# Patient Record
Sex: FEMALE | Race: WHITE | NOT HISPANIC OR LATINO | Employment: OTHER | URBAN - METROPOLITAN AREA
[De-identification: names, ages, dates, MRNs, and addresses within clinical notes are randomized per-mention and may not be internally consistent; named-entity substitution may affect disease eponyms.]

---

## 2017-01-24 ENCOUNTER — TRANSCRIBE ORDERS (OUTPATIENT)
Dept: ADMINISTRATIVE | Facility: HOSPITAL | Age: 67
End: 2017-01-24

## 2017-01-24 DIAGNOSIS — K57.30 DIVERTICULOSIS OF LARGE INTESTINE WITHOUT DIVERTICULITIS: ICD-10-CM

## 2017-01-24 DIAGNOSIS — R10.11 ABDOMINAL PAIN, RIGHT UPPER QUADRANT: Primary | ICD-10-CM

## 2017-01-24 DIAGNOSIS — R19.7 DIARRHEA, UNSPECIFIED TYPE: ICD-10-CM

## 2017-01-24 DIAGNOSIS — R10.31 ABDOMINAL PAIN, RIGHT LOWER QUADRANT: ICD-10-CM

## 2017-01-24 DIAGNOSIS — K57.32 DIVERTICULITIS OF COLON (WITHOUT MENTION OF HEMORRHAGE)(562.11): ICD-10-CM

## 2017-01-27 ENCOUNTER — TRANSCRIBE ORDERS (OUTPATIENT)
Dept: ADMINISTRATIVE | Facility: HOSPITAL | Age: 67
End: 2017-01-27

## 2017-01-27 ENCOUNTER — APPOINTMENT (OUTPATIENT)
Dept: LAB | Facility: HOSPITAL | Age: 67
End: 2017-01-27
Payer: MEDICARE

## 2017-01-27 DIAGNOSIS — K57.32 DIVERTICULITIS OF COLON (WITHOUT MENTION OF HEMORRHAGE)(562.11): Primary | ICD-10-CM

## 2017-01-27 DIAGNOSIS — Z79.899 ENCOUNTER FOR LONG-TERM (CURRENT) USE OF OTHER MEDICATIONS: Primary | ICD-10-CM

## 2017-01-27 LAB
ALBUMIN SERPL BCP-MCNC: 3.5 G/DL (ref 3.5–5)
ALP SERPL-CCNC: 68 U/L (ref 46–116)
ALT SERPL W P-5'-P-CCNC: 17 U/L (ref 12–78)
ANION GAP SERPL CALCULATED.3IONS-SCNC: 9 MMOL/L (ref 4–13)
AST SERPL W P-5'-P-CCNC: 13 U/L (ref 5–45)
BILIRUB SERPL-MCNC: 0.3 MG/DL (ref 0.2–1)
BUN SERPL-MCNC: 25 MG/DL (ref 5–25)
CALCIUM SERPL-MCNC: 8.7 MG/DL (ref 8.3–10.1)
CHLORIDE SERPL-SCNC: 103 MMOL/L (ref 100–108)
CO2 SERPL-SCNC: 28 MMOL/L (ref 21–32)
CREAT SERPL-MCNC: 0.83 MG/DL (ref 0.6–1.3)
GFR SERPL CREATININE-BSD FRML MDRD: >60 ML/MIN/1.73SQ M
GLUCOSE SERPL-MCNC: 87 MG/DL (ref 65–140)
POTASSIUM SERPL-SCNC: 3.8 MMOL/L (ref 3.5–5.3)
PROT SERPL-MCNC: 7.1 G/DL (ref 6.4–8.2)
SODIUM SERPL-SCNC: 140 MMOL/L (ref 136–145)

## 2017-01-27 PROCEDURE — 36415 COLL VENOUS BLD VENIPUNCTURE: CPT | Performed by: NURSE PRACTITIONER

## 2017-01-27 PROCEDURE — 80053 COMPREHEN METABOLIC PANEL: CPT | Performed by: NURSE PRACTITIONER

## 2017-01-30 ENCOUNTER — HOSPITAL ENCOUNTER (OUTPATIENT)
Dept: RADIOLOGY | Facility: HOSPITAL | Age: 67
Discharge: HOME | End: 2017-01-30
Attending: INTERNAL MEDICINE
Payer: MEDICARE

## 2017-01-30 DIAGNOSIS — K57.30 DIVERTICULOSIS OF LARGE INTESTINE WITHOUT DIVERTICULITIS: ICD-10-CM

## 2017-01-30 DIAGNOSIS — R19.7 DIARRHEA, UNSPECIFIED TYPE: ICD-10-CM

## 2017-01-30 DIAGNOSIS — R10.11 ABDOMINAL PAIN, RIGHT UPPER QUADRANT: ICD-10-CM

## 2017-01-30 DIAGNOSIS — R10.31 ABDOMINAL PAIN, RIGHT LOWER QUADRANT: ICD-10-CM

## 2017-01-30 DIAGNOSIS — K57.32 DIVERTICULITIS OF COLON (WITHOUT MENTION OF HEMORRHAGE)(562.11): ICD-10-CM

## 2017-01-30 PROCEDURE — 74177 CT ABD & PELVIS W/CONTRAST: CPT

## 2017-01-30 RX ADMIN — IOHEXOL 100 ML: 350 INJECTION, SOLUTION INTRAVENOUS at 10:27

## 2017-02-20 ENCOUNTER — ALLSCRIPTS OFFICE VISIT (OUTPATIENT)
Dept: OTHER | Facility: OTHER | Age: 67
End: 2017-02-20

## 2017-03-27 ENCOUNTER — GENERIC CONVERSION - ENCOUNTER (OUTPATIENT)
Dept: OTHER | Facility: OTHER | Age: 67
End: 2017-03-27

## 2017-04-13 ENCOUNTER — GENERIC CONVERSION - ENCOUNTER (OUTPATIENT)
Dept: OTHER | Facility: OTHER | Age: 67
End: 2017-04-13

## 2017-09-16 ENCOUNTER — GENERIC CONVERSION - ENCOUNTER (OUTPATIENT)
Dept: OTHER | Facility: OTHER | Age: 67
End: 2017-09-16

## 2017-10-29 ENCOUNTER — HOSPITAL ENCOUNTER (INPATIENT)
Facility: HOSPITAL | Age: 67
LOS: 1 days | Discharge: HOME | DRG: 305 | End: 2017-10-31
Attending: FAMILY MEDICINE | Admitting: INTERNAL MEDICINE
Payer: MEDICARE

## 2017-10-29 ENCOUNTER — APPOINTMENT (EMERGENCY)
Dept: RADIOLOGY | Facility: HOSPITAL | Age: 67
DRG: 305 | End: 2017-10-29
Payer: MEDICARE

## 2017-10-29 DIAGNOSIS — I10 HTN (HYPERTENSION): Primary | ICD-10-CM

## 2017-10-29 DIAGNOSIS — I16.0 HYPERTENSIVE URGENCY: ICD-10-CM

## 2017-10-29 DIAGNOSIS — R51.9 HEADACHE: ICD-10-CM

## 2017-10-29 PROBLEM — E03.9 HYPOTHYROIDISM: Status: ACTIVE | Noted: 2017-10-29

## 2017-10-29 LAB
ALBUMIN SERPL BCP-MCNC: 3.7 G/DL (ref 3.5–5)
ALP SERPL-CCNC: 79 U/L (ref 46–116)
ALT SERPL W P-5'-P-CCNC: 34 U/L (ref 12–78)
ANION GAP SERPL CALCULATED.3IONS-SCNC: 9 MMOL/L (ref 4–13)
APTT PPP: 28 SECONDS (ref 24–33)
AST SERPL W P-5'-P-CCNC: 24 U/L (ref 5–45)
BACTERIA UR QL AUTO: ABNORMAL /HPF
BASOPHILS # BLD AUTO: 0 THOUSANDS/ΜL (ref 0–0.1)
BASOPHILS NFR BLD AUTO: 1 % (ref 0–1)
BILIRUB SERPL-MCNC: 0.4 MG/DL (ref 0.2–1)
BILIRUB UR QL STRIP: NEGATIVE
BUN SERPL-MCNC: 12 MG/DL (ref 5–25)
CALCIUM SERPL-MCNC: 9.5 MG/DL (ref 8.3–10.1)
CHLORIDE SERPL-SCNC: 101 MMOL/L (ref 100–108)
CLARITY UR: CLEAR
CO2 SERPL-SCNC: 29 MMOL/L (ref 21–32)
COLOR UR: YELLOW
CREAT SERPL-MCNC: 0.73 MG/DL (ref 0.6–1.3)
CRP SERPL HS-MCNC: 2.27 MG/L
EOSINOPHIL # BLD AUTO: 0.2 THOUSAND/ΜL (ref 0–0.61)
EOSINOPHIL NFR BLD AUTO: 4 % (ref 0–6)
ERYTHROCYTE [DISTWIDTH] IN BLOOD BY AUTOMATED COUNT: 13 % (ref 11.6–15.1)
ERYTHROCYTE [SEDIMENTATION RATE] IN BLOOD: 13 MM/HOUR (ref 2–25)
GFR SERPL CREATININE-BSD FRML MDRD: 84 ML/MIN/1.73SQ M
GLUCOSE SERPL-MCNC: 91 MG/DL (ref 65–140)
GLUCOSE UR STRIP-MCNC: NEGATIVE MG/DL
HCT VFR BLD AUTO: 43.8 % (ref 37–47)
HGB BLD-MCNC: 14.7 G/DL (ref 12–16)
HGB UR QL STRIP.AUTO: ABNORMAL
INR PPP: 0.94 (ref 0.86–1.16)
KETONES UR STRIP-MCNC: NEGATIVE MG/DL
LEUKOCYTE ESTERASE UR QL STRIP: NEGATIVE
LYMPHOCYTES # BLD AUTO: 1.2 THOUSANDS/ΜL (ref 0.6–4.47)
LYMPHOCYTES NFR BLD AUTO: 26 % (ref 14–44)
MAGNESIUM SERPL-MCNC: 1.8 MG/DL (ref 1.6–2.6)
MCH RBC QN AUTO: 32.4 PG (ref 27–31)
MCHC RBC AUTO-ENTMCNC: 33.6 G/DL (ref 31.4–37.4)
MCV RBC AUTO: 96 FL (ref 82–98)
MONOCYTES # BLD AUTO: 0.4 THOUSAND/ΜL (ref 0.17–1.22)
MONOCYTES NFR BLD AUTO: 9 % (ref 4–12)
MUCOUS THREADS UR QL AUTO: ABNORMAL
NEUTROPHILS # BLD AUTO: 2.8 THOUSANDS/ΜL (ref 1.85–7.62)
NEUTS SEG NFR BLD AUTO: 60 % (ref 43–75)
NITRITE UR QL STRIP: NEGATIVE
NON-SQ EPI CELLS URNS QL MICRO: ABNORMAL /HPF
NRBC BLD AUTO-RTO: 0 /100 WBCS
PH UR STRIP.AUTO: 6.5 [PH] (ref 5–9)
PLATELET # BLD AUTO: 361 THOUSANDS/UL (ref 130–400)
PMV BLD AUTO: 7.4 FL (ref 8.9–12.7)
POTASSIUM SERPL-SCNC: 3.7 MMOL/L (ref 3.5–5.3)
PROT SERPL-MCNC: 7.6 G/DL (ref 6.4–8.2)
PROT UR STRIP-MCNC: NEGATIVE MG/DL
PROTHROMBIN TIME: 9.9 SECONDS (ref 9.4–11.7)
RBC # BLD AUTO: 4.55 MILLION/UL (ref 4.2–5.4)
RBC #/AREA URNS AUTO: ABNORMAL /HPF
SODIUM SERPL-SCNC: 139 MMOL/L (ref 136–145)
SP GR UR STRIP.AUTO: 1.01 (ref 1–1.03)
TROPONIN I SERPL-MCNC: <0.02 NG/ML
TROPONIN I SERPL-MCNC: <0.02 NG/ML
UROBILINOGEN UR QL STRIP.AUTO: 0.2 E.U./DL
WBC # BLD AUTO: 4.6 THOUSAND/UL (ref 4.8–10.8)
WBC #/AREA URNS AUTO: ABNORMAL /HPF

## 2017-10-29 PROCEDURE — 84484 ASSAY OF TROPONIN QUANT: CPT | Performed by: FAMILY MEDICINE

## 2017-10-29 PROCEDURE — 85025 COMPLETE CBC W/AUTO DIFF WBC: CPT | Performed by: PHYSICIAN ASSISTANT

## 2017-10-29 PROCEDURE — 96374 THER/PROPH/DIAG INJ IV PUSH: CPT

## 2017-10-29 PROCEDURE — 86141 C-REACTIVE PROTEIN HS: CPT | Performed by: PHYSICIAN ASSISTANT

## 2017-10-29 PROCEDURE — 85610 PROTHROMBIN TIME: CPT | Performed by: PHYSICIAN ASSISTANT

## 2017-10-29 PROCEDURE — 70450 CT HEAD/BRAIN W/O DYE: CPT

## 2017-10-29 PROCEDURE — 96375 TX/PRO/DX INJ NEW DRUG ADDON: CPT

## 2017-10-29 PROCEDURE — 71020 HB CHEST X-RAY 2VW FRONTAL&LATL: CPT

## 2017-10-29 PROCEDURE — 36415 COLL VENOUS BLD VENIPUNCTURE: CPT | Performed by: PHYSICIAN ASSISTANT

## 2017-10-29 PROCEDURE — 99285 EMERGENCY DEPT VISIT HI MDM: CPT

## 2017-10-29 PROCEDURE — 83735 ASSAY OF MAGNESIUM: CPT | Performed by: PHYSICIAN ASSISTANT

## 2017-10-29 PROCEDURE — 96361 HYDRATE IV INFUSION ADD-ON: CPT

## 2017-10-29 PROCEDURE — 87081 CULTURE SCREEN ONLY: CPT | Performed by: FAMILY MEDICINE

## 2017-10-29 PROCEDURE — 85730 THROMBOPLASTIN TIME PARTIAL: CPT | Performed by: PHYSICIAN ASSISTANT

## 2017-10-29 PROCEDURE — 80053 COMPREHEN METABOLIC PANEL: CPT | Performed by: PHYSICIAN ASSISTANT

## 2017-10-29 PROCEDURE — 81001 URINALYSIS AUTO W/SCOPE: CPT | Performed by: PHYSICIAN ASSISTANT

## 2017-10-29 PROCEDURE — 85652 RBC SED RATE AUTOMATED: CPT | Performed by: PHYSICIAN ASSISTANT

## 2017-10-29 RX ORDER — ASPIRIN 81 MG/1
81 TABLET ORAL DAILY
COMMUNITY
End: 2018-05-25 | Stop reason: ALTCHOICE

## 2017-10-29 RX ORDER — MULTIVITAMIN
1 TABLET ORAL DAILY
COMMUNITY
End: 2018-04-17

## 2017-10-29 RX ORDER — DIPHENHYDRAMINE HYDROCHLORIDE 50 MG/ML
25 INJECTION INTRAMUSCULAR; INTRAVENOUS ONCE
Status: COMPLETED | OUTPATIENT
Start: 2017-10-29 | End: 2017-10-29

## 2017-10-29 RX ORDER — LEVOTHYROXINE SODIUM 0.1 MG/1
100 TABLET ORAL
Status: DISCONTINUED | OUTPATIENT
Start: 2017-10-30 | End: 2017-10-30

## 2017-10-29 RX ORDER — AMLODIPINE BESYLATE 10 MG/1
10 TABLET ORAL DAILY
Status: DISCONTINUED | OUTPATIENT
Start: 2017-10-30 | End: 2017-10-29

## 2017-10-29 RX ORDER — METOCLOPRAMIDE HYDROCHLORIDE 5 MG/ML
10 INJECTION INTRAMUSCULAR; INTRAVENOUS ONCE
Status: COMPLETED | OUTPATIENT
Start: 2017-10-29 | End: 2017-10-29

## 2017-10-29 RX ORDER — LEVOTHYROXINE SODIUM 0.1 MG/1
100 TABLET ORAL DAILY
COMMUNITY
End: 2017-10-31 | Stop reason: HOSPADM

## 2017-10-29 RX ORDER — ALPRAZOLAM 0.25 MG/1
0.25 TABLET ORAL AS NEEDED
COMMUNITY
End: 2019-03-14 | Stop reason: SDUPTHER

## 2017-10-29 RX ORDER — ALPRAZOLAM 0.25 MG/1
0.25 TABLET ORAL 2 TIMES DAILY PRN
Status: DISCONTINUED | OUTPATIENT
Start: 2017-10-29 | End: 2017-10-31 | Stop reason: HOSPADM

## 2017-10-29 RX ORDER — ACETAMINOPHEN 325 MG/1
650 TABLET ORAL EVERY 6 HOURS PRN
Status: DISCONTINUED | OUTPATIENT
Start: 2017-10-29 | End: 2017-10-31 | Stop reason: HOSPADM

## 2017-10-29 RX ORDER — MULTIVITAMIN WITH IRON
250 TABLET ORAL DAILY
COMMUNITY
End: 2018-05-25 | Stop reason: ALTCHOICE

## 2017-10-29 RX ORDER — ASPIRIN 81 MG/1
81 TABLET ORAL DAILY
Status: DISCONTINUED | OUTPATIENT
Start: 2017-10-30 | End: 2017-10-31 | Stop reason: HOSPADM

## 2017-10-29 RX ORDER — AMLODIPINE BESYLATE 5 MG/1
5 TABLET ORAL DAILY
Status: DISCONTINUED | OUTPATIENT
Start: 2017-10-30 | End: 2017-10-29

## 2017-10-29 RX ORDER — MOEXIPRIL HYDROCHLORIDE AND HYDROCHLOROTHIAZIDE 15; 25 MG/1; MG/1
1 TABLET, FILM COATED ORAL DAILY
COMMUNITY
End: 2017-10-31 | Stop reason: HOSPADM

## 2017-10-29 RX ORDER — ACETAMINOPHEN 325 MG/1
650 TABLET ORAL ONCE
Status: COMPLETED | OUTPATIENT
Start: 2017-10-29 | End: 2017-10-29

## 2017-10-29 RX ORDER — METOPROLOL TARTRATE 5 MG/5ML
5 INJECTION INTRAVENOUS ONCE
Status: COMPLETED | OUTPATIENT
Start: 2017-10-29 | End: 2017-10-29

## 2017-10-29 RX ADMIN — Medication 400 MG: at 17:58

## 2017-10-29 RX ADMIN — DIPHENHYDRAMINE HYDROCHLORIDE 25 MG: 50 INJECTION, SOLUTION INTRAMUSCULAR; INTRAVENOUS at 11:15

## 2017-10-29 RX ADMIN — SODIUM CHLORIDE 500 ML: 0.9 INJECTION, SOLUTION INTRAVENOUS at 11:15

## 2017-10-29 RX ADMIN — ACETAMINOPHEN 650 MG: 325 TABLET, FILM COATED ORAL at 11:19

## 2017-10-29 RX ADMIN — METOPROLOL TARTRATE 5 MG: 5 INJECTION INTRAVENOUS at 11:18

## 2017-10-29 RX ADMIN — ALPRAZOLAM 0.25 MG: 0.25 TABLET ORAL at 21:40

## 2017-10-29 RX ADMIN — METOCLOPRAMIDE 10 MG: 5 INJECTION, SOLUTION INTRAMUSCULAR; INTRAVENOUS at 11:25

## 2017-10-29 NOTE — ED PROVIDER NOTES
History  Chief Complaint   Patient presents with    Headache     Pt reports she started with a headache yesterday, today noticed her BP was high  The patient is a 29-year-old female who presents for evaluation of headache  The patient states headache developed 1-2 days ago  The patient does have a history of migraines however feels different  The patient believes this is related to her hypertension  Patient states that she has a history of and renal gland tumor but no longer follows up with the endocrinologist and is currently waiting for a new appointment with a new endocrinologist   Patient states that she gets these hypertensive attacks every couple months  She does take her antihypertensive medications as prescribed  Denies any falls or trauma  Denies any blurry vision  Headache is generalized and vague  Also complains of bilateral ear pain  Denies any neck pain fever, chills, difficulty breathing, chest pain, shortness of breath, blurred vision  Headache   Associated symptoms: no eye pain, no fatigue, no myalgias, no photophobia, no seizures, no sore throat and no weakness        Prior to Admission Medications   Prescriptions Last Dose Informant Patient Reported? Taking?    ALPRAZolam (XANAX) 0 25 mg tablet 10/28/2017 at Unknown time  Yes Yes   Sig: Take 0 25 mg by mouth as needed for anxiety   Magnesium 250 MG TABS 10/28/2017 at Unknown time  Yes Yes   Sig: Take 250 mg by mouth daily   Multiple Vitamin (MULTIVITAMIN) tablet 10/28/2017 at Unknown time  Yes Yes   Sig: Take 1 tablet by mouth daily   aspirin (ECOTRIN LOW STRENGTH) 81 mg EC tablet 10/28/2017 at Unknown time  Yes Yes   Sig: Take 81 mg by mouth daily   levothyroxine 100 mcg tablet 10/29/2017 at Unknown time  Yes Yes   Sig: Take 100 mcg by mouth daily   moexipril-hydrochlorothiazide (UNIRETIC) 15-25 MG per tablet 10/29/2017 at Unknown time  Yes Yes   Sig: Take 1 tablet by mouth daily      Facility-Administered Medications: None Past Medical History:   Diagnosis Date    Disease of thyroid gland     Hypertension        Past Surgical History:   Procedure Laterality Date    APPENDECTOMY       SECTION      CHOLECYSTECTOMY         History reviewed  No pertinent family history  I have reviewed and agree with the history as documented  Social History   Substance Use Topics    Smoking status: Former Smoker    Smokeless tobacco: Never Used    Alcohol use Yes      Comment: occasional        Review of Systems   Constitutional: Negative for activity change, appetite change and fatigue  HENT: Negative for nosebleeds, sneezing, sore throat, trouble swallowing and voice change  Eyes: Negative for photophobia, pain and visual disturbance  Respiratory: Negative for apnea, choking and stridor  Cardiovascular: Negative for palpitations and leg swelling  Gastrointestinal: Negative for anal bleeding and constipation  Endocrine: Negative for cold intolerance, heat intolerance, polydipsia and polyphagia  Genitourinary: Negative for decreased urine volume, enuresis, frequency, genital sores and urgency  Musculoskeletal: Negative for joint swelling and myalgias  Allergic/Immunologic: Negative for environmental allergies and food allergies  Neurological: Positive for headaches  Negative for tremors, seizures, speech difficulty and weakness  Hematological: Negative for adenopathy  Psychiatric/Behavioral: Negative for behavioral problems, decreased concentration, dysphoric mood and hallucinations         Physical Exam  ED Triage Vitals   Temp Pulse Respirations Blood Pressure SpO2   -- 10/29/17 1010 10/29/17 1010 10/29/17 1010 10/29/17 1010    77 18 (!) 214/116 96 %      Temp src Heart Rate Source Patient Position - Orthostatic VS BP Location FiO2 (%)   -- 10/29/17 1010 10/29/17 1010 10/29/17 1115 --    Monitor Sitting Left arm       Pain Score       10/29/17 1010       Worst Possible Pain           Orthostatic Vital Signs  Vitals:    10/29/17 1115 10/29/17 1145 10/29/17 1208 10/29/17 1301   BP: (!) 213/100 (!) 171/96 (!) 185/84 151/91   Pulse: 71 62 57    Patient Position - Orthostatic VS: Sitting  Sitting        Physical Exam   Constitutional: She is oriented to person, place, and time  She appears well-developed and well-nourished  No distress  HENT:   Head: Normocephalic and atraumatic  Right Ear: External ear normal    Left Ear: External ear normal    Nose: Nose normal    Mouth/Throat: Oropharynx is clear and moist    Eyes: Conjunctivae and EOM are normal  Pupils are equal, round, and reactive to light  Neck: Normal range of motion  Neck supple  Cardiovascular: Normal rate, regular rhythm and normal heart sounds  Exam reveals no gallop and no friction rub  No murmur heard  Pulmonary/Chest: Effort normal and breath sounds normal  No respiratory distress  She has no wheezes  Abdominal: Soft  Bowel sounds are normal    Neurological: She is alert and oriented to person, place, and time  No cranial nerve deficit  Skin: Skin is warm and dry  She is not diaphoretic  Psychiatric: She has a normal mood and affect  Her behavior is normal    Vitals reviewed        ED Medications  Medications   sodium chloride 0 9 % bolus 500 mL (500 mL Intravenous New Bag 10/29/17 1115)   acetaminophen (TYLENOL) tablet 650 mg (650 mg Oral Given 10/29/17 1119)   metoclopramide (REGLAN) injection 10 mg (10 mg Intravenous Given 10/29/17 1125)   diphenhydrAMINE (BENADRYL) injection 25 mg (25 mg Intravenous Given 10/29/17 1115)   metoprolol (LOPRESSOR) injection 5 mg (5 mg Intravenous Given 10/29/17 1118)       Diagnostic Studies  Results Reviewed     Procedure Component Value Units Date/Time    Sedimentation rate, automated [[id number]]  (Normal) Collected:  10/29/17 1115    Lab Status:  Final result Specimen:  Blood from Arm, Right Updated:  10/29/17 1226     Sed Rate 13 mm/hour     Troponin I [[id number]]  (Normal) Collected:  10/29/17 1115    Lab Status:  Final result Specimen:  Blood from Arm, Right Updated:  10/29/17 1200     Troponin I <0 02 ng/mL     Narrative:         [employer] Chemistry analyzer 99% cutoff is > 0 04 ng/mL in network labs    o cTnI 99% cutoff is useful only when applied to patients in the clinical setting of myocardial ischemia  o cTnI 99% cutoff should be interpreted in the context of clinical history, ECG findings and possibly cardiac imaging to establish correct diagnosis  o cTnI 99% cutoff may be suggestive but clearly not indicative of a coronary event without the clinical setting of myocardial ischemia  Comprehensive metabolic panel [[id number]] Collected:  10/29/17 1115    Lab Status:  Final result Specimen:  Blood from Arm, Right Updated:  10/29/17 1155     Sodium 139 mmol/L      Potassium 3 7 mmol/L      Chloride 101 mmol/L      CO2 29 mmol/L      Anion Gap 9 mmol/L      BUN 12 mg/dL      Creatinine 0 73 mg/dL      Glucose 91 mg/dL      Calcium 9 5 mg/dL      AST 24 U/L      ALT 34 U/L      Alkaline Phosphatase 79 U/L      Total Protein 7 6 g/dL      Albumin 3 7 g/dL      Total Bilirubin 0 40 mg/dL      eGFR 84 ml/min/1 73sq m     Narrative:         National Kidney Disease Education Program recommendations are as follows:  GFR calculation is accurate only with a steady state creatinine  Chronic Kidney disease less than 60 ml/min/1 73 sq  meters  Kidney failure less than 15 ml/min/1 73 sq  meters      Magnesium [[id number]]  (Normal) Collected:  10/29/17 1115    Lab Status:  Final result Specimen:  Blood from Arm, Right Updated:  10/29/17 1155     Magnesium 1 8 mg/dL     Protime-INR [[id number]]  (Normal) Collected:  10/29/17 1115    Lab Status:  Final result Specimen:  Blood from Arm, Right Updated:  10/29/17 1151     Protime 9 9 seconds      INR 0 94    APTT [[id number]]  (Normal) Collected:  10/29/17 1115    Lab Status:  Final result Specimen:  Blood from Arm, Right Updated:  10/29/17 1150     PTT 28 seconds Narrative: Therapeutic Heparin Range = 60-90 seconds    CBC and differential [[id number]]  (Abnormal) Collected:  10/29/17 1115    Lab Status:  Final result Specimen:  Blood from Arm, Right Updated:  10/29/17 1133     WBC 4 60 (L) Thousand/uL      RBC 4 55 Million/uL      Hemoglobin 14 7 g/dL      Hematocrit 43 8 %      MCV 96 fL      MCH 32 4 (H) pg      MCHC 33 6 g/dL      RDW 13 0 %      MPV 7 4 (L) fL      Platelets 404 Thousands/uL      nRBC 0 /100 WBCs      Neutrophils Relative 60 %      Lymphocytes Relative 26 %      Monocytes Relative 9 %      Eosinophils Relative 4 %      Basophils Relative 1 %      Neutrophils Absolute 2 80 Thousands/µL      Lymphocytes Absolute 1 20 Thousands/µL      Monocytes Absolute 0 40 Thousand/µL      Eosinophils Absolute 0 20 Thousand/µL      Basophils Absolute 0 00 Thousands/µL     High sensitivity CRP [[id number]] Collected:  10/29/17 1115    Lab Status: In process Specimen:  Blood from Arm, Right Updated:  10/29/17 1130    UA w Reflex to Microscopic w Reflex to Culture [[id number]]     Lab Status:  No result Specimen:  Urine                  CT head wo contrast   Final Result by Laurie Gunn MD (10/29 1245)      No acute intracranial abnormality  Workstation performed: [id number]         XR chest 2 views   Final Result by Rema Robertson MD (10/29 1214)      No active pulmonary disease           Workstation performed: [id number]                    Procedures  Procedures       Phone Contacts  ED Phone Contact    ED Course  ED Course                                MDM  CritCare Time    Disposition  Final diagnoses:   HTN (hypertension)   Headache     Time reflects when diagnosis was documented in both MDM as applicable and the Disposition within this note     Time User Action Codes Description Comment    10/29/2017  1:12 PM Ivananorbert Butcher HTN (hypertension)     10/29/2017  1:12 PM Jeannette Rooney Add [R51] Headache       ED Disposition     None Follow-up Information    None       Patient's Medications   Discharge Prescriptions    No medications on file     No discharge procedures on file      ED Provider  Electronically Signed by           Faustina Walters PA-C  10/29/17 7063

## 2017-10-29 NOTE — PLAN OF CARE
CARDIOVASCULAR - ADULT     Maintains optimal cardiac output and hemodynamic stability Progressing     Absence of cardiac dysrhythmias or at baseline rhythm Progressing        HEMATOLOGIC - ADULT     Maintains hematologic stability Progressing

## 2017-10-29 NOTE — H&P
History and Physical - [address] [place] [address] Internal Medicine    Patient Information: Kj Palacios 69 y o  female MRN: [id number]  Unit/Bed#: [facility] Encounter: [id number]  Admitting Physician: Domingo Baires DO  PCP: Chadd Rodas DO  Date of Admission:  10/29/17        Hospital Problem List:     Principal Problem:    Hypertensive urgency  Active Problems:    Hypertension    Headache    Hypothyroidism      Assessment/Plan:    1  Hypertensive urgency - her BP has improved to 134/81  Will continue her home medication Uniretic and monitor blood pressures  Since her blood pressure has significantly improved, will not start any other antihypertensives for now  Continue monitoring and if her blood pressure goes uncontrolled, will start patient on another antihypertensive agent  Will r/o renal artery stenosis with U/S  Advised patient to check her blood pressures on a regular basis at home and to set close follow-up appointments with her PCP for better management of her blood pressures  Check an echocardiogram in the a   and consult Cardiology    2  Headache -most likely due to uncontrolled blood pressures  This has resolved    3  Hypothyroidism -continue levothyroxine and obtain a repeat TSH level in the a m      4   Left adrenal adenoma - this was stable in size on CT scan in January of 2017  Patient advised to keep her appointment with Endocrinology in December as scheduled for further management        VTE Prophylaxis: Enoxaparin (Lovenox)  / sequential compression device   Code Status: Level 1 - Full Code    Anticipated Length of Stay:  Patient will be admitted on an Observation basis with an anticipated length of stay of 1 midnights  Total Time for Visit, including Counseling / Coordination of Care: 45 minutes  Greater than 50% of this total time spent on direct patient counseling and coordination of care      Chief Complaint:     Headache (Pt reports she started with a headache yesterday, today noticed her BP was high )    of Present Illness:    Nata Francis is a 69 y o  female who presents with complaints of headache and high blood pressure  Patient states that the headache started yesterday morning and she took NSAIDs and although her headache did somewhat subsided never went away and she had a headache today as well  Patient states that the headache was 10/10 in intensity but current knee pain free  She states that the headache was all over  She also vomited yesterday  States that her eyes felt weird but denies this currently  Patient states that her blood pressure at home was about 180/106  Patient states that she has had intermittent episodes of extremely high blood pressures about once every 2 months and these episodes last about 3 days  She is on Uniretic at home and states that she took an extra dose of it today  Patient states that she normally does not check her blood pressures at home unless she becomes symptomatic and it has been about 8 months and she followed up with PCP  Patient does have history of adrenal adenoma and as per endocrinology note on epic, her plasma metanephrines were abnormal but 24 hour urine studies were normal   She had A CT abdomen/pelvis in January of 2017 which showed a stable left adrenal adenoma  At patient has an appointment with a new endocrinologist in December  In the ER her initial blood pressure was noted to be 214/116 and she received 5 milligrams of IV Lopressor x1     Review of Systems:    Review of Systems   Constitutional: Negative for appetite change, chills and fever  HENT: Negative for congestion and trouble swallowing  Eyes: Positive for visual disturbance  Negative for photophobia  Respiratory: Negative for cough, chest tightness and shortness of breath  Cardiovascular: Negative for chest pain, palpitations and leg swelling  Gastrointestinal: Positive for vomiting  Negative for abdominal pain, blood in stool, diarrhea and nausea  Genitourinary: Negative for dysuria, frequency and hematuria  Musculoskeletal: Negative for neck stiffness  Skin: Negative for wound  Neurological: Positive for headaches  Negative for syncope, facial asymmetry, speech difficulty and weakness  Hematological: Does not bruise/bleed easily  Psychiatric/Behavioral: Negative for agitation  Past Medical and Surgical History:     Past Medical History:   Diagnosis Date    Disease of thyroid gland     Hypertension        Past Surgical History:   Procedure Laterality Date    APPENDECTOMY       SECTION      CHOLECYSTECTOMY         Meds/Allergies:    PTA meds:   Prior to Admission Medications   Prescriptions Last Dose Informant Patient Reported? Taking? ALPRAZolam (XANAX) 0 25 mg tablet 10/28/2017 at Unknown time  Yes Yes   Sig: Take 0 25 mg by mouth as needed for anxiety   Magnesium 250 MG TABS 10/28/2017 at Unknown time  Yes Yes   Sig: Take 250 mg by mouth daily   Multiple Vitamin (MULTIVITAMIN) tablet 10/28/2017 at Unknown time  Yes Yes   Sig: Take 1 tablet by mouth daily   aspirin (ECOTRIN LOW STRENGTH) 81 mg EC tablet 10/28/2017 at Unknown time  Yes Yes   Sig: Take 81 mg by mouth daily   levothyroxine 100 mcg tablet 10/29/2017 at Unknown time  Yes Yes   Sig: Take 100 mcg by mouth daily   moexipril-hydrochlorothiazide (UNIRETIC) 15-25 MG per tablet 10/29/2017 at Unknown time  Yes Yes   Sig: Take 1 tablet by mouth daily      Facility-Administered Medications: None       Allergies: No Known Allergies  History:     Marital Status: /Civil Union     Substance Use History:   History   Alcohol Use    Yes     Comment: occasional     History   Smoking Status    Former Smoker   Smokeless Tobacco    Never Used     History   Drug Use No       Family History:    History reviewed  No pertinent family history      Physical Exam:     Vitals:   Blood Pressure: 134/81 (10/29/17 1652)  Pulse: 64 (10/29/17 1652)  Temperature: 97 8 °F (36 6 °C) (10/29/17 1652)  Temp Source: Oral (10/29/17 1652)  Respirations: 18 (10/29/17 1652)  Height: 5' (152 4 cm) (10/29/17 1500)  Weight - Scale: 67 kg (147 lb 11 3 oz) (10/29/17 1500)  SpO2: 99 % (10/29/17 1652)    Physical Exam   Constitutional: She is oriented to person, place, and time  She appears well-developed and well-nourished  No distress  HENT:   Head: Normocephalic and atraumatic  Mouth/Throat: Oropharynx is clear and moist    Eyes: EOM are normal  Right eye exhibits no discharge  Left eye exhibits no discharge  No scleral icterus  Neck: Neck supple  No tracheal deviation present  Cardiovascular: Normal rate and regular rhythm  Pulmonary/Chest: Effort normal and breath sounds normal  No respiratory distress  She has no wheezes  She has no rales  Abdominal: Soft  Bowel sounds are normal  She exhibits no distension  There is no tenderness  Musculoskeletal:   No pitting edema noted   Neurological: She is alert and oriented to person, place, and time  No cranial nerve deficit  Skin: Skin is dry  She is not diaphoretic  Psychiatric: She has a normal mood and affect  Lab Results: I have personally reviewed pertinent reports  Results from last 7 days  Lab Units 10/29/17  1115   WBC Thousand/uL 4 60*   HEMOGLOBIN g/dL 14 7   HEMATOCRIT % 43 8   PLATELETS Thousands/uL 361   NEUTROS PCT % 60   LYMPHS PCT % 26   MONOS PCT % 9   EOS PCT % 4       Results from last 7 days  Lab Units 10/29/17  1115   SODIUM mmol/L 139   POTASSIUM mmol/L 3 7   CHLORIDE mmol/L 101   CO2 mmol/L 29   BUN mg/dL 12   CREATININE mg/dL 0 73   CALCIUM mg/dL 9 5   TOTAL PROTEIN g/dL 7 6   BILIRUBIN TOTAL mg/dL 0 40   ALK PHOS U/L 79   ALT U/L 34   AST U/L 24   GLUCOSE RANDOM mg/dL 91       Results from last 7 days  Lab Units 10/29/17  1115   INR  0 94       Imaging: I have personally reviewed pertinent reports        Xr Chest 2 Views    Result Date: 10/29/2017  Narrative: CHEST INDICATION:  Hypertension COMPARISON:  None VIEWS:  Frontal and lateral projections IMAGES:  2 FINDINGS:     Cardiomediastinal silhouette appears unremarkable  The lungs are clear  No pneumothorax or pleural effusion  Visualized osseous structures appear within normal limits for the patient's age  Impression: No active pulmonary disease  Workstation performed: [id number]     Ct Head Wo Contrast    Result Date: 10/29/2017  Narrative: CT BRAIN - WITHOUT CONTRAST INDICATION:  Headache COMPARISON:  CT head 3/12/2013 TECHNIQUE:  CT examination of the brain was performed  In addition to axial images, coronal reformatted images were created and submitted for interpretation  Radiation dose length product (DLP) for this visit:  1184 41 mGy-cm   This examination, like all CT scans performed in the [facility], was performed utilizing techniques to minimize radiation dose exposure, including the use of iterative reconstruction and automated exposure control  IMAGE QUALITY:  Diagnostic  FINDINGS:  PARENCHYMA:  No intracranial mass, mass effect or midline shift  No CT signs of acute infarction  There is no parenchymal hemorrhage  VENTRICLES AND EXTRA-AXIAL SPACES:  Normal for patient's age  VISUALIZED ORBITS AND PARANASAL SINUSES:  Unremarkable  CALVARIUM AND EXTRACRANIAL SOFT TISSUES:   Normal      Impression: No acute intracranial abnormality  Workstation performed: [id number]       CT head wo contrast   Final Result      No acute intracranial abnormality  Workstation performed: [id number]         XR chest 2 views   Final Result      No active pulmonary disease  Workstation performed: [id number]             EKG, Pathology, and Other Studies Reviewed on Admission:   · No EKG available    Allscripts Records Reviewed: Yes     ** Please Note: Dragon 360 Dictation voice to text software may have been used in the creation of this document   **

## 2017-10-30 ENCOUNTER — GENERIC CONVERSION - ENCOUNTER (OUTPATIENT)
Dept: OTHER | Facility: OTHER | Age: 67
End: 2017-10-30

## 2017-10-30 ENCOUNTER — APPOINTMENT (OUTPATIENT)
Dept: NON INVASIVE DIAGNOSTICS | Facility: HOSPITAL | Age: 67
DRG: 305 | End: 2017-10-30
Payer: MEDICARE

## 2017-10-30 LAB
ANION GAP SERPL CALCULATED.3IONS-SCNC: 8 MMOL/L (ref 4–13)
ATRIAL RATE: 59 BPM
BUN SERPL-MCNC: 13 MG/DL (ref 5–25)
CALCIUM SERPL-MCNC: 9.1 MG/DL (ref 8.3–10.1)
CHLORIDE SERPL-SCNC: 102 MMOL/L (ref 100–108)
CO2 SERPL-SCNC: 29 MMOL/L (ref 21–32)
CORTIS SERPL-MCNC: 27.7 UG/DL
CREAT SERPL-MCNC: 0.74 MG/DL (ref 0.6–1.3)
ERYTHROCYTE [DISTWIDTH] IN BLOOD BY AUTOMATED COUNT: 12.7 % (ref 11.6–15.1)
GFR SERPL CREATININE-BSD FRML MDRD: 83 ML/MIN/1.73SQ M
GLUCOSE SERPL-MCNC: 95 MG/DL (ref 65–140)
HCT VFR BLD AUTO: 41.1 % (ref 37–47)
HGB BLD-MCNC: 13.6 G/DL (ref 12–16)
MAGNESIUM SERPL-MCNC: 2 MG/DL (ref 1.6–2.6)
MCH RBC QN AUTO: 32 PG (ref 27–31)
MCHC RBC AUTO-ENTMCNC: 33.2 G/DL (ref 31.4–37.4)
MCV RBC AUTO: 96 FL (ref 82–98)
P AXIS: 76 DEGREES
PLATELET # BLD AUTO: 326 THOUSANDS/UL (ref 130–400)
PMV BLD AUTO: 7.2 FL (ref 8.9–12.7)
POTASSIUM SERPL-SCNC: 3.6 MMOL/L (ref 3.5–5.3)
PR INTERVAL: 182 MS
QRS AXIS: 79 DEGREES
QRSD INTERVAL: 82 MS
QT INTERVAL: 430 MS
QTC INTERVAL: 425 MS
RBC # BLD AUTO: 4.26 MILLION/UL (ref 4.2–5.4)
SODIUM SERPL-SCNC: 139 MMOL/L (ref 136–145)
T WAVE AXIS: 77 DEGREES
TSH SERPL DL<=0.05 MIU/L-ACNC: 0.15 UIU/ML (ref 0.36–3.74)
VENTRICULAR RATE: 59 BPM
WBC # BLD AUTO: 4.7 THOUSAND/UL (ref 4.8–10.8)

## 2017-10-30 PROCEDURE — 84439 ASSAY OF FREE THYROXINE: CPT | Performed by: INTERNAL MEDICINE

## 2017-10-30 PROCEDURE — 93005 ELECTROCARDIOGRAM TRACING: CPT | Performed by: FAMILY MEDICINE

## 2017-10-30 PROCEDURE — 84443 ASSAY THYROID STIM HORMONE: CPT | Performed by: FAMILY MEDICINE

## 2017-10-30 PROCEDURE — 83735 ASSAY OF MAGNESIUM: CPT | Performed by: FAMILY MEDICINE

## 2017-10-30 PROCEDURE — 84481 FREE ASSAY (FT-3): CPT | Performed by: INTERNAL MEDICINE

## 2017-10-30 PROCEDURE — 93306 TTE W/DOPPLER COMPLETE: CPT

## 2017-10-30 PROCEDURE — 85027 COMPLETE CBC AUTOMATED: CPT | Performed by: FAMILY MEDICINE

## 2017-10-30 PROCEDURE — 82533 TOTAL CORTISOL: CPT | Performed by: FAMILY MEDICINE

## 2017-10-30 PROCEDURE — 80048 BASIC METABOLIC PNL TOTAL CA: CPT | Performed by: FAMILY MEDICINE

## 2017-10-30 RX ORDER — LEVOTHYROXINE SODIUM 88 UG/1
88 TABLET ORAL
Status: DISCONTINUED | OUTPATIENT
Start: 2017-10-31 | End: 2017-10-31 | Stop reason: HOSPADM

## 2017-10-30 RX ORDER — PROPRANOLOL HYDROCHLORIDE 20 MG/1
10 TABLET ORAL EVERY 6 HOURS SCHEDULED
Status: DISCONTINUED | OUTPATIENT
Start: 2017-10-30 | End: 2017-10-31

## 2017-10-30 RX ADMIN — Medication 1 TABLET: at 09:12

## 2017-10-30 RX ADMIN — ACETAMINOPHEN 650 MG: 325 TABLET, FILM COATED ORAL at 22:28

## 2017-10-30 RX ADMIN — ASPIRIN 81 MG: 81 TABLET, COATED ORAL at 09:12

## 2017-10-30 RX ADMIN — ENOXAPARIN SODIUM 40 MG: 40 INJECTION SUBCUTANEOUS at 09:12

## 2017-10-30 RX ADMIN — Medication 400 MG: at 09:12

## 2017-10-30 RX ADMIN — ACETAMINOPHEN 650 MG: 325 TABLET, FILM COATED ORAL at 06:28

## 2017-10-30 RX ADMIN — LEVOTHYROXINE SODIUM 100 MCG: 100 TABLET ORAL at 06:28

## 2017-10-30 RX ADMIN — PROPRANOLOL HYDROCHLORIDE 10 MG: 20 TABLET ORAL at 23:01

## 2017-10-30 RX ADMIN — ALPRAZOLAM 0.25 MG: 0.25 TABLET ORAL at 23:01

## 2017-10-30 RX ADMIN — PROPRANOLOL HYDROCHLORIDE 10 MG: 20 TABLET ORAL at 18:19

## 2017-10-30 NOTE — SOCIAL WORK
DASH discussion completed  Discussed goals of making sure pt's needs are met upon discharge, pt's preferences are taken into account, pt understands her health condition, medications and symptoms to watch for after returning home and pt is aware of any follow up appointments recommended by hospital physician  SPOKE WITH THE PT AND SON AT BEDSIDE  PT STATED THAT SHE LIVES WITH HER  AND HAS NO DME OR HHC  PT DENIES ANY FURTHER NEEDS AT HOME AT THIS TIME   PT DOES DRIVE AND USES [facility] IN [place], [place]

## 2017-10-30 NOTE — CASE MANAGEMENT
Initial Clinical Review    Admission: Date/Time/Statement: 10/29/17 1314    Orders Placed This Encounter   Procedures    Place in Observation (expected length of stay for this patient is less than two midnights)     Standing Status:   Standing     Number of Occurrences:   1     Order Specific Question:   Admitting Physician     Answer:   Carli Huff [[id number]]     Order Specific Question:   Level of Care     Answer:   Med Surg [16]         ED: Date/Time/Mode of Arrival:   ED Arrival Information     Expected Arrival Acuity Means of Arrival Escorted By Service Admission Type    - 10/29/2017 10:04 Urgent Walk-In Family Member General Medicine Urgent    Arrival Complaint    HIGH BLOOD PRESSURE          Chief Complaint:   Chief Complaint   Patient presents with    Headache     Pt reports she started with a headache yesterday, today noticed her BP was high  History of Illness:  69 y o  female who presents with complaints of headache and high blood pressure  Patient states that the headache started yesterday morning and she took NSAIDs and although her headache did somewhat subsided never went away and she had a headache today as well  Patient states that the headache was 10/10 in intensity but current knee pain free  She states that the headache was all over  She also vomited yesterday  States that her eyes felt weird but denies this currently  Patient states that her blood pressure at home was about 180/106  Patient states that she has had intermittent episodes of extremely high blood pressures about once every 2 months and these episodes last about 3 days  She is on Uniretic at home and states that she took an extra dose of it today  Patient states that she normally does not check her blood pressures at home unless she becomes symptomatic and it has been about 8 months and she followed up with PCP    Patient does have history of adrenal adenoma and as per endocrinology note on epic, her plasma metanephrines were abnormal but 24 hour urine studies were normal   She had A CT abdomen/pelvis in January of 2017 which showed a stable left adrenal adenoma  At patient has an appointment with a new endocrinologist in December  In the ER her initial blood pressure was noted to be 214/116 and she received 5 milligrams of IV Lopressor x1     ED Vital Signs:   ED Triage Vitals   Temperature Pulse Respirations Blood Pressure SpO2   10/29/17 1414 10/29/17 1010 10/29/17 1010 10/29/17 1010 10/29/17 1010   97 5 °F (36 4 °C) 77 18 (!) 214/116 96 %      Temp Source Heart Rate Source Patient Position - Orthostatic VS BP Location FiO2 (%)   10/29/17 1414 10/29/17 1010 10/29/17 1010 10/29/17 1115 --   Tympanic Monitor Sitting Left arm       Pain Score       10/29/17 1010       Worst Possible Pain        Wt Readings from Last 1 Encounters:   10/29/17 67 kg (147 lb 11 3 oz)       Abnormal Labs/Diagnostic Test Results:   WBC Thousand/uL 4 60*   CT HEADNo acute intracranial abnormality  Final Result   No active pulmonary disease       ED Treatment:   Medication Administration from 10/29/2017 1004 to 10/29/2017 1424       Date/Time Order Dose Route Action Action by Comments     10/29/2017 1329 sodium chloride 0 9 % bolus 500 mL 0 mL Intravenous Stopped Yumiko L AMY Hernandez      10/29/2017 1115 sodium chloride 0 9 % bolus 500 mL 500 mL Intravenous New Bag Crystal L AMY Hernandez      10/29/2017 1119 acetaminophen (TYLENOL) tablet 650 mg 650 mg Oral Given Crystal L AMY Hernandez      10/29/2017 1125 metoclopramide (REGLAN) injection 10 mg 10 mg Intravenous Given Crystal L AMY Hernandez      10/29/2017 1115 diphenhydrAMINE (BENADRYL) injection 25 mg 25 mg Intravenous Given Crystal L AMY Hernandez      10/29/2017 1118 metoprolol (LOPRESSOR) injection 5 mg 5 mg Intravenous Given Crystal L AMY Hernandez           Past Medical/Surgical History:    Active Ambulatory Problems     Diagnosis Date Noted    No Active Ambulatory Problems     Resolved Ambulatory Problems     Diagnosis Date Noted    No Resolved Ambulatory Problems     Past Medical History:   Diagnosis Date    Disease of thyroid gland     Hypertension        Admitting Diagnosis: High blood pressure [I10]  HTN (hypertension) [I10]  Headache [R51]  Headache [R51]    Age/Sex: 69 y o  female    Assessment/Plan:   Principal Problem:    Hypertensive urgency  Active Problems:    Hypertension    Headache    Hypothyroidism  Assessment/Plan:  1  Hypertensive urgency - her BP has improved to 134/81  Will continue her home medication Uniretic and monitor blood pressures  Since her blood pressure has significantly improved, will not start any other antihypertensives for now  Continue monitoring and if her blood pressure goes uncontrolled, will start patient on another antihypertensive agent  Will r/o renal artery stenosis with U/S  Advised patient to check her blood pressures on a regular basis at home and to set close follow-up appointments with her PCP for better management of her blood pressures  Check an echocardiogram in the a m  and consult Cardiology  2  Headache -most likely due to uncontrolled blood pressures  This has resolved  3  Hypothyroidism -continue levothyroxine and obtain a repeat TSH level in the a m     4   Left adrenal adenoma - this was stable in size on CT scan in January of 2017    Patient advised to keep her appointment with Endocrinology in December as scheduled for further management           Admission Orders:  OBSERVATION  TELE MON  CONSULT CARDIO  VAS RENAL ARTERY   ECHO  Scheduled Meds:   aspirin 81 mg Oral Daily   enoxaparin 40 mg Subcutaneous Q24H [facility]   levothyroxine 100 mcg Oral Early Morning   magnesium oxide 400 mg Oral Daily   multivitamin-minerals 1 tablet Oral Daily     Continuous Infusions:    PRN Meds:   acetaminophen    ALPRAZolam

## 2017-10-30 NOTE — PROGRESS NOTES
[address] Internal Medicine Progress Note  Patient: Parish Ayala 69 y o  female   MRN: [id number]  PCP: Anthony Winters DO  Unit/Bed#: [address] [place] [address] Encounter: [id number]  Date Of Visit: 10/30/17    Problem List:    Principal Problem:    Hypertensive urgency  Active Problems:    Hypertension    Headache    Hypothyroidism      Assessment & Plan:    1  Hypertensive urgency-the patient has history of adrenal adenoma  Patient does have paroxysmal episodes of elevated blood pressures with headaches  Cannot rule out pheochromocytoma/hyper aldosteronism  Renal artery duplex pending to rule out renal artery stenosis  Patient will need outpatient follow-up with Endocrinology for further diagnosis and management  Patient will be added on propranolol and blood pressure will be monitored overnight  Echo showed EF of 55-60%, no regional wall motion abnormalities  2  Hypothyroidism-TSH level was low at 0 151  Will decrease Synthroid dose to 88 microgram p  o  daily  Patient will need repeat TSH in 6-8 weeks  3  Left adrenal adenoma-patient has a follow-up appointment with an endocrinologist in [facility] in December  Patient advised to follow up with endocrinologist closely to rule out pheochromocytoma/hyper aldosteronism  VTE Pharmacologic Prophylaxis:   Pharmacologic: Enoxaparin (Lovenox)  Mechanical VTE Prophylaxis in Place: Yes    Patient Centered Rounds: I have performed bedside rounds with nursing staff today  Discussions with Specialists or Other Care Team Provider: Edwina Reddy    Education and Discussions with Family / Patient:Yes    Time Spent for Care: 30 minutes  More than 50% of total time spent on counseling and coordination of care as described above  Current Length of Stay: 0 day(s)    Current Patient Status: Inpatient     Discharge Plan: Home    Code Status: Level 1 - Full Code      Subjective:   Patient still complaining of some headache    Patient denies any chest pain, palpitations, shortness of breath  Objective:         Negative for Chest Pain, Palpitations, Shortness of Breath, Abdominal Pain, Nausea, Vomiting, Constipation, Diarrhea, Dizziness  All other 10 review of systems negative and without drastic interval changes from yesterday  Vitals:   Temp (24hrs), Av 8 °F (36 6 °C), Min:97 6 °F (36 4 °C), Max:98 1 °F (36 7 °C)    HR:  [63-72] 68  Resp:  [18] 18  BP: (132-180)/(66-94) 180/92  SpO2:  [98 %-100 %] 98 %  Body mass index is 28 85 kg/m²  Input and Output Summary (last 24 hours):     No intake or output data in the 24 hours ending 10/30/17 1834    Physical Exam:     Physical Exam   Constitutional: No distress  HENT:   Head: Normocephalic and atraumatic  Nose: Nose normal    Eyes: Conjunctivae and EOM are normal  Pupils are equal, round, and reactive to light  Neck: Normal range of motion  Neck supple  No JVD present  Cardiovascular: Normal rate, regular rhythm and normal heart sounds  Exam reveals no gallop and no friction rub  No murmur heard  Pulmonary/Chest: Effort normal and breath sounds normal  No respiratory distress  She has no wheezes  She has no rales  She exhibits no tenderness  Abdominal: Soft  Bowel sounds are normal  She exhibits no distension  There is no tenderness  There is no rebound and no guarding  Musculoskeletal: She exhibits no edema  Neurological: She is alert  No cranial nerve deficit  Skin: Skin is warm and dry  No rash noted  Psychiatric: She has a normal mood and affect         Additional Data:     Labs:      Results from last 7 days  Lab Units 10/30/17  0648 10/29/17  1115   WBC Thousand/uL 4 70* 4 60*   HEMOGLOBIN g/dL 13 6 14 7   HEMATOCRIT % 41 1 43 8   PLATELETS Thousands/uL 326 361   NEUTROS PCT %  --  60   LYMPHS PCT %  --  26   MONOS PCT %  --  9   EOS PCT %  --  4       Results from last 7 days  Lab Units 10/30/17  0648 10/29/17  1115   SODIUM mmol/L 139 139   POTASSIUM mmol/L 3 6 3 7   CHLORIDE mmol/L 102 101 CO2 mmol/L 29 29   BUN mg/dL 13 12   CREATININE mg/dL 0 74 0 73   CALCIUM mg/dL 9 1 9 5   TOTAL PROTEIN g/dL  --  7 6   BILIRUBIN TOTAL mg/dL  --  0 40   ALK PHOS U/L  --  79   ALT U/L  --  34   AST U/L  --  24   GLUCOSE RANDOM mg/dL 95 91       Results from last 7 days  Lab Units 10/29/17  1115   INR  0 94       * I Have Reviewed All Lab Data Listed Above  * Additional Pertinent Lab Tests Reviewed: All Labs For Current Hospital Admission Reviewed    Imaging:  Xr Chest 2 Views    Result Date: 10/29/2017  Narrative: CHEST INDICATION:  Hypertension COMPARISON:  None VIEWS:  Frontal and lateral projections IMAGES:  2 FINDINGS:     Cardiomediastinal silhouette appears unremarkable  The lungs are clear  No pneumothorax or pleural effusion  Visualized osseous structures appear within normal limits for the patient's age  Impression: No active pulmonary disease  Workstation performed: [id number]     Ct Head Wo Contrast    Result Date: 10/29/2017  Narrative: CT BRAIN - WITHOUT CONTRAST INDICATION:  Headache COMPARISON:  CT head 3/12/2013 TECHNIQUE:  CT examination of the brain was performed  In addition to axial images, coronal reformatted images were created and submitted for interpretation  Radiation dose length product (DLP) for this visit:  1184 41 mGy-cm   This examination, like all CT scans performed in the [facility], was performed utilizing techniques to minimize radiation dose exposure, including the use of iterative reconstruction and automated exposure control  IMAGE QUALITY:  Diagnostic  FINDINGS:  PARENCHYMA:  No intracranial mass, mass effect or midline shift  No CT signs of acute infarction  There is no parenchymal hemorrhage  VENTRICLES AND EXTRA-AXIAL SPACES:  Normal for patient's age  VISUALIZED ORBITS AND PARANASAL SINUSES:  Unremarkable  CALVARIUM AND EXTRACRANIAL SOFT TISSUES:   Normal      Impression: No acute intracranial abnormality   Workstation performed: [id number]     Imaging Reports Reviewed by myself    Cultures:   Blood Culture: No results found for: BLOODCX  Urine Culture: No results found for: URINECX  Sputum Culture: No components found for: SPUTUMCX  Wound Culture: No results found for: WOUNDCULT    Last 24 Hours Medication List:     aspirin 81 mg Oral Daily   enoxaparin 40 mg Subcutaneous Q24H [facility]   levothyroxine 100 mcg Oral Early Morning   magnesium oxide 400 mg Oral Daily   multivitamin-minerals 1 tablet Oral Daily   propranolol 10 mg Oral Q6H [facility]        Today, Patient Was Seen By: Mandeep Galvez MD    ** Please Note: Dragon 360 Dictation voice to text software may have been used in the creation of this document   **

## 2017-10-30 NOTE — CONSULTS
CARDIOLOGY CONSULTATION  Priyanka Painting 69 y o  female MRN: [id number]  Unit/Bed#: [address] [address] Encounter: [id number]      History of Present Illness   Physician Requesting Consult: Glenn Alberto MD  Reason for Consult / Principal Problem: headaches    Assessment/Plan   1  Htn emergency- recommend starting on non-selective beta-blocker propanolol  Given her paroxysmal hypertension episodes, adrenal adenoma, prior elevated metanephrines possible pheochromocytoma? Renal artery ultrasound pending to rule out renal artery stenosis  Prior aldosterone levels have been normal  Plan to start on nonselective beta-blocker given her frequent migraines and continued uncontrolled blood pressure  Outpatient follow-up with Endocrinology  2  Adrenal adenoma-pending workup  3  Hypothyroid  4  Prior smoker      HPI: Priyanka Painting is a 69y o  year old female who presented to the emergency room with a blood pressure of 214/116 with complaints of severe migraine headaches  Patient reports having periodic surges in her blood pressure for the past 6-8 months  She has been previously worked up by Endocrinology for a stable left adrenal adenoma  EACs CT abdomen pelvis was completed in 2017  She reports having a prior history of hypertension but never uncontrolled  She denies having history prior CVA  She denies having history of myocardial infarctions she denies having any unilateral weakness, numbness or speech difficulty  She denies having any syncopal episodes  She has been compliant with her outpatient medications she denies having any recent fevers or chills  She denies having any history of seizures        Historical Information   Past Medical History:   Diagnosis Date    Disease of thyroid gland     Hypertension      Past Surgical History:   Procedure Laterality Date    APPENDECTOMY       SECTION      CHOLECYSTECTOMY       History   Alcohol Use    Yes     Comment: occasional     History Drug Use No     History   Smoking Status    Former Smoker   Smokeless Tobacco    Never Used     History reviewed  No pertinent family history  Meds/Allergies   Prior to Admission medications    Medication Sig Start Date End Date Taking? Authorizing Provider   ALPRAZolam Kandee Pierson) 0 25 mg tablet Take 0 25 mg by mouth as needed for anxiety   Yes Historical Provider, MD   aspirin (ECOTRIN LOW STRENGTH) 81 mg EC tablet Take 81 mg by mouth daily   Yes Historical Provider, MD   levothyroxine 100 mcg tablet Take 100 mcg by mouth daily   Yes Historical Provider, MD   Magnesium 250 MG TABS Take 250 mg by mouth daily   Yes Historical Provider, MD   moexipril-hydrochlorothiazide (UNIRETIC) 15-25 MG per tablet Take 1 tablet by mouth daily   Yes Historical Provider, MD   Multiple Vitamin (MULTIVITAMIN) tablet Take 1 tablet by mouth daily   Yes Historical Provider, MD     Current Facility-Administered Medications   Medication Dose Route Frequency Provider Last Rate Last Dose    acetaminophen (TYLENOL) tablet 650 mg  650 mg Oral Q6H PRN Shaina Revankar, DO   650 mg at 10/30/17 0628    ALPRAZolam (XANAX) tablet 0 25 mg  0 25 mg Oral BID PRN Shaina Revankar, DO   0 25 mg at 10/29/17 2140    aspirin (ECOTRIN LOW STRENGTH) EC tablet 81 mg  81 mg Oral Daily Shaina Revankar, DO   81 mg at 10/30/17 0912    enoxaparin (LOVENOX) subcutaneous injection 40 mg  40 mg Subcutaneous Q24H [address] Shaina Revankar, DO   40 mg at 10/30/17 0912    levothyroxine tablet 100 mcg  100 mcg Oral Early Morning Shaina Revankar, DO   100 mcg at 10/30/17 0628    magnesium oxide (MAG-OX) tablet 400 mg  400 mg Oral Daily Shaina Revankar, DO   400 mg at 10/30/17 0912    multivitamin-minerals (CENTRUM) tablet 1 tablet  1 tablet Oral Daily Shaina Revankar, DO   1 tablet at 10/30/17 0912     No Known Allergies    Review of systems  CONSTITUTIONAL:  No weight loss, fever, chills, weakness or fatigue    HEENT:  Eyes:  No visual loss, blurred vision, double vision or yellow sclerae  Ears, Nose, Throat:  No hearing loss, sneezing, congestion, runny nose or sore throat  SKIN:  No rash or itching  CARDIOVASCULAR:  As per history and physical  RESPIRATORY:  No shortness of breath, cough or sputum  GASTROINTESTINAL:  No anorexia, nausea, vomiting or diarrhea  No abdominal pain or blood  GENITOURINARY:  Burning on urination  No flank pain  NEUROLOGICAL:  History of periodic migraines  MUSCULOSKELETAL:  No muscle, back pain, joint pain or stiffness  HEMATOLOGIC:  No anemia, bleeding or bruising  LYMPHATICS:  No enlarged nodes  No history of splenectomy  PSYCHIATRIC:  No active suicidal or homicidal ideation  ENDOCRINOLOGIC:  No reports of sweating, cold or heat intolerance  No polyuria or polydipsia  ALLERGIES:  No history of asthma, hives, eczema or rhinitis  More than 10 systems reviewed and otherwise noncontributory  Objective   Vitals: Blood pressure 136/77, pulse 72, temperature 97 7 °F (36 5 °C), temperature source Oral, resp  rate 18, height 5' (1 524 m), weight 67 kg (147 lb 11 3 oz), SpO2 98 %  Physical Exam   Constitutional: She is oriented to person, place, and time  No distress  HENT:   Head: Normocephalic and atraumatic  Right Ear: External ear normal    Left Ear: External ear normal    Nose: Nose normal    Mouth/Throat: No oropharyngeal exudate  Eyes: Conjunctivae are normal  Pupils are equal, round, and reactive to light  Right eye exhibits no discharge  Left eye exhibits no discharge  No scleral icterus  Neck: Normal range of motion  No JVD present  No tracheal deviation present  No thyromegaly present  Cardiovascular: Normal rate, regular rhythm and intact distal pulses  Exam reveals gallop  Exam reveals no friction rub  No murmur heard  Pulmonary/Chest: Effort normal and breath sounds normal  No stridor  No respiratory distress  She has no wheezes  She has no rales  She exhibits no tenderness  Abdominal: Soft   Bowel sounds are normal  She exhibits no distension and no mass  There is no tenderness  There is no rebound and no guarding  Genitourinary:   Genitourinary Comments: No CVA tenderness   Musculoskeletal: She exhibits no edema, tenderness or deformity  Neurological: She is alert and oriented to person, place, and time  She displays normal reflexes  She exhibits normal muscle tone  Skin: Skin is warm and dry  No rash noted  She is not diaphoretic  No erythema  Psychiatric: She has a normal mood and affect  Her behavior is normal  Judgment and thought content normal    Nursing note and vitals reviewed      Recent Results (from the past 24 hour(s))   CBC and differential    Collection Time: 10/29/17 11:15 AM   Result Value Ref Range    WBC 4 60 (L) 4 80 - 10 80 Thousand/uL    RBC 4 55 4 20 - 5 40 Million/uL    Hemoglobin 14 7 12 0 - 16 0 g/dL    Hematocrit 43 8 37 0 - 47 0 %    MCV 96 82 - 98 fL    MCH 32 4 (H) 27 0 - 31 0 pg    MCHC 33 6 31 4 - 37 4 g/dL    RDW 13 0 11 6 - 15 1 %    MPV 7 4 (L) 8 9 - 12 7 fL    Platelets 958 947 - 393 Thousands/uL    nRBC 0 /100 WBCs    Neutrophils Relative 60 43 - 75 %    Lymphocytes Relative 26 14 - 44 %    Monocytes Relative 9 4 - 12 %    Eosinophils Relative 4 0 - 6 %    Basophils Relative 1 0 - 1 %    Neutrophils Absolute 2 80 1 85 - 7 62 Thousands/µL    Lymphocytes Absolute 1 20 0 60 - 4 47 Thousands/µL    Monocytes Absolute 0 40 0 17 - 1 22 Thousand/µL    Eosinophils Absolute 0 20 0 00 - 0 61 Thousand/µL    Basophils Absolute 0 00 0 00 - 0 10 Thousands/µL   Comprehensive metabolic panel    Collection Time: 10/29/17 11:15 AM   Result Value Ref Range    Sodium 139 136 - 145 mmol/L    Potassium 3 7 3 5 - 5 3 mmol/L    Chloride 101 100 - 108 mmol/L    CO2 29 21 - 32 mmol/L    Anion Gap 9 4 - 13 mmol/L    BUN 12 5 - 25 mg/dL    Creatinine 0 73 0 60 - 1 30 mg/dL    Glucose 91 65 - 140 mg/dL    Calcium 9 5 8 3 - 10 1 mg/dL    AST 24 5 - 45 U/L    ALT 34 12 - 78 U/L    Alkaline Phosphatase 79 46 - 116 U/L    Total Protein 7 6 6 4 - 8 2 g/dL    Albumin 3 7 3 5 - 5 0 g/dL    Total Bilirubin 0 40 0 20 - 1 00 mg/dL    eGFR 84 ml/min/1 73sq m   Sedimentation rate, automated    Collection Time: 10/29/17 11:15 AM   Result Value Ref Range    Sed Rate 13 2 - 25 mm/hour   Protime-INR    Collection Time: 10/29/17 11:15 AM   Result Value Ref Range    Protime 9 9 9 4 - 11 7 seconds    INR 0 94 0 86 - 1 16   APTT    Collection Time: 10/29/17 11:15 AM   Result Value Ref Range    PTT 28 24 - 33 seconds   Magnesium    Collection Time: 10/29/17 11:15 AM   Result Value Ref Range    Magnesium 1 8 1 6 - 2 6 mg/dL   High sensitivity CRP    Collection Time: 10/29/17 11:15 AM   Result Value Ref Range    CRP, High Sensitivity 2 27 mg/L   Troponin I    Collection Time: 10/29/17 11:15 AM   Result Value Ref Range    Troponin I <0 02 <=0 04 ng/mL   UA w Reflex to Microscopic w Reflex to Culture    Collection Time: 10/29/17  2:12 PM   Result Value Ref Range    Color, UA Yellow     Clarity, UA Clear     Specific [place], UA 1 015 1 000 - 1 030    pH, UA 6 5 5 0 - 9 0    Leukocytes, UA Negative Negative    Nitrite, UA Negative Negative    Protein, UA Negative Negative mg/dl    Glucose, UA Negative Negative mg/dl    Ketones, UA Negative Negative mg/dl    Urobilinogen, UA 0 2 0 2, 1 0 E U /dl E U /dl    Bilirubin, UA Negative Negative    Blood, UA Small (A) Negative   Urine Microscopic    Collection Time: 10/29/17  2:12 PM   Result Value Ref Range    RBC, UA 2-4 (A) None Seen, 0-5 /hpf    WBC, UA 0-1 (A) None Seen, 0-5, 5-55, 5-65 /hpf    Epithelial Cells Moderate (A) None Seen, Occasional /hpf    Bacteria, UA Occasional None Seen, Occasional /hpf    MUCOUS THREADS Occasional Occasional, Moderate, Innumerable   Troponin I    Collection Time: 10/29/17  6:03 PM   Result Value Ref Range    Troponin I <0 02 <=0 04 ng/mL   TSH, 3rd generation    Collection Time: 10/30/17  6:48 AM   Result Value Ref Range    TSH 3RD GENERATON 0 151 (L) 0 358 - 3 740 uIU/mL   Basic metabolic panel    Collection Time: 10/30/17  6:48 AM   Result Value Ref Range    Sodium 139 136 - 145 mmol/L    Potassium 3 6 3 5 - 5 3 mmol/L    Chloride 102 100 - 108 mmol/L    CO2 29 21 - 32 mmol/L    Anion Gap 8 4 - 13 mmol/L    BUN 13 5 - 25 mg/dL    Creatinine 0 74 0 60 - 1 30 mg/dL    Glucose 95 65 - 140 mg/dL    Calcium 9 1 8 3 - 10 1 mg/dL    eGFR 83 ml/min/1 73sq m   CBC    Collection Time: 10/30/17  6:48 AM   Result Value Ref Range    WBC 4 70 (L) 4 80 - 10 80 Thousand/uL    RBC 4 26 4 20 - 5 40 Million/uL    Hemoglobin 13 6 12 0 - 16 0 g/dL    Hematocrit 41 1 37 0 - 47 0 %    MCV 96 82 - 98 fL    MCH 32 0 (H) 27 0 - 31 0 pg    MCHC 33 2 31 4 - 37 4 g/dL    RDW 12 7 11 6 - 15 1 %    Platelets 749 931 - 511 Thousands/uL    MPV 7 2 (L) 8 9 - 12 7 fL   Magnesium    Collection Time: 10/30/17  6:48 AM   Result Value Ref Range    Magnesium 2 0 1 6 - 2 6 mg/dL     Imaging: I have personally reviewed pertinent films in PACS  EKG:  Sinus bradycardia no acute ST T wave changes      Counseling / Coordination of Care  Total floor / unit time spent today 70 minutes   Greater than fifty percent of time spent at bedside for coordination of care, patient counseling, history taking:  Hypertensive emergency management

## 2017-10-30 NOTE — PLAN OF CARE
Problem: DISCHARGE PLANNING - CARE MANAGEMENT  Goal: Discharge to post-acute care or home with appropriate resources  INTERVENTIONS:  - Conduct assessment to determine patient/family and health care team treatment goals, and need for post-acute services based on payer coverage, community resources, and patient preferences, and barriers to discharge  - Address psychosocial, clinical, and financial barriers to discharge as identified in assessment in conjunction with the patient/family and health care team  - Arrange appropriate level of post-acute services according to patient's   needs and preference and payer coverage in collaboration with the physician and health care team  - Communicate with and update the patient/family, physician, and health care team regarding progress on the discharge plan  - Arrange appropriate transportation to post-acute venues  TO HOME INDEPENDENTLY  Outcome: Progressing

## 2017-10-30 NOTE — SOCIAL WORK
DASH discussion completed  Discussed goals of making sure pt's needs are met upon discharge, pt's preferences are taken into account, pt understands her health condition, medications and symptoms to watch for after returning home and pt is aware of any follow up appointments recommended by hospital physician  SPOKE WITH THE PT AND SON AT BEDSIDE  PT STATED THAT SHE LIVES WITH HER  AND HAS NO DME OR HHC  PT DENIES ANY FURTHER NEEDS AT HOME AT THIS TIME    PT DOES DRIVE AND USES [facility] IN [place], [place]

## 2017-10-31 ENCOUNTER — APPOINTMENT (INPATIENT)
Dept: RADIOLOGY | Facility: HOSPITAL | Age: 67
DRG: 305 | End: 2017-10-31
Payer: MEDICARE

## 2017-10-31 VITALS
HEART RATE: 70 BPM | TEMPERATURE: 98.6 F | DIASTOLIC BLOOD PRESSURE: 81 MMHG | OXYGEN SATURATION: 97 % | RESPIRATION RATE: 18 BRPM | SYSTOLIC BLOOD PRESSURE: 149 MMHG | BODY MASS INDEX: 29 KG/M2 | WEIGHT: 147.71 LBS | HEIGHT: 60 IN

## 2017-10-31 LAB
MRSA NOSE QL CULT: NORMAL
T3FREE SERPL-MCNC: 2.57 PG/ML (ref 2.3–4.2)
T4 FREE SERPL-MCNC: 1.52 NG/DL (ref 0.76–1.46)

## 2017-10-31 PROCEDURE — 93975 VASCULAR STUDY: CPT

## 2017-10-31 RX ORDER — HYDROCHLOROTHIAZIDE 25 MG/1
25 TABLET ORAL DAILY
Qty: 30 TABLET | Refills: 0 | Status: SHIPPED | OUTPATIENT
Start: 2017-10-31 | End: 2018-04-17

## 2017-10-31 RX ORDER — LEVOTHYROXINE SODIUM 88 UG/1
88 TABLET ORAL
Qty: 30 TABLET | Refills: 0 | Status: SHIPPED | OUTPATIENT
Start: 2017-11-01 | End: 2018-02-28 | Stop reason: SDUPTHER

## 2017-10-31 RX ORDER — PROPRANOLOL HYDROCHLORIDE 20 MG/1
40 TABLET ORAL EVERY 12 HOURS SCHEDULED
Status: DISCONTINUED | OUTPATIENT
Start: 2017-10-31 | End: 2017-10-31 | Stop reason: HOSPADM

## 2017-10-31 RX ORDER — PROPRANOLOL HYDROCHLORIDE 120 MG/1
120 CAPSULE, EXTENDED RELEASE ORAL DAILY
Qty: 30 CAPSULE | Refills: 0 | Status: SHIPPED | OUTPATIENT
Start: 2017-10-31 | End: 2018-01-26 | Stop reason: DRUGHIGH

## 2017-10-31 RX ORDER — PROPRANOLOL HCL 60 MG
120 CAPSULE, EXTENDED RELEASE 24HR ORAL ONCE
Status: DISCONTINUED | OUTPATIENT
Start: 2017-10-31 | End: 2017-10-31 | Stop reason: HOSPADM

## 2017-10-31 RX ADMIN — ENOXAPARIN SODIUM 40 MG: 40 INJECTION SUBCUTANEOUS at 08:55

## 2017-10-31 RX ADMIN — PROPRANOLOL HYDROCHLORIDE 10 MG: 20 TABLET ORAL at 06:22

## 2017-10-31 RX ADMIN — Medication 1 TABLET: at 08:55

## 2017-10-31 RX ADMIN — ASPIRIN 81 MG: 81 TABLET, COATED ORAL at 08:55

## 2017-10-31 RX ADMIN — Medication 400 MG: at 08:55

## 2017-10-31 RX ADMIN — LEVOTHYROXINE SODIUM 88 MCG: 88 TABLET ORAL at 06:23

## 2017-10-31 RX ADMIN — PROPRANOLOL HYDROCHLORIDE 40 MG: 20 TABLET ORAL at 08:55

## 2017-10-31 NOTE — DISCHARGE INSTRUCTIONS
Follow-up with endocrinologist as soon as possible for adrenal adenoma  Monitor blood pressure twice a day at home

## 2017-10-31 NOTE — DISCHARGE SUMMARY
Discharge Summary - [facility] Medicine    Patient Information: Von Montes De Oca 69 y o  female MRN: [id number]  Unit/Bed#: [address] [place] [address] Encounter: [id number]    Discharging Physician / Practitioner: Tanna Medrano MD  PCP: Brit Jacobo DO  Admission Date: 10/29/2017  Discharge Date: 10/31/17    Reason for Admission: Headache (Pt reports she started with a headache yesterday, today noticed her BP was high )      Discharge Diagnoses:     Principal Problem:    Hypertensive urgency  Active Problems:    Hypertension    Headache    Hypothyroidism  Resolved Problems:    * No resolved hospital problems  *  1  Hypertensive urgency  2  Left adrenal adenoma  3  Hypothyroidism with elevated TSH    Consultations During Hospital Stay:  José Antonio Rosa  Procedures Performed:     ·     Imaging while in hospital:    Xr Chest 2 Views    Result Date: 10/29/2017  Narrative: CHEST INDICATION:  Hypertension COMPARISON:  None VIEWS:  Frontal and lateral projections IMAGES:  2 FINDINGS:     Cardiomediastinal silhouette appears unremarkable  The lungs are clear  No pneumothorax or pleural effusion  Visualized osseous structures appear within normal limits for the patient's age  Impression: No active pulmonary disease  Workstation performed: [id number]     Ct Head Wo Contrast    Result Date: 10/29/2017    Impression: No acute intracranial abnormality  Workstation performed: [id number]     Vas Renal Artery Complete    Result Date: 10/31/2017  Narrative:  [facility] REPORT CLINICAL: Indications:  Benign Essential Hypertension [I10]  Patient presents to evaluate the renal arteries secondary to uncontrolled HTN  Patient is currently on medications for Blood pressure  Risk Factors The patient has history of hypertension  CONCLUSION:  Impression The abdominal aorta is widely patent and normal caliber  RIGHT RENAL: No evidence of significant arterial occlusive disease in the main renal artery  Patent renal vein Adequate parenchymal flow noted with a renovascular resistive index of 0 68  Renal/Aorta Ratio 2 5   The Kidney measures 10 3cm No Prior  LEFT RENAL: No evidence of significant arterial occlusive disease in the main renal artery  Patent renal vein Adequate parenchymal flow noted with a renovascular resistive index of 0 64  Renal/Aorta Ratio: 2 75  The Kidney measures 9 8 cm, No Prior  MESENTERIC: Celiac and superior mesenteric arteries are patent  SIGNATURE: Electronically Signed by: Ganesh Lei on 2017-10-31 04:00:36 PM      Test Results Pending at Discharge (will require follow up):   · As per After Visit Summary     Outpatient Tests Requested:  · Follow-up with Dr Sari Dick in 2 weeks  Follow up with endocrinologist as soon as possible to follow up on left adrenal adenoma    Complications:  None    Hospital Course:     Jono Jordan is a 69 y o  female patient who originally presented to the hospital on 10/29/2017 due to headache and high blood pressure  Patient reported that she had 10 out 10 headache and blood pressure was noted to be 180/106 at home  Patient does have episodes of intermittent headache episodes with high blood pressure at home  Patient has history of left adrenal adenoma  Patient was admitted hospital and was seen in consultation with Cardiology  Given her history of left adrenal adenoma patient was added on nonselective beta-blocker with propranolol  Patient's blood pressure improved  Patient was noted to have low TSH and her Synthroid dose was decreased  Patient's Uniretic was stopped and patient was advised to go home on hydrochlorothiazide and propranolol as Ace inhibitors can alter her adrenal workup  The patient had renal artery Dopplers done which showed no evidence of significant stenosis  Patient advised to check blood pressure twice a day and follow up with Cardiology in 2 weeks and Endocrinology as soon as possible      Condition at Discharge: stable     Discharge Day Visit / Exam:     Subjective:  Patient has much improved headache  Patient denies any chest pain, palpitations, shortness of breath  Vitals: Blood Pressure: 149/81 (10/31/17 1200)  Pulse: 70 (10/31/17 1200)  Temperature: 98 6 °F (37 °C) (10/31/17 1200)  Temp Source: Oral (10/31/17 1200)  Respirations: 18 (10/31/17 1200)  Height: 5' (152 4 cm) (10/29/17 1500)  Weight - Scale: 67 kg (147 lb 11 3 oz) (10/29/17 1500)  SpO2: 97 % (10/31/17 1200)  Exam:   Physical Exam   Constitutional: No distress  HENT:   Head: Normocephalic and atraumatic  Nose: Nose normal    Eyes: Conjunctivae and EOM are normal  Pupils are equal, round, and reactive to light  Neck: Normal range of motion  Neck supple  No JVD present  Cardiovascular: Normal rate, regular rhythm and normal heart sounds  Exam reveals no gallop and no friction rub  No murmur heard  Pulmonary/Chest: Effort normal and breath sounds normal  No respiratory distress  She has no wheezes  She has no rales  She exhibits no tenderness  Abdominal: Soft  Bowel sounds are normal  She exhibits no distension  There is no tenderness  There is no rebound and no guarding  Musculoskeletal: She exhibits no edema  Neurological: She is alert  No cranial nerve deficit  Skin: Skin is warm and dry  No rash noted  Psychiatric: She has a normal mood and affect  Discharge instructions/Information to patient and family:(Discharge Medications and Follow up):   See after visit summary for information provided to patient and family  Provisions for Follow-Up Care:  See after visit summary for information related to follow-up care and any pertinent home health orders  Disposition: Home    Planned Readmission:  No     Discharge Statement:  I spent 35 minutes discharging the patient  This time was spent on the day of discharge  I had direct contact with the patient on the day of discharge   Greater than 50% of the total time was spent examining patient, answering all patient questions, arranging and discussing plan of care with patient as well as directly providing post-discharge instructions  Additional time then spent on discharge activities  Discharge Medications:  See after visit summary for reconciled discharge medications provided to patient and family  ** Please Note: Dragon 360 Dictation voice to text software may have been used in the creation of this document   **

## 2017-10-31 NOTE — CASE MANAGEMENT
Dev Bennett, RN Registered Nurse Signed   Case Management Date of Service: 10/30/2017  8:53 AM           Initial Clinical Review     Admission: Date/Time/Statement: 10/29/17 1314           Orders Placed This Encounter   Procedures    Place in Observation (expected length of stay for this patient is less than two midnights)       Standing Status:   Standing       Number of Occurrences:   1       Order Specific Question:   Admitting Physician       Answer:   Shaneka Holloway [[id number]]       Order Specific Question:   Level of Care       Answer:   Med Surg [16]        ED: Date/Time/Mode of Arrival:             ED Arrival Information      Expected Arrival Acuity Means of Arrival Escorted By Service Admission Type     - 10/29/2017 10:04 Urgent Walk-In Family Member General Medicine Urgent     Arrival Complaint     HIGH BLOOD PRESSURE             Chief Complaint:        Chief Complaint   Patient presents with    Headache       Pt reports she started with a headache yesterday, today noticed her BP was high          History of Illness:  68 y o  female who presents with complaints of headache and high blood pressure   Patient states that the headache started yesterday morning and she took NSAIDs and although her headache did somewhat subsided never went away and she had a headache today as well  Patient states that the headache was 10/10 in intensity but current knee pain free   She states that the headache was all over   She also vomited yesterday   States that her eyes felt weird but denies this currently   Patient states that her blood pressure at home was about 180/106   Patient states that she has had intermittent episodes of extremely high blood pressures about once every 2 months and these episodes last about 3 days  Catarina Yip is on Uniretic at home and states that she took an extra dose of it today   Patient states that she normally does not check her blood pressures at home unless she becomes symptomatic and it has been about 8 months and she followed up with PCP  Sana Carey does have history of adrenal adenoma and as per endocrinology note on epic, her plasma metanephrines were abnormal but 24 hour urine studies were normal   She had A CT abdomen/pelvis in January of 2017 which showed a stable left adrenal adenoma   At patient has an appointment with a new endocrinologist in December   In the ER her initial blood pressure was noted to be 214/116 and she received 5 milligrams of IV Lopressor x1      ED Vital Signs:            ED Triage Vitals   Temperature Pulse Respirations Blood Pressure SpO2   10/29/17 1414 10/29/17 1010 10/29/17 1010 10/29/17 1010 10/29/17 1010   97 5 °F (36 4 °C) 77 18 (!) 214/116 96 %       Temp Source Heart Rate Source Patient Position - Orthostatic VS BP Location FiO2 (%)   10/29/17 1414 10/29/17 1010 10/29/17 1010 10/29/17 1115 --   Tympanic Monitor Sitting Left arm         Pain Score           10/29/17 1010           Worst Possible Pain                Wt Readings from Last 1 Encounters:   10/29/17 67 kg (147 lb 11 3 oz)         Abnormal Labs/Diagnostic Test Results:   WBC Thousand/uL 4 60*   CT HEADNo acute intracranial abnormality     Final Result   No active pulmonary disease         ED Treatment:              Medication Administration from 10/29/2017 1004 to 10/29/2017 1424        Date/Time Order Dose Route Action Action by Comments       10/29/2017 1329 sodium chloride 0 9 % bolus 500 mL 0 mL Intravenous Stopped Yumiko L AMY Hernandez         10/29/2017 1115 sodium chloride 0 9 % bolus 500 mL 500 mL Intravenous New Bag Crystal L AMY Hernandez         10/29/2017 1119 acetaminophen (TYLENOL) tablet 650 mg 650 mg Oral Given Crystal L AMY Hernandez         10/29/2017 1125 metoclopramide (REGLAN) injection 10 mg 10 mg Intravenous Given Crystal L AMY Hernandez         10/29/2017 1115 diphenhydrAMINE (BENADRYL) injection 25 mg 25 mg Intravenous Given Crystal L AMY Hernandez         10/29/2017 1118 metoprolol (LOPRESSOR) injection 5 mg 5 mg Intravenous Given Yumiko Hernandez RN               Past Medical/Surgical History:       Past Medical History:   Diagnosis Date    Disease of thyroid gland      Hypertension           Admitting Diagnosis: High blood pressure [I10]  HTN (hypertension) [I10]  Headache [R51]  Headache [R51]     Age/Sex: 69 y o  female     Assessment/Plan:   Principal Problem:    Hypertensive urgency  Active Problems:    Hypertension    Headache    Hypothyroidism  Assessment/Plan:  1  Hypertensive urgency - her BP has improved to 134/81   Will continue her home medication Uniretic and monitor blood pressures   Since her blood pressure has significantly improved, will not start any other antihypertensives for now   Continue monitoring and if her blood pressure goes uncontrolled, will start patient on another antihypertensive agent   Will r/o renal artery stenosis with U/S   Advised patient to check her blood pressures on a regular basis at home and to set close follow-up appointments with her PCP for better management of her blood pressures   Check an echocardiogram in the a   and consult Cardiology  2   Headache -most likely due to uncontrolled blood pressures   This has resolved  3   Hypothyroidism -continue levothyroxine and obtain a repeat TSH level in the a m     4   Left adrenal adenoma - this was stable in size on CT scan in January of 2017   Patient advised to keep her appointment with Endocrinology in December as scheduled for further management           Admission Orders:  OBSERVATION  TELE MON  CONSULT CARDIO  VAS RENAL ARTERY   ECHO  Scheduled Meds:   aspirin 81 mg Oral Daily   enoxaparin 40 mg Subcutaneous Q24H DORCAS   levothyroxine 100 mcg Oral Early Morning   magnesium oxide 400 mg Oral Daily   multivitamin-minerals 1 tablet Oral Daily      Continuous Infusions:    PRN Meds:   acetaminophen    ALPRAZolam                10/30 ---     Problem List:     Principal Problem:    Hypertensive urgency  Active Problems: Hypertension    Headache    Hypothyroidism        Assessment & Plan:     1  Hypertensive urgency-the patient has history of adrenal adenoma  Patient does have paroxysmal episodes of elevated blood pressures with headaches  Cannot rule out pheochromocytoma/hyper aldosteronism  Renal artery duplex pending to rule out renal artery stenosis  Patient will need outpatient follow-up with Endocrinology for further diagnosis and management  Patient will be added on propranolol and blood pressure will be monitored overnight  Echo showed EF of 55-60%, no regional wall motion abnormalities  2  Hypothyroidism-TSH level was low at 0 151  Will decrease Synthroid dose to 88 microgram p  o  daily  Patient will need repeat TSH in 6-8 weeks  3  Left adrenal adenoma-patient has a follow-up appointment with an endocrinologist in [facility] in December    Patient advised to follow up with endocrinologist closely to rule out pheochromocytoma/hyper aldosteronism          Current Patient Status: Inpatient

## 2017-10-31 NOTE — NURSING NOTE
Patient discharged home  Accompanied by   Ambulated off unit, IV removed  Prescriptions called into pharmacy  Discharged instructions reviewed with patient  Educated with verbalized understanding regarding blood pressure monitoring

## 2017-10-31 NOTE — PROGRESS NOTES
Progress Note - Cardiology   Radha Dunlap 69 y o  female MRN: [id number]  Unit/Bed#: [address] [facility] Encounter: [id number]    Assessment/Plan:  1  Htn urgency- BP spike overnight to 180/92? Sleep screen  Add propanolol 40mg twice a day + restart hctz 25mg daily  Additional propanolol for BP surge  Outpatient follow-up with Endocrinology + Cardiology one week  2  Adrenal adenoma-pending workup  3  Hypothyroid  4  Prior smoker    Subjective/Objective   No chest pain/headaches overnight    Objective:     Vitals: /81   Pulse 70   Temp 98 6 °F (37 °C) (Oral)   Resp 18   Ht 5' (1 524 m)   Wt 67 kg (147 lb 11 3 oz)   SpO2 97%   BMI 28 85 kg/m²   Vitals:    10/29/17 1010 10/29/17 1500   Weight: 67 1 kg (148 lb) 67 kg (147 lb 11 3 oz)     Orthostatic Blood Pressures    Flowsheet Row Most Recent Value   Blood Pressure  149/81 filed at 10/31/2017 1200   Patient Position - Orthostatic VS  Sitting filed at 10/31/2017 1200            Intake/Output Summary (Last 24 hours) at 10/31/17 1328  Last data filed at 10/31/17 0901   Gross per 24 hour   Intake              360 ml   Output              400 ml   Net              -40 ml       Invasive Devices     Peripheral Intravenous Line            Peripheral IV 10/29/17 Right Antecubital 2 days                Review of Systems: reviewed    Physical Exam   Constitutional: She is oriented to person, place, and time  No distress  HENT:   Head: Normocephalic and atraumatic  Right Ear: External ear normal    Left Ear: External ear normal    Eyes: Conjunctivae are normal  Pupils are equal, round, and reactive to light  Right eye exhibits no discharge  Left eye exhibits no discharge  No scleral icterus  Neck: Normal range of motion  Neck supple  No JVD present  No tracheal deviation present  No thyromegaly present  Cardiovascular: Normal rate and regular rhythm  Exam reveals gallop  Exam reveals no friction rub  No murmur heard    Pulmonary/Chest: Effort normal and breath sounds normal  No stridor  No respiratory distress  She has no wheezes  She has no rales  She exhibits no tenderness  Abdominal: Soft  Bowel sounds are normal  She exhibits no distension and no mass  There is no tenderness  There is no rebound and no guarding  Musculoskeletal: Normal range of motion  She exhibits no edema, tenderness or deformity  Neurological: She is alert and oriented to person, place, and time  She has normal reflexes  No cranial nerve deficit  She exhibits normal muscle tone  Coordination normal    Skin: Skin is warm and dry  No rash noted  She is not diaphoretic  No erythema  No pallor  Psychiatric: She has a normal mood and affect  Her behavior is normal  Judgment and thought content normal    Nursing note and vitals reviewed  Lab Results: I have personally reviewed pertinent lab results  Imaging: I have personally reviewed pertinent reports  EKG: reviewed  VTE Pharmacologic Prophylaxis: Sequential compression device (Venodyne)   VTE Mechanical Prophylaxis: sequential compression device    Counseling / Coordination of Care  Total time spent today 50 minutes  Greater than 50% of total time was spent with the patient and / or family counseling and / or coordination of care   A description of the counseling / coordination of care: htn urgency

## 2017-11-02 NOTE — PHYSICIAN ADVISOR
Current patient class: Inpatient  The patient is currently on Hospital Day: 3      The patient was admitted to the hospital at [place] [address] on 10/30/17 for the following diagnosis:  High blood pressure [I10]  HTN (hypertension) [I10]  Headache [R51]  Headache [R51]       There is documentation in the medical record of an expected length of stay of at least 2 midnights  The patient is therefore expected to satisfy the 2 midnight benchmark and given the 2 midnight presumption is appropriate for INPATIENT ADMISSION  Given this expectation of a satisfying stay, CMS instructs us that the patient is most often appropriate for inpatient admission under part A provided medical necessity is documented in the chart  After review of the relevant documentation, labs, vital signs and test results, the patient is appropriate for INPATIENT ADMISSION  Admission to the hospital as an inpatient is a complex decision making process which requires the practitioner to consider the patients presenting complaint, history and physical examination and all relevant testing  With this in mind, in this case, the patient was deemed appropriate for INPATIENT ADMISSION  After review of the documentation and testing available at the time of the admission I concur with this clinical determination of medical necessity  Rationale is as follows:     The patient is a 69 yrs old Female who presented to the ED at 10/29/2017 10:09 AM with a chief complaint of Headache (Pt reports she started with a headache yesterday, today noticed her BP was high )    The patients vitals on arrival were ED Triage Vitals   Temperature Pulse Respirations Blood Pressure SpO2   10/29/17 1414 10/29/17 1010 10/29/17 1010 10/29/17 1010 10/29/17 1010   97 5 °F (36 4 °C) 77 18 (!) 214/116 96 %      Temp Source Heart Rate Source Patient Position - Orthostatic VS BP Location FiO2 (%)   10/29/17 1414 10/29/17 1010 10/29/17 1010 10/29/17 1115 --   Tympanic Monitor Sitting Left arm       Pain Score       10/29/17 1010       Worst Possible Pain           Past Medical History:   Diagnosis Date    Disease of thyroid gland     Hypertension      Past Surgical History:   Procedure Laterality Date    APPENDECTOMY       SECTION      CHOLECYSTECTOMY             Consults have been placed to:   IP CONSULT TO CARDIOLOGY    Vitals:    10/30/17 1300 10/30/17 2210 10/31/17 0730 10/31/17 1200   BP: (!) 180/92 (!) 171/93 125/76 149/81   Pulse: 68 64 68 70   Resp: 18 18 18 18   Temp: 98 1 °F (36 7 °C) 98 3 °F (36 8 °C) 98 6 °F (37 °C) 98 6 °F (37 °C)   TempSrc: Oral Tympanic Oral Oral   SpO2: 98% 98% 97% 97%   Weight:       Height:           Most recent labs:    No results for input(s): WBC, HGB, HCT, PLT, K, NA, CALCIUM, BUN, CREATININE, LIPASE, AMYLASE, INR, TROPONINI, CKTOTAL, AST, ALT, ALKPHOS, BILITOT in the last 72 hours  Scheduled Meds:  Continuous Infusions:  No current facility-administered medications for this encounter  PRN Meds:

## 2017-11-06 ENCOUNTER — ALLSCRIPTS OFFICE VISIT (OUTPATIENT)
Dept: OTHER | Facility: OTHER | Age: 67
End: 2017-11-06

## 2017-11-06 DIAGNOSIS — I10 ESSENTIAL (PRIMARY) HYPERTENSION: ICD-10-CM

## 2017-11-06 DIAGNOSIS — Z12.31 ENCOUNTER FOR SCREENING MAMMOGRAM FOR MALIGNANT NEOPLASM OF BREAST: ICD-10-CM

## 2017-11-07 ENCOUNTER — ALLSCRIPTS OFFICE VISIT (OUTPATIENT)
Dept: OTHER | Facility: OTHER | Age: 67
End: 2017-11-07

## 2017-11-08 NOTE — PROGRESS NOTES
Assessment  Assessed    1  Malignant hypertension (401 0) (I10)   2  Hyperlipidemia (272 4) (E78 5)   3  Adenoma of left adrenal gland (227 0) (D35 02)    Plan  Malignant hypertension    · HydroCHLOROthiazide 25 MG Oral Tablet; take 1 tablet daily in am   Rx By: Alisha Ramirez; Dispense: 90 Days ; #:90 Tablet; Refill: 2;For: Malignant hypertension; JORGE L = N; Verified Transmission to [facility]; Last Updated By: System, SureScripts; 11/7/2017 3:28:24 PM   · Propranolol HCl - 20 MG Oral Tablet; TAKE 1 TABLET 3 TIMES DAILY BEFORE  MEALS   Rx By: Alisha Ramirez; Dispense: 30 Days ; #:90 Tablet; Refill: 1;For: Malignant hypertension; JORGE L = N; Verified Transmission to [place] [id number]; Last Updated By: System, SureScripts; 11/3/2017 10:45:27 AM   · Propranolol HCl ER 80 MG Oral Capsule Extended Release 24 Hour; Take one  tablet daily   Rx By: Alisha Ramirez; Dispense: 90 Days ; #:90 Capsule Extended Release 24 Hour; Refill: 3;For: Malignant hypertension; JORGE L = N; Verified Transmission to [facility]; Last Updated By: System, SureScripts; 11/7/2017 3:27:59 PM   · (1) BASIC METABOLIC PROFILE; Status:Active; Requested for:51Lre1936;    Perform:[facility]; Due:89Vmx6066; Ordered; For:Malignant hypertension; Ordered By:Gerson Murillo;   · (1) MAGNESIUM; Status:Active; Requested for:85Auo9482;    Perform:[facility]; Due:49Key2282; Ordered; For:Malignant hypertension; Ordered By:Gerson Murillo;    Discussion/Summary  Cardiology Discussion Summary Free Text Note Form [facility]:   Malignant htn/adrenal adenoma- possible pheo? blood metanephrine level elevated but urinate normal? only one sample  renal arter doppler negative for renal artery stenosis  has paroxysmal episoes  aldosterone/corticol level normalfollow-up with endocrinology pendingpropanolol 80mg at nighthctz in AMfollow-up blood work in one month        Chief Complaint  Chief Complaint Free Text Note Form: [place] is here for a follow up from the hospital and has no complaints at this time  History of Present Illness  Cardiology HPI Free Text Note Form [facility]: 17-year-old woman history of an adrenal adenoma with recent admission for hypertensive urgency presents for follow-up  She was initially on a higher dosage of nonselective beta-blocker propanolol but felt dizzy and lightheaded  Therefore her propanolol was decreased to 20 mg 3 times a day  Her blood pressure has been better improved  However she has trouble taking the afternoon medication  She is pending follow-up with Endocrinology  She denies having any chest heaviness palpitations  She denies having any syncopal episodes  She denies having any significant fluctuations in her blood pressure  She denies having any sleep apnea symptoms  Review of Systems  Cardiology Female ROS:     Cardiac: No complaints of chest pain, no palpitations, no fainting  Skin: No complaints of nonhealing sores or skin rash  Genitourinary: No complaints of recurrent urinary tract infections, frequent urination at night, difficult urination, blood in urine, kidney stones, loss of bladder control, kidney problems, denies any birth control or hormone replacement, is not post menopausal, not currently pregnant  Psychological: No complaints of feeling depressed, anxiety, panic attacks, or difficulty concentrating  General: No complaints of trouble sleeping, lack of energy, fatigue, appetite changes, weight changes, fever, frequent infections, or night sweats  Respiratory: No complaints of shortness of breath, cough with sputum, or wheezing  HEENT: No complaints of serious problems, hearing problems, nose problems, throat problems, or snoring  Gastrointestinal: No complaints of liver problems, nausea, vomiting, heartburn, constipation, bloody stools, diarrhea, problems swallowing, adbominal pain, or rectal bleeding     Hematologic: No complaints of bleeding disorders, anemia, blood clots, or excessive brusing  Neurological: No complaints of numbness, tingling, dizziness, weakness, seizures, headaches, syncope or fainting, AM fatigue, daytime sleepiness, no witnessed apnea episodes  Musculoskeletal: No complaints of arthritis, back pain, or painfull swelling  ROS Reviewed:   ROS reviewed  Active Problems  Problems    1  Abnormal cortisol level (790 6) (R79 89)   2  Acute abdominal pain in left lower quadrant (789 04,338 19) (R10 32)   3  Adenoma of left adrenal gland (227 0) (D35 02)   4  Anxiety (300 00) (F41 9)   5  Arthropathy (716 90) (M12 9)   6  Benign essential hypertension (401 1) (I10)   7  Benign paroxysmal vertigo, unspecified laterality (386 11) (H81 10)   8  Colitis (558 9) (K52 9)   9  Depression (311) (F32 9)   10  Diverticulitis of colon (562 11) (K57 32)   11  Encounter for screening for malignant neoplasm of colon (V76 51) (Z12 11)   12  Encounter for screening mammogram for malignant neoplasm of breast (V76 12)    (Z12 31)   13  Hyperlipidemia (272 4) (E78 5)   14  Hyponatremia (276 1) (E87 1)   15  Long term use of drug (V58 69) (Z79 899)   16  Malignant hypertension (401 0) (I10)   17  Nontoxic single thyroid nodule (241 0) (E04 1)   18  Obesity (278 00) (E66 9)   19  Primary hypothyroidism (244 9) (E03 9)   20  Right sided abdominal pain (789 09) (R10 9)   21  Sigmoid diverticulitis (562 11) (K57 32)   22  Subclinical hyperthyroidism (242 90) (E05 90)   23  Vitamin D deficiency (268 9) (E55 9)    Past Medical History  Problems    1  Acute diverticulitis (562 11) (K57 92)   2  Acute diverticulitis (562 11) (K57 92)   3  Benign paroxysmal positional vertigo (386 11) (H81 10)   4  History of acute sinusitis (V12 69) (Z87 09)   5  History of obesity (V13 89) (Z86 39)   6  History of vertigo (V12 49) (Z87 898)   7  History of Primary hypothyroidism (244 9) (E03 9)   8  History of Screening for diabetes mellitus (DM) (V77 1) (Z13 1)   9   Strep throat (034 0) (J02 0)   10  History of Vaginal candidiasis (112 1) (B37 3)    Surgical History  Problems    1  History of Appendectomy   2  History of Arthrotomy Of Knee With Open Meniscus Repair   3  History of Cholecystectomy    Family History  Family History    1  Adopted (Z90 68) (Z02 82)    Social History  Problems    · Adopted - Father Unknown   · Adopted - Mother Unknown   · Being A Social Drinker   · Caffeine use (V49 89) (F15 90)   · Caffeine Use   · Former smoker (C97 14) (Q34 844)   · Retired   · Social alcohol use (Z78 9)    Current Meds   1  ALPRAZolam 0 25 MG Oral Tablet; 1 PO Q6 hours prn anxiety; Therapy: 55Amf0783 to (Last Rx:16Oct2014) Ordered   2  Aspir-81 81 MG Oral Tablet Delayed Release; 1 every day; Therapy: (Recorded:01Nov2017) to Recorded   3  Daily Multivitamin TABS; TAKE 1 TABLET DAILY; Therapy: (Recorded:01Nov2017) to Recorded   4  HydroCHLOROthiazide 25 MG Oral Tablet; 1 DAILY; Therapy: (Recorded:01Nov2017) to Recorded   5  Levothyroxine Sodium 88 MCG Oral Tablet; 1 DAILY; Therapy: 63VQU4187 to (Faheem Bennett)  Requested for: 53EOO9000 Recorded    Allergies  Medication    1  No Known Drug Allergies    Vitals  Vital Signs    Recorded: 29YRL1274 03:13PM   Heart Rate 72   Systolic 603, LUE, Sitting   Diastolic 74, LUE, Sitting   Height 4 ft 11 in   Weight 147 lb    BMI Calculated 29 69   BSA Calculated 1 62   O2 Saturation 98     Physical Exam    Constitutional   General appearance: No acute distress, well appearing and well nourished  Eyes   Conjunctiva and Sclera examination: Conjunctiva pink, sclera anicteric  Ears, Nose, Mouth, and Throat - Oropharynx: Clear, nares are clear, mucous membranes are moist    Neck   Neck and thyroid: Normal, supple, trachea midline, no thyromegaly  Pulmonary   Respiratory effort: No increased work of breathing or signs of respiratory distress  Auscultation of lungs: Clear to auscultation, no rales, no rhonchi, no wheezing, good air movement  Cardiovascular   Auscultation of heart: Normal rate and rhythm, normal S1 and S2, no murmurs  -- rrr pmi displaced  Carotid pulses: Normal, 2+ bilaterally  Peripheral vascular exam: Normal pulses throughout, no tenderness, erythema or swelling  Pedal pulses: Normal, 2+ bilaterally  Examination of extremities for edema and/or varicosities: Normal     Abdomen   Abdomen: Non-tender and no distention  Liver and spleen: No hepatomegaly or splenomegaly  Musculoskeletal Gait and station: Normal gait  -- Digits and nails: Normal without clubbing or cyanosis  -- Inspection/palpation of joints, bones, and muscles: Normal, ROM normal     Skin - Skin and subcutaneous tissue: Normal without rashes or lesions  Skin is warm and well perfused, normal turgor  Neurologic - Cranial nerves: II - XII intact  -- Speech: Normal     Psychiatric - Orientation to person, place, and time: Normal -- Mood and affect: Normal       Results/Data  Diagnostic Studies Reviewed Cardio: I personally reviewed the recording/images in the office today  My interpretation follows  Health Management  Encounter for screening mammogram for malignant neoplasm of breast   Digital Bilateral Screening Mammogram With CAD; every 1 year; Last 27Oct2014; Next Due:  56FKZ0387; Overdue    Future Appointments    Date/Time Provider Specialty Site   01/09/2018 09:15 AM [address]   12/06/2017 10:20 AM CORTNEY Aguilar   Cardiology [address]     Signatures   Electronically signed by : CORTNEY Mcadams ; Nov 7 2017  4:02PM EST                       (Author)

## 2017-11-28 ENCOUNTER — TRANSCRIBE ORDERS (OUTPATIENT)
Dept: ADMINISTRATIVE | Facility: HOSPITAL | Age: 67
End: 2017-11-28

## 2017-11-28 ENCOUNTER — APPOINTMENT (OUTPATIENT)
Dept: LAB | Facility: HOSPITAL | Age: 67
End: 2017-11-28
Payer: MEDICARE

## 2017-11-28 DIAGNOSIS — I10 ESSENTIAL (PRIMARY) HYPERTENSION: ICD-10-CM

## 2017-11-28 DIAGNOSIS — I10 ESSENTIAL HYPERTENSION, MALIGNANT: Primary | ICD-10-CM

## 2017-11-28 DIAGNOSIS — E03.9 ACQUIRED HYPOTHYROIDISM: ICD-10-CM

## 2017-11-28 DIAGNOSIS — E03.9 HYPOTHYROIDISM: ICD-10-CM

## 2017-11-28 DIAGNOSIS — I10 ESSENTIAL HYPERTENSION, MALIGNANT: ICD-10-CM

## 2017-11-28 LAB
ALBUMIN SERPL BCP-MCNC: 3.5 G/DL (ref 3.5–5)
ALP SERPL-CCNC: 73 U/L (ref 46–116)
ALT SERPL W P-5'-P-CCNC: 44 U/L (ref 12–78)
ANION GAP SERPL CALCULATED.3IONS-SCNC: 5 MMOL/L (ref 4–13)
AST SERPL W P-5'-P-CCNC: 28 U/L (ref 5–45)
BILIRUB SERPL-MCNC: 0.3 MG/DL (ref 0.2–1)
BUN SERPL-MCNC: 18 MG/DL (ref 5–25)
CALCIUM SERPL-MCNC: 8.7 MG/DL (ref 8.3–10.1)
CHLORIDE SERPL-SCNC: 102 MMOL/L (ref 100–108)
CHOLEST SERPL-MCNC: 226 MG/DL (ref 50–200)
CO2 SERPL-SCNC: 30 MMOL/L (ref 21–32)
CREAT SERPL-MCNC: 0.86 MG/DL (ref 0.6–1.3)
GFR SERPL CREATININE-BSD FRML MDRD: 69 ML/MIN/1.73SQ M
GLUCOSE P FAST SERPL-MCNC: 101 MG/DL (ref 65–99)
HDLC SERPL-MCNC: 73 MG/DL (ref 40–60)
LDLC SERPL CALC-MCNC: 143 MG/DL (ref 0–100)
MAGNESIUM SERPL-MCNC: 2 MG/DL (ref 1.6–2.6)
POTASSIUM SERPL-SCNC: 4.2 MMOL/L (ref 3.5–5.3)
PROT SERPL-MCNC: 6.9 G/DL (ref 6.4–8.2)
SODIUM SERPL-SCNC: 137 MMOL/L (ref 136–145)
T4 FREE SERPL-MCNC: 1.37 NG/DL (ref 0.76–1.46)
TRIGL SERPL-MCNC: 48 MG/DL
TSH SERPL DL<=0.05 MIU/L-ACNC: 0.21 UIU/ML (ref 0.36–3.74)

## 2017-11-28 PROCEDURE — 80053 COMPREHEN METABOLIC PANEL: CPT

## 2017-11-28 PROCEDURE — 83735 ASSAY OF MAGNESIUM: CPT

## 2017-11-28 PROCEDURE — 36415 COLL VENOUS BLD VENIPUNCTURE: CPT

## 2017-11-28 PROCEDURE — 84439 ASSAY OF FREE THYROXINE: CPT

## 2017-11-28 PROCEDURE — 80061 LIPID PANEL: CPT

## 2017-11-28 PROCEDURE — 84443 ASSAY THYROID STIM HORMONE: CPT

## 2017-11-29 ENCOUNTER — GENERIC CONVERSION - ENCOUNTER (OUTPATIENT)
Dept: OTHER | Facility: OTHER | Age: 67
End: 2017-11-29

## 2017-12-06 ENCOUNTER — GENERIC CONVERSION - ENCOUNTER (OUTPATIENT)
Dept: OTHER | Facility: OTHER | Age: 67
End: 2017-12-06

## 2017-12-11 ENCOUNTER — GENERIC CONVERSION - ENCOUNTER (OUTPATIENT)
Dept: FAMILY MEDICINE CLINIC | Facility: CLINIC | Age: 67
End: 2017-12-11

## 2017-12-12 ENCOUNTER — GENERIC CONVERSION - ENCOUNTER (OUTPATIENT)
Dept: FAMILY MEDICINE CLINIC | Facility: CLINIC | Age: 67
End: 2017-12-12

## 2017-12-18 DIAGNOSIS — I10 ESSENTIAL (PRIMARY) HYPERTENSION: ICD-10-CM

## 2017-12-18 DIAGNOSIS — E03.9 HYPOTHYROIDISM: ICD-10-CM

## 2017-12-19 ENCOUNTER — TRANSCRIBE ORDERS (OUTPATIENT)
Dept: ADMINISTRATIVE | Facility: HOSPITAL | Age: 67
End: 2017-12-19

## 2017-12-19 ENCOUNTER — APPOINTMENT (OUTPATIENT)
Dept: LAB | Facility: HOSPITAL | Age: 67
End: 2017-12-19
Payer: MEDICARE

## 2017-12-19 DIAGNOSIS — I15.2 ADRENAL HYPERTENSION (HCC): ICD-10-CM

## 2017-12-19 DIAGNOSIS — E27.9 ADRENAL HYPERTENSION (HCC): ICD-10-CM

## 2017-12-19 DIAGNOSIS — I51.9 MYXEDEMA HEART DISEASE: Primary | ICD-10-CM

## 2017-12-19 DIAGNOSIS — I51.9 MYXEDEMA HEART DISEASE: ICD-10-CM

## 2017-12-19 DIAGNOSIS — E03.9 MYXEDEMA HEART DISEASE: ICD-10-CM

## 2017-12-19 DIAGNOSIS — E03.9 MYXEDEMA HEART DISEASE: Primary | ICD-10-CM

## 2017-12-19 LAB
ANION GAP SERPL CALCULATED.3IONS-SCNC: 8 MMOL/L (ref 4–13)
BUN SERPL-MCNC: 17 MG/DL (ref 5–25)
CALCIUM SERPL-MCNC: 9 MG/DL (ref 8.3–10.1)
CHLORIDE SERPL-SCNC: 102 MMOL/L (ref 100–108)
CO2 SERPL-SCNC: 28 MMOL/L (ref 21–32)
CORTIS SERPL-MCNC: 1.2 UG/DL
CREAT SERPL-MCNC: 0.64 MG/DL (ref 0.6–1.3)
GFR SERPL CREATININE-BSD FRML MDRD: 91 ML/MIN/1.73SQ M
GLUCOSE P FAST SERPL-MCNC: 108 MG/DL (ref 65–99)
POTASSIUM SERPL-SCNC: 4.1 MMOL/L (ref 3.5–5.3)
SODIUM SERPL-SCNC: 138 MMOL/L (ref 136–145)
T4 FREE SERPL-MCNC: 1.37 NG/DL (ref 0.76–1.46)
TSH SERPL DL<=0.05 MIU/L-ACNC: 0.28 UIU/ML (ref 0.36–3.74)

## 2017-12-19 PROCEDURE — 84443 ASSAY THYROID STIM HORMONE: CPT

## 2017-12-19 PROCEDURE — 84439 ASSAY OF FREE THYROXINE: CPT

## 2017-12-19 PROCEDURE — 36415 COLL VENOUS BLD VENIPUNCTURE: CPT

## 2017-12-19 PROCEDURE — 83835 ASSAY OF METANEPHRINES: CPT

## 2017-12-19 PROCEDURE — 82088 ASSAY OF ALDOSTERONE: CPT

## 2017-12-19 PROCEDURE — 82533 TOTAL CORTISOL: CPT

## 2017-12-19 PROCEDURE — 80048 BASIC METABOLIC PNL TOTAL CA: CPT

## 2017-12-22 LAB
METANEPH FREE SERPL-MCNC: 34 PG/ML (ref 0–62)
NORMETANEPHRINE SERPL-MCNC: 105 PG/ML (ref 0–145)

## 2017-12-26 LAB — ALDOST SERPL-MCNC: 12.8 NG/DL (ref 0–30)

## 2017-12-29 ENCOUNTER — HOSPITAL ENCOUNTER (OUTPATIENT)
Dept: NON INVASIVE DIAGNOSTICS | Facility: HOSPITAL | Age: 67
Discharge: HOME | End: 2017-12-29
Attending: INTERNAL MEDICINE
Payer: MEDICARE

## 2017-12-29 ENCOUNTER — GENERIC CONVERSION - ENCOUNTER (OUTPATIENT)
Dept: CARDIOLOGY CLINIC | Facility: CLINIC | Age: 67
End: 2017-12-29

## 2017-12-29 DIAGNOSIS — I10 ESSENTIAL HYPERTENSION, MALIGNANT: ICD-10-CM

## 2017-12-29 PROCEDURE — 93350 STRESS TTE ONLY: CPT

## 2018-01-02 LAB
CHEST PAIN STATEMENT: NORMAL
MAX DIASTOLIC BP: 80 MMHG
MAX HEART RATE: 110 BPM
MAX PREDICTED HEART RATE: 151 BPM
MAX. SYSTOLIC BP: 160 MMHG
PROTOCOL NAME: NORMAL
REASON FOR TERMINATION: NORMAL
TARGET HR FORMULA: NORMAL
TEST INDICATION: NORMAL
TIME IN EXERCISE PHASE: NORMAL

## 2018-01-09 ENCOUNTER — HOSPITAL ENCOUNTER (OUTPATIENT)
Dept: RADIOLOGY | Facility: HOSPITAL | Age: 68
Discharge: HOME | End: 2018-01-09
Payer: MEDICARE

## 2018-01-09 ENCOUNTER — GENERIC CONVERSION - ENCOUNTER (OUTPATIENT)
Dept: OTHER | Facility: OTHER | Age: 68
End: 2018-01-09

## 2018-01-09 DIAGNOSIS — Z12.31 ENCOUNTER FOR SCREENING MAMMOGRAM FOR MALIGNANT NEOPLASM OF BREAST: ICD-10-CM

## 2018-01-09 PROCEDURE — 77063 BREAST TOMOSYNTHESIS BI: CPT

## 2018-01-09 PROCEDURE — 77067 SCR MAMMO BI INCL CAD: CPT

## 2018-01-10 NOTE — RESULT NOTES
Verified Results  (1) COMPREHENSIVE METABOLIC PANEL 38DSD3039 34:91IL Rupali Candelaria     Test Name Result Flag Reference   GLUCOSE,RANDM 88 mg/dL     If the patient is fasting, the ADA then defines impaired fasting glucose as > 100 mg/dL and diabetes as > or equal to 123 mg/dL  SODIUM 132 mmol/L L 136-145   POTASSIUM 3 8 mmol/L  3 5-5 3   CHLORIDE 96 mmol/L L 100-108   CARBON DIOXIDE 29 mmol/L  21-32   ANION GAP (CALC) 7 mmol/L  4-13   BLOOD UREA NITROGEN 14 mg/dL  5-25   CREATININE 0 70 mg/dL  0 60-1 30   Standardized to IDMS reference method   CALCIUM 9 0 mg/dL  8 3-10 1   BILI, TOTAL 0 60 mg/dL  0 20-1 00   ALK PHOSPHATAS 77 U/L     ALT (SGPT) 22 U/L  12-78   AST(SGOT) 14 U/L  5-45   ALBUMIN 3 7 g/dL  3 5-5 0   TOTAL PROTEIN 7 4 g/dL  6 4-8 2   eGFR Non-African American      >60 0 ml/min/1 73sq [facility] [employer] Disease Education Program recommendations are as follows:  GFR calculation is accurate only with a steady state creatinine  Chronic Kidney disease less than 60 ml/min/1 73 sq  meters  Kidney failure less than 15 ml/min/1 73 sq  meters  (1) LIPID PANEL FASTING W DIRECT LDL REFLEX 70HMO1484 11:30AM Rupali Candelaria     Test Name Result Flag Reference   CHOLESTEROL 211 mg/dL H    LDL CHOLESTEROL CALCULATED 128 mg/dL H 0-100   Triglyceride:         Normal              <150 mg/dl       Borderline High    150-199 mg/dl       High               200-499 mg/dl       Very High          >499 mg/dl  Cholesterol:         Desirable        <200 mg/dl      Borderline High  200-239 mg/dl      High             >239 mg/dl  HDL Cholesterol:        High    >59 mg/dL      Low     <41 mg/dL  LDL Cholesterol:        Optimal          <100 mg/dl        Near Optimal     100-129 mg/dl        Above Optimal          Borderline High   130-159 mg/dl          High              160-189 mg/dl          Very High        >189 mg/dl  LDL CALCULATED:    This screening LDL is a calculated result    It does not have the accuracy of the Direct Measured LDL in the monitoring of patients with hyperlipidemia and/or statin therapy  Direct Measure LDL (CXS565) must be ordered separately in these patients  TRIGLYCERIDES 55 mg/dL  <=150   Specimen collection should occur prior to N-Acetylcysteine or Metamizole administration due to the potential for falsely depressed results  HDL,DIRECT 72 mg/dL H 40-60   Specimen collection should occur prior to Metamizole administration due to the potential for falsely depressed results  (1) CBC/PLT/DIFF 58IBQ5064 11:30AM [employer]     Test Name Result Flag Reference   WBC COUNT 6 60 Thousand/uL  4 80-10 80   WBC ADJUSTED 6 60 Thousand/uL  4 80-10 80   RBC COUNT 4 25 Million/uL  4 20-5 40   HEMOGLOBIN 13 4 g/dL  12 0-16 0   HEMATOCRIT 40 2 %  37 0-47 0   MCV 95 fL  82-98   MCH 31 5 pg H 27 0-31 0   MCHC 33 4 g/dL  31 4-37 4   RDW 12 9 %  11 6-15 1   MPV 6 8 fL L 8 9-12 7   PLATELET COUNT 477 Thousands/uL  130-400   nRBC AUTOMATED 0 /100 WBCs     NEUTROPHILS RELATIVE PERCENT 72 %  43-75   LYMPHOCYTES RELATIVE PERCENT 19 %  14-44   MONOCYTES RELATIVE PERCENT 7 %  4-12   EOSINOPHILS RELATIVE PERCENT 1 %  0-6   BASOPHILS RELATIVE PERCENT 0 %  0-1   NEUTROPHILS ABSOLUTE COUNT 4 80 Thousands/?L  1 85-7 62   LYMPHOCYTES ABSOLUTE COUNT 1 30 Thousands/?L  0 60-4 47   MONOCYTES ABSOLUTE COUNT 0 50 Thousand/?L  0 17-1 22   EOSINOPHILS ABSOLUTE COUNT 0 00 Thousand/?L  0 00-0 61   BASOPHILS ABSOLUTE COUNT 0 00 Thousands/?L  0 00-0 10     * CT ABDOMEN PELVIS W CONTRAST [id number] 11:23AM [employer]     Test Name Result Flag Reference   CT ABDOMEN PELVIS W CONTRAST (Report)     CT ABDOMEN AND PELVIS WITH IV CONTRAST     INDICATION: Possible diverticulitis  History of appendectomy and cholecystectomy  COMPARISON: None       TECHNIQUE: CT examination of the abdomen and pelvis was performed after the administration of intravenous contrast  This examination, like all CT scans performed in the [facility], was performed utilizing techniques to minimize    radiation dose exposure, including the use of iterative reconstruction and automated exposure control  Axial, sagittal, and coronal reformatted images were submitted for interpretation  100 cc of intravenous Omnipaque 350 was administered for this    examination  Enteric contrast was given  FINDINGS:     ABDOMEN     LOWER CHEST: Mild subsegmental atelectasis in the bases of both lower lobes  Image portions of the lungs and pleural spaces otherwise clear  Distal esophagus normal      LIVER/BILIARY TREE: Liver normal  Common bile duct dilated, with a maximal diameter of 1 3 cm, tapering inferiorly to a normal caliber of 5 mm  No intrahepatic bile duct dilatation  This appearance can be normal in postcholecystectomy patients  GALLBLADDER: Postcholecystectomy  SPLEEN: Unremarkable  PANCREAS: Unremarkable  ADRENAL GLANDS: 1 4 x 2 3 cm low-attenuation mass in the left adrenal gland, typical of an adenoma  Normal right adrenal gland  KIDNEYS/URETERS: 4 mm cyst in the posterior lower pole the left kidney  No other evidence of renal mass  Mild rotational anomaly of the right kidney along its long axis  No calculus, hydronephrosis or ureterectasis  STOMACH AND BOWEL: Stomach and small intestine normal in appearance  Diverticulosis in the descending colon and sigmoid  Mural thickening in a segment of mid sigmoid colon with increased attenuation in the adjacent mesocolic fat, consistent with acute   diverticulitis  1 2 x 1 5 x 1 8 cm adjacent gas collection, apparently extraluminal and consistent with a confined perforation  No evidence of abscess  APPENDIX: Absent  ABDOMINOPELVIC CAVITY: No lymphadenopathy or mass  No ascites  No pneumoperitoneum  1 5 cm peripherally calcified round structure in the left midabdomen, probably in the mesentery  This may be an unusual calcified mesenteric lymph node   It does not   appear to be arising from a vessel or from the adjacent loops of small bowel  VESSELS: Aorta normal in caliber  Dilated left ovarian vein, communicating with large left-sided pelvic veins  This appearance is nonspecific and often an incidental finding  However, in the proper clinical setting, this can be a manifestation of    pelvic congestion syndrome  PELVIS     REPRODUCTIVE ORGANS: Uterus unremarkable  No adnexal masses  URINARY BLADDER: Unremarkable  ABDOMINAL WALL/INGUINAL REGIONS: Unremarkable  OSSEOUS STRUCTURES: No acute fracture or destructive osseous lesion  IMPRESSION:     Sigmoid diverticulitis with 1 8 cm confined perforation  I have discussed my findings with Dr Eusebia Ivan  Workstation performed: [id number]     Signed by:   Annika Bravo MD   5/27/16       Plan  Adenoma of left adrenal gland    · 1 - Brenda Lloyd MD, [facility] (Endocrinology) Physician Referral  Consult  Status: Active   Requested for: [id number]  Care Summary provided  : Yes    Discussion/Summary   Israel Hendrickson,   Here is a copy of the results we discussed     Dr Katina Butts

## 2018-01-12 NOTE — RESULT NOTES
Message   metanephrines and catecholamines are wnl  pls obtain other tests as ordered for 24 hr urine cortisol etc      Verified Results  (1) CATECHOLAMINES, FRACTIONATED, URINARY FREE, RANDOM URINE [id number] 07:23AM Reatha Blocker     Test Name Result Flag Reference   CREATININE, URINE 136 1 mg/dL  Not Estab     EPINEPHRINE, RAND UR 13 ug/L  Undefined   NOREPINEPH, RAND UR 89 ug/L  Undefined   NOREPINEPHRINE 65 ug/g Creat  0 - 111   DOPAMINE, RAND  ug/L  Undefined   DOPAMINE 205 ug/g Creat  0 - 348   EPINEPHRINE URINE 10 ug/g Creat  0 - 19   Performed at:  [address]  [id number]  : Florence Alfaro MD, Phone:  [phone]

## 2018-01-13 VITALS
DIASTOLIC BLOOD PRESSURE: 70 MMHG | TEMPERATURE: 98.2 F | SYSTOLIC BLOOD PRESSURE: 124 MMHG | HEART RATE: 68 BPM | RESPIRATION RATE: 14 BRPM

## 2018-01-13 VITALS
BODY MASS INDEX: 29.64 KG/M2 | HEART RATE: 72 BPM | HEIGHT: 59 IN | WEIGHT: 147 LBS | SYSTOLIC BLOOD PRESSURE: 134 MMHG | OXYGEN SATURATION: 98 % | DIASTOLIC BLOOD PRESSURE: 74 MMHG

## 2018-01-13 NOTE — RESULT NOTES
Message   Please suggest patient to call the primary care physician for abnormal low sodium levels  She may need adjustment of the medications  limit water intake to less than 1000 cc in a day  pls completed the 1 mg dexamethasone suppression test  After doing that date for another 3 days before collecting 24-hour urine  We'll order further workup for abnormal cortisol, metanephrines and TSH  Please collect 24-hour urine is advised  Verified Results  (1) RENAL FUNCTION PANEL 15Sep2016 07:17AM Clearance [place] Order Number: [id number]     Test Name Result Flag Reference   ANION GAP (CALC) 7 mmol/L  4-13   ALBUMIN 3 8 g/dL  3 5-5 0   BLOOD UREA NITROGEN 20 mg/dL  5-25   National Kidney Disease Education Program recommendations are as follows:  GFR calculation is accurate only with a steady state creatinine  Chronic Kidney disease less than 60 ml/min/1 73 sq  meters  Kidney failure less than 15 ml/min/1 73 sq  meters  CALCIUM 8 9 mg/dL  8 3-10 1   CHLORIDE 95 mmol/L L 100-108   CARBON DIOXIDE 29 mmol/L  21-32   CREATININE 0 72 mg/dL  0 60-1 30   Standardized to IDMS reference method   GLUCOSE,RANDM 89 mg/dL     POTASSIUM 4 3 mmol/L  3 5-5 3   eGFR Non-African American      >60 0 ml/min/1 73sq m   SODIUM 131 mmol/L L 136-145   PHOSPHORUS 4 0 mg/dL  2 3-4 1     (1) DHEA-S [id number] 07:17AM [facility] [place] Order Number: [id number]     Test Name Result Flag Reference   DHEA-SULFATE 148 3 ug/dL  20 4 - 186  6   Performed at:  [facility] [address]  [id number]  : Farheen Kirkpatrick MD, Phone:  [phone] [id number] 07:17AM Hildegard Slot   TW Order Number: [id number]     Test Name Result Flag Reference   ADRENOCORT  TROP 76 4 pg/mL H 7 2 - 63 3   ACTH reference interval for samples collected between 7 and 10 AM     Performed at:  [facility] [address]  [id number]  : Luis Armando Owens MD, Phone:  [phone]     (1) ALDOSTERONE, BLOOD 88Jvu7975 07:17AM Ashvin Rascon   TW Order Number: [id number]     Test Name Result Flag Reference   ALDOSTER, BLOOD 17 1 ng/dL  0 0 - 30 0   This test was developed and its performance characteristics  determined by [facility]  It has not been cleared or approved  by the Food and Drug Administration  Performed at:  [address]  [id number]  : India Gutiérrez MD, Phone:  [phone]     (1) CORTISOL AM SPECIMEN [id number] 07:17AM Ashvin Rascon   TW Order Number: [id number]     Test Name Result Flag Reference   CORTISO AM SPEC 31 6 ug/mL H 4 2-22 4   Reference ranges established for specimens drawn between 7 and 9 am  Results may be inaccurate if timing is not correct  (1) METANEPHRINE, PLASMA 44Xtb1872 07:17AM Sofi Kulwinder Order Number: [id number]     Test Name Result Flag Reference   METANEPHRINE, PLASMA 38 pg/mL  0 - 62   Concentrations of Normetanephrine between 146 and 487 pg/mL, and  Metanephrine between 63 and 255 pg/mL are considered indeterminate  Follow-up biochemical testing is recommended when patient levels fall  within this indeterminate range  These tests include repeat testing of  plasma/urinary fractionated metanephrines and plasma catecholamines  - Patient Instructions: Patient should be fasting overnight (water and non-caffeinated soft drinks are permissible)  The patient should be in a supine position for at least 15 minutes before and during sample collection      Performed at:  [address]  [id number]  : India Gutiérrez MD, Phone:  [phone]   NORMETANEPHRINE, PLASMA 207 pg/mL H 0 - 145     (1) POTASSIUM, URINE RANDOM 95Dec9014 07:17AAddShoppersna Kulwinder Order Number: [id number]     Test Name Result Flag Reference   POTASSIUM URINE 58 2 mmol/L  1 0-300 0     (1) RENIN ACTIVITY 15Sep2016 07:CARMELLA [facility]sworth Helms Order Number: [id number]     Test Name Result Flag Reference   RENIN 1 05 ng/mL/hr     Adult Normal Salt                           Intake:                             Upright         1 31 -  3 95                             Supine          0 15 -  2 33                          Salt Excretion                           (Na mEq/24 hr):                             Na=   0 -  30   8 82 - 23 86                             Na=  30 -  75   4 09 -  7 73                             Na=  75 - 150   1 44 -  2 80                             Na=      >150   0 39 -  1 31    Performed at:  [address]  [id number]  : Kendal Schrader MD, Phone:  [phone]     (1) SODIUM, URINE RANDOM 15Sep2016 07:[facility]LLA [facility] Order Number: [id number]     Test Name Result Flag Reference   SODIUM URINE 17       (1) T4, FREE 15Sep2016 07:[facility] Order Number: [id number]     Test Name Result Flag Reference   T4,FREE 1 27 ng/dL  0 76-1 46     (1) TSH 15Sep2016 07:[facility] Emair Order Number: [id number]     Test Name Result Flag Reference   TSH 0 230 uIU/mL L 0 358-3 740   - Patient Instructions: This bloodwork is non-fasting  Please drink two glasses of water morning of bloodwork  - Patient Instructions: This bloodwork is non-fasting  Please drink two glasses of water morning of bloodwork  Patients undergoing fluorescein dye angiography may retain small amounts of fluorescein in the body for 48-72 hours post procedure  Samples containing fluorescein can produce falsely depressed TSH values  If the patient had this procedure,a specimen should be resubmitted post fluorescein clearance            The recommended reference ranges for TSH during pregnancy are as follows:  First trimester 0 1 to 2 5 uIU/mL  Second trimester  0 2 to 3 0 uIU/mL  Third trimester 0 3 to 3 0 uIU/m Plan  Abnormal cortisol level, Elevated plasma metanephrines, Subclinical hyperthyroidism    · (1) ADRENOCORTICOTROPHIN; Status:Active; Requested for:82Ndk3087;    · (1) ALDOSTERONE, BLOOD; Status:Active; Requested for:39Njc6351;    · (1) ALDOSTERONE, URINE; Status:Active; Requested for:38Wik0436;    · (1) CATECHOLAMINE, URINE; Status:Active; Requested for:50Yyr0138;    · (1) CORTISOL AM SPECIMEN; Status:Active; Requested for:39Eul9331;    · (1) CORTISOL FREE, URINE; Status:Active; Requested for:86Hhw5971;    · (1) CREATININE, URINE 24HR; Status:Active; Requested for:75Vhl2433;    · (1) DHEA-S; Status:Active; Requested for:63Cbq1116;    · (1) FREE T3; Status:Active; Requested for:36Sar9261;    · (1) METANEPHRINE, FRACTIONED; Status:Active; Requested for:52Nbw9547;    · (1) METANEPHRINE, PLASMA; Status:Active; Requested for:53Vod7958;    · (1) RENAL FUNCTION PANEL; Status:Active; Requested for:59Xhh5862;    · (1) RENIN ACTIVITY; Status:Active; Requested for:94Rmk8158;    · (1) SODIUM, URINE 24HR; Status:Active; Requested for:02Pry7727;    · (1) T3 TOTAL; Status:Active; Requested for:46Raf8099;    · (1) T4, FREE; Status:Active; Requested for:30Nqa0482;    · (1) THYROGLOBULIN/QUANT W/ANTIBODY PANEL; Status:Active; Requested  for:95Vvu5884;    · (1) THYROID MICROSOMAL ANTIBODY; Status:Active; Requested for:79Oek1636;    · (1) THYROID STIMULATING IMMUNOGLOBULIN; Status:Active; Requested  for:32Fja9383;    · (1) TSH; Status:Active;  Requested for:80Ihl6970;

## 2018-01-15 NOTE — RESULT NOTES
Discussion/Summary   Ha Courtney,   Here is a copy of your lab work  We will review at your upcoming appointment in January  Dr Goyo Givens      Verified Results  (1) COMPREHENSIVE METABOLIC PANEL [id number] 45:47JW Ze Lien     Test Name Result Flag Reference   SODIUM 137 mmol/L  136-145   POTASSIUM 4 2 mmol/L  3 5-5 3   CHLORIDE 102 mmol/L  100-108   CARBON DIOXIDE 30 mmol/L  21-32   ANION GAP (CALC) 5 mmol/L  4-13   BLOOD UREA NITROGEN 18 mg/dL  5-25   CREATININE 0 86 mg/dL  0 60-1 30   Standardized to IDMS reference method   CALCIUM 8 7 mg/dL  8 3-10 1   BILI, TOTAL 0 30 mg/dL  0 20-1 00   ALK PHOSPHATAS 73 U/L     ALT (SGPT) 44 U/L  12-78   Specimen collection should occur prior to Sulfasalazine administration due to the potential for falsely depressed results  AST(SGOT) 28 U/L  5-45   Specimen collection should occur prior to Sulfasalazine administration due to the potential for falsely depressed results  ALBUMIN 3 5 g/dL  3 5-5 0   TOTAL PROTEIN 6 9 g/dL  6 4-8 2   eGFR 69 ml/min/1 73sq m     This is a patient instruction: Patient fasting for 8 hours or longer recommended  National Kidney Disease Education Program recommendations are as follows:  GFR calculation is accurate only with a steady state creatinine  Chronic Kidney disease less than 60 ml/min/1 73 sq  meters  Kidney failure less than 15 ml/min/1 73 sq  meters  GLUCOSE FASTING 101 mg/dL H 65-99   Specimen collection should occur prior to Sulfasalazine administration due to the potential for falsely depressed results  Specimen collection should occur prior to Sulfapyridine administration due to the potential for falsely elevated results  (1) TSH [id number] 07:22AM Ze Lien     Test Name Result Flag Reference   TSH 0 215 uIU/mL L 0 358-3 740   This is a patient instruction: This test is non-fasting  Please drink two glasses of water morning of bloodwork           Patients undergoing fluorescein dye angiography may retain small amounts of fluorescein in the body for 48-72 hours post procedure  Samples containing fluorescein can produce falsely depressed TSH values  If the patient had this procedure,a specimen should be resubmitted post fluorescein clearance  The recommended reference ranges for TSH during pregnancy are as follows:  First trimester 0 1 to 2 5 uIU/mL  Second trimester  0 2 to 3 0 uIU/mL  Third trimester 0 3 to 3 0 uIU/m     (1) LIPID PANEL FASTING W DIRECT LDL REFLEX [id number] 07:22AM conXt     Test Name Result Flag Reference   CHOLESTEROL 226 mg/dL H    LDL CHOLESTEROL CALCULATED 143 mg/dL H 0-100   This is a patient instruction: This test requires patient fasting for 10-12 hours or longer  Drinking of black coffee or black tea is acceptable  Triglyceride:        Normal <150 mg/dl   Borderline High 150-199 mg/dl   High 200-499 mg/dl   Very High >499 mg/dl      Cholesterol:       Desirable <200 mg/dl    Borderline High 200-239 mg/dl    High >239 mg/dl      HDL Cholesterol:       High>59 mg/dL    Low <41 mg/dL      HDL Cholesterol:       High>59 mg/dL    Low <41 mg/dL      This screening LDL is a calculated result  It does not have the accuracy of the Direct Measured LDL in the monitoring of patients with hyperlipidemia and/or statin therapy  Direct Measure LDL (DCC681) must be ordered separately in these patients  TRIGLYCERIDES 48 mg/dL  <=150   Specimen collection should occur prior to N-Acetylcysteine or Metamizole administration due to the potential for falsely depressed results  HDL,DIRECT 73 mg/dL H 40-60   Specimen collection should occur prior to Metamizole administration due to the potential for falsley depressed results  (1) T4, FREE [id number] 07:22AM conXt     Test Name Result Flag Reference   T4,FREE 1 37 ng/dL  0 76-1 46   Specimen collection should occur prior to Sulfasalazine administration due to the potential for falsely elevated results

## 2018-01-22 VITALS
RESPIRATION RATE: 18 BRPM | HEIGHT: 59 IN | TEMPERATURE: 98.2 F | WEIGHT: 146 LBS | BODY MASS INDEX: 29.43 KG/M2 | HEART RATE: 70 BPM | SYSTOLIC BLOOD PRESSURE: 134 MMHG | DIASTOLIC BLOOD PRESSURE: 90 MMHG

## 2018-01-23 NOTE — RESULT NOTES
Discussion/Summary   Jass Jenkins,   Your mammogram is normal    Dr Jess Jose [id number] 10:27AM Glenroy Avalos Order Number: [id number]    - Patient Instructions: To schedule this appointment, please contact Central Scheduling at [phone]  Do not wear any perfume, powder, lotion or deodorant on breast or underarm area  Please bring your doctors order, referral (if needed) and insurance information with you on the day of the test  Failure to bring this information may result in this test being rescheduled  Arrive 15 minutes prior to your appointment time to register  On the day of your test, please bring any prior mammogram or breast studies with you that were not performed at a [facility]  Failure to bring prior exams may result in your test needing to be rescheduled  Test Name Result Flag Reference   MAMMO SCREENING BILATERAL W 3D & CAD (Report)     Patient History:   Patient is postmenopausal    No known family history of cancer  Patient has never smoked  Patient's BMI is 28 3  Reason for exam: screening, asymptomatic  Screening     Mammo Screening Bilateral W DBT and CAD: January 9, 2018 - Check    In #: [de-identified]   2D/3D Procedure   3D Bilateral CC and MLO view(s) were taken  2D Bilateral CC and MLO view(s) were taken  Technologist: DASHAWN Dailey   Prior study comparison: October 27, 2014, bilateral screening    mammogram performed at [place] [address]  September 23, 2013, bilateral screening mammogram performed at    [place] [address]  April 24, 2012, bilateral    screening mammogram performed at [place] [address]  March 1, 2011, bilateral screening mammogram performed    at [place] [address]  September 21, 2009,    bilateral screening mammogram performed at [place] [address]       There are scattered fibroglandular densities  No dominant soft    tissue mass, architectural distortion or suspicious    calcifications are noted  The skin and nipple contours are    within normal limits  No evidence of malignancy  A biopsy clip    in the left retroareolar breast is again noted  No significant    change when compared to the prior studies  1  No evidence of malignancy  2  No significant change when compared with the prior study  ACR BI-RADSï¾® Assessments: BiRad:1 - Negative     Recommendation:   Routine screening mammogram of both breasts in 1 year  The patient is scheduled in a reminder system for screening    mammography  8-10% of cancers will be missed on mammography  Management of a    palpable abnormality must be based on clinical grounds  Patients    will be notified of their results via letter from our facility  Accredited by [employer] of [employer] and FDA       Transcription Location: [facility] 98: [id number]     Risk Value(s):   Tyrer-Cuzick 10 Year: 2 800%, Tyrer-Cuzick Lifetime: 4 700%,    Myriad Table: 1 5%, SUSHMA 5 Year: 1 4%, NCI Lifetime: 4 3%   Signed by:   Virgen Rivera MD   1/9/18

## 2018-01-24 VITALS
DIASTOLIC BLOOD PRESSURE: 80 MMHG | HEART RATE: 62 BPM | SYSTOLIC BLOOD PRESSURE: 126 MMHG | BODY MASS INDEX: 30.28 KG/M2 | WEIGHT: 150.19 LBS | OXYGEN SATURATION: 99 % | HEIGHT: 59 IN

## 2018-01-24 NOTE — MISCELLANEOUS
Assessment   1  Benign essential hypertension (401 1) (I10)1   2  Adenoma of left adrenal gland (227 0) (D35 02)2   3  Right sided abdominal pain (789 09) (R10 9)2   4  Primary hypothyroidism (244 9) (E03 9)2      1 Amended By: Arya Pitts ; Nov 06 2017 10:40 AM EST   2 Amended By: Arya Pitts ; Nov 06 2017 10:43 AM EST    Plan  Benign essential hypertension    · (1) COMPREHENSIVE METABOLIC PANEL; Status:Active; Requested for:07Vvu3261; 1   Perform:[facility]; Due:35Ijr0723; Ordered; For:Benign essential   hypertension; Ordered By:Kropf, Ezell Paget D1    · (1) LIPID PANEL FASTING W DIRECT LDL REFLEX; Status:Active; Requested  for:16Mdo2763; 1   Perform:[facility]; Due:67Btr7088; Ordered; For:Benign essential   hypertension; Ordered By:Sherif Dorsey    · Begin a limited exercise program ; Status:Complete;   Done: 90PKY7943 22:04PO4   Ordered; For:Benign essential hypertension; Ordered By:Kropf, Ezell Paget D2    · Follow-up visit in 2 months Evaluation and Treatment  Follow-up  Status:1 1,2  Complete - Scheduling  Done:2 1,2  71ORD0519 11:01AM2   Ordered; For: Benign essential hypertension;  Ordered By: Arya Pitts  Performed:     Due: 81LXS3116;4  Last Updated By: Placido Cedillo; 11/6/2017 11:01:25 AM2   Encounter for screening mammogram for malignant neoplasm of breast    · * MAMMO SCREENING BILATERAL W CAD; Status:1 1,2 Active;2  Requested EWO:56ZRU2545; 1   Perform:[facility]; CHAIM:07MGU1267;9  Last Updated By:Carlos Manuel Piedra; 11/6/2017 11:00:30 AM;2 Ordered; For:Encounter for screening mammogram for malignant neoplasm of breast;   Ordered By:Kropf, Ezell Paget D1   Primary hypothyroidism    · (1) T4, FREE; Status:Active; Requested for:13Hyf5308; 1   Perform:[facility]; Due:60Ubj6948; Ordered; For:Primary hypothyroidism;   Ordered By:Kropf, Ezell Paget D1    · (1) TSH; Status:Active; Requested for:11Yea5504; 1   Perform:[facility]; Due:91Clo8952; Ordered;  For:Primary hypothyroidism;   Ordered By:Julianna Dorsey D1      1 Amended By: Compa Daugherty ; Nov 06 2017 10:56 AM EST   2 Amended By: Compa Daugherty ; Nov 06 2017 1:03 PM EST    Discussion/Summary  Discussion Summary:   Hypertension - improved, but concerned that it could be from her adrenal gland  adrenal adenoma - She is going to see a new endocrinologist next month  She is symptomatically getting worse  (Dr Tatianna Campbell) 1  1,2  copy of lab work done by previous endocrinologist printed and given to patient bring with her to her new specialist  2  right sided abdominal pain - continued pain, reviewed colonoscopy report  She is going to go back to following with Dr Dat Ashton    hypothyroidism - TSH was too low during her hospital stay  dose of her levothyroxine changed from 100 to 8 during her stay  declined flu shot1        1 Amended By: Compa Daugherty ; Nov 06 2017 10:44 AM EST   2 Amended By: Compa Daugherty ; Nov 06 2017 1:04 PM EST    Chief Complaint  I spoke with Humphrey Montano on 11/1/17 after her hospital discharge to home on 10/31/17  She is feeling much better and denies any headaches  She has no complaints at this time  I reviewed her medications with her and updated her chart accordingly  She will call Dr Elias Layne office to set up her hospital follow up appt  She is aware to call our office at any time with any questions or concerns and she is aware to go to the ER if any chest pain, severe h/a's, dizziness, weakness, palpitations, etc [facility] LPN   Chief Complaint Free Text Note Form: [facility] Headache and Blood pressure [email or web address]        1 Amended By: Dayan Brown; Nov 06 2017 10:35 AM EST    History of Present Illness  TCM Communication [facility]: The patient is being contacted for follow-up after hospitalization  Hospital records were reviewed  She was hospitalized at  The date of admission: 10/29/17, date of discharge: 10/31/17  Diagnosis: Headache, Hypertensive Urgency  She was discharged to home  Medications reviewed and updated today  She scheduled a follow up appointment  Follow-up appointments with other specialists: She will call Dr Linda Haywood to schedule her appt  Counseling was provided to the patient  Topics counseled included instructions for management, risk factor reductions, patient and family education, activities of daily living and importance of compliance with treatment  Communication performed and completed by [facility] LPN 01/5/05   HPI: she has been getting spurts of high blood pressure  1    Hypertension (Follow-Up): The patient presents for follow-up of2  essential hypertension2   The patient states she has been  doing well2  with her blood pressure control since the last visit2   Symptoms:   Medications:2  The patient is adherent with her medication regimen2   She denies medication side effects2         1 Amended By: Governor Alfonso ; Nov 06 2017 10:44 AM EST   2 Amended By: Governor Alfonso ; Nov 06 2017 1:03 PM EST    Review of Systems  Complete-Female:   Constitutional:1  No fever, no chills, feels well, no tiredness, no recent weight gain or weight loss1   Respiratory:1  No complaints of shortness of breath, no wheezing, no cough, no SOB on exertion, no orthopnea, no PND1   1 Amended By: Governor Alfonso ; Nov 06 2017 10:53 AM EST    Active Problems   1  Abnormal cortisol level (790 6) (R79 89)  2  Acute abdominal pain in left lower quadrant (789 04,338 19) (R10 32)  3  Adenoma of left adrenal gland (227 0) (D35 02)  4  Anxiety (300 00) (F41 9)  5  Arthropathy (716 90) (M12 9)  6  Benign essential hypertension (401 1) (I10)  7  Benign paroxysmal vertigo, unspecified laterality (386 11) (H81 10)  8  Colitis (558 9) (K52 9)  9  Depression (311) (F32 9)  10  Diverticulitis of colon (562 11) (K57 32)  11  Encounter for screening for malignant neoplasm of colon (V76 51) (Z12 11)  12  Encounter for screening mammogram for malignant neoplasm of breast (V76 12)    (Z12 31)  13  Hyperlipidemia (272 4) (E78 5)  14  Hyponatremia (276 1) (E87 1)  15  Long term use of drug (V58 69) (Z79 899)  16  Nontoxic single thyroid nodule (241 0) (E04 1)  17  Obesity (278 00) (E66 9)  18  Primary hypothyroidism (244 9) (E03 9)  19  Sigmoid diverticulitis (562 11) (K57 32)  20  Subclinical hyperthyroidism (242 90) (E05 90)  21  Vitamin D deficiency (268 9) (E55 9)    Past Medical History   1  Acute diverticulitis (562 11) (K57 92)  2  Acute diverticulitis (562 11) (K57 92)  3  Benign paroxysmal positional vertigo (386 11) (H81 10)  4  History of acute sinusitis (V12 69) (Z87 09)  5  History of obesity (V13 89) (Z86 39)  6  History of vertigo (V12 49) (Z87 898)  7  History of Primary hypothyroidism (244 9) (E03 9)  8  History of Screening for diabetes mellitus (DM) (V77 1) (Z13 1)  9  Strep throat (034 0) (J02 0)  10  History of Vaginal candidiasis (112 1) (B37 3)    Surgical History   1  History of Appendectomy  2  History of Arthrotomy Of Knee With Open Meniscus Repair  3  History of Cholecystectomy    Family History  Family History   1  Adopted (J92 80) (Z02 82)    Social History    · Adopted - Father Unknown   · Adopted - Mother Unknown   · Being A Social Drinker   · Caffeine use (V49 89) (F15 90)   · Caffeine Use   · Former smoker (T44 08) (T29 150)   · Retired   · Social alcohol use (Z78 9)    Social History Reviewed: The social history was reviewed and updated today  1        1 Amended By: Romulo Rae ; Nov 06 2017 10:58 AM EST    Current Meds  1  ALPRAZolam 0 25 MG Oral Tablet; 1 PO Q6 hours prn anxiety; Therapy: 65Psd6499 to (Last Rx:16Oct2014) Ordered  2  Aspir-81 81 MG Oral Tablet Delayed Release; 1 every day; Therapy: (Recorded:01Nov2017) to Recorded  3  Daily Multivitamin TABS; TAKE 1 TABLET DAILY; Therapy: (Recorded:01Nov2017) to Recorded  4  HydroCHLOROthiazide 25 MG Oral Tablet; 1 DAILY; Therapy: (Recorded:01Nov2017) to Recorded  5   Inderal  MG Oral Capsule Extended Release 24 Hour; 1 DAILY; Therapy: [phone]) to Recorded  6  Levothyroxine Sodium 88 MCG Oral Tablet; 1 DAILY; Therapy: 76KAE0493 to (Adrian Walker)  Requested for: 22GGG3422 Recorded    Allergies   1  No Known Drug Allergies    Physical Exam    Constitutional1    General appearance: No acute distress, well appearing and well nourished  1    Ears, Nose, Mouth, and Throat1    External inspection of ears and nose: Normal 1    Otoscopic examination: Tympanic membranes translucent with normal light reflex  Canals patent without erythema  1    Oropharynx: Normal with no erythema, edema, exudate or lesions  1    Pulmonary1    Auscultation of lungs: Clear to auscultation  1  No rales or crackles were heard bilaterally1   No rhonchi1   No friction rub1   No wheezing1   Cardiovascular1    Auscultation of heart: Normal rate and rhythm, normal S1 and S2, without murmurs  1    Abdomen1  Right sided abdominal pain with deep palpitations  1           1 Amended By: Tanner Lewis ; Nov 06 2017 10:55 AM [facility]    Health Management  Encounter for screening mammogram for malignant neoplasm of breast   Digital Bilateral Screening Mammogram With CAD; every 1 year; Last 27Oct2014; Next Due:  68CNJ6755; Overdue    Message   Recorded as Task   Date: 10/31/2017 06:18 PM, Created By: System   Task Name: Hospital [facility]   Assigned To: Dev Alexander   Regarding Patient: Angela Bravo, Status: Active   Comment:    System - 31 Oct 2017 6:18 PM     Patient discharged from hospital   Patient Name: Sachi Gil  Patient YOB: 1950  Discharge Date: 10/31/2017  Facility: [facility] - 23 Nov 2017 9:02 AM     TASK REASSIGNED: Previously Assigned To Lesa Garcia - 01 Nov 2017 11:51 AM     TASK EDITED  LM TO CB   I want to schedule a TCM visit for her Yuliya Rock - 01 Nov 2017 12:20 PM     TASK EDITED  [phone] returning your call     Future Appointments    Date/Time Provider Specialty Site 11/06/2017 10:30 AM Marlo Garza, [address]   Electronically signed by : Petra Schirmer, ; Nov 1 2017  3:11PM EST                       (Co-author)    Electronically signed by : Jannet Stanford DO; Nov 1 2017  9:18PM EST                       (Author)    Electronically signed by : Jannet Stanford DO; Nov 6 2017  1:04PM EST                       (Author)

## 2018-01-26 ENCOUNTER — TELEPHONE (OUTPATIENT)
Dept: CARDIOLOGY CLINIC | Facility: CLINIC | Age: 68
End: 2018-01-26

## 2018-01-26 RX ORDER — PROPRANOLOL HYDROCHLORIDE 120 MG/1
120 CAPSULE, EXTENDED RELEASE ORAL DAILY
Qty: 30 CAPSULE | Refills: 5 | Status: CANCELLED | OUTPATIENT
Start: 2018-01-26

## 2018-01-26 RX ORDER — PROPRANOLOL HYDROCHLORIDE 20 MG/1
1 TABLET ORAL EVERY 8 HOURS
COMMUNITY
Start: 2017-11-03 | End: 2018-01-30 | Stop reason: SDUPTHER

## 2018-01-30 ENCOUNTER — TELEPHONE (OUTPATIENT)
Dept: CARDIOLOGY CLINIC | Facility: CLINIC | Age: 68
End: 2018-01-30

## 2018-01-30 DIAGNOSIS — I10 HTN (HYPERTENSION), MALIGNANT: Primary | ICD-10-CM

## 2018-01-30 RX ORDER — PROPRANOLOL HYDROCHLORIDE 20 MG/1
20 TABLET ORAL EVERY 8 HOURS SCHEDULED
Qty: 90 TABLET | Refills: 5 | Status: SHIPPED | OUTPATIENT
Start: 2018-01-30 | End: 2018-04-17

## 2018-02-05 ENCOUNTER — OFFICE VISIT (OUTPATIENT)
Dept: CARDIOLOGY CLINIC | Facility: CLINIC | Age: 68
End: 2018-02-05
Payer: MEDICARE

## 2018-02-05 VITALS
DIASTOLIC BLOOD PRESSURE: 84 MMHG | WEIGHT: 155 LBS | SYSTOLIC BLOOD PRESSURE: 148 MMHG | HEART RATE: 68 BPM | HEIGHT: 59 IN | OXYGEN SATURATION: 98 % | BODY MASS INDEX: 31.25 KG/M2

## 2018-02-05 DIAGNOSIS — I10 HTN (HYPERTENSION), MALIGNANT: Primary | ICD-10-CM

## 2018-02-05 DIAGNOSIS — R06.02 SHORTNESS OF BREATH ON EXERTION: ICD-10-CM

## 2018-02-05 DIAGNOSIS — D35.02 ADENOMA OF LEFT ADRENAL GLAND: ICD-10-CM

## 2018-02-05 PROCEDURE — 99214 OFFICE O/P EST MOD 30 MIN: CPT | Performed by: INTERNAL MEDICINE

## 2018-02-05 RX ORDER — LISINOPRIL 10 MG/1
10 TABLET ORAL DAILY
Qty: 30 TABLET | Refills: 1 | Status: SHIPPED | OUTPATIENT
Start: 2018-02-05 | End: 2018-04-17 | Stop reason: SDUPTHER

## 2018-02-05 NOTE — PROGRESS NOTES
Cardiology Follow Up    Candi Romero  1950  [id number]  [address] [facility] [address] [address]    Interval History:   61-year-old woman with a history of malignant hypertension, adrenal adenoma with follow-up with Endocrinology  Her adenoma workup was negative for pheochromocytoma or hyperaldosteronism  She is here in deeply concerned about usage of beta-blockers  She has had a 5 lb increase in weight and this is very upsetting for her  She reports her blood pressure has been controlled  She denies having any migraines like previously  Patient Active Problem List   Diagnosis    Hypertension    Headache    Hypothyroidism    Hypertensive urgency     Past Medical History:   Diagnosis Date    Adenoma of left adrenal gland     Disease of thyroid gland     Hypertension     Malignant hypertension     Primary hypothyroidism     SOB (shortness of breath) on exertion      Social History     Social History    Marital status: /Civil Union     Spouse name: N/A    Number of children: N/A    Years of education: N/A     Occupational History    Not on file       Social History Main Topics    Smoking status: Former Smoker    Smokeless tobacco: Never Used    Alcohol use Yes      Comment: occasional    Drug use: No    Sexual activity: Not on file     Other Topics Concern    Not on file     Social History Narrative    No narrative on file      Family History   Problem Relation Age of Onset    Heart disease Family      Adopted but heard family member     Past Surgical History:   Procedure Laterality Date    APPENDECTOMY       SECTION      CHOLECYSTECTOMY      KNEE SURGERY      History of Arthrotomy of Knee; Open Meniscus Tear       Current Outpatient Prescriptions:     ALPRAZolam (XANAX) 0 25 mg tablet, Take 0 25 mg by mouth as needed for anxiety, Disp: , Rfl:   aspirin (ECOTRIN LOW STRENGTH) 81 mg EC tablet, Take 81 mg by mouth daily, Disp: , Rfl:     hydrochlorothiazide (HYDRODIURIL) 25 mg tablet, Take 1 tablet by mouth daily, Disp: 30 tablet, Rfl: 0    levothyroxine 88 mcg tablet, Take 1 tablet by mouth daily in the early morning, Disp: 30 tablet, Rfl: 0    lisinopril (ZESTRIL) 10 mg tablet, Take 1 tablet (10 mg total) by mouth daily, Disp: 30 tablet, Rfl: 1    Magnesium 250 MG TABS, Take 250 mg by mouth daily, Disp: , Rfl:     Multiple Vitamin (MULTIVITAMIN) tablet, Take 1 tablet by mouth daily, Disp: , Rfl:     propranolol (INDERAL) 20 mg tablet, Take 1 tablet (20 mg total) by mouth every 8 (eight) hours, Disp: 90 tablet, Rfl: 5  No Known Allergies    Review of Systems:  Review of Systems    Vitals:    02/05/18 0858   BP: 148/84   BP Location: Right arm   Patient Position: Sitting   Cuff Size: Large   Pulse: 68   SpO2: 98%   Weight: 70 3 kg (155 lb)   Height: 4' 11" (1 499 m)     Physical Exam:  Physical Exam    1  HTN (hypertension), malignant  lisinopril (ZESTRIL) 10 mg tablet    Basic metabolic panel        Labs:  Hospital Outpatient Visit on 12/29/2017   Component Date Value    Protocol Name 12/29/2017 OLVIN     Time In Exercise Phase 12/29/2017 00:06:45     MAX   SYSTOLIC BP 15/09/9776 824     Max Diastolic Bp 80/71/7424 80     Max Heart Rate 12/29/2017 110     Max Predicted Heart Rate 12/29/2017 151     Reason for Termination 12/29/2017 Dyspnea     Test Indication 12/29/2017 sob,htn,adenoma of left adrenal gland     Target Hr Formular 12/29/2017 (220 - Age)*85%     Chest Pain Statement 12/29/2017 none    Appointment on 12/19/2017   Component Date Value    TSH 3RD GENERATON 12/19/2017 0 284*    Free T4 12/19/2017 1 37     Sodium 12/19/2017 138     Potassium 12/19/2017 4 1     Chloride 12/19/2017 102     CO2 12/19/2017 28     Anion Gap 12/19/2017 8     BUN 12/19/2017 17     Creatinine 12/19/2017 0 64     Glucose, Fasting 12/19/2017 108*    Calcium 12/19/2017 9 0     eGFR 12/19/2017 91     Cortisol, Random 12/19/2017 1 2*    Normetanephrine, Free 12/19/2017 105     Metanephrine, Free 12/19/2017 34     Aldosterone 12/19/2017 12 8      Exercise stress echo equivocal to 72% of maximum predicted heart rate of MPHR- noted htn bp response    Discussion/Summary:  Malignant htn- secondary htn study negative as per endocrinology  -- pt prefers to be off propanolol-- plan to wean down 1/2 tablet TID for one week than BID for three days than off  -- start lisinopril 10mg daily + possible uptitration to BID next week-- she will call us  -- continue hctz 25mg in AM  -- add on aldactone 25mg if with incomplete control    SOB- second to uncontrolled BP  monitor    Adenoma- awaiting outpatient endocrinology records   Cant upload from [facility] currently      Aayush Shah  Please call with any questions or suggestions

## 2018-02-06 NOTE — TELEPHONE ENCOUNTER
Phone call from [facility] of [address]; patient is there now to  a prescription for Lisinopril ordered yesterday  Rx went to [facility]  [facility] Pharmacist stated they will call to cancel sending this order and patient requested to  at [facility]

## 2018-02-07 ENCOUNTER — TELEPHONE (OUTPATIENT)
Dept: CARDIOLOGY CLINIC | Facility: CLINIC | Age: 68
End: 2018-02-07

## 2018-02-28 ENCOUNTER — TELEPHONE (OUTPATIENT)
Dept: FAMILY MEDICINE CLINIC | Facility: CLINIC | Age: 68
End: 2018-02-28

## 2018-02-28 DIAGNOSIS — E03.9 PRIMARY HYPOTHYROIDISM: Primary | ICD-10-CM

## 2018-02-28 RX ORDER — LEVOTHYROXINE SODIUM 88 UG/1
88 TABLET ORAL
Qty: 30 TABLET | Refills: 0 | Status: SHIPPED | OUTPATIENT
Start: 2018-02-28 | End: 2018-04-15 | Stop reason: SDUPTHER

## 2018-02-28 NOTE — TELEPHONE ENCOUNTER
DR MIX    Patients synthroid was lost by her mail order  They are trying to track it  In the mean time could we call in a 30 day supply to [facility] in [place]? She is aware to wait for you until 3/01/2018

## 2018-03-15 ENCOUNTER — TELEPHONE (OUTPATIENT)
Dept: CARDIOLOGY CLINIC | Facility: CLINIC | Age: 68
End: 2018-03-15

## 2018-03-15 NOTE — TELEPHONE ENCOUNTER
I left a voicemail for patient to return our call  Patient has an appt  on Monday with Dr Lizbet Robertson and he had ordered BPM not in patient chart  Did she have this done? Where?

## 2018-03-24 ENCOUNTER — OFFICE VISIT (OUTPATIENT)
Dept: FAMILY MEDICINE CLINIC | Facility: CLINIC | Age: 68
End: 2018-03-24
Payer: MEDICARE

## 2018-03-24 VITALS — TEMPERATURE: 96.8 F | SYSTOLIC BLOOD PRESSURE: 136 MMHG | DIASTOLIC BLOOD PRESSURE: 78 MMHG | HEART RATE: 66 BPM

## 2018-03-24 DIAGNOSIS — J01.00 ACUTE NON-RECURRENT MAXILLARY SINUSITIS: Primary | ICD-10-CM

## 2018-03-24 PROCEDURE — 99213 OFFICE O/P EST LOW 20 MIN: CPT | Performed by: FAMILY MEDICINE

## 2018-03-24 RX ORDER — POLYETHYLENE GLYCOL-3350, SODIUM CHLORIDE, POTASSIUM CHLORIDE AND SODIUM BICARBONATE 420; 11.2; 5.72; 1.48 G/438.4G; G/438.4G; G/438.4G; G/438.4G
POWDER, FOR SOLUTION ORAL
COMMUNITY
Start: 2018-03-14 | End: 2018-04-17

## 2018-03-24 RX ORDER — AMOXICILLIN AND CLAVULANATE POTASSIUM 875; 125 MG/1; MG/1
1 TABLET, FILM COATED ORAL EVERY 12 HOURS SCHEDULED
Qty: 20 TABLET | Refills: 0 | Status: SHIPPED | OUTPATIENT
Start: 2018-03-24 | End: 2018-04-03

## 2018-03-24 RX ORDER — PANTOPRAZOLE SODIUM 40 MG/1
40 TABLET, DELAYED RELEASE ORAL DAILY
COMMUNITY
Start: 2018-03-22 | End: 2019-03-14 | Stop reason: ALTCHOICE

## 2018-03-24 NOTE — PROGRESS NOTES
Assessment/Plan:    Problem List Items Addressed This Visit     None      Visit Diagnoses     Acute non-recurrent maxillary sinusitis    -  Primary    Relevant Medications    amoxicillin-clavulanate (AUGMENTIN) 875-125 mg per tablet          There are no Patient Instructions on file for this visit  No Follow-up on file  Subjective:      Patient ID: Suzi Mcleod is a 77 y o  female  Chief Complaint   Patient presents with    Headache    alot of Mucous     for about 2 weeks  af/rma        Pt states she was sick all month  Pt states her head is filled with mucus  She is unsure if she has vertigo or a sinus infection  States she is dizzy brittni if she lies down snd tries to get uyp to fast  Has a HA      Headache    Associated symptoms include coughing and sinus pressure  Pertinent negatives include no abdominal pain, abnormal behavior, anorexia, back pain, blurred vision, dizziness, drainage, ear pain, eye pain, eye redness, eye watering, facial sweating, fever, hearing loss, insomnia, loss of balance, muscle aches, nausea, neck pain, numbness, phonophobia, photophobia, rhinorrhea, scalp tenderness, seizures, sore throat, swollen glands, tingling, tinnitus, visual change, vomiting, weakness or weight loss  The following portions of the patient's history were reviewed and updated as appropriate: allergies, current medications, past family history, past medical history, past social history, past surgical history and problem list     Review of Systems   Constitutional: Negative for fever and weight loss  HENT: Positive for sinus pressure  Negative for ear pain, hearing loss, rhinorrhea, sore throat and tinnitus  Eyes: Negative for blurred vision, photophobia, pain and redness  Respiratory: Positive for cough  Gastrointestinal: Negative for abdominal pain, anorexia, nausea and vomiting  Musculoskeletal: Negative for back pain and neck pain  Neurological: Positive for headaches   Negative for dizziness, tingling, seizures, weakness, numbness and loss of balance  Psychiatric/Behavioral: The patient does not have insomnia  Current Outpatient Prescriptions   Medication Sig Dispense Refill    ALPRAZolam (XANAX) 0 25 mg tablet Take 0 25 mg by mouth as needed for anxiety      aspirin (ECOTRIN LOW STRENGTH) 81 mg EC tablet Take 81 mg by mouth daily      GAVILYTE-N WITH FLAVOR PACK 420 g solution       hydrochlorothiazide (HYDRODIURIL) 25 mg tablet Take 1 tablet by mouth daily 30 tablet 0    levothyroxine 88 mcg tablet Take 1 tablet (88 mcg total) by mouth daily in the early morning 30 tablet 0    lisinopril (ZESTRIL) 10 mg tablet Take 1 tablet (10 mg total) by mouth daily 30 tablet 1    Magnesium 250 MG TABS Take 250 mg by mouth daily      Multiple Vitamin (MULTIVITAMIN) tablet Take 1 tablet by mouth daily      pantoprazole (PROTONIX) 40 mg tablet 40 mg 2 (two) times a day        amoxicillin-clavulanate (AUGMENTIN) 875-125 mg per tablet Take 1 tablet by mouth every 12 (twelve) hours for 10 days 20 tablet 0    propranolol (INDERAL) 20 mg tablet Take 1 tablet (20 mg total) by mouth every 8 (eight) hours 90 tablet 5     No current facility-administered medications for this visit  Objective:    /78   Pulse 66   Temp (!) 96 8 °F (36 °C)        Physical Exam   Constitutional: She appears well-developed and well-nourished  HENT:   Head: Normocephalic and atraumatic  Right Ear: Tympanic membrane is bulging  Left Ear: Tympanic membrane is bulging  Nose: Mucosal edema present  Mouth/Throat:       Eyes: EOM are normal  Pupils are equal, round, and reactive to light  Neck: Normal range of motion  Neck supple  Cardiovascular: Normal rate and regular rhythm  Pulmonary/Chest: Effort normal    Abdominal: Soft  Musculoskeletal: Normal range of motion  Neurological: She is alert  Skin: Skin is warm  Nursing note and vitals reviewed        Pt advised if her symptoms so not clear in 10 days or so we can send her to balance center as she did get slightly dizzy with lying down and turning head to left, not really positive for vertigo in my estimation       Ricky Stanford, DO

## 2018-04-04 ENCOUNTER — TRANSCRIBE ORDERS (OUTPATIENT)
Dept: ADMINISTRATIVE | Facility: HOSPITAL | Age: 68
End: 2018-04-04

## 2018-04-04 ENCOUNTER — APPOINTMENT (OUTPATIENT)
Dept: LAB | Facility: HOSPITAL | Age: 68
End: 2018-04-04
Attending: INTERNAL MEDICINE
Payer: MEDICARE

## 2018-04-04 ENCOUNTER — APPOINTMENT (OUTPATIENT)
Dept: LAB | Facility: HOSPITAL | Age: 68
End: 2018-04-04
Payer: MEDICARE

## 2018-04-04 DIAGNOSIS — I51.9 MYXEDEMA HEART DISEASE: Primary | ICD-10-CM

## 2018-04-04 DIAGNOSIS — E03.9 MYXEDEMA HEART DISEASE: ICD-10-CM

## 2018-04-04 DIAGNOSIS — I51.9 MYXEDEMA HEART DISEASE: ICD-10-CM

## 2018-04-04 DIAGNOSIS — E03.9 MYXEDEMA HEART DISEASE: Primary | ICD-10-CM

## 2018-04-04 DIAGNOSIS — I10 HTN (HYPERTENSION), MALIGNANT: ICD-10-CM

## 2018-04-04 LAB
ANION GAP SERPL CALCULATED.3IONS-SCNC: 8 MMOL/L (ref 4–13)
BUN SERPL-MCNC: 18 MG/DL (ref 5–25)
CALCIUM SERPL-MCNC: 9.3 MG/DL (ref 8.3–10.1)
CHLORIDE SERPL-SCNC: 97 MMOL/L (ref 100–108)
CO2 SERPL-SCNC: 29 MMOL/L (ref 21–32)
CREAT SERPL-MCNC: 0.82 MG/DL (ref 0.6–1.3)
GFR SERPL CREATININE-BSD FRML MDRD: 73 ML/MIN/1.73SQ M
GLUCOSE P FAST SERPL-MCNC: 94 MG/DL (ref 65–99)
POTASSIUM SERPL-SCNC: 3.8 MMOL/L (ref 3.5–5.3)
SODIUM SERPL-SCNC: 134 MMOL/L (ref 136–145)
T4 FREE SERPL-MCNC: 1.34 NG/DL (ref 0.76–1.46)
TSH SERPL DL<=0.05 MIU/L-ACNC: 0.45 UIU/ML (ref 0.36–3.74)

## 2018-04-04 PROCEDURE — 36415 COLL VENOUS BLD VENIPUNCTURE: CPT

## 2018-04-04 PROCEDURE — 84443 ASSAY THYROID STIM HORMONE: CPT

## 2018-04-04 PROCEDURE — 84439 ASSAY OF FREE THYROXINE: CPT

## 2018-04-04 PROCEDURE — 80048 BASIC METABOLIC PNL TOTAL CA: CPT

## 2018-04-15 DIAGNOSIS — E03.9 PRIMARY HYPOTHYROIDISM: ICD-10-CM

## 2018-04-16 RX ORDER — LEVOTHYROXINE SODIUM 88 UG/1
TABLET ORAL
Qty: 90 TABLET | Refills: 1 | Status: SHIPPED | OUTPATIENT
Start: 2018-04-16 | End: 2018-09-23 | Stop reason: SDUPTHER

## 2018-04-17 ENCOUNTER — OFFICE VISIT (OUTPATIENT)
Dept: CARDIOLOGY CLINIC | Facility: CLINIC | Age: 68
End: 2018-04-17
Payer: MEDICARE

## 2018-04-17 VITALS
SYSTOLIC BLOOD PRESSURE: 150 MMHG | WEIGHT: 151 LBS | HEART RATE: 56 BPM | OXYGEN SATURATION: 97 % | BODY MASS INDEX: 30.44 KG/M2 | DIASTOLIC BLOOD PRESSURE: 82 MMHG | HEIGHT: 59 IN

## 2018-04-17 DIAGNOSIS — I10 MALIGNANT HYPERTENSION: ICD-10-CM

## 2018-04-17 DIAGNOSIS — R07.89 CHEST TIGHTNESS: Primary | ICD-10-CM

## 2018-04-17 DIAGNOSIS — I10 HTN (HYPERTENSION), MALIGNANT: ICD-10-CM

## 2018-04-17 PROCEDURE — 99214 OFFICE O/P EST MOD 30 MIN: CPT | Performed by: INTERNAL MEDICINE

## 2018-04-17 PROCEDURE — 93000 ELECTROCARDIOGRAM COMPLETE: CPT | Performed by: INTERNAL MEDICINE

## 2018-04-17 RX ORDER — LISINOPRIL 10 MG/1
10 TABLET ORAL
Qty: 30 TABLET | Refills: 0 | Status: SHIPPED | OUTPATIENT
Start: 2018-04-17 | End: 2018-04-27 | Stop reason: SDUPTHER

## 2018-04-17 RX ORDER — SPIRONOLACTONE AND HYDROCHLOROTHIAZIDE 25; 25 MG/1; MG/1
1 TABLET ORAL
Qty: 90 TABLET | Refills: 3 | Status: SHIPPED | OUTPATIENT
Start: 2018-04-17 | End: 2018-07-17 | Stop reason: SDUPTHER

## 2018-04-17 NOTE — PROGRESS NOTES
Cardiology Follow Up    Sofya Ochoa  1950  [id number]  [facility] [place] [address] [address]    Interval History:   68-year-old woman with a history of malignant hypertension, adrenal adenoma with follow-up with Endocrinology  Her adenoma workup was negative for pheochromocytoma or hyperaldosteronism  She is here in deeply concerned about usage of beta-blockers  She has had a 5 lb increase in weight and this is very upsetting for her  She reports her blood pressure has been controlled  She denies having any migraines like previously  : Still with surges in her blood pressure  Feels some swelling at tiems  No chest pain  No dizziness  Patient Active Problem List   Diagnosis    Hypertension    Headache    Primary hypothyroidism    Hypertensive urgency    Adenoma of left adrenal gland    Shortness of breath on exertion    HTN (hypertension), malignant     Past Medical History:   Diagnosis Date    Adenoma of left adrenal gland     Disease of thyroid gland     Hypertension     Malignant hypertension     Primary hypothyroidism     SOB (shortness of breath) on exertion      Social History     Social History    Marital status: /Civil Union     Spouse name: N/A    Number of children: N/A    Years of education: N/A     Occupational History    Not on file       Social History Main Topics    Smoking status: Former Smoker    Smokeless tobacco: Never Used    Alcohol use Yes      Comment: occasional    Drug use: No    Sexual activity: Not on file     Other Topics Concern    Not on file     Social History Narrative    No narrative on file      Family History   Problem Relation Age of Onset    Heart disease Family      Adopted but heard family member     Past Surgical History:   Procedure Laterality Date    APPENDECTOMY       SECTION      CHOLECYSTECTOMY      KNEE SURGERY      History of Arthrotomy of Knee; Open Meniscus Tear       Current Outpatient Prescriptions:     ALPRAZolam (XANAX) 0 25 mg tablet, Take 0 25 mg by mouth as needed for anxiety, Disp: , Rfl:     aspirin (ECOTRIN LOW STRENGTH) 81 mg EC tablet, Take 81 mg by mouth daily, Disp: , Rfl:     hydrochlorothiazide (HYDRODIURIL) 25 mg tablet, Take 1 tablet by mouth daily, Disp: 30 tablet, Rfl: 0    levothyroxine 88 mcg tablet, TAKE 1 TABLET DAILY, Disp: 90 tablet, Rfl: 1    lisinopril (ZESTRIL) 10 mg tablet, Take 1 tablet (10 mg total) by mouth daily, Disp: 30 tablet, Rfl: 1    Magnesium 250 MG TABS, Take 250 mg by mouth daily, Disp: , Rfl:     pantoprazole (PROTONIX) 40 mg tablet, 40 mg 2 (two) times a day  , Disp: , Rfl:   No Known Allergies    Review of Systems:  Review of Systems   Constitutional: Negative  Negative for activity change, appetite change, chills, diaphoresis, fatigue, fever and unexpected weight change  HENT: Negative  Negative for congestion, dental problem, drooling, ear discharge, ear pain, facial swelling, hearing loss, mouth sores, nosebleeds, postnasal drip, rhinorrhea, sinus pain, sinus pressure, sneezing, sore throat, tinnitus, trouble swallowing and voice change  Eyes: Negative  Negative for photophobia, pain, redness, itching and visual disturbance  Respiratory: Negative  Negative for apnea, cough, choking, chest tightness, shortness of breath, wheezing and stridor  Cardiovascular: Negative  Negative for chest pain, palpitations and leg swelling  Gastrointestinal: Negative  Negative for abdominal distention, abdominal pain, anal bleeding, blood in stool, constipation, diarrhea, nausea, rectal pain and vomiting  Endocrine: Negative  Negative for cold intolerance, heat intolerance, polydipsia, polyphagia and polyuria  Genitourinary: Negative    Negative for decreased urine volume, difficulty urinating, dyspareunia, dysuria, enuresis, flank pain, frequency, genital sores, hematuria, menstrual problem, pelvic pain, urgency, vaginal bleeding, vaginal discharge and vaginal pain  Musculoskeletal: Negative  Negative for arthralgias, back pain, gait problem, joint swelling, myalgias, neck pain and neck stiffness  Skin: Negative  Negative for color change, pallor, rash and wound  Allergic/Immunologic: Negative  Negative for environmental allergies, food allergies and immunocompromised state  Neurological: Positive for headaches  Negative for dizziness, tremors, seizures, syncope, facial asymmetry, speech difficulty, weakness, light-headedness and numbness  Hematological: Negative  Negative for adenopathy  Does not bruise/bleed easily  Psychiatric/Behavioral: Negative  Negative for agitation, behavioral problems, confusion, decreased concentration, dysphoric mood, hallucinations, self-injury, sleep disturbance and suicidal ideas  The patient is not nervous/anxious and is not hyperactive  All other systems reviewed and are negative  Vitals:    04/17/18 1531   BP: 150/82   BP Location: Right arm   Patient Position: Sitting   Cuff Size: Large   Pulse: 56   SpO2: 97%   Weight: 68 5 kg (151 lb)   Height: 4' 11" (1 499 m)     Physical Exam:  Physical Exam   Constitutional: She is oriented to person, place, and time  She appears well-developed and well-nourished  No distress  HENT:   Head: Normocephalic and atraumatic  Right Ear: External ear normal    Left Ear: External ear normal    Eyes: Conjunctivae are normal  Pupils are equal, round, and reactive to light  Right eye exhibits no discharge  Left eye exhibits no discharge  No scleral icterus  Neck: Normal range of motion  Neck supple  No JVD present  No tracheal deviation present  No thyromegaly present  Cardiovascular: Normal rate and regular rhythm  Exam reveals no gallop and no friction rub  No murmur heard    Pulmonary/Chest: Effort normal and breath sounds normal  No stridor  No respiratory distress  She has no wheezes  She has no rales  She exhibits no tenderness  Abdominal: Soft  Bowel sounds are normal  She exhibits no distension and no mass  There is no tenderness  There is no rebound and no guarding  Musculoskeletal: Normal range of motion  She exhibits edema  She exhibits no tenderness or deformity  Neurological: She is alert and oriented to person, place, and time  She has normal reflexes  No cranial nerve deficit  She exhibits normal muscle tone  Coordination normal    Skin: Skin is warm and dry  No rash noted  She is not diaphoretic  No erythema  No pallor  Psychiatric: She has a normal mood and affect  Her behavior is normal  Judgment and thought content normal    Nursing note and vitals reviewed  1  Chest tightness  POCT ECG   2  Malignant hypertension  POCT ECG        Labs:  Appointment on 04/04/2018   Component Date Value    TSH 3RD GENERATON 04/04/2018 0 446     Free T4 04/04/2018 1 34    Appointment on 04/04/2018   Component Date Value    Sodium 04/04/2018 134*    Potassium 04/04/2018 3 8     Chloride 04/04/2018 97*    CO2 04/04/2018 29     Anion Gap 04/04/2018 8     BUN 04/04/2018 18     Creatinine 04/04/2018 0 82     Glucose, Fasting 04/04/2018 94     Calcium 04/04/2018 9 3     eGFR 04/04/2018 73      Exercise stress echo equivocal to 72% of maximum predicted heart rate of MPHR- noted htn bp response    Discussion/Summary:  Malignant htn- secondary htn study negative as per endocrinology  -- pt prefers to be off propanolol-- plan to wean down 1/2 tablet TID for one week than BID for three days than off  -- lisinopril 10mg daily   -- increase aldactone 25mg-hctz 25mg in AM      SOB- second to uncontrolled BP  monitor    Adenoma- awaiting outpatient endocrinology records   Cant upload from [facility] currently      Neena Shah  Please call with any questions or suggestions

## 2018-04-27 DIAGNOSIS — I10 HTN (HYPERTENSION), MALIGNANT: ICD-10-CM

## 2018-04-27 RX ORDER — LISINOPRIL 10 MG/1
10 TABLET ORAL
Qty: 90 TABLET | Refills: 3 | Status: SHIPPED | OUTPATIENT
Start: 2018-04-27 | End: 2019-03-17 | Stop reason: SDUPTHER

## 2018-05-17 ENCOUNTER — TELEPHONE (OUTPATIENT)
Dept: FAMILY MEDICINE CLINIC | Facility: CLINIC | Age: 68
End: 2018-05-17

## 2018-05-17 DIAGNOSIS — T75.3XXA MOTION SICKNESS, INITIAL ENCOUNTER: Primary | ICD-10-CM

## 2018-05-18 ENCOUNTER — TELEPHONE (OUTPATIENT)
Dept: FAMILY MEDICINE CLINIC | Facility: CLINIC | Age: 68
End: 2018-05-18

## 2018-05-18 RX ORDER — SCOLOPAMINE TRANSDERMAL SYSTEM 1 MG/1
1 PATCH, EXTENDED RELEASE TRANSDERMAL
Qty: 6 PATCH | Refills: 0 | Status: SHIPPED | OUTPATIENT
Start: 2018-05-18 | End: 2018-05-25 | Stop reason: ALTCHOICE

## 2018-05-18 NOTE — TELEPHONE ENCOUNTER
PA being started in [address]  ID [id number]  Correct number is [phone]  Brand name is the only Transderm Scop patch available  [facility] Kennedy stated that [facility] the generic is unavailable     rmklpn

## 2018-05-18 NOTE — TELEPHONE ENCOUNTER
Spoke with patient sts pharmacy called her shortly before I called saying she needs a PA done for this medication  Try calling the pharmacy this afternoon for more information

## 2018-05-18 NOTE — TELEPHONE ENCOUNTER
PA was done over the phone   Allowing 6 per month    Approved for 1 year  6 patches per month  Reference #[id number] Pt is aware   Task complete   rmklpn

## 2018-05-25 ENCOUNTER — OFFICE VISIT (OUTPATIENT)
Dept: FAMILY MEDICINE CLINIC | Facility: CLINIC | Age: 68
End: 2018-05-25
Payer: MEDICARE

## 2018-05-25 VITALS
RESPIRATION RATE: 20 BRPM | DIASTOLIC BLOOD PRESSURE: 80 MMHG | HEART RATE: 60 BPM | HEIGHT: 59 IN | SYSTOLIC BLOOD PRESSURE: 110 MMHG | TEMPERATURE: 97.6 F

## 2018-05-25 DIAGNOSIS — R10.31 RIGHT LOWER QUADRANT ABDOMINAL PAIN: Primary | ICD-10-CM

## 2018-05-25 DIAGNOSIS — K26.9 DUODENAL ULCER: ICD-10-CM

## 2018-05-25 DIAGNOSIS — I10 BENIGN ESSENTIAL HYPERTENSION: ICD-10-CM

## 2018-05-25 DIAGNOSIS — F41.1 GENERALIZED ANXIETY DISORDER: ICD-10-CM

## 2018-05-25 DIAGNOSIS — K57.30 SIGMOID DIVERTICULOSIS: ICD-10-CM

## 2018-05-25 PROBLEM — K52.9 COLITIS: Status: ACTIVE | Noted: 2017-02-20

## 2018-05-25 PROBLEM — E27.8 ADRENAL NODULE (HCC): Status: ACTIVE | Noted: 2017-12-12

## 2018-05-25 PROBLEM — E27.9 ADRENAL NODULE (HCC): Status: ACTIVE | Noted: 2017-12-12

## 2018-05-25 PROCEDURE — 99214 OFFICE O/P EST MOD 30 MIN: CPT | Performed by: NURSE PRACTITIONER

## 2018-05-25 RX ORDER — HYDROCHLOROTHIAZIDE 25 MG/1
1 TABLET ORAL DAILY
COMMUNITY
Start: 2018-04-21 | End: 2018-05-25 | Stop reason: ALTCHOICE

## 2018-05-25 RX ORDER — PROPRANOLOL HYDROCHLORIDE 80 MG/1
1 CAPSULE, EXTENDED RELEASE ORAL DAILY
COMMUNITY
Start: 2018-04-21 | End: 2018-07-17

## 2018-05-25 NOTE — PROGRESS NOTES
Assessment/Plan:  Reduce gluten and lactose in the diet  Drink plenty of fluids  Supportive care discussed and advised  Follow up for no improvement and worsening of conditions  Patient advised and educated when to see immediate medical care  Diagnoses and all orders for this visit:    Right lower quadrant abdominal pain  Recent CT Scan and colonoscopy was ok  No acute changes in symptoms  Will change diet and will follow up with rheumatologist    Sigmoid diverticulosis  Comments:  Denies any diarrhea, LLQ pain, and fever  Duodenal ulcer  Comments: On protonix and managed by GI    Benign essential hypertension  Comments:  stable with ACE and aldactazide     Generalized anxiety disorder  Comments: On Propanolol     Other orders  -     Discontinue: hydrochlorothiazide (HYDRODIURIL) 25 mg tablet; Take 1 tablet by mouth daily  -     propranolol (INDERAL LA) 80 mg 24 hr capsule; Take 1 capsule by mouth daily          Return if symptoms worsen or fail to improve  Subjective:      Patient ID: Blanquita Wilks is a 77 y o  female  Chief Complaint   Patient presents with    Abdominal Pain     Right abdominal pain drhlpn       HPI  Patient having right lower abdominal discomfort and sometimes radiates to back and happening from last 2 years on and off  Patient is under care of GI and had colonoscopy about a month ago and polyp was removed  Patient has h/o sigmoid diverticulosis but denies any diarrhea and constipation  Has h/o duodenal ulcer and on protonix and managed by GI  Patient stated that only xanax helps with the pain  Denies any change in intensity of pain at this time from usual but she is upset as nobody can find anything  CT scan report reviewed  Patient does not have gall bladder and appendix       The following portions of the patient's history were reviewed and updated as appropriate: allergies, current medications, past family history, past medical history, past social history, past surgical history and problem list       Review of Systems   Constitutional: Negative  HENT: Negative  Respiratory: Negative  Cardiovascular: Negative  Gastrointestinal: Positive for abdominal pain (right lower quadrant)  Negative for abdominal distention, anal bleeding, blood in stool, constipation, diarrhea, nausea and rectal pain  Genitourinary: Negative  Musculoskeletal: Negative  Skin: Negative  Neurological: Negative  Psychiatric/Behavioral: Negative  Objective:    History   Smoking Status    Former Smoker   Smokeless Tobacco    Never Used       Allergies: No Known Allergies    Vitals:  /80   Pulse 60   Temp 97 6 °F (36 4 °C)   Resp 20   Ht 4' 11" (1 499 m)     Current Outpatient Prescriptions   Medication Sig Dispense Refill    ALPRAZolam (XANAX) 0 25 mg tablet Take 0 25 mg by mouth as needed for anxiety      levothyroxine 88 mcg tablet TAKE 1 TABLET DAILY 90 tablet 1    lisinopril (ZESTRIL) 10 mg tablet Take 1 tablet (10 mg total) by mouth daily at bedtime 90 tablet 3    pantoprazole (PROTONIX) 40 mg tablet 40 mg 2 (two) times a day        propranolol (INDERAL LA) 80 mg 24 hr capsule Take 1 capsule by mouth daily      spironolactone-hydrochlorothiazide (ALDACTAZIDE) 25-25 mg per tablet Take 1 tablet by mouth daily in the early morning 90 tablet 3     No current facility-administered medications for this visit  Physical Exam   Constitutional: She appears well-developed and well-nourished  HENT:   Head: Normocephalic and atraumatic  Right Ear: External ear normal    Left Ear: External ear normal    Nose: Nose normal    Eyes: Conjunctivae are normal    Neck: Normal range of motion  Neck supple  Cardiovascular: Normal rate, regular rhythm and normal heart sounds  Pulmonary/Chest: Effort normal and breath sounds normal    Abdominal: Soft  Bowel sounds are normal  There is no hepatosplenomegaly  There is no tenderness   There is no CVA tenderness  No hernia  Hernia confirmed negative in the right inguinal area and confirmed negative in the left inguinal area  Musculoskeletal: Normal range of motion  She exhibits no edema, tenderness or deformity  Neurological: She is alert  She has normal strength  No sensory deficit  Coordination and gait normal  GCS eye subscore is 4  GCS verbal subscore is 5  GCS motor subscore is 6  Skin: Skin is warm, dry and intact  Psychiatric: She has a normal mood and affect   Her speech is normal and behavior is normal  Judgment and thought content normal  Cognition and memory are normal          COURT Cartwright

## 2018-05-25 NOTE — PATIENT INSTRUCTIONS
Reduce gluten and lactose in the diet  Drink plenty of fluids  Supportive care discussed and advised  Follow up for no improvement and worsening of conditions  Patient advised and educated when to see immediate medical care

## 2018-06-02 ENCOUNTER — OFFICE VISIT (OUTPATIENT)
Dept: FAMILY MEDICINE CLINIC | Facility: CLINIC | Age: 68
End: 2018-06-02
Payer: MEDICARE

## 2018-06-02 VITALS
DIASTOLIC BLOOD PRESSURE: 92 MMHG | HEART RATE: 72 BPM | TEMPERATURE: 97.4 F | RESPIRATION RATE: 16 BRPM | SYSTOLIC BLOOD PRESSURE: 160 MMHG

## 2018-06-02 DIAGNOSIS — N39.0 URINARY TRACT INFECTION WITHOUT HEMATURIA, SITE UNSPECIFIED: Primary | ICD-10-CM

## 2018-06-02 DIAGNOSIS — N30.90 CYSTITIS: ICD-10-CM

## 2018-06-02 LAB
SL AMB  POCT GLUCOSE, UA: ABNORMAL
SL AMB LEUKOCYTE ESTERASE,UA: 75
SL AMB POCT BILIRUBIN,UA: 1
SL AMB POCT BLOOD,UA: 50
SL AMB POCT CLARITY,UA: CLEAR
SL AMB POCT COLOR,UA: YELLOW
SL AMB POCT KETONES,UA: 15
SL AMB POCT NITRITE,UA: ABNORMAL
SL AMB POCT PH,UA: 8
SL AMB POCT SPECIFIC GRAVITY,UA: 1.01
SL AMB POCT URINE PROTEIN: ABNORMAL
SL AMB POCT UROBILINOGEN: ABNORMAL

## 2018-06-02 PROCEDURE — 81003 URINALYSIS AUTO W/O SCOPE: CPT | Performed by: FAMILY MEDICINE

## 2018-06-02 PROCEDURE — 99213 OFFICE O/P EST LOW 20 MIN: CPT | Performed by: FAMILY MEDICINE

## 2018-06-02 RX ORDER — NITROFURANTOIN 25; 75 MG/1; MG/1
100 CAPSULE ORAL 2 TIMES DAILY
Qty: 10 CAPSULE | Refills: 0 | Status: SHIPPED | OUTPATIENT
Start: 2018-06-02 | End: 2018-07-17

## 2018-06-02 NOTE — PROGRESS NOTES
Subjective:           Problem List Items Addressed This Visit     None      Visit Diagnoses     Urinary tract infection without hematuria, site unspecified    -  Primary    Relevant Orders    POCT urine dip auto non-scope  Pos LE trace blood              Orders Placed This Encounter   Procedures    POCT urine dip auto non-scope       Patient Instructions   Hydrate well  ED if worsens   Monistat cream      Duaine Cristiane Barton      HPI Back pain no fever diff urinating no hematuria no h/o stones  H/O chronic rotation R kidney    Vitals:    18 0959   BP: 160/92   Pulse: 72   Resp: 16   Temp: (!) 97 4 °F (36 3 °C)       No Known Allergies    Current Outpatient Prescriptions on File Prior to Visit   Medication Sig Dispense Refill    ALPRAZolam (XANAX) 0 25 mg tablet Take 0 25 mg by mouth as needed for anxiety      levothyroxine 88 mcg tablet TAKE 1 TABLET DAILY 90 tablet 1    lisinopril (ZESTRIL) 10 mg tablet Take 1 tablet (10 mg total) by mouth daily at bedtime 90 tablet 3    pantoprazole (PROTONIX) 40 mg tablet 40 mg 2 (two) times a day        spironolactone-hydrochlorothiazide (ALDACTAZIDE) 25-25 mg per tablet Take 1 tablet by mouth daily in the early morning 90 tablet 3    propranolol (INDERAL LA) 80 mg 24 hr capsule Take 1 capsule by mouth daily       No current facility-administered medications on file prior to visit  Past Medical History:   Diagnosis Date    Adenoma of left adrenal gland     Disease of thyroid gland     Hypertension     Malignant hypertension     Primary hypothyroidism     SOB (shortness of breath) on exertion        Past Surgical History:   Procedure Laterality Date    APPENDECTOMY       SECTION      CHOLECYSTECTOMY      KNEE SURGERY      History of Arthrotomy of Knee; Open Meniscus Tear          reports that she has quit smoking  She has never used smokeless tobacco  She reports that she drinks alcohol  She reports that she does not use drugs       reports that she has quit smoking  She has never used smokeless tobacco     The following portions of the patient's history were reviewed and updated as appropriate: allergies, current medications, past family history, past medical history, past social history, past surgical history and problem list     Review of Systems   Constitutional: Negative for activity change and fever  HENT: Negative for congestion  Respiratory: Negative for chest tightness  Genitourinary: Positive for difficulty urinating  Negative for hematuria and vaginal bleeding  Musculoskeletal: Positive for back pain  Neurological: Negative for dizziness  Psychiatric/Behavioral: Negative for agitation  Physical Exam   Constitutional: She appears well-developed  HENT:   Head: Normocephalic and atraumatic  Mouth/Throat: Oropharynx is clear and moist    Eyes: Conjunctivae and EOM are normal  Pupils are equal, round, and reactive to light  No scleral icterus  Neck: No JVD present  No thyromegaly present  Cardiovascular: Normal rate and regular rhythm  No murmur heard  Pulmonary/Chest: Effort normal and breath sounds normal  No stridor  Abdominal: She exhibits no distension  There is no guarding  Musculoskeletal: She exhibits no deformity  Lymphadenopathy:     She has no cervical adenopathy  Neurological: No cranial nerve deficit  Skin: No erythema  Psychiatric: She has a normal mood and affect   Her behavior is normal  Judgment and thought content normal          UA blood LE positive

## 2018-06-19 ENCOUNTER — TELEPHONE (OUTPATIENT)
Dept: CARDIOLOGY CLINIC | Facility: CLINIC | Age: 68
End: 2018-06-19

## 2018-06-19 DIAGNOSIS — I10 HTN (HYPERTENSION), MALIGNANT: Primary | ICD-10-CM

## 2018-06-19 NOTE — TELEPHONE ENCOUNTER
Should pt still be both: hctz and aldactone-hctz - pt states bp has been good on both  Please advise

## 2018-06-19 NOTE — TELEPHONE ENCOUNTER
Per Dr Murguia Drafts pt will only take aldactone-hctz and get b/w done to check electrolytes  Pt aware and understand; will get blood drawn at end of month due to being out of town

## 2018-07-16 RX ORDER — FENOPROFEN CALCIUM 600 MG
TABLET ORAL
Refills: 0 | COMMUNITY
Start: 2018-06-14 | End: 2018-07-17

## 2018-07-16 RX ORDER — TRAMADOL HYDROCHLORIDE 50 MG/1
TABLET ORAL
COMMUNITY
Start: 2018-06-14 | End: 2018-07-17

## 2018-07-17 ENCOUNTER — OFFICE VISIT (OUTPATIENT)
Dept: CARDIOLOGY CLINIC | Facility: CLINIC | Age: 68
End: 2018-07-17
Payer: MEDICARE

## 2018-07-17 VITALS
DIASTOLIC BLOOD PRESSURE: 84 MMHG | OXYGEN SATURATION: 100 % | HEIGHT: 59 IN | HEART RATE: 81 BPM | SYSTOLIC BLOOD PRESSURE: 120 MMHG | WEIGHT: 139 LBS | BODY MASS INDEX: 28.02 KG/M2

## 2018-07-17 DIAGNOSIS — D35.02 ADENOMA OF LEFT ADRENAL GLAND: Primary | ICD-10-CM

## 2018-07-17 DIAGNOSIS — R07.89 CHEST TIGHTNESS: ICD-10-CM

## 2018-07-17 DIAGNOSIS — I10 MALIGNANT HYPERTENSION: ICD-10-CM

## 2018-07-17 DIAGNOSIS — I10 HTN (HYPERTENSION), MALIGNANT: ICD-10-CM

## 2018-07-17 DIAGNOSIS — E04.1 NONTOXIC SINGLE THYROID NODULE: ICD-10-CM

## 2018-07-17 DIAGNOSIS — E05.90 SUBCLINICAL HYPERTHYROIDISM: ICD-10-CM

## 2018-07-17 PROCEDURE — 99214 OFFICE O/P EST MOD 30 MIN: CPT | Performed by: INTERNAL MEDICINE

## 2018-07-17 RX ORDER — CARVEDILOL 6.25 MG/1
TABLET ORAL
Qty: 180 TABLET | Refills: 1 | Status: SHIPPED | OUTPATIENT
Start: 2018-07-17 | End: 2018-08-06 | Stop reason: SINTOL

## 2018-07-17 RX ORDER — SPIRONOLACTONE AND HYDROCHLOROTHIAZIDE 25; 25 MG/1; MG/1
1 TABLET ORAL
Qty: 90 TABLET | Refills: 3 | Status: SHIPPED | OUTPATIENT
Start: 2018-07-17 | End: 2019-09-12 | Stop reason: SINTOL

## 2018-07-17 NOTE — PROGRESS NOTES
Cardiology Follow Up    Sil November  1950  [id number]  [id number] [facility] [address] [id number]    Interval History:   78-year-old woman with a history of malignant hypertension, adrenal adenoma with follow-up with Endocrinology  Her adenoma workup was negative for pheochromocytoma or hyperaldosteronism  She is here in deeply concerned about usage of beta-blockers  She has had a 5 lb increase in weight and this is very upsetting for her  She reports her blood pressure has been controlled  She denies having any migraines like previously  4/17: Still with surges in her blood pressure  Feels some swelling at tiems  No chest pain  No dizziness  07/17/2018:  SHE STILL FEELS SOMETIMES SURGES OF HER BLOOD PRESSURE  SHE HAS A BLOOD PRESSURE RECORDING /106 TODAY PRIOR TO SEEING ME   SHE DENIES HAVING CHEST HEAVINESS  SHE HAS BEEN COMPLIANT WITH HER MEDICATIONS  WE REVIEWED THROUGH HER MEDICATIONS        Patient Active Problem List   Diagnosis    Hypertension    Headache    Primary hypothyroidism    Hypertensive urgency    Adenoma of left adrenal gland    Shortness of breath on exertion    HTN (hypertension), malignant    Abnormal cortisol level    Adrenal nodule (HCC)    Anxiety    Arthropathy    Benign essential hypertension    Benign paroxysmal vertigo    Colitis    Depression    Diverticulitis of colon    Generalized anxiety disorder    Hyperlipidemia    Hyponatremia    Major depressive disorder, recurrent episode, moderate (HCC)    Nontoxic single thyroid nodule    Obesity    Sigmoid diverticulosis    Subclinical hyperthyroidism    Vitamin D deficiency    Cystitis     Past Medical History:   Diagnosis Date    Adenoma of left adrenal gland     Disease of thyroid gland     Hypertension     Malignant hypertension     Primary hypothyroidism     SOB (shortness of breath) on exertion      Social History     Social History    Marital status: /Civil Union     Spouse name: N/A    Number of children: N/A    Years of education: N/A     Occupational History    Not on file  Social History Main Topics    Smoking status: Former Smoker    Smokeless tobacco: Never Used    Alcohol use Yes      Comment: occasional    Drug use: No    Sexual activity: Not on file     Other Topics Concern    Not on file     Social History Narrative    No narrative on file      Family History   Problem Relation Age of Onset    Heart disease Family         Adopted but heard family member     Past Surgical History:   Procedure Laterality Date    APPENDECTOMY       SECTION      CHOLECYSTECTOMY      KNEE SURGERY      History of Arthrotomy of Knee; Open Meniscus Tear       Current Outpatient Prescriptions:     ALPRAZolam (XANAX) 0 25 mg tablet, Take 0 25 mg by mouth as needed for anxiety, Disp: , Rfl:     levothyroxine 88 mcg tablet, TAKE 1 TABLET DAILY, Disp: 90 tablet, Rfl: 1    lisinopril (ZESTRIL) 10 mg tablet, Take 1 tablet (10 mg total) by mouth daily at bedtime, Disp: 90 tablet, Rfl: 3    pantoprazole (PROTONIX) 40 mg tablet, 40 mg daily  , Disp: , Rfl:     spironolactone-hydrochlorothiazide (ALDACTAZIDE) 25-25 mg per tablet, Take 1 tablet by mouth daily in the early morning, Disp: 90 tablet, Rfl: 3  Allergies   Allergen Reactions    Acetaminophen      Other reaction(s): Other (See Comments)  AGITATION-PER PT       Review of Systems:  Review of Systems   Constitutional: Negative  Negative for activity change, appetite change, chills, diaphoresis, fatigue, fever and unexpected weight change  HENT: Negative    Negative for congestion, dental problem, drooling, ear discharge, ear pain, facial swelling, hearing loss, mouth sores, nosebleeds, postnasal drip, rhinorrhea, sinus pain, sinus pressure, sneezing, sore throat, tinnitus, trouble swallowing and voice change  Eyes: Negative  Negative for photophobia, pain, redness, itching and visual disturbance  Respiratory: Negative  Negative for apnea, cough, choking, chest tightness, shortness of breath, wheezing and stridor  Cardiovascular: Negative  Negative for chest pain, palpitations and leg swelling  Gastrointestinal: Negative  Negative for abdominal distention, abdominal pain, anal bleeding, blood in stool, constipation, diarrhea, nausea, rectal pain and vomiting  Endocrine: Negative  Negative for cold intolerance, heat intolerance, polydipsia, polyphagia and polyuria  Genitourinary: Negative  Negative for decreased urine volume, difficulty urinating, dyspareunia, dysuria, enuresis, flank pain, frequency, genital sores, hematuria, menstrual problem, pelvic pain, urgency, vaginal bleeding, vaginal discharge and vaginal pain  Musculoskeletal: Negative  Negative for arthralgias, back pain, gait problem, joint swelling, myalgias, neck pain and neck stiffness  Skin: Negative  Negative for color change, pallor, rash and wound  Allergic/Immunologic: Negative  Negative for environmental allergies, food allergies and immunocompromised state  Neurological: Positive for headaches  Negative for dizziness, tremors, seizures, syncope, facial asymmetry, speech difficulty, weakness, light-headedness and numbness  Hematological: Negative  Negative for adenopathy  Does not bruise/bleed easily  Psychiatric/Behavioral: Negative  Negative for agitation, behavioral problems, confusion, decreased concentration, dysphoric mood, hallucinations, self-injury, sleep disturbance and suicidal ideas  The patient is not nervous/anxious and is not hyperactive  All other systems reviewed and are negative        Vitals:    07/17/18 1101   BP: 120/84   BP Location: Left arm   Patient Position: Sitting   Cuff Size: Standard   Pulse: 81   SpO2: 100%   Weight: 63 kg (139 lb)   Height: 4' 11" (1 499 m) Physical Exam:  Physical Exam   Constitutional: She is oriented to person, place, and time  She appears well-developed and well-nourished  No distress  HENT:   Head: Normocephalic and atraumatic  Right Ear: External ear normal    Left Ear: External ear normal    Eyes: Conjunctivae are normal  Pupils are equal, round, and reactive to light  Right eye exhibits no discharge  Left eye exhibits no discharge  No scleral icterus  Neck: Normal range of motion  Neck supple  No JVD present  No tracheal deviation present  No thyromegaly present  Cardiovascular: Normal rate and regular rhythm  Exam reveals no gallop and no friction rub  No murmur heard  Pulmonary/Chest: Effort normal and breath sounds normal  No stridor  No respiratory distress  She has no wheezes  She has no rales  She exhibits no tenderness  Abdominal: Soft  Bowel sounds are normal  She exhibits no distension and no mass  There is no tenderness  There is no rebound and no guarding  Musculoskeletal: Normal range of motion  She exhibits edema  She exhibits no tenderness or deformity  Neurological: She is alert and oriented to person, place, and time  She has normal reflexes  No cranial nerve deficit  She exhibits normal muscle tone  Coordination normal    Skin: Skin is warm and dry  No rash noted  She is not diaphoretic  No erythema  No pallor  Psychiatric: She has a normal mood and affect  Her behavior is normal  Judgment and thought content normal    Nursing note and vitals reviewed  No diagnosis found       Labs:  Office Visit on 06/02/2018   Component Date Value     COLOR,UA 06/02/2018 yellow      CLARITY,UA 06/02/2018 clear      SPECIFIC GRAVITY,UA 06/02/2018 1 015      PH,UA 06/02/2018 8     LEUKOCYTE ESTERASE,UA 06/02/2018 75      NITRITE,UA 06/02/2018 neg     GLUCOSE, UA 06/02/2018 norm      KETONES,UA 06/02/2018 15      BILIRUBIN,UA 06/02/2018 1      BLOOD,UA 06/02/2018 50     SL AMB POCT URINE PROTEIN 06/02/2018 neg     SL AMB POCT UROBILINOGEN 06/02/2018 norm      Exercise stress echo equivocal to 72% of maximum predicted heart rate of MPHR- noted htn bp response    Discussion/Summary:  Malignant htn- secondary htn study negative as per endocrinology  -- add low dose carvedilol3 125mg bid- up-titrate in one week  -- lisinopril 10mg daily   --  aldactone 25mg-hctz 25mg in AM    SOB- second to uncontrolled BP  monitor    Adenoma- awaiting outpatient endocrinology records  Cant upload from [facility] currently  Will refer to [facility]        Maudine Bamberger MD [place]  Please call with any questions or suggestions

## 2018-08-06 ENCOUNTER — TELEPHONE (OUTPATIENT)
Dept: FAMILY MEDICINE CLINIC | Facility: CLINIC | Age: 68
End: 2018-08-06

## 2018-08-06 ENCOUNTER — OFFICE VISIT (OUTPATIENT)
Dept: FAMILY MEDICINE CLINIC | Facility: CLINIC | Age: 68
End: 2018-08-06
Payer: MEDICARE

## 2018-08-06 VITALS
HEIGHT: 59 IN | TEMPERATURE: 98 F | HEART RATE: 78 BPM | RESPIRATION RATE: 18 BRPM | BODY MASS INDEX: 28.22 KG/M2 | DIASTOLIC BLOOD PRESSURE: 82 MMHG | WEIGHT: 140 LBS | SYSTOLIC BLOOD PRESSURE: 126 MMHG

## 2018-08-06 DIAGNOSIS — F41.9 ANXIETY: ICD-10-CM

## 2018-08-06 DIAGNOSIS — E03.9 PRIMARY HYPOTHYROIDISM: ICD-10-CM

## 2018-08-06 DIAGNOSIS — I10 BENIGN ESSENTIAL HYPERTENSION: Primary | ICD-10-CM

## 2018-08-06 DIAGNOSIS — R79.89 ABNORMAL CORTISOL LEVEL: ICD-10-CM

## 2018-08-06 PROCEDURE — 99214 OFFICE O/P EST MOD 30 MIN: CPT | Performed by: FAMILY MEDICINE

## 2018-08-06 RX ORDER — MULTIVITAMIN WITH IRON
TABLET ORAL DAILY
COMMUNITY
End: 2021-01-28

## 2018-08-06 RX ORDER — METOPROLOL SUCCINATE 25 MG/1
25 TABLET, EXTENDED RELEASE ORAL
Qty: 30 TABLET | Refills: 1 | Status: SHIPPED | OUTPATIENT
Start: 2018-08-06 | End: 2018-09-14 | Stop reason: SDUPTHER

## 2018-08-06 NOTE — ASSESSMENT & PLAN NOTE
Not controlled  Continue xanax as needed  Will consider starting daily anxiety medication in a month  I do not want to start 2 new medications the same day since she has been having a hard time tolerating medication

## 2018-08-06 NOTE — TELEPHONE ENCOUNTER
Yes, the metoprolol is a replacement for the Coreg    She should stay on her other medication  Sunni Ring, DO

## 2018-08-06 NOTE — TELEPHONE ENCOUNTER
If patient doesn't answer please leave message on machine  She saw Dr Nicole Damon today and was prescribed another medication  Is she suppose to take that with her water pill and lisiniprol?     Thank you

## 2018-08-06 NOTE — PROGRESS NOTES
Assessment/Plan:    Problem List Items Addressed This Visit     Primary hypothyroidism    Relevant Medications    metoprolol succinate (TOPROL-XL) 25 mg 24 hr tablet    Other Relevant Orders    TSH, 3rd generation    T4, free    Abnormal cortisol level     Needs follow up with endocrinology  She agreed to schedule an appointment  She plans on seeing Dr Steve Berry that her friend saw         Anxiety     Not controlled  Continue xanax as needed  Will consider starting daily anxiety medication in a month  I do not want to start 2 new medications the same day since she has been having a hard time tolerating medication  Benign essential hypertension - Primary     Pressure controlled but not tolerating medication well   Will try Metoprolol instead  Risks and benefits of medication discussed  Relevant Medications    metoprolol succinate (TOPROL-XL) 25 mg 24 hr tablet    Other Relevant Orders    CBC    Comprehensive metabolic panel    Lipid Panel with Direct LDL reflex          There are no Patient Instructions on file for this visit  Return in about 1 month (around 9/6/2018) for Annual Wellness Visit  Subjective:      Patient ID: Lacey Miranda is a 77 y o  female  Chief Complaint   Patient presents with    Blood Pressure Check       She is was on propranolol from the cardioloigst and didn't do well  He changed her to carvedilol  She is feeling dizzy  She feels light headed when she gets up  She is also very fatigued  She is only taking a half of the carvedilol  She is feeling anxious  She thinks it may from her adrenals  She had 3 appointments with endocrinology that were cancelled  She has not rescheduled  The following portions of the patient's history were reviewed and updated as appropriate:  past social history    Review of Systems   Constitutional: Positive for fatigue  Neurological: Positive for dizziness           Current Outpatient Prescriptions   Medication Sig Dispense Refill    ALPRAZolam (XANAX) 0 25 mg tablet Take 0 25 mg by mouth as needed for anxiety      levothyroxine 88 mcg tablet TAKE 1 TABLET DAILY 90 tablet 1    lisinopril (ZESTRIL) 10 mg tablet Take 1 tablet (10 mg total) by mouth daily at bedtime 90 tablet 3    Magnesium 250 MG TABS Take by mouth daily      pantoprazole (PROTONIX) 40 mg tablet 40 mg daily        spironolactone-hydrochlorothiazide (ALDACTAZIDE) 25-25 mg per tablet Take 1 tablet by mouth daily in the early morning 90 tablet 3    metoprolol succinate (TOPROL-XL) 25 mg 24 hr tablet Take 1 tablet (25 mg total) by mouth daily at bedtime 30 tablet 1     No current facility-administered medications for this visit  Objective:    /82   Pulse 78   Temp 98 °F (36 7 °C)   Resp 18   Ht 4' 11" (1 499 m)   Wt 63 5 kg (140 lb)   BMI 28 28 kg/m²        Physical Exam   Constitutional: She appears well-developed and well-nourished  HENT:   Head: Normocephalic and atraumatic  Right Ear: External ear normal    Left Ear: External ear normal    Mouth/Throat: Oropharynx is clear and moist    Cardiovascular: Normal rate, regular rhythm and normal heart sounds  Exam reveals no friction rub  No murmur heard  Pulmonary/Chest: Effort normal and breath sounds normal  No respiratory distress  She has no wheezes  She has no rales  Musculoskeletal: She exhibits no edema or deformity  Nursing note and vitals reviewed               Sheree Castorena DO

## 2018-08-06 NOTE — ASSESSMENT & PLAN NOTE
Pressure controlled but not tolerating medication well   Will try Metoprolol instead  Risks and benefits of medication discussed

## 2018-08-06 NOTE — ASSESSMENT & PLAN NOTE
Needs follow up with endocrinology  She agreed to schedule an appointment  She plans on seeing Dr Shamir Nolasco that her friend saw

## 2018-08-13 ENCOUNTER — APPOINTMENT (OUTPATIENT)
Dept: LAB | Facility: HOSPITAL | Age: 68
End: 2018-08-13
Payer: MEDICARE

## 2018-08-13 ENCOUNTER — TRANSCRIBE ORDERS (OUTPATIENT)
Dept: ADMINISTRATIVE | Facility: HOSPITAL | Age: 68
End: 2018-08-13

## 2018-08-13 DIAGNOSIS — E03.9 PRIMARY HYPOTHYROIDISM: ICD-10-CM

## 2018-08-13 DIAGNOSIS — I10 HTN (HYPERTENSION), MALIGNANT: ICD-10-CM

## 2018-08-13 DIAGNOSIS — I10 BENIGN ESSENTIAL HYPERTENSION: ICD-10-CM

## 2018-08-13 DIAGNOSIS — I10 MALIGNANT HYPERTENSION: ICD-10-CM

## 2018-08-13 LAB
ALBUMIN SERPL BCP-MCNC: 3.3 G/DL (ref 3.5–5)
ALP SERPL-CCNC: 58 U/L (ref 46–116)
ALT SERPL W P-5'-P-CCNC: 16 U/L (ref 12–78)
ANION GAP SERPL CALCULATED.3IONS-SCNC: 7 MMOL/L (ref 4–13)
AST SERPL W P-5'-P-CCNC: 13 U/L (ref 5–45)
BILIRUB SERPL-MCNC: 0.3 MG/DL (ref 0.2–1)
BUN SERPL-MCNC: 25 MG/DL (ref 5–25)
CALCIUM SERPL-MCNC: 8.5 MG/DL (ref 8.3–10.1)
CHLORIDE SERPL-SCNC: 101 MMOL/L (ref 100–108)
CHOLEST SERPL-MCNC: 245 MG/DL (ref 50–200)
CO2 SERPL-SCNC: 26 MMOL/L (ref 21–32)
CREAT SERPL-MCNC: 0.76 MG/DL (ref 0.6–1.3)
ERYTHROCYTE [DISTWIDTH] IN BLOOD BY AUTOMATED COUNT: 12.6 % (ref 11.6–15.1)
GFR SERPL CREATININE-BSD FRML MDRD: 80 ML/MIN/1.73SQ M
GLUCOSE P FAST SERPL-MCNC: 96 MG/DL (ref 65–99)
HCT VFR BLD AUTO: 36.6 % (ref 34.8–46.1)
HDLC SERPL-MCNC: 62 MG/DL (ref 40–60)
HGB BLD-MCNC: 11.9 G/DL (ref 11.5–15.4)
LDLC SERPL CALC-MCNC: 170 MG/DL (ref 0–100)
MCH RBC QN AUTO: 32.6 PG (ref 26.8–34.3)
MCHC RBC AUTO-ENTMCNC: 32.5 G/DL (ref 31.4–37.4)
MCV RBC AUTO: 100 FL (ref 82–98)
PLATELET # BLD AUTO: 350 THOUSANDS/UL (ref 149–390)
PMV BLD AUTO: 9.1 FL (ref 8.9–12.7)
POTASSIUM SERPL-SCNC: 4.4 MMOL/L (ref 3.5–5.3)
PROT SERPL-MCNC: 6.6 G/DL (ref 6.4–8.2)
RBC # BLD AUTO: 3.65 MILLION/UL (ref 3.81–5.12)
SODIUM SERPL-SCNC: 134 MMOL/L (ref 136–145)
T4 FREE SERPL-MCNC: 1.2 NG/DL (ref 0.76–1.46)
TRIGL SERPL-MCNC: 65 MG/DL
TSH SERPL DL<=0.05 MIU/L-ACNC: 0.29 UIU/ML (ref 0.36–3.74)
WBC # BLD AUTO: 4.95 THOUSAND/UL (ref 4.31–10.16)

## 2018-08-13 PROCEDURE — 80053 COMPREHEN METABOLIC PANEL: CPT

## 2018-08-13 PROCEDURE — 85027 COMPLETE CBC AUTOMATED: CPT

## 2018-08-13 PROCEDURE — 80061 LIPID PANEL: CPT

## 2018-08-13 PROCEDURE — 84439 ASSAY OF FREE THYROXINE: CPT

## 2018-08-13 PROCEDURE — 36415 COLL VENOUS BLD VENIPUNCTURE: CPT

## 2018-08-13 PROCEDURE — 84443 ASSAY THYROID STIM HORMONE: CPT

## 2018-08-30 ENCOUNTER — APPOINTMENT (OUTPATIENT)
Dept: LAB | Facility: HOSPITAL | Age: 68
End: 2018-08-30
Payer: MEDICARE

## 2018-08-30 ENCOUNTER — TRANSCRIBE ORDERS (OUTPATIENT)
Dept: ADMINISTRATIVE | Facility: HOSPITAL | Age: 68
End: 2018-08-30

## 2018-08-30 DIAGNOSIS — I15.2 ADRENAL HYPERTENSION (HCC): ICD-10-CM

## 2018-08-30 DIAGNOSIS — E03.9 MYXEDEMA HEART DISEASE: Primary | ICD-10-CM

## 2018-08-30 DIAGNOSIS — E27.9 ADRENAL HYPERTENSION (HCC): ICD-10-CM

## 2018-08-30 DIAGNOSIS — I51.9 MYXEDEMA HEART DISEASE: ICD-10-CM

## 2018-08-30 DIAGNOSIS — E03.9 MYXEDEMA HEART DISEASE: ICD-10-CM

## 2018-08-30 DIAGNOSIS — I51.9 MYXEDEMA HEART DISEASE: Primary | ICD-10-CM

## 2018-08-30 LAB — T4 FREE SERPL-MCNC: 1.43 NG/DL (ref 0.76–1.46)

## 2018-08-30 PROCEDURE — 82088 ASSAY OF ALDOSTERONE: CPT

## 2018-08-30 PROCEDURE — 84445 ASSAY OF TSI GLOBULIN: CPT

## 2018-08-30 PROCEDURE — 36415 COLL VENOUS BLD VENIPUNCTURE: CPT | Performed by: PHYSICAL MEDICINE & REHABILITATION

## 2018-08-30 PROCEDURE — 84439 ASSAY OF FREE THYROXINE: CPT | Performed by: PHYSICAL MEDICINE & REHABILITATION

## 2018-09-01 LAB — TSI SER-ACNC: <0.1 IU/L (ref 0–0.55)

## 2018-09-02 LAB — ALDOST SERPL-MCNC: 9.5 NG/DL (ref 0–30)

## 2018-09-14 ENCOUNTER — OFFICE VISIT (OUTPATIENT)
Dept: FAMILY MEDICINE CLINIC | Facility: CLINIC | Age: 68
End: 2018-09-14
Payer: MEDICARE

## 2018-09-14 VITALS
SYSTOLIC BLOOD PRESSURE: 118 MMHG | TEMPERATURE: 97.3 F | BODY MASS INDEX: 28.39 KG/M2 | HEIGHT: 59 IN | RESPIRATION RATE: 16 BRPM | DIASTOLIC BLOOD PRESSURE: 80 MMHG | HEART RATE: 60 BPM | WEIGHT: 140.8 LBS

## 2018-09-14 DIAGNOSIS — F32.0 CURRENT MILD EPISODE OF MAJOR DEPRESSIVE DISORDER WITHOUT PRIOR EPISODE (HCC): ICD-10-CM

## 2018-09-14 DIAGNOSIS — Z00.00 INITIAL MEDICARE ANNUAL WELLNESS VISIT: Primary | ICD-10-CM

## 2018-09-14 DIAGNOSIS — I10 BENIGN ESSENTIAL HYPERTENSION: ICD-10-CM

## 2018-09-14 DIAGNOSIS — Z11.59 NEED FOR HEPATITIS C SCREENING TEST: ICD-10-CM

## 2018-09-14 DIAGNOSIS — E03.9 PRIMARY HYPOTHYROIDISM: ICD-10-CM

## 2018-09-14 PROCEDURE — G0438 PPPS, INITIAL VISIT: HCPCS | Performed by: FAMILY MEDICINE

## 2018-09-14 RX ORDER — METOPROLOL SUCCINATE 25 MG/1
25 TABLET, EXTENDED RELEASE ORAL
Qty: 90 TABLET | Refills: 1 | Status: SHIPPED | OUTPATIENT
Start: 2018-09-14 | End: 2018-12-18 | Stop reason: SDUPTHER

## 2018-09-14 NOTE — PATIENT INSTRUCTIONS
Recent Results (from the past 840 hour(s))   TSH, 3rd generation    Collection Time: 08/13/18  7:17 AM   Result Value Ref Range    TSH 3RD GENERATON 0 289 (L) 0 358 - 3 740 uIU/mL   T4, free    Collection Time: 08/13/18  7:17 AM   Result Value Ref Range    Free T4 1 20 0 76 - 1 46 ng/dL   Comprehensive metabolic panel    Collection Time: 08/13/18  7:17 AM   Result Value Ref Range    Sodium 134 (L) 136 - 145 mmol/L    Potassium 4 4 3 5 - 5 3 mmol/L    Chloride 101 100 - 108 mmol/L    CO2 26 21 - 32 mmol/L    ANION GAP 7 4 - 13 mmol/L    BUN 25 5 - 25 mg/dL    Creatinine 0 76 0 60 - 1 30 mg/dL    Glucose, Fasting 96 65 - 99 mg/dL    Calcium 8 5 8 3 - 10 1 mg/dL    AST 13 5 - 45 U/L    ALT 16 12 - 78 U/L    Alkaline Phosphatase 58 46 - 116 U/L    Total Protein 6 6 6 4 - 8 2 g/dL    Albumin 3 3 (L) 3 5 - 5 0 g/dL    Total Bilirubin 0 30 0 20 - 1 00 mg/dL    eGFR 80 ml/min/1 73sq m   Lipid Panel with Direct LDL reflex    Collection Time: 08/13/18  7:17 AM   Result Value Ref Range    Cholesterol 245 (H) 50 - 200 mg/dL    Triglycerides 65 <=150 mg/dL    HDL, Direct 62 (H) 40 - 60 mg/dL    LDL Calculated 170 (H) 0 - 100 mg/dL   CBC    Collection Time: 08/13/18  7:17 AM   Result Value Ref Range    WBC 4 95 4 31 - 10 16 Thousand/uL    RBC 3 65 (L) 3 81 - 5 12 Million/uL    Hemoglobin 11 9 11 5 - 15 4 g/dL    Hematocrit 36 6 34 8 - 46 1 %     (H) 82 - 98 fL    MCH 32 6 26 8 - 34 3 pg    MCHC 32 5 31 4 - 37 4 g/dL    RDW 12 6 11 6 - 15 1 %    Platelets 816 619 - 824 Thousands/uL    MPV 9 1 8 9 - 12 7 fL   T4, free    Collection Time: 08/30/18  7:20 AM   Result Value Ref Range    Free T4 1 43 0 76 - 1 46 ng/dL   Thyroid stimulating immunoglobulin    Collection Time: 08/30/18  7:20 AM   Result Value Ref Range    TSI <0 10 0 00 - 0 55 IU/L   Aldosterone    Collection Time: 08/30/18  7:20 AM   Result Value Ref Range    Aldosterone 9 5 0 0 - 30 0 ng/dL

## 2018-09-14 NOTE — PROGRESS NOTES
Assessment and Plan:    Problem List Items Addressed This Visit     Primary hypothyroidism    Relevant Medications    metoprolol succinate (TOPROL-XL) 25 mg 24 hr tablet    Other Relevant Orders    TSH, 3rd generation    T4, free    Benign essential hypertension    Relevant Medications    metoprolol succinate (TOPROL-XL) 25 mg 24 hr tablet    Other Relevant Orders    CBC    Comprehensive metabolic panel    Lipid Panel with Direct LDL reflex    Depression     Mood is being aggravated by metoprolol  Will try taking metoprolol every other day and see if she feels better           Other Visit Diagnoses     Initial Medicare annual wellness visit    -  Primary    Need for hepatitis C screening test        Relevant Orders    Hepatitis C antibody        Health Maintenance Due   Topic Date Due    DXA SCAN  1950    DTaP,Tdap,and Td Vaccines (1 - Tdap) 03/16/1969    Pneumococcal PPSV23/PCV13 65+ Years / Low and Medium Risk (1 of 2 - PCV13) 03/16/2013    INFLUENZA VACCINE  09/01/2018         HPI:  Lacey Miranda is a 77 y o  female here for her Initial Wellness Visit  She has been feeling short of breath since being on the metoprolol  It makes her tired        Patient Active Problem List   Diagnosis    Headache    Primary hypothyroidism    Hypertensive urgency    Adenoma of left adrenal gland    Shortness of breath on exertion    HTN (hypertension), malignant    Abnormal cortisol level    Adrenal nodule (HCC)    Anxiety    Arthropathy    Benign essential hypertension    Benign paroxysmal vertigo    Colitis    Depression    Diverticulitis of colon    Generalized anxiety disorder    Hyperlipidemia    Hyponatremia    Major depressive disorder, recurrent episode, moderate (HCC)    Nontoxic single thyroid nodule    Obesity    Sigmoid diverticulosis    Subclinical hyperthyroidism    Vitamin D deficiency    Cystitis     Past Medical History:   Diagnosis Date    Acute diverticulitis     last assessed 16    Adenoma of left adrenal gland     Benign paroxysmal positional vertigo     last assessed 03/24/15    Disease of thyroid gland     Hypertension     Malignant hypertension     Obesity     Primary hypothyroidism     Primary hypothyroidism     SOB (shortness of breath) on exertion     Vertigo     last assessed 13     Past Surgical History:   Procedure Laterality Date    APPENDECTOMY       SECTION      CHOLECYSTECTOMY      KNEE SURGERY      History of Arthrotomy of Knee; Open Meniscus Tear     Family History   Problem Relation Age of Onset    Adopted: Yes    Heart disease Family         Adopted but heard family member    No Known Problems Mother      History   Smoking Status    Former Smoker   Smokeless Tobacco    Never Used     History   Alcohol Use    Yes     Comment: occasional      History   Drug Use No       Current Outpatient Prescriptions   Medication Sig Dispense Refill    ALPRAZolam (XANAX) 0 25 mg tablet Take 0 25 mg by mouth as needed for anxiety      levothyroxine 88 mcg tablet TAKE 1 TABLET DAILY 90 tablet 1    lisinopril (ZESTRIL) 10 mg tablet Take 1 tablet (10 mg total) by mouth daily at bedtime 90 tablet 3    Magnesium 250 MG TABS Take by mouth daily      metoprolol succinate (TOPROL-XL) 25 mg 24 hr tablet Take 1 tablet (25 mg total) by mouth daily at bedtime 90 tablet 1    pantoprazole (PROTONIX) 40 mg tablet 40 mg daily        spironolactone-hydrochlorothiazide (ALDACTAZIDE) 25-25 mg per tablet Take 1 tablet by mouth daily in the early morning 90 tablet 3     No current facility-administered medications for this visit  Allergies   Allergen Reactions    Acetaminophen      Other reaction(s): Other (See Comments)  AGITATION-PER PT     There is no immunization history for the selected administration types on file for this patient      Patient Care Team:  Aneudy Mora DO as PCP - MD Aneudy Morin DO Chloe Hove MD Ric Inman MD    Medicare Screening Tests and Risk Assessments:  Daniel Rodrigues is here for her Subsequent Wellness visit  Health Risk Assessment:  Patient rates overall health as good  Patient feels that their physical health rating is Same  Eyesight was rated as Slightly worse  Hearing was rated as Slightly worse  Patient feels that their emotional and mental health rating is Same  Pain experienced by patient in the last 7 days has been Some  Patient's pain rating has been 3/10  Patient states that she has experienced no weight loss or gain in last 6 months  Emotional/Mental Health:  Patient has been feeling nervous/anxious  PHQ-9 Depression Screening:    Frequency of the following problems over the past two weeks:      1  Little interest or pleasure in doing things: 3 - nearly every day      2  Feeling down, depressed, or hopeless: 3 - nearly every day      3  Trouble falling or staying asleep, or sleeping too much: 2 - more than half the days      4  Feeling tired or having little energy: 2 - more than half the days      5  Poor appetite or overeatin - not at all      6  Feeling bad about yourself - or that you are a failure or have let yourself or your family down: 1 - several days      7  Trouble concentrating on things, such as reading the newspaper or watching television: 1 - several days      8  Moving or speaking so slowly that other people could have noticed  Or the opposite - being so fidgety or restless that you have been moving around a lot more than usual: 0 - not at all      9  Thoughts that you would be better off dead, or of hurting yourself in some way: 0 - not at all  PHQ-2 Score: 6  PHQ-9 Score: 10    Broken Bones/Falls: Fall Risk Assessment:    In the past year, patient has experienced: No history of falling in past year          Bladder/Bowel:  Patient has not leaked urine accidently in the last six months  Patient reports no loss of bowel control      Immunizations:  Patient has not had a flu vaccination within the last year  Patient has not received a pneumonia shot  Patient has not received a shingles shot  Patient has not received tetanus/diphtheria shot  Home Safety:  Patient has trouble with stairs inside or outside of their home  Patient currently reports that there are no safety hazards present in home, working smoke alarms, working carbon monoxide detectors  Preventative Screenings:   Breast cancer screening performed, colon cancer screen completed, cholesterol screen completed, glaucoma eye exam completed,     Nutrition:  Current diet: Regular with servings of the following:    Medications:  Patient is currently taking over-the-counter supplements  List of OTC medications includes: magnesium, vitamin b complex   Patient is able to manage medications  Lifestyle Choices:  Patient reports no tobacco use  Patient has not smoked or used tobacco in the past   Patient reports alcohol use  Alcohol use per week: occasional red wine   Patient drives a vehicle  Patient wears seat belt  Activities of Daily Living:  Can get out of bed by his or her self, able to dress self, able to make own meals, able to do own shopping, able to bathe self, can do own laundry/housekeeping, can manage own money, pay bills and track expenses    Previous Hospitalizations:  Hospitalization or ED visit in past 12 months  Number of hospitalizations within the last year: 1-2        Advanced Directives:  Patient has decided on a power of   Patient has spoken to designated power of   Patient has completed advanced directive          Preventative Screening/Counseling:      Cardiovascular:      General: Screening Current          Diabetes:      General: Screening Current          Colorectal Cancer:      General: Screening Current          Breast Cancer:      General: Screening Current          Cervical Cancer:      General: Screening Not Indicated Osteoporosis:      General: Risks and Benefits Discussed      Due for studies: DXA Appendicular          AAA:      General: Screening Not Indicated          Glaucoma:      General: Screening Current      Comments: She saw Dr Ana Lilia Ross yesterday        HIV:      General: Screening Not Indicated          Hepatitis C:      General: Risks and Benefits Discussed      Counseling: has received general HCV counseling        Advanced Directives:   Patient has living will for healthcare, has durable POA for healthcare, patient has an advanced directive  Information on ACP and/or AD provided  End of life assessment reviewed with patient  Provider agrees with end of life decisions   Immunizations:      Influenza: Risks & Benefits Discussed and Patient Declines      Pneumococcal: Risks & Benefits Discussed and Patient Declines      Shingrix: Risks & Benefits Discussed and Shingrix Vaccine Needed Today      Hepatitis B (Low risk patients): Series Not Indicated      Zostavax: Zostavax Vaccine UTD        Physical Exam   Constitutional: She appears well-developed and well-nourished  HENT:   Head: Normocephalic and atraumatic  Right Ear: External ear normal    Left Ear: External ear normal    Mouth/Throat: Oropharynx is clear and moist    Cardiovascular: Normal rate, regular rhythm and normal heart sounds  Exam reveals no friction rub  No murmur heard  Pulmonary/Chest: Effort normal and breath sounds normal  No respiratory distress  She has no wheezes  She has no rales  Musculoskeletal: She exhibits no edema or deformity  Nursing note and vitals reviewed

## 2018-09-14 NOTE — ASSESSMENT & PLAN NOTE
Mood is being aggravated by metoprolol  Will try taking metoprolol every other day and see if she feels better

## 2018-09-20 ENCOUNTER — TELEPHONE (OUTPATIENT)
Dept: FAMILY MEDICINE CLINIC | Facility: CLINIC | Age: 68
End: 2018-09-20

## 2018-09-20 DIAGNOSIS — K57.32 DIVERTICULITIS OF COLON: Primary | ICD-10-CM

## 2018-09-20 RX ORDER — AMOXICILLIN AND CLAVULANATE POTASSIUM 875; 125 MG/1; MG/1
1 TABLET, FILM COATED ORAL EVERY 12 HOURS SCHEDULED
Qty: 20 TABLET | Refills: 0 | Status: SHIPPED | OUTPATIENT
Start: 2018-09-20 | End: 2018-09-30

## 2018-09-20 NOTE — TELEPHONE ENCOUNTER
Rony Bocanegra is having a diverticulitous attack and wants to know if Dr Farrah Olivares can have prescription sent to [address] [place] [address],[address]     Thanks

## 2018-09-20 NOTE — TELEPHONE ENCOUNTER
9/20/2018 12:37 PM Returned call to [place]  She felt her diverticulitis starting last night  She is getting pain in her side  This feels like to be getting of her diverticulitis  She did have a CT scan a year ago  We discussed risks and benefits of medication  Started her on Augmentin  If she fails to improve or if she worsens she will call the office immediately        Message complete  Dr Sullivan Cash

## 2018-09-23 DIAGNOSIS — E03.9 PRIMARY HYPOTHYROIDISM: ICD-10-CM

## 2018-09-24 RX ORDER — LEVOTHYROXINE SODIUM 88 UG/1
TABLET ORAL
Qty: 90 TABLET | Refills: 1 | Status: SHIPPED | OUTPATIENT
Start: 2018-09-24 | End: 2018-12-18 | Stop reason: SDUPTHER

## 2018-10-31 ENCOUNTER — TELEPHONE (OUTPATIENT)
Dept: FAMILY MEDICINE CLINIC | Facility: CLINIC | Age: 68
End: 2018-10-31

## 2018-10-31 NOTE — TELEPHONE ENCOUNTER
Modesta Lainez,   Pt  Stating having abdominal pain which started when she was taking antibiotic two weeks ago , states pain is not severe denies nausea and vomit  Pt states pain is intermittent, in her stomach,midline and radiates to both sides  Pt states has some diarrhea and the only relief from pain  is whe she lays on her stomach  Pt is aware if pain worsens to severe  go ED      Pt has appointment with Evan Gamboa tomorrow Thursday at 11:15am   Kashmir Lemos MA

## 2018-10-31 NOTE — TELEPHONE ENCOUNTER
Stomach issues  Does not want to wait until next week and will ONLY see Dr Chris Meyer  Can we call to get more details on stomach and see if this can wait  Dr Chris Meyer does not have anything this week      Thanks

## 2018-11-01 ENCOUNTER — OFFICE VISIT (OUTPATIENT)
Dept: FAMILY MEDICINE CLINIC | Facility: CLINIC | Age: 68
End: 2018-11-01
Payer: MEDICARE

## 2018-11-01 VITALS
RESPIRATION RATE: 18 BRPM | SYSTOLIC BLOOD PRESSURE: 120 MMHG | HEIGHT: 59 IN | HEART RATE: 72 BPM | DIASTOLIC BLOOD PRESSURE: 78 MMHG | WEIGHT: 140 LBS | TEMPERATURE: 97.4 F | BODY MASS INDEX: 28.22 KG/M2

## 2018-11-01 DIAGNOSIS — K57.30 SIGMOID DIVERTICULOSIS: ICD-10-CM

## 2018-11-01 DIAGNOSIS — I10 BENIGN ESSENTIAL HYPERTENSION: ICD-10-CM

## 2018-11-01 DIAGNOSIS — K29.00 ACUTE GASTRITIS WITHOUT HEMORRHAGE, UNSPECIFIED GASTRITIS TYPE: Primary | ICD-10-CM

## 2018-11-01 PROBLEM — N30.90 CYSTITIS: Status: RESOLVED | Noted: 2018-06-02 | Resolved: 2018-11-01

## 2018-11-01 PROCEDURE — 99214 OFFICE O/P EST MOD 30 MIN: CPT | Performed by: NURSE PRACTITIONER

## 2018-11-01 RX ORDER — SUCRALFATE 1 G/1
1 TABLET ORAL 3 TIMES DAILY
Qty: 90 TABLET | Refills: 0 | Status: SHIPPED | OUTPATIENT
Start: 2018-11-01 | End: 2019-03-14 | Stop reason: ALTCHOICE

## 2018-11-01 RX ORDER — TRAMADOL HYDROCHLORIDE 50 MG/1
TABLET ORAL AS NEEDED
COMMUNITY
Start: 2018-10-15 | End: 2019-03-14 | Stop reason: ALTCHOICE

## 2018-11-01 NOTE — PATIENT INSTRUCTIONS
Zantac (Ranitidine) 150mg twice daily for the next month in addition to Carafe 3 times daily     Continue Protonix daily  Follow up with Dr Mary Le if you do not feel better

## 2018-11-01 NOTE — PROGRESS NOTES
Assessment/Plan:    Will start on Carafate and Zantac in addition to her Protonix  If symptoms do not improve after 2-3 weeks, she will schedule an appt with Dr Shamar Powell for further evaluation  May need EGD for further workup  Problem List Items Addressed This Visit        Cardiovascular and Mediastinum    Benign essential hypertension     Stable, continue current medications  Other    Sigmoid diverticulosis     No lower abdominal discomfort, fevers, or other symptoms to suggest these is a diverticulitis flare           Other Visit Diagnoses     Acute gastritis without hemorrhage, unspecified gastritis type    -  Primary    Relevant Medications    sucralfate (CARAFATE) 1 g tablet          Patient Instructions   Zantac (Ranitidine) 150mg twice daily for the next month in addition to Carafe 3 times daily  Continue Protonix daily  Follow up with Dr Shamar Powell if you do not feel better       Return if symptoms worsen or fail to improve  Subjective:      Patient ID: Riddhi Martins is a 77 y o  female  Chief Complaint   Patient presents with    Abdominal Pain     for months discomfort  [id number]       She has had chronic stomach discomfort  After she eats, the pain is worse  Epigastric region and radiates into her lower abdomen  She has tried a gluten free diet and eliminating various foods, which does not help  Typically has diarrhea after eating  Normally moving her bowels 3 times daily  Pain described as burning  Stool seems mucousy, but denies blood  Denies fevers, n/v     Weight has been stable and reports normal appetite  Was treated for suspected diverticulitis about 4-5 weeks ago, no improvement in symptoms  Lays on her stomach and this helps with the pain  Has taken Tylenol and Mylanta OTC with minimal relief  Has been on Protonix 40mg daily     Colonoscopy done earlier this year, repeat in 2 years  History of colon perforation secondary to diverticulitis also colitis last year        The following portions of the patient's history were reviewed and updated as appropriate: allergies, current medications, past family history, past medical history, past social history, past surgical history and problem list     Review of Systems   Constitutional: Negative for chills, fatigue and fever  Respiratory: Negative for cough, shortness of breath and wheezing  Cardiovascular: Negative for chest pain, palpitations and leg swelling  Gastrointestinal: Positive for abdominal pain and diarrhea  Negative for nausea and vomiting  Skin: Negative for rash  Neurological: Negative for dizziness and headaches  All other systems reviewed and are negative  Current Outpatient Prescriptions   Medication Sig Dispense Refill    ALPRAZolam (XANAX) 0 25 mg tablet Take 0 25 mg by mouth as needed for anxiety      levothyroxine 88 mcg tablet TAKE 1 TABLET BY MOUTH  DAILY 90 tablet 1    lisinopril (ZESTRIL) 10 mg tablet Take 1 tablet (10 mg total) by mouth daily at bedtime 90 tablet 3    Magnesium 250 MG TABS Take by mouth daily      metoprolol succinate (TOPROL-XL) 25 mg 24 hr tablet Take 1 tablet (25 mg total) by mouth daily at bedtime 90 tablet 1    pantoprazole (PROTONIX) 40 mg tablet 40 mg daily        spironolactone-hydrochlorothiazide (ALDACTAZIDE) 25-25 mg per tablet Take 1 tablet by mouth daily in the early morning 90 tablet 3    sucralfate (CARAFATE) 1 g tablet Take 1 tablet (1 g total) by mouth 3 (three) times a day 90 tablet 0    traMADol (ULTRAM) 50 mg tablet as needed       No current facility-administered medications for this visit  Objective:    /78   Pulse 72   Temp (!) 97 4 °F (36 3 °C)   Resp 18   Ht 4' 10 5" (1 486 m)   Wt 63 5 kg (140 lb)   BMI 28 76 kg/m²        Physical Exam   Constitutional: She appears well-developed and well-nourished  HENT:   Head: Normocephalic and atraumatic     Right Ear: Tympanic membrane, external ear and ear canal normal    Left Ear: Tympanic membrane, external ear and ear canal normal    Nose: No mucosal edema or rhinorrhea  Mouth/Throat: Uvula is midline, oropharynx is clear and moist and mucous membranes are normal    Eyes: Conjunctivae are normal    Neck: Neck supple  No edema present  No thyromegaly present  Cardiovascular: Normal rate, regular rhythm, normal heart sounds and intact distal pulses  No murmur heard  Pulmonary/Chest: Effort normal and breath sounds normal    Abdominal: Bowel sounds are normal  She exhibits no distension  There is no splenomegaly or hepatomegaly  There is tenderness in the epigastric area and left upper quadrant  There is no rebound and no guarding  Lymphadenopathy:        Right cervical: No superficial cervical adenopathy present  Left cervical: No superficial cervical adenopathy present  Skin: Skin is warm, dry and intact  No rash noted  Psychiatric: She has a normal mood and affect  Nursing note and vitals reviewed               Nancy Farooq

## 2018-11-09 ENCOUNTER — TRANSCRIBE ORDERS (OUTPATIENT)
Dept: ADMINISTRATIVE | Facility: HOSPITAL | Age: 68
End: 2018-11-09

## 2018-11-09 DIAGNOSIS — R10.12 LEFT UPPER QUADRANT PAIN: Primary | ICD-10-CM

## 2018-11-09 DIAGNOSIS — R10.13 ABDOMINAL PAIN, EPIGASTRIC: ICD-10-CM

## 2018-11-10 ENCOUNTER — APPOINTMENT (OUTPATIENT)
Dept: LAB | Facility: HOSPITAL | Age: 68
End: 2018-11-10
Payer: MEDICARE

## 2018-11-10 ENCOUNTER — TRANSCRIBE ORDERS (OUTPATIENT)
Dept: ADMINISTRATIVE | Facility: HOSPITAL | Age: 68
End: 2018-11-10

## 2018-11-10 DIAGNOSIS — R10.13 ABDOMINAL PAIN, EPIGASTRIC: ICD-10-CM

## 2018-11-10 DIAGNOSIS — R10.13 ABDOMINAL PAIN, EPIGASTRIC: Primary | ICD-10-CM

## 2018-11-10 LAB
ANION GAP SERPL CALCULATED.3IONS-SCNC: 5 MMOL/L (ref 4–13)
BUN SERPL-MCNC: 16 MG/DL (ref 5–25)
CALCIUM SERPL-MCNC: 9.1 MG/DL (ref 8.3–10.1)
CHLORIDE SERPL-SCNC: 101 MMOL/L (ref 100–108)
CO2 SERPL-SCNC: 29 MMOL/L (ref 21–32)
CREAT SERPL-MCNC: 0.76 MG/DL (ref 0.6–1.3)
GFR SERPL CREATININE-BSD FRML MDRD: 80 ML/MIN/1.73SQ M
GLUCOSE P FAST SERPL-MCNC: 97 MG/DL (ref 65–99)
POTASSIUM SERPL-SCNC: 3.6 MMOL/L (ref 3.5–5.3)
SODIUM SERPL-SCNC: 135 MMOL/L (ref 136–145)

## 2018-11-10 PROCEDURE — 80048 BASIC METABOLIC PNL TOTAL CA: CPT

## 2018-11-10 PROCEDURE — 36415 COLL VENOUS BLD VENIPUNCTURE: CPT

## 2018-11-13 ENCOUNTER — HOSPITAL ENCOUNTER (OUTPATIENT)
Dept: RADIOLOGY | Facility: HOSPITAL | Age: 68
Discharge: HOME | End: 2018-11-13
Payer: MEDICARE

## 2018-11-13 DIAGNOSIS — R10.12 LEFT UPPER QUADRANT PAIN: ICD-10-CM

## 2018-11-13 DIAGNOSIS — R10.13 ABDOMINAL PAIN, EPIGASTRIC: ICD-10-CM

## 2018-11-13 PROCEDURE — 74177 CT ABD & PELVIS W/CONTRAST: CPT

## 2018-11-13 RX ADMIN — IOHEXOL 100 ML: 350 INJECTION, SOLUTION INTRAVENOUS at 08:22

## 2018-12-18 DIAGNOSIS — I10 BENIGN ESSENTIAL HYPERTENSION: ICD-10-CM

## 2018-12-18 DIAGNOSIS — E03.9 PRIMARY HYPOTHYROIDISM: ICD-10-CM

## 2018-12-18 RX ORDER — LEVOTHYROXINE SODIUM 88 UG/1
TABLET ORAL
Qty: 90 TABLET | Refills: 1 | Status: SHIPPED | OUTPATIENT
Start: 2018-12-18 | End: 2019-07-24 | Stop reason: SDUPTHER

## 2018-12-18 RX ORDER — METOPROLOL SUCCINATE 25 MG/1
TABLET, EXTENDED RELEASE ORAL
Qty: 90 TABLET | Refills: 1 | Status: SHIPPED | OUTPATIENT
Start: 2018-12-18 | End: 2019-03-14 | Stop reason: SDUPTHER

## 2019-03-14 ENCOUNTER — OFFICE VISIT (OUTPATIENT)
Dept: FAMILY MEDICINE CLINIC | Facility: CLINIC | Age: 69
End: 2019-03-14
Payer: COMMERCIAL

## 2019-03-14 ENCOUNTER — TELEPHONE (OUTPATIENT)
Dept: FAMILY MEDICINE CLINIC | Facility: CLINIC | Age: 69
End: 2019-03-14

## 2019-03-14 VITALS
RESPIRATION RATE: 16 BRPM | DIASTOLIC BLOOD PRESSURE: 72 MMHG | TEMPERATURE: 97.7 F | HEART RATE: 60 BPM | WEIGHT: 140 LBS | SYSTOLIC BLOOD PRESSURE: 110 MMHG | BODY MASS INDEX: 28.22 KG/M2 | HEIGHT: 59 IN

## 2019-03-14 DIAGNOSIS — F41.1 GENERALIZED ANXIETY DISORDER: ICD-10-CM

## 2019-03-14 DIAGNOSIS — E55.9 VITAMIN D DEFICIENCY: ICD-10-CM

## 2019-03-14 DIAGNOSIS — Z79.899 ENCOUNTER FOR LONG-TERM CURRENT USE OF MEDICATION: ICD-10-CM

## 2019-03-14 DIAGNOSIS — R92.2 DENSE BREAST: ICD-10-CM

## 2019-03-14 DIAGNOSIS — E03.9 PRIMARY HYPOTHYROIDISM: ICD-10-CM

## 2019-03-14 DIAGNOSIS — Z12.31 SCREENING MAMMOGRAM, ENCOUNTER FOR: ICD-10-CM

## 2019-03-14 DIAGNOSIS — I10 BENIGN ESSENTIAL HYPERTENSION: Primary | ICD-10-CM

## 2019-03-14 DIAGNOSIS — Z11.59 NEED FOR HEPATITIS C SCREENING TEST: ICD-10-CM

## 2019-03-14 PROBLEM — I16.0 HYPERTENSIVE URGENCY: Status: RESOLVED | Noted: 2017-10-29 | Resolved: 2019-03-14

## 2019-03-14 PROCEDURE — 1160F RVW MEDS BY RX/DR IN RCRD: CPT | Performed by: FAMILY MEDICINE

## 2019-03-14 PROCEDURE — 3008F BODY MASS INDEX DOCD: CPT | Performed by: FAMILY MEDICINE

## 2019-03-14 PROCEDURE — 3074F SYST BP LT 130 MM HG: CPT | Performed by: FAMILY MEDICINE

## 2019-03-14 PROCEDURE — 1036F TOBACCO NON-USER: CPT | Performed by: FAMILY MEDICINE

## 2019-03-14 PROCEDURE — 3078F DIAST BP <80 MM HG: CPT | Performed by: FAMILY MEDICINE

## 2019-03-14 PROCEDURE — 99214 OFFICE O/P EST MOD 30 MIN: CPT | Performed by: FAMILY MEDICINE

## 2019-03-14 RX ORDER — ALPRAZOLAM 0.25 MG/1
0.25 TABLET ORAL AS NEEDED
Qty: 90 TABLET | Refills: 0 | Status: SHIPPED | OUTPATIENT
Start: 2019-03-14 | End: 2020-04-20 | Stop reason: SDUPTHER

## 2019-03-14 RX ORDER — METOPROLOL SUCCINATE 25 MG/1
TABLET, EXTENDED RELEASE ORAL
Qty: 180 TABLET | Refills: 1 | Status: SHIPPED | OUTPATIENT
Start: 2019-03-14 | End: 2019-09-02 | Stop reason: SDUPTHER

## 2019-03-14 NOTE — PROGRESS NOTES
Assessment/Plan:    Problem List Items Addressed This Visit     Benign essential hypertension - Primary     Will have her continue Toprol 25 mg a day and then increase dose as needed for systolic blood pressure 153 or greater  Relevant Medications    metoprolol succinate (TOPROL-XL) 25 mg 24 hr tablet    Other Relevant Orders    CBC    Comprehensive metabolic panel    Lipid Panel with Direct LDL reflex    Comprehensive metabolic panel    Lipid Panel with Direct LDL reflex    CBC    Generalized anxiety disorder     Stable with use of xanax         Relevant Medications    ALPRAZolam (XANAX) 0 25 mg tablet    Primary hypothyroidism     Has been stable  Check her labs         Relevant Medications    metoprolol succinate (TOPROL-XL) 25 mg 24 hr tablet    Other Relevant Orders    T4, free    TSH, 3rd generation    T4, free    TSH, 3rd generation    Vitamin D deficiency    Relevant Orders    Vitamin D 25 hydroxy      Other Visit Diagnoses     Screening mammogram, encounter for        Relevant Orders    Mammo screening bilateral w 3d & cad    Dense breast        Relevant Orders    Mammo screening bilateral w 3d & cad    Need for hepatitis C screening test        Relevant Orders    Hepatitis C antibody    Encounter for long-term current use of medication        Relevant Orders    Magnesium    Vitamin B12          BMI Counseling: Body mass index is 28 76 kg/m²  Discussed with patient's BMI with her  The BMI is above average  BMI counseling and education was provided to the patient  Exercise recommendations include exercising 3-5 times per week  There are no Patient Instructions on file for this visit  Return in about 6 months (around 9/16/2019) for Annual Wellness Visit  Subjective:      Patient ID: Andreina Vergara is a 77 y o  female  Chief Complaint   Patient presents with    Follow-up     6 month f/u prcma    Hypertension       She has been getting headaches when her pressure goes up   She is needing an extra half or full pull if needed  She has been needing the xanax to help with her anxiety at night  She is trying to take less of it  The following portions of the patient's history were reviewed and updated as appropriate:  past social history    Review of Systems   Respiratory: Negative  Cardiovascular: Negative  Current Outpatient Medications   Medication Sig Dispense Refill    ALPRAZolam (XANAX) 0 25 mg tablet Take 1 tablet (0 25 mg total) by mouth as needed for anxiety 90 tablet 0    levothyroxine 88 mcg tablet TAKE 1 TABLET BY MOUTH  DAILY 90 tablet 1    lisinopril (ZESTRIL) 10 mg tablet Take 1 tablet (10 mg total) by mouth daily at bedtime 90 tablet 3    Magnesium 250 MG TABS Take by mouth daily      metoprolol succinate (TOPROL-XL) 25 mg 24 hr tablet Take 1 pill daily, take 1 extra as needed for systolic blood pressure above 140 180 tablet 1    spironolactone-hydrochlorothiazide (ALDACTAZIDE) 25-25 mg per tablet Take 1 tablet by mouth daily in the early morning 90 tablet 3     No current facility-administered medications for this visit  Objective:    /72   Pulse 60   Temp 97 7 °F (36 5 °C)   Resp 16   Ht 4' 10 5" (1 486 m)   Wt 63 5 kg (140 lb)   BMI 28 76 kg/m²        Physical Exam   Constitutional: She appears well-developed and well-nourished  HENT:   Head: Normocephalic and atraumatic  Right Ear: External ear normal    Left Ear: External ear normal    Mouth/Throat: Oropharynx is clear and moist    Cardiovascular: Normal rate, regular rhythm and normal heart sounds  Exam reveals no friction rub  No murmur heard  Pulmonary/Chest: Effort normal and breath sounds normal  No respiratory distress  She has no wheezes  She has no rales  Musculoskeletal: She exhibits no edema or deformity  Nursing note and vitals reviewed               Candi Gutierrez DO

## 2019-03-14 NOTE — ASSESSMENT & PLAN NOTE
Will have her continue Toprol 25 mg a day and then increase dose as needed for systolic blood pressure 201 or greater

## 2019-03-14 NOTE — TELEPHONE ENCOUNTER
Fax request form from [employer] asking for frequency of administration of Alprazolam 0 25mg  Form in folder    Socorro Levy MA

## 2019-03-15 ENCOUNTER — APPOINTMENT (OUTPATIENT)
Dept: LAB | Facility: HOSPITAL | Age: 69
End: 2019-03-15
Payer: COMMERCIAL

## 2019-03-15 ENCOUNTER — TRANSCRIBE ORDERS (OUTPATIENT)
Dept: ADMINISTRATIVE | Facility: HOSPITAL | Age: 69
End: 2019-03-15

## 2019-03-15 DIAGNOSIS — Z11.59 NEED FOR HEPATITIS C SCREENING TEST: ICD-10-CM

## 2019-03-15 DIAGNOSIS — E03.9 PRIMARY HYPOTHYROIDISM: ICD-10-CM

## 2019-03-15 DIAGNOSIS — E55.9 VITAMIN D DEFICIENCY: ICD-10-CM

## 2019-03-15 DIAGNOSIS — I10 BENIGN ESSENTIAL HYPERTENSION: ICD-10-CM

## 2019-03-15 DIAGNOSIS — Z79.899 ENCOUNTER FOR LONG-TERM CURRENT USE OF MEDICATION: ICD-10-CM

## 2019-03-15 LAB
25(OH)D3 SERPL-MCNC: 38.1 NG/ML (ref 30–100)
ALBUMIN SERPL BCP-MCNC: 3.8 G/DL (ref 3.5–5)
ALP SERPL-CCNC: 70 U/L (ref 46–116)
ALT SERPL W P-5'-P-CCNC: 22 U/L (ref 12–78)
ANION GAP SERPL CALCULATED.3IONS-SCNC: 7 MMOL/L (ref 4–13)
AST SERPL W P-5'-P-CCNC: 20 U/L (ref 5–45)
BILIRUB SERPL-MCNC: 0.3 MG/DL (ref 0.2–1)
BUN SERPL-MCNC: 23 MG/DL (ref 5–25)
CALCIUM SERPL-MCNC: 9 MG/DL (ref 8.3–10.1)
CHLORIDE SERPL-SCNC: 97 MMOL/L (ref 100–108)
CHOLEST SERPL-MCNC: 238 MG/DL (ref 50–200)
CO2 SERPL-SCNC: 28 MMOL/L (ref 21–32)
CREAT SERPL-MCNC: 0.78 MG/DL (ref 0.6–1.3)
ERYTHROCYTE [DISTWIDTH] IN BLOOD BY AUTOMATED COUNT: 12 % (ref 11.6–15.1)
GFR SERPL CREATININE-BSD FRML MDRD: 77 ML/MIN/1.73SQ M
GLUCOSE P FAST SERPL-MCNC: 95 MG/DL (ref 65–99)
HCT VFR BLD AUTO: 40.5 % (ref 34.8–46.1)
HCV AB SER QL: NORMAL
HDLC SERPL-MCNC: 65 MG/DL (ref 40–60)
HGB BLD-MCNC: 13.6 G/DL (ref 11.5–15.4)
LDLC SERPL CALC-MCNC: 164 MG/DL (ref 0–100)
MAGNESIUM SERPL-MCNC: 2.2 MG/DL (ref 1.6–2.6)
MCH RBC QN AUTO: 33.3 PG (ref 26.8–34.3)
MCHC RBC AUTO-ENTMCNC: 33.6 G/DL (ref 31.4–37.4)
MCV RBC AUTO: 99 FL (ref 82–98)
PLATELET # BLD AUTO: 376 THOUSANDS/UL (ref 149–390)
PMV BLD AUTO: 9.3 FL (ref 8.9–12.7)
POTASSIUM SERPL-SCNC: 3.9 MMOL/L (ref 3.5–5.3)
PROT SERPL-MCNC: 7.4 G/DL (ref 6.4–8.2)
RBC # BLD AUTO: 4.09 MILLION/UL (ref 3.81–5.12)
SODIUM SERPL-SCNC: 132 MMOL/L (ref 136–145)
T4 FREE SERPL-MCNC: 1.33 NG/DL (ref 0.76–1.46)
TRIGL SERPL-MCNC: 43 MG/DL
TSH SERPL DL<=0.05 MIU/L-ACNC: 0.68 UIU/ML (ref 0.36–3.74)
VIT B12 SERPL-MCNC: 477 PG/ML (ref 100–900)
WBC # BLD AUTO: 4.77 THOUSAND/UL (ref 4.31–10.16)

## 2019-03-15 PROCEDURE — 82607 VITAMIN B-12: CPT

## 2019-03-15 PROCEDURE — 84443 ASSAY THYROID STIM HORMONE: CPT

## 2019-03-15 PROCEDURE — 86803 HEPATITIS C AB TEST: CPT

## 2019-03-15 PROCEDURE — 85027 COMPLETE CBC AUTOMATED: CPT

## 2019-03-15 PROCEDURE — 80061 LIPID PANEL: CPT

## 2019-03-15 PROCEDURE — 84439 ASSAY OF FREE THYROXINE: CPT

## 2019-03-15 PROCEDURE — 36415 COLL VENOUS BLD VENIPUNCTURE: CPT

## 2019-03-15 PROCEDURE — 83735 ASSAY OF MAGNESIUM: CPT

## 2019-03-15 PROCEDURE — 80053 COMPREHEN METABOLIC PANEL: CPT

## 2019-03-15 PROCEDURE — 82306 VITAMIN D 25 HYDROXY: CPT

## 2019-03-17 DIAGNOSIS — I10 HTN (HYPERTENSION), MALIGNANT: ICD-10-CM

## 2019-03-19 RX ORDER — LISINOPRIL 10 MG/1
TABLET ORAL
Qty: 90 TABLET | Refills: 3 | Status: SHIPPED | OUTPATIENT
Start: 2019-03-19 | End: 2019-09-12 | Stop reason: SINTOL

## 2019-04-04 ENCOUNTER — APPOINTMENT (OUTPATIENT)
Dept: LAB | Facility: HOSPITAL | Age: 69
End: 2019-04-04
Payer: COMMERCIAL

## 2019-04-04 ENCOUNTER — TRANSCRIBE ORDERS (OUTPATIENT)
Dept: ADMINISTRATIVE | Facility: HOSPITAL | Age: 69
End: 2019-04-04

## 2019-04-04 DIAGNOSIS — E27.9 ADRENAL HYPERTENSION (HCC): Primary | ICD-10-CM

## 2019-04-04 DIAGNOSIS — I15.2 ADRENAL HYPERTENSION (HCC): Primary | ICD-10-CM

## 2019-04-04 LAB — CORTIS AM PEAK SERPL-MCNC: 1.6 UG/DL (ref 4.2–22.4)

## 2019-04-04 PROCEDURE — 80375 DRUG/SUBSTANCE NOS 1-3: CPT

## 2019-04-04 PROCEDURE — 82533 TOTAL CORTISOL: CPT

## 2019-04-04 PROCEDURE — 36415 COLL VENOUS BLD VENIPUNCTURE: CPT

## 2019-04-15 LAB — MISCELLANEOUS LAB TEST RESULT: NORMAL

## 2019-04-18 ENCOUNTER — TRANSCRIBE ORDERS (OUTPATIENT)
Dept: ADMINISTRATIVE | Facility: HOSPITAL | Age: 69
End: 2019-04-18

## 2019-04-18 ENCOUNTER — APPOINTMENT (OUTPATIENT)
Dept: LAB | Facility: HOSPITAL | Age: 69
End: 2019-04-18
Payer: COMMERCIAL

## 2019-04-18 DIAGNOSIS — R19.7 DIARRHEA OF PRESUMED INFECTIOUS ORIGIN: ICD-10-CM

## 2019-04-18 DIAGNOSIS — R19.7 DIARRHEA OF PRESUMED INFECTIOUS ORIGIN: Primary | ICD-10-CM

## 2019-04-18 LAB — C DIFF TOX GENS STL QL NAA+PROBE: NORMAL

## 2019-04-18 PROCEDURE — 87493 C DIFF AMPLIFIED PROBE: CPT

## 2019-05-24 ENCOUNTER — OFFICE VISIT (OUTPATIENT)
Dept: FAMILY MEDICINE CLINIC | Facility: CLINIC | Age: 69
End: 2019-05-24
Payer: COMMERCIAL

## 2019-05-24 VITALS
HEIGHT: 59 IN | SYSTOLIC BLOOD PRESSURE: 128 MMHG | DIASTOLIC BLOOD PRESSURE: 80 MMHG | HEART RATE: 64 BPM | RESPIRATION RATE: 16 BRPM | WEIGHT: 140 LBS | TEMPERATURE: 98.1 F | BODY MASS INDEX: 28.22 KG/M2

## 2019-05-24 DIAGNOSIS — J06.9 UPPER RESPIRATORY TRACT INFECTION, UNSPECIFIED TYPE: Primary | ICD-10-CM

## 2019-05-24 DIAGNOSIS — F32.0 CURRENT MILD EPISODE OF MAJOR DEPRESSIVE DISORDER WITHOUT PRIOR EPISODE (HCC): ICD-10-CM

## 2019-05-24 PROCEDURE — 99213 OFFICE O/P EST LOW 20 MIN: CPT | Performed by: FAMILY MEDICINE

## 2019-05-24 PROCEDURE — 3008F BODY MASS INDEX DOCD: CPT | Performed by: FAMILY MEDICINE

## 2019-05-24 PROCEDURE — 1101F PT FALLS ASSESS-DOCD LE1/YR: CPT | Performed by: FAMILY MEDICINE

## 2019-05-24 RX ORDER — AZITHROMYCIN 250 MG/1
TABLET, FILM COATED ORAL
Qty: 6 TABLET | Refills: 0 | Status: SHIPPED | OUTPATIENT
Start: 2019-05-24 | End: 2019-05-29

## 2019-05-24 RX ORDER — GUAIFENESIN AND CODEINE PHOSPHATE 100; 10 MG/5ML; MG/5ML
10 SOLUTION ORAL
Qty: 120 ML | Refills: 0 | Status: SHIPPED | OUTPATIENT
Start: 2019-05-24 | End: 2020-04-20 | Stop reason: ALTCHOICE

## 2019-05-24 RX ORDER — BENZONATATE 200 MG/1
200 CAPSULE ORAL 3 TIMES DAILY PRN
Qty: 30 CAPSULE | Refills: 0 | Status: SHIPPED | OUTPATIENT
Start: 2019-05-24 | End: 2020-04-20 | Stop reason: ALTCHOICE

## 2019-06-10 ENCOUNTER — APPOINTMENT (OUTPATIENT)
Dept: RADIOLOGY | Facility: CLINIC | Age: 69
End: 2019-06-10
Payer: COMMERCIAL

## 2019-06-10 ENCOUNTER — TELEPHONE (OUTPATIENT)
Dept: FAMILY MEDICINE CLINIC | Facility: CLINIC | Age: 69
End: 2019-06-10

## 2019-06-10 ENCOUNTER — OFFICE VISIT (OUTPATIENT)
Dept: FAMILY MEDICINE CLINIC | Facility: CLINIC | Age: 69
End: 2019-06-10
Payer: COMMERCIAL

## 2019-06-10 VITALS
DIASTOLIC BLOOD PRESSURE: 82 MMHG | SYSTOLIC BLOOD PRESSURE: 128 MMHG | TEMPERATURE: 97.7 F | RESPIRATION RATE: 20 BRPM | HEART RATE: 56 BPM | OXYGEN SATURATION: 98 %

## 2019-06-10 DIAGNOSIS — R07.89 CHEST WALL TENDERNESS: ICD-10-CM

## 2019-06-10 DIAGNOSIS — R07.89 CHEST WALL TENDERNESS: Primary | ICD-10-CM

## 2019-06-10 DIAGNOSIS — E03.9 PRIMARY HYPOTHYROIDISM: ICD-10-CM

## 2019-06-10 DIAGNOSIS — I10 BENIGN ESSENTIAL HYPERTENSION: ICD-10-CM

## 2019-06-10 PROCEDURE — 3074F SYST BP LT 130 MM HG: CPT | Performed by: NURSE PRACTITIONER

## 2019-06-10 PROCEDURE — 71046 X-RAY EXAM CHEST 2 VIEWS: CPT

## 2019-06-10 PROCEDURE — 1160F RVW MEDS BY RX/DR IN RCRD: CPT | Performed by: NURSE PRACTITIONER

## 2019-06-10 PROCEDURE — 1036F TOBACCO NON-USER: CPT | Performed by: NURSE PRACTITIONER

## 2019-06-10 PROCEDURE — 99214 OFFICE O/P EST MOD 30 MIN: CPT | Performed by: NURSE PRACTITIONER

## 2019-06-10 PROCEDURE — 3079F DIAST BP 80-89 MM HG: CPT | Performed by: NURSE PRACTITIONER

## 2019-06-10 RX ORDER — DICYCLOMINE HYDROCHLORIDE 10 MG/1
CAPSULE ORAL
COMMUNITY
Start: 2019-06-05 | End: 2021-05-28

## 2019-07-24 DIAGNOSIS — E03.9 PRIMARY HYPOTHYROIDISM: ICD-10-CM

## 2019-07-25 RX ORDER — LEVOTHYROXINE SODIUM 88 UG/1
TABLET ORAL
Qty: 90 TABLET | Refills: 0 | Status: SHIPPED | OUTPATIENT
Start: 2019-07-25 | End: 2019-09-12 | Stop reason: SDUPTHER

## 2019-09-02 DIAGNOSIS — R07.89 CHEST TIGHTNESS: ICD-10-CM

## 2019-09-02 DIAGNOSIS — I10 BENIGN ESSENTIAL HYPERTENSION: ICD-10-CM

## 2019-09-02 DIAGNOSIS — I10 MALIGNANT HYPERTENSION: ICD-10-CM

## 2019-09-03 RX ORDER — METOPROLOL SUCCINATE 25 MG/1
TABLET, EXTENDED RELEASE ORAL
Qty: 180 TABLET | Refills: 1 | Status: SHIPPED | OUTPATIENT
Start: 2019-09-03 | End: 2019-09-12 | Stop reason: SDUPTHER

## 2019-09-05 NOTE — TELEPHONE ENCOUNTER
She has pending follow-up with her PCP  Dr Jean Mcdonald will determine if she should continue on med  Does she have enough till than?

## 2019-09-09 ENCOUNTER — OFFICE VISIT (OUTPATIENT)
Dept: FAMILY MEDICINE CLINIC | Facility: CLINIC | Age: 69
End: 2019-09-09
Payer: COMMERCIAL

## 2019-09-09 VITALS
DIASTOLIC BLOOD PRESSURE: 78 MMHG | SYSTOLIC BLOOD PRESSURE: 132 MMHG | TEMPERATURE: 97.3 F | HEART RATE: 84 BPM | RESPIRATION RATE: 16 BRPM

## 2019-09-09 DIAGNOSIS — Z79.899 ENCOUNTER FOR LONG-TERM CURRENT USE OF MEDICATION: ICD-10-CM

## 2019-09-09 DIAGNOSIS — I10 BENIGN ESSENTIAL HYPERTENSION: ICD-10-CM

## 2019-09-09 DIAGNOSIS — E03.9 PRIMARY HYPOTHYROIDISM: ICD-10-CM

## 2019-09-09 DIAGNOSIS — G25.81 RESTLESS LEG SYNDROME: Primary | ICD-10-CM

## 2019-09-09 PROCEDURE — 99214 OFFICE O/P EST MOD 30 MIN: CPT | Performed by: FAMILY MEDICINE

## 2019-09-09 PROCEDURE — 3075F SYST BP GE 130 - 139MM HG: CPT | Performed by: FAMILY MEDICINE

## 2019-09-09 PROCEDURE — 3078F DIAST BP <80 MM HG: CPT | Performed by: FAMILY MEDICINE

## 2019-09-09 RX ORDER — ROPINIROLE 0.5 MG/1
0.5 TABLET, FILM COATED ORAL
Qty: 30 TABLET | Refills: 3 | Status: SHIPPED | OUTPATIENT
Start: 2019-09-09 | End: 2020-04-20 | Stop reason: ALTCHOICE

## 2019-09-09 NOTE — PROGRESS NOTES
Assessment/Plan:    Problem List Items Addressed This Visit     Benign essential hypertension     Well controlled          Relevant Orders    CBC    Comprehensive metabolic panel    Lipid Panel with Direct LDL reflex    Primary hypothyroidism     Has been following with endocrinologist, but would like blood work  Relevant Orders    TSH, 3rd generation    T4, free    Restless leg syndrome - Primary     Worsening symptoms  Will try requip  Risks and benefits of medication discussed  Relevant Medications    rOPINIRole (REQUIP) 0 5 mg tablet      Other Visit Diagnoses     Encounter for long-term current use of medication        Relevant Orders    Magnesium    Iron              There are no Patient Instructions on file for this visit  Return in about 6 months (around 3/9/2020) for Annual Wellness Visit  Subjective:      Patient ID: Marvel Sabillon is a 68 y o  female  Chief Complaint   Patient presents with    Pain     back-lj    restless legs       She has been having a very hard time sleeping  Her legs and now her arm are bothering her  She is needing xanax to sleep sometimes  Hypertension   This is a chronic problem  The current episode started more than 1 year ago  The problem is controlled  Past treatments include ACE inhibitors and beta blockers  The current treatment provides moderate improvement  There are no compliance problems  The following portions of the patient's history were reviewed and updated as appropriate:  past social history    Review of Systems   Respiratory: Negative  Cardiovascular: Negative            Current Outpatient Medications   Medication Sig Dispense Refill    ALPRAZolam (XANAX) 0 25 mg tablet Take 1 tablet (0 25 mg total) by mouth as needed for anxiety 90 tablet 0    dicyclomine (BENTYL) 10 mg capsule PRN as instructed      levothyroxine 88 mcg tablet TAKE 1 TABLET BY MOUTH  DAILY 90 tablet 0    lisinopril (ZESTRIL) 10 mg tablet TAKE 1 TABLET BY MOUTH  DAILY AT BEDTIME 90 tablet 3    Magnesium 250 MG TABS Take by mouth daily      metoprolol succinate (TOPROL-XL) 25 mg 24 hr tablet TAKE 1 TABLET BY MOUTH  DAILY AND TAKE 1 EXTRA  TABLET AS NEEDED FOR  SYSTOLIC BLOOD PRESSURE  ABOVE 140 180 tablet 1    spironolactone-hydrochlorothiazide (ALDACTAZIDE) 25-25 mg per tablet Take 1 tablet by mouth daily in the early morning 90 tablet 3    benzonatate (TESSALON) 200 MG capsule Take 1 capsule (200 mg total) by mouth 3 (three) times a day as needed for cough (Patient not taking: Reported on 6/10/2019) 30 capsule 0    guaifenesin-codeine (GUAIFENESIN AC) 100-10 MG/5ML liquid Take 10 mL by mouth daily at bedtime as needed for cough (Patient not taking: Reported on 6/10/2019) 120 mL 0    rOPINIRole (REQUIP) 0 5 mg tablet Take 1 tablet (0 5 mg total) by mouth daily at bedtime 30 tablet 3     No current facility-administered medications for this visit  Objective:    /78   Pulse 84   Temp (!) 97 3 °F (36 3 °C)   Resp 16        Physical Exam   Constitutional: She appears well-developed and well-nourished  HENT:   Head: Normocephalic and atraumatic  Right Ear: External ear normal    Left Ear: External ear normal    Mouth/Throat: Oropharynx is clear and moist    Cardiovascular: Normal rate, regular rhythm and normal heart sounds  Exam reveals no friction rub  No murmur heard  Pulmonary/Chest: Effort normal and breath sounds normal  No respiratory distress  She has no wheezes  She has no rales  Musculoskeletal: She exhibits no edema or deformity  Nursing note and vitals reviewed               Alejandra Dutton DO

## 2019-09-11 ENCOUNTER — TELEPHONE (OUTPATIENT)
Dept: FAMILY MEDICINE CLINIC | Facility: CLINIC | Age: 69
End: 2019-09-11

## 2019-09-11 ENCOUNTER — APPOINTMENT (OUTPATIENT)
Dept: LAB | Facility: HOSPITAL | Age: 69
End: 2019-09-11
Payer: COMMERCIAL

## 2019-09-11 ENCOUNTER — TRANSCRIBE ORDERS (OUTPATIENT)
Dept: ADMINISTRATIVE | Facility: HOSPITAL | Age: 69
End: 2019-09-11

## 2019-09-11 DIAGNOSIS — E03.9 PRIMARY HYPOTHYROIDISM: ICD-10-CM

## 2019-09-11 DIAGNOSIS — I10 BENIGN ESSENTIAL HYPERTENSION: ICD-10-CM

## 2019-09-11 DIAGNOSIS — Z79.899 ENCOUNTER FOR LONG-TERM CURRENT USE OF MEDICATION: ICD-10-CM

## 2019-09-11 DIAGNOSIS — E87.1 HYPONATREMIA: Primary | ICD-10-CM

## 2019-09-11 LAB
ALBUMIN SERPL BCP-MCNC: 3.8 G/DL (ref 3.5–5)
ALP SERPL-CCNC: 64 U/L (ref 46–116)
ALT SERPL W P-5'-P-CCNC: 20 U/L (ref 12–78)
ANION GAP SERPL CALCULATED.3IONS-SCNC: 7 MMOL/L (ref 4–13)
AST SERPL W P-5'-P-CCNC: 14 U/L (ref 5–45)
BILIRUB SERPL-MCNC: 0.3 MG/DL (ref 0.2–1)
BUN SERPL-MCNC: 24 MG/DL (ref 5–25)
CALCIUM SERPL-MCNC: 9.1 MG/DL (ref 8.3–10.1)
CHLORIDE SERPL-SCNC: 95 MMOL/L (ref 100–108)
CHOLEST SERPL-MCNC: 245 MG/DL (ref 50–200)
CO2 SERPL-SCNC: 28 MMOL/L (ref 21–32)
CREAT SERPL-MCNC: 0.8 MG/DL (ref 0.6–1.3)
ERYTHROCYTE [DISTWIDTH] IN BLOOD BY AUTOMATED COUNT: 11.7 % (ref 11.6–15.1)
GFR SERPL CREATININE-BSD FRML MDRD: 74 ML/MIN/1.73SQ M
GLUCOSE P FAST SERPL-MCNC: 103 MG/DL (ref 65–99)
HCT VFR BLD AUTO: 41 % (ref 34.8–46.1)
HDLC SERPL-MCNC: 65 MG/DL (ref 40–60)
HGB BLD-MCNC: 13.8 G/DL (ref 11.5–15.4)
IRON SERPL-MCNC: 126 UG/DL (ref 50–170)
LDLC SERPL CALC-MCNC: 165 MG/DL (ref 0–100)
MAGNESIUM SERPL-MCNC: 2 MG/DL (ref 1.6–2.6)
MCH RBC QN AUTO: 33.3 PG (ref 26.8–34.3)
MCHC RBC AUTO-ENTMCNC: 33.7 G/DL (ref 31.4–37.4)
MCV RBC AUTO: 99 FL (ref 82–98)
PLATELET # BLD AUTO: 348 THOUSANDS/UL (ref 149–390)
PMV BLD AUTO: 9 FL (ref 8.9–12.7)
POTASSIUM SERPL-SCNC: 4.3 MMOL/L (ref 3.5–5.3)
PROT SERPL-MCNC: 7.4 G/DL (ref 6.4–8.2)
RBC # BLD AUTO: 4.14 MILLION/UL (ref 3.81–5.12)
SODIUM SERPL-SCNC: 130 MMOL/L (ref 136–145)
T4 FREE SERPL-MCNC: 1.53 NG/DL (ref 0.76–1.46)
TRIGL SERPL-MCNC: 75 MG/DL
TSH SERPL DL<=0.05 MIU/L-ACNC: 0.35 UIU/ML (ref 0.36–3.74)
WBC # BLD AUTO: 4.65 THOUSAND/UL (ref 4.31–10.16)

## 2019-09-11 PROCEDURE — 85027 COMPLETE CBC AUTOMATED: CPT

## 2019-09-11 PROCEDURE — 83735 ASSAY OF MAGNESIUM: CPT

## 2019-09-11 PROCEDURE — 80061 LIPID PANEL: CPT

## 2019-09-11 PROCEDURE — 83540 ASSAY OF IRON: CPT

## 2019-09-11 PROCEDURE — 80053 COMPREHEN METABOLIC PANEL: CPT

## 2019-09-11 PROCEDURE — 84443 ASSAY THYROID STIM HORMONE: CPT

## 2019-09-11 PROCEDURE — 36415 COLL VENOUS BLD VENIPUNCTURE: CPT

## 2019-09-11 PROCEDURE — 84439 ASSAY OF FREE THYROXINE: CPT

## 2019-09-12 RX ORDER — LEVOTHYROXINE SODIUM 0.07 MG/1
75 TABLET ORAL DAILY
Qty: 90 TABLET | Refills: 1 | Status: SHIPPED | OUTPATIENT
Start: 2019-09-12 | End: 2020-01-31

## 2019-09-12 RX ORDER — METOPROLOL SUCCINATE 50 MG/1
TABLET, EXTENDED RELEASE ORAL
Qty: 180 TABLET | Refills: 1
Start: 2019-09-12 | End: 2020-01-16 | Stop reason: SDUPTHER

## 2019-09-23 RX ORDER — SPIRONOLACTONE AND HYDROCHLOROTHIAZIDE 25; 25 MG/1; MG/1
1 TABLET ORAL
Qty: 90 TABLET | Refills: 3 | OUTPATIENT
Start: 2019-09-23

## 2019-10-15 ENCOUNTER — TELEPHONE (OUTPATIENT)
Dept: FAMILY MEDICINE CLINIC | Facility: CLINIC | Age: 69
End: 2019-10-15

## 2019-10-16 RX ORDER — SPIRONOLACTONE AND HYDROCHLOROTHIAZIDE 25; 25 MG/1; MG/1
0.5 TABLET ORAL DAILY
COMMUNITY
End: 2019-12-02 | Stop reason: SDUPTHER

## 2019-10-16 NOTE — TELEPHONE ENCOUNTER
Brie Sparrow is feeling better  When she went off medication she could feel her rings were tight and her neck  She feels better since she started taking the 1/2 pill of Spirono-hctz 25/25  Wants to know if she should take 1/2 pill or full pill  Please call Brie Sparrow back       Thank you

## 2019-10-16 NOTE — TELEPHONE ENCOUNTER
10/16/2019 8:11 AM Called patient to see how she is doing  Message left on machine to call the office      Ureña Maker, DO

## 2019-10-16 NOTE — TELEPHONE ENCOUNTER
10/16/2019 10:07 AM Returned call to patient  She could feel the swelling come back with stopping her water pill  She will stay on half a day and we will recheck her sodium in 2 weeks  She has a lab slip at home      Message complete  [facility] [place], [place]

## 2019-12-02 DIAGNOSIS — I10 BENIGN ESSENTIAL HYPERTENSION: Primary | ICD-10-CM

## 2019-12-02 RX ORDER — SPIRONOLACTONE AND HYDROCHLOROTHIAZIDE 25; 25 MG/1; MG/1
0.5 TABLET ORAL DAILY
Qty: 45 TABLET | Refills: 1 | Status: SHIPPED | OUTPATIENT
Start: 2019-12-02 | End: 2020-04-21

## 2020-01-15 DIAGNOSIS — I10 BENIGN ESSENTIAL HYPERTENSION: Primary | ICD-10-CM

## 2020-01-16 RX ORDER — METOPROLOL SUCCINATE 25 MG/1
TABLET, EXTENDED RELEASE ORAL
Qty: 180 TABLET | Refills: 0 | Status: SHIPPED | OUTPATIENT
Start: 2020-01-16 | End: 2020-04-10

## 2020-01-30 DIAGNOSIS — E03.9 PRIMARY HYPOTHYROIDISM: ICD-10-CM

## 2020-01-31 RX ORDER — LEVOTHYROXINE SODIUM 0.07 MG/1
TABLET ORAL
Qty: 90 TABLET | Refills: 0 | Status: SHIPPED | OUTPATIENT
Start: 2020-01-31 | End: 2020-04-27

## 2020-02-18 ENCOUNTER — TELEPHONE (OUTPATIENT)
Dept: FAMILY MEDICINE CLINIC | Facility: CLINIC | Age: 70
End: 2020-02-18

## 2020-02-18 DIAGNOSIS — R53.83 FATIGUE, UNSPECIFIED TYPE: ICD-10-CM

## 2020-02-18 DIAGNOSIS — E05.90 SUBCLINICAL HYPERTHYROIDISM: ICD-10-CM

## 2020-02-18 DIAGNOSIS — I10 BENIGN ESSENTIAL HYPERTENSION: Primary | ICD-10-CM

## 2020-02-18 NOTE — TELEPHONE ENCOUNTER
DR MIX   Patient called stating she has been feeling a little "off" since her thyroid medication was changed  Please call patient back

## 2020-02-18 NOTE — TELEPHONE ENCOUNTER
Spoke to pt says ever since the dose for the Thyroid med change she feels tired, fatigue she's wondering if that can be a reason why  Pt also would like you to order any testing/ blood work, offered appt pt refused to come in until she has blood work done  Please advise      Kayla Ty MA

## 2020-02-20 ENCOUNTER — APPOINTMENT (OUTPATIENT)
Dept: LAB | Facility: HOSPITAL | Age: 70
End: 2020-02-20
Payer: COMMERCIAL

## 2020-02-20 ENCOUNTER — TRANSCRIBE ORDERS (OUTPATIENT)
Dept: ADMINISTRATIVE | Facility: HOSPITAL | Age: 70
End: 2020-02-20

## 2020-02-20 DIAGNOSIS — I10 BENIGN ESSENTIAL HYPERTENSION: ICD-10-CM

## 2020-02-20 DIAGNOSIS — E03.9 PRIMARY HYPOTHYROIDISM: ICD-10-CM

## 2020-02-20 DIAGNOSIS — E05.90 SUBCLINICAL HYPERTHYROIDISM: ICD-10-CM

## 2020-02-20 DIAGNOSIS — R53.83 FATIGUE, UNSPECIFIED TYPE: ICD-10-CM

## 2020-02-20 DIAGNOSIS — E87.1 HYPONATREMIA: ICD-10-CM

## 2020-02-20 LAB
ALBUMIN SERPL BCP-MCNC: 3.6 G/DL (ref 3.5–5)
ALP SERPL-CCNC: 61 U/L (ref 46–116)
ALT SERPL W P-5'-P-CCNC: 22 U/L (ref 12–78)
ANION GAP SERPL CALCULATED.3IONS-SCNC: 1 MMOL/L (ref 4–13)
AST SERPL W P-5'-P-CCNC: 15 U/L (ref 5–45)
BILIRUB SERPL-MCNC: 0.4 MG/DL (ref 0.2–1)
BUN SERPL-MCNC: 20 MG/DL (ref 5–25)
CALCIUM SERPL-MCNC: 8.5 MG/DL (ref 8.3–10.1)
CHLORIDE SERPL-SCNC: 103 MMOL/L (ref 100–108)
CO2 SERPL-SCNC: 30 MMOL/L (ref 21–32)
CREAT SERPL-MCNC: 0.71 MG/DL (ref 0.6–1.3)
ERYTHROCYTE [DISTWIDTH] IN BLOOD BY AUTOMATED COUNT: 12.2 % (ref 11.6–15.1)
GFR SERPL CREATININE-BSD FRML MDRD: 86 ML/MIN/1.73SQ M
GLUCOSE P FAST SERPL-MCNC: 98 MG/DL (ref 65–99)
HCT VFR BLD AUTO: 41.2 % (ref 34.8–46.1)
HGB BLD-MCNC: 13.4 G/DL (ref 11.5–15.4)
MCH RBC QN AUTO: 32.9 PG (ref 26.8–34.3)
MCHC RBC AUTO-ENTMCNC: 32.5 G/DL (ref 31.4–37.4)
MCV RBC AUTO: 101 FL (ref 82–98)
PLATELET # BLD AUTO: 296 THOUSANDS/UL (ref 149–390)
PMV BLD AUTO: 9.4 FL (ref 8.9–12.7)
POTASSIUM SERPL-SCNC: 3.9 MMOL/L (ref 3.5–5.3)
PROT SERPL-MCNC: 7 G/DL (ref 6.4–8.2)
RBC # BLD AUTO: 4.07 MILLION/UL (ref 3.81–5.12)
SODIUM SERPL-SCNC: 134 MMOL/L (ref 136–145)
T3FREE SERPL-MCNC: 1.75 PG/ML (ref 2.3–4.2)
T4 FREE SERPL-MCNC: 1.21 NG/DL (ref 0.76–1.46)
TSH SERPL DL<=0.05 MIU/L-ACNC: 2.03 UIU/ML (ref 0.36–3.74)
WBC # BLD AUTO: 4.23 THOUSAND/UL (ref 4.31–10.16)

## 2020-02-20 PROCEDURE — 80053 COMPREHEN METABOLIC PANEL: CPT

## 2020-02-20 PROCEDURE — 84481 FREE ASSAY (FT-3): CPT

## 2020-02-20 PROCEDURE — 85027 COMPLETE CBC AUTOMATED: CPT

## 2020-02-20 PROCEDURE — 36415 COLL VENOUS BLD VENIPUNCTURE: CPT

## 2020-02-20 PROCEDURE — 86618 LYME DISEASE ANTIBODY: CPT

## 2020-02-20 PROCEDURE — 84439 ASSAY OF FREE THYROXINE: CPT

## 2020-02-20 PROCEDURE — 84443 ASSAY THYROID STIM HORMONE: CPT

## 2020-02-21 LAB — B BURGDOR IGG+IGM SER-ACNC: <0.91 ISR (ref 0–0.9)

## 2020-04-09 DIAGNOSIS — I10 BENIGN ESSENTIAL HYPERTENSION: ICD-10-CM

## 2020-04-10 RX ORDER — METOPROLOL SUCCINATE 25 MG/1
TABLET, EXTENDED RELEASE ORAL
Qty: 180 TABLET | Refills: 0 | Status: SHIPPED | OUTPATIENT
Start: 2020-04-10 | End: 2020-07-06

## 2020-04-20 ENCOUNTER — TELEPHONE (OUTPATIENT)
Dept: FAMILY MEDICINE CLINIC | Facility: CLINIC | Age: 70
End: 2020-04-20

## 2020-04-20 ENCOUNTER — TELEMEDICINE (OUTPATIENT)
Dept: FAMILY MEDICINE CLINIC | Facility: CLINIC | Age: 70
End: 2020-04-20
Payer: COMMERCIAL

## 2020-04-20 DIAGNOSIS — I10 BENIGN ESSENTIAL HYPERTENSION: ICD-10-CM

## 2020-04-20 DIAGNOSIS — F41.1 GENERALIZED ANXIETY DISORDER: ICD-10-CM

## 2020-04-20 DIAGNOSIS — I10 BENIGN ESSENTIAL HYPERTENSION: Primary | ICD-10-CM

## 2020-04-20 DIAGNOSIS — E05.90 SUBCLINICAL HYPERTHYROIDISM: ICD-10-CM

## 2020-04-20 DIAGNOSIS — Z12.31 SCREENING MAMMOGRAM, ENCOUNTER FOR: ICD-10-CM

## 2020-04-20 DIAGNOSIS — G25.81 RESTLESS LEG SYNDROME: ICD-10-CM

## 2020-04-20 PROCEDURE — 99214 OFFICE O/P EST MOD 30 MIN: CPT | Performed by: FAMILY MEDICINE

## 2020-04-20 RX ORDER — GABAPENTIN 100 MG/1
100 CAPSULE ORAL
Qty: 30 CAPSULE | Refills: 3 | Status: SHIPPED | OUTPATIENT
Start: 2020-04-20 | End: 2020-07-30 | Stop reason: SDUPTHER

## 2020-04-20 RX ORDER — ALPRAZOLAM 0.25 MG/1
0.25 TABLET ORAL AS NEEDED
Qty: 90 TABLET | Refills: 1 | Status: SHIPPED | OUTPATIENT
Start: 2020-04-20 | End: 2020-10-05 | Stop reason: ALTCHOICE

## 2020-04-20 RX ORDER — ALPRAZOLAM 0.25 MG/1
0.25 TABLET ORAL AS NEEDED
Qty: 90 TABLET | Refills: 1 | Status: SHIPPED | OUTPATIENT
Start: 2020-04-20 | End: 2020-04-20 | Stop reason: SDUPTHER

## 2020-04-21 RX ORDER — SPIRONOLACTONE AND HYDROCHLOROTHIAZIDE 25; 25 MG/1; MG/1
TABLET ORAL
Qty: 45 TABLET | Refills: 1 | Status: SHIPPED | OUTPATIENT
Start: 2020-04-21 | End: 2020-06-06 | Stop reason: SDUPTHER

## 2020-04-25 DIAGNOSIS — E03.9 PRIMARY HYPOTHYROIDISM: ICD-10-CM

## 2020-04-27 RX ORDER — LEVOTHYROXINE SODIUM 0.07 MG/1
TABLET ORAL
Qty: 90 TABLET | Refills: 1 | Status: SHIPPED | OUTPATIENT
Start: 2020-04-27 | End: 2020-07-30 | Stop reason: SDUPTHER

## 2020-06-06 ENCOUNTER — TELEPHONE (OUTPATIENT)
Dept: FAMILY MEDICINE CLINIC | Facility: CLINIC | Age: 70
End: 2020-06-06

## 2020-06-06 ENCOUNTER — OFFICE VISIT (OUTPATIENT)
Dept: FAMILY MEDICINE CLINIC | Facility: CLINIC | Age: 70
End: 2020-06-06
Payer: COMMERCIAL

## 2020-06-06 VITALS
DIASTOLIC BLOOD PRESSURE: 90 MMHG | HEART RATE: 66 BPM | RESPIRATION RATE: 18 BRPM | TEMPERATURE: 97.8 F | SYSTOLIC BLOOD PRESSURE: 150 MMHG | OXYGEN SATURATION: 98 %

## 2020-06-06 DIAGNOSIS — L30.9 ECZEMA, UNSPECIFIED TYPE: ICD-10-CM

## 2020-06-06 DIAGNOSIS — L30.9 DERMATITIS: Primary | ICD-10-CM

## 2020-06-06 DIAGNOSIS — I10 BENIGN ESSENTIAL HYPERTENSION: ICD-10-CM

## 2020-06-06 PROCEDURE — 1036F TOBACCO NON-USER: CPT | Performed by: INTERNAL MEDICINE

## 2020-06-06 PROCEDURE — 3080F DIAST BP >= 90 MM HG: CPT | Performed by: INTERNAL MEDICINE

## 2020-06-06 PROCEDURE — 96372 THER/PROPH/DIAG INJ SC/IM: CPT

## 2020-06-06 PROCEDURE — 99213 OFFICE O/P EST LOW 20 MIN: CPT | Performed by: INTERNAL MEDICINE

## 2020-06-06 PROCEDURE — 3077F SYST BP >= 140 MM HG: CPT | Performed by: INTERNAL MEDICINE

## 2020-06-06 PROCEDURE — 1160F RVW MEDS BY RX/DR IN RCRD: CPT | Performed by: INTERNAL MEDICINE

## 2020-06-06 RX ORDER — CLOBETASOL PROPIONATE 0.05 MG/G
1 GEL TOPICAL 2 TIMES DAILY
Qty: 15 EACH | Refills: 0 | Status: SHIPPED | OUTPATIENT
Start: 2020-06-06 | End: 2020-10-05 | Stop reason: ALTCHOICE

## 2020-06-06 RX ORDER — SPIRONOLACTONE AND HYDROCHLOROTHIAZIDE 25; 25 MG/1; MG/1
1 TABLET ORAL DAILY
Qty: 90 TABLET | Refills: 1 | Status: SHIPPED | OUTPATIENT
Start: 2020-06-06 | End: 2020-07-06 | Stop reason: SINTOL

## 2020-06-06 RX ORDER — METHYLPREDNISOLONE ACETATE 80 MG/ML
80 INJECTION, SUSPENSION INTRA-ARTICULAR; INTRALESIONAL; INTRAMUSCULAR; SOFT TISSUE ONCE
Status: COMPLETED | OUTPATIENT
Start: 2020-06-06 | End: 2020-06-06

## 2020-06-06 RX ADMIN — METHYLPREDNISOLONE ACETATE 80 MG: 80 INJECTION, SUSPENSION INTRA-ARTICULAR; INTRALESIONAL; INTRAMUSCULAR; SOFT TISSUE at 10:36

## 2020-06-30 ENCOUNTER — TELEMEDICINE (OUTPATIENT)
Dept: DERMATOLOGY | Facility: CLINIC | Age: 70
End: 2020-06-30
Payer: COMMERCIAL

## 2020-06-30 VITALS — BODY MASS INDEX: 28.22 KG/M2 | TEMPERATURE: 98.4 F | WEIGHT: 140 LBS | HEIGHT: 59 IN

## 2020-06-30 DIAGNOSIS — R21 RASH: Primary | ICD-10-CM

## 2020-06-30 PROCEDURE — 11104 PUNCH BX SKIN SINGLE LESION: CPT | Performed by: DERMATOLOGY

## 2020-06-30 PROCEDURE — 1160F RVW MEDS BY RX/DR IN RCRD: CPT | Performed by: DERMATOLOGY

## 2020-06-30 PROCEDURE — 88313 SPECIAL STAINS GROUP 2: CPT | Performed by: STUDENT IN AN ORGANIZED HEALTH CARE EDUCATION/TRAINING PROGRAM

## 2020-06-30 PROCEDURE — 88305 TISSUE EXAM BY PATHOLOGIST: CPT | Performed by: STUDENT IN AN ORGANIZED HEALTH CARE EDUCATION/TRAINING PROGRAM

## 2020-06-30 PROCEDURE — 99203 OFFICE O/P NEW LOW 30 MIN: CPT | Performed by: DERMATOLOGY

## 2020-07-02 ENCOUNTER — TELEPHONE (OUTPATIENT)
Dept: DERMATOLOGY | Facility: CLINIC | Age: 70
End: 2020-07-02

## 2020-07-02 DIAGNOSIS — L23.9 DERMAL HYPERSENSITIVITY REACTION: Primary | ICD-10-CM

## 2020-07-02 RX ORDER — PREDNISONE 10 MG/1
TABLET ORAL
Qty: 49 TABLET | Refills: 1 | Status: SHIPPED | OUTPATIENT
Start: 2020-07-02 | End: 2020-07-30 | Stop reason: ALTCHOICE

## 2020-07-02 NOTE — TELEPHONE ENCOUNTER
Patient called and states she would like something for the itching  I did recommended Sarna or Gold arellano menthol to help with the itching   Patient would also like a topical

## 2020-07-03 DIAGNOSIS — I10 BENIGN ESSENTIAL HYPERTENSION: ICD-10-CM

## 2020-07-06 ENCOUNTER — TELEPHONE (OUTPATIENT)
Dept: FAMILY MEDICINE CLINIC | Facility: CLINIC | Age: 70
End: 2020-07-06

## 2020-07-06 DIAGNOSIS — I10 BENIGN ESSENTIAL HYPERTENSION: Primary | ICD-10-CM

## 2020-07-06 RX ORDER — METOPROLOL SUCCINATE 25 MG/1
TABLET, EXTENDED RELEASE ORAL
Qty: 180 TABLET | Refills: 0 | Status: SHIPPED | OUTPATIENT
Start: 2020-07-06 | End: 2020-09-28

## 2020-07-06 RX ORDER — SPIRONOLACTONE 25 MG/1
25 TABLET ORAL DAILY
Qty: 90 TABLET | Refills: 1 | Status: SHIPPED | OUTPATIENT
Start: 2020-07-06 | End: 2020-10-05 | Stop reason: ALTCHOICE

## 2020-07-06 NOTE — TELEPHONE ENCOUNTER
----- Message from Sharad Garcia, [address] sent at 7/6/2020  9:00 AM EDT -----  Regarding: FW: RE: Visit Follow-Up Question  Contact: [phone]      ----- Message -----  From: Meenakshi Dominguez  Sent: 7/5/2020   7:19 AM EDT  To: [facility]  Subject: RE: RE: Visit Follow-Up Question                 Dr Lauren Salas,  I still have the rash and new ones keep popping up  It has been 7-8 weeks now  Driving me crazy  Could not take prednisone Dr Marylen Begun prescribed, had terrible stomach pain  Any suggestions? Thank you,   Kaiden Atkinson    ----- Message -----  From: Lori Zavaleta DO  Sent: 6/29/20, 9:27 AM  To: Meenakshi Dominguez  Subject: RE: Visit Follow-Up Question    Yamile Mcdaniel,  I have not heard of any other cases  I do think the dermatologist will be able to help  Feel better,  Lori Zavaleta, DO     ----- Message -----     From: Meenakshi Dominguez     Sent: 6/28/2020  7:20 AM EDT       To: Lori Zavaleta DO  Subject: RE: Visit Follow-Up Question    Hi dr Cindy Hayes you are well  I still have the rash, its been more than a month, and new ones keep popping up  I was wondering if you know of any other cases? I have a virtual phone appt  with a dermatologist, cant imagine how that can work  Thank you and stay well  Kaiden Atkinson  ----- Message -----  From: Lori Zavaleta DO  Sent: 6/17/2020 12:22 PM EDT  To: Meenakshi Dominguez  Subject: RE: Visit Follow-Up Question  Yamile Mcdaniel,  I am going treat going to go see the dermatologist   That is the best plan  Thanks for letting me know,  Lori Zavaleta DO     ----- Message -----     From: Meenakshi Dominguez     Sent: 6/17/2020  9:23 AM EDT       To: Lori Zavaleta DO  Subject: Visit Follow-Up Question    Hi Doctor, My Daughter made an appointment at [place] [address] dermatology because the rash has not improved  I have these pictures up on the portal for your viewing as well as the dermatologist  My appt is 6/30 @ 9:15 am virtually   Thank you, Yamile Mcdaniel

## 2020-07-06 NOTE — TELEPHONE ENCOUNTER
7/6/2020 12:09 PM spoke with patient regarding her concerns  I stopped her HCTZ and continued her spironolactone  Will have her follow up for a blood pressure check in 4-6 weeks  She canceled the appointment that she had today because her son tested positive for JASE Henry, DO

## 2020-07-15 ENCOUNTER — APPOINTMENT (OUTPATIENT)
Dept: LAB | Facility: HOSPITAL | Age: 70
End: 2020-07-15
Attending: ALLERGY & IMMUNOLOGY
Payer: COMMERCIAL

## 2020-07-15 ENCOUNTER — TRANSCRIBE ORDERS (OUTPATIENT)
Dept: ADMINISTRATIVE | Facility: HOSPITAL | Age: 70
End: 2020-07-15

## 2020-07-15 DIAGNOSIS — Z91.018 ALLERGY TO OTHER FOODS: ICD-10-CM

## 2020-07-15 DIAGNOSIS — L29.9 PRURITUS OF SKIN: ICD-10-CM

## 2020-07-15 DIAGNOSIS — I10 BENIGN ESSENTIAL HYPERTENSION: ICD-10-CM

## 2020-07-15 DIAGNOSIS — L29.9 PRURITUS OF SKIN: Primary | ICD-10-CM

## 2020-07-15 LAB
25(OH)D3 SERPL-MCNC: 20.6 NG/ML (ref 30–100)
ALBUMIN SERPL BCP-MCNC: 3.6 G/DL (ref 3.5–5)
ALBUMIN SERPL BCP-MCNC: 3.7 G/DL (ref 3.5–5)
ALP SERPL-CCNC: 70 U/L (ref 46–116)
ALP SERPL-CCNC: 70 U/L (ref 46–116)
ALT SERPL W P-5'-P-CCNC: 20 U/L (ref 12–78)
ALT SERPL W P-5'-P-CCNC: 21 U/L (ref 12–78)
ANION GAP SERPL CALCULATED.3IONS-SCNC: 5 MMOL/L (ref 4–13)
ANION GAP SERPL CALCULATED.3IONS-SCNC: 7 MMOL/L (ref 4–13)
AST SERPL W P-5'-P-CCNC: 18 U/L (ref 5–45)
AST SERPL W P-5'-P-CCNC: 19 U/L (ref 5–45)
BASOPHILS # BLD AUTO: 0.05 THOUSANDS/ΜL (ref 0–0.1)
BASOPHILS NFR BLD AUTO: 1 % (ref 0–1)
BILIRUB SERPL-MCNC: 0.5 MG/DL (ref 0.2–1)
BILIRUB SERPL-MCNC: 0.5 MG/DL (ref 0.2–1)
BUN SERPL-MCNC: 12 MG/DL (ref 5–25)
BUN SERPL-MCNC: 13 MG/DL (ref 5–25)
C3 SERPL-MCNC: 103 MG/DL (ref 90–180)
C4 SERPL-MCNC: 24 MG/DL (ref 10–40)
CALCIUM SERPL-MCNC: 8.9 MG/DL (ref 8.3–10.1)
CALCIUM SERPL-MCNC: 9.1 MG/DL (ref 8.3–10.1)
CHLORIDE SERPL-SCNC: 98 MMOL/L (ref 100–108)
CHLORIDE SERPL-SCNC: 99 MMOL/L (ref 100–108)
CHOLEST SERPL-MCNC: 229 MG/DL (ref 50–200)
CO2 SERPL-SCNC: 27 MMOL/L (ref 21–32)
CO2 SERPL-SCNC: 28 MMOL/L (ref 21–32)
CREAT SERPL-MCNC: 0.81 MG/DL (ref 0.6–1.3)
CREAT SERPL-MCNC: 0.83 MG/DL (ref 0.6–1.3)
CRP SERPL QL: 2.3 MG/L
EOSINOPHIL # BLD AUTO: 0.19 THOUSAND/ΜL (ref 0–0.61)
EOSINOPHIL NFR BLD AUTO: 4 % (ref 0–6)
ERYTHROCYTE [DISTWIDTH] IN BLOOD BY AUTOMATED COUNT: 12.4 % (ref 11.6–15.1)
GFR SERPL CREATININE-BSD FRML MDRD: 71 ML/MIN/1.73SQ M
GFR SERPL CREATININE-BSD FRML MDRD: 73 ML/MIN/1.73SQ M
GLUCOSE P FAST SERPL-MCNC: 84 MG/DL (ref 65–99)
GLUCOSE P FAST SERPL-MCNC: 85 MG/DL (ref 65–99)
HCT VFR BLD AUTO: 40.7 % (ref 34.8–46.1)
HDLC SERPL-MCNC: 66 MG/DL
HGB BLD-MCNC: 13.8 G/DL (ref 11.5–15.4)
IMM GRANULOCYTES # BLD AUTO: 0.02 THOUSAND/UL (ref 0–0.2)
IMM GRANULOCYTES NFR BLD AUTO: 0 % (ref 0–2)
LDLC SERPL CALC-MCNC: 150 MG/DL (ref 0–100)
LYMPHOCYTES # BLD AUTO: 0.86 THOUSANDS/ΜL (ref 0.6–4.47)
LYMPHOCYTES NFR BLD AUTO: 17 % (ref 14–44)
MCH RBC QN AUTO: 34 PG (ref 26.8–34.3)
MCHC RBC AUTO-ENTMCNC: 33.9 G/DL (ref 31.4–37.4)
MCV RBC AUTO: 100 FL (ref 82–98)
MONOCYTES # BLD AUTO: 0.47 THOUSAND/ΜL (ref 0.17–1.22)
MONOCYTES NFR BLD AUTO: 9 % (ref 4–12)
NEUTROPHILS # BLD AUTO: 3.43 THOUSANDS/ΜL (ref 1.85–7.62)
NEUTS SEG NFR BLD AUTO: 69 % (ref 43–75)
NRBC BLD AUTO-RTO: 0 /100 WBCS
PLATELET # BLD AUTO: 326 THOUSANDS/UL (ref 149–390)
PMV BLD AUTO: 8.8 FL (ref 8.9–12.7)
POTASSIUM SERPL-SCNC: 4.6 MMOL/L (ref 3.5–5.3)
POTASSIUM SERPL-SCNC: 4.6 MMOL/L (ref 3.5–5.3)
PROT SERPL-MCNC: 7.2 G/DL (ref 6.4–8.2)
PROT SERPL-MCNC: 7.3 G/DL (ref 6.4–8.2)
RBC # BLD AUTO: 4.06 MILLION/UL (ref 3.81–5.12)
RHEUMATOID FACT SER QL LA: NEGATIVE
SODIUM SERPL-SCNC: 132 MMOL/L (ref 136–145)
SODIUM SERPL-SCNC: 132 MMOL/L (ref 136–145)
T3 SERPL-MCNC: 0.7 NG/ML (ref 0.6–1.8)
T4 SERPL-MCNC: 11.1 UG/DL (ref 4.7–13.3)
TRIGL SERPL-MCNC: 65 MG/DL
WBC # BLD AUTO: 5.02 THOUSAND/UL (ref 4.31–10.16)

## 2020-07-15 PROCEDURE — 80053 COMPREHEN METABOLIC PANEL: CPT

## 2020-07-15 PROCEDURE — 84480 ASSAY TRIIODOTHYRONINE (T3): CPT

## 2020-07-15 PROCEDURE — 36415 COLL VENOUS BLD VENIPUNCTURE: CPT

## 2020-07-15 PROCEDURE — 84165 PROTEIN E-PHORESIS SERUM: CPT

## 2020-07-15 PROCEDURE — 86140 C-REACTIVE PROTEIN: CPT

## 2020-07-15 PROCEDURE — 84432 ASSAY OF THYROGLOBULIN: CPT

## 2020-07-15 PROCEDURE — 86162 COMPLEMENT TOTAL (CH50): CPT

## 2020-07-15 PROCEDURE — 86160 COMPLEMENT ANTIGEN: CPT

## 2020-07-15 PROCEDURE — 86003 ALLG SPEC IGE CRUDE XTRC EA: CPT

## 2020-07-15 PROCEDURE — 86038 ANTINUCLEAR ANTIBODIES: CPT

## 2020-07-15 PROCEDURE — 85025 COMPLETE CBC W/AUTO DIFF WBC: CPT

## 2020-07-15 PROCEDURE — 84436 ASSAY OF TOTAL THYROXINE: CPT

## 2020-07-15 PROCEDURE — 86430 RHEUMATOID FACTOR TEST QUAL: CPT

## 2020-07-15 PROCEDURE — 82306 VITAMIN D 25 HYDROXY: CPT

## 2020-07-15 PROCEDURE — 84165 PROTEIN E-PHORESIS SERUM: CPT | Performed by: PATHOLOGY

## 2020-07-15 PROCEDURE — 86376 MICROSOMAL ANTIBODY EACH: CPT

## 2020-07-15 PROCEDURE — 86800 THYROGLOBULIN ANTIBODY: CPT

## 2020-07-15 PROCEDURE — 80061 LIPID PANEL: CPT

## 2020-07-16 LAB
ALLERGEN COMMENT: NORMAL
ALLERGEN COMMENT: NORMAL
ALMOND IGE QN: <0.1 KUA/I
CH50 SERPL-ACNC: 60 U/ML
CORN IGE QN: <0.1 KUA/I

## 2020-07-17 LAB
ALBUMIN SERPL ELPH-MCNC: 4.21 G/DL (ref 3.5–5)
ALBUMIN SERPL ELPH-MCNC: 60.1 % (ref 52–65)
ALPHA1 GLOB SERPL ELPH-MCNC: 0.33 G/DL (ref 0.1–0.4)
ALPHA1 GLOB SERPL ELPH-MCNC: 4.7 % (ref 2.5–5)
ALPHA2 GLOB SERPL ELPH-MCNC: 0.75 G/DL (ref 0.4–1.2)
ALPHA2 GLOB SERPL ELPH-MCNC: 10.7 % (ref 7–13)
BETA GLOB ABNORMAL SERPL ELPH-MCNC: 0.43 G/DL (ref 0.4–0.8)
BETA1 GLOB SERPL ELPH-MCNC: 6.1 % (ref 5–13)
BETA2 GLOB SERPL ELPH-MCNC: 5.2 % (ref 2–8)
BETA2+GAMMA GLOB SERPL ELPH-MCNC: 0.36 G/DL (ref 0.2–0.5)
GAMMA GLOB ABNORMAL SERPL ELPH-MCNC: 0.92 G/DL (ref 0.5–1.6)
GAMMA GLOB SERPL ELPH-MCNC: 13.2 % (ref 12–22)
IGG/ALB SER: 1.51 {RATIO} (ref 1.1–1.8)
PROT PATTERN SERPL ELPH-IMP: NORMAL
PROT SERPL-MCNC: 7 G/DL (ref 6.4–8.2)
RYE IGE QN: NEGATIVE
THYROPEROXIDASE AB SERPL-ACNC: <9 IU/ML (ref 0–34)

## 2020-07-18 LAB — CHOCOLATE IGE QN: <0.1 KU/L

## 2020-07-23 LAB
THYROGLOB AB SERPL-ACNC: 6.7 IU/ML (ref 0–0.9)
THYROGLOB SERPL-MCNC: 3.7 NG/ML

## 2020-07-30 ENCOUNTER — OFFICE VISIT (OUTPATIENT)
Dept: FAMILY MEDICINE CLINIC | Facility: CLINIC | Age: 70
End: 2020-07-30
Payer: COMMERCIAL

## 2020-07-30 VITALS
BODY MASS INDEX: 30.44 KG/M2 | TEMPERATURE: 96 F | HEIGHT: 59 IN | SYSTOLIC BLOOD PRESSURE: 120 MMHG | RESPIRATION RATE: 16 BRPM | WEIGHT: 151 LBS | HEART RATE: 64 BPM | DIASTOLIC BLOOD PRESSURE: 76 MMHG

## 2020-07-30 DIAGNOSIS — Z12.31 SCREENING MAMMOGRAM, ENCOUNTER FOR: ICD-10-CM

## 2020-07-30 DIAGNOSIS — L30.9 DERMATITIS: ICD-10-CM

## 2020-07-30 DIAGNOSIS — E03.9 PRIMARY HYPOTHYROIDISM: ICD-10-CM

## 2020-07-30 DIAGNOSIS — E27.8 ADRENAL NODULE (HCC): ICD-10-CM

## 2020-07-30 DIAGNOSIS — G25.81 RESTLESS LEG SYNDROME: Primary | ICD-10-CM

## 2020-07-30 PROCEDURE — 1036F TOBACCO NON-USER: CPT | Performed by: FAMILY MEDICINE

## 2020-07-30 PROCEDURE — 3008F BODY MASS INDEX DOCD: CPT | Performed by: NURSE PRACTITIONER

## 2020-07-30 PROCEDURE — 3078F DIAST BP <80 MM HG: CPT | Performed by: FAMILY MEDICINE

## 2020-07-30 PROCEDURE — 1160F RVW MEDS BY RX/DR IN RCRD: CPT | Performed by: FAMILY MEDICINE

## 2020-07-30 PROCEDURE — 3008F BODY MASS INDEX DOCD: CPT | Performed by: FAMILY MEDICINE

## 2020-07-30 PROCEDURE — 99214 OFFICE O/P EST MOD 30 MIN: CPT | Performed by: FAMILY MEDICINE

## 2020-07-30 PROCEDURE — 3074F SYST BP LT 130 MM HG: CPT | Performed by: FAMILY MEDICINE

## 2020-07-30 RX ORDER — GABAPENTIN 100 MG/1
200 CAPSULE ORAL
Qty: 60 CAPSULE | Refills: 3 | Status: SHIPPED | OUTPATIENT
Start: 2020-07-30 | End: 2020-10-05 | Stop reason: ALTCHOICE

## 2020-07-30 RX ORDER — LEVOTHYROXINE SODIUM 88 UG/1
88 TABLET ORAL DAILY
Qty: 90 TABLET | Refills: 1 | Status: SHIPPED | OUTPATIENT
Start: 2020-07-30 | End: 2020-07-30 | Stop reason: SDUPTHER

## 2020-07-30 RX ORDER — LEVOTHYROXINE SODIUM 88 UG/1
88 TABLET ORAL DAILY
Qty: 90 TABLET | Refills: 1 | Status: SHIPPED | OUTPATIENT
Start: 2020-07-30 | End: 2020-12-14

## 2020-07-30 NOTE — PROGRESS NOTES
Assessment/Plan:    1  Restless leg syndrome  Assessment & Plan:  Having a hard time sleeping due to her rash and restless leg  Dose of gabapentin increased from 100 to 200 mg a day    Encouraged patient to stop taking Xanax every night  We discussed that Xanax can be addicting    Orders:  -     gabapentin (NEURONTIN) 100 mg capsule; Take 2 capsules (200 mg total) by mouth daily at bedtime    2  Primary hypothyroidism  Assessment & Plan:  She doesn't feel as well on the 75 mcg, dose increased back to 88    Orders:  -     TSH, 3rd generation; Future  -     levothyroxine 88 mcg tablet; Take 1 tablet (88 mcg total) by mouth daily    3  Screening mammogram, encounter for  -     Mammo screening bilateral w cad; Future; Expected date: 07/31/2020    4  Dermatitis  Comments:  Reviewed biopsy, I am concerned it could be from spironoloactone  She will take a 1 week break from the medication and see if that helps  5  Adrenal nodule ([facility] [address] )  Assessment & Plan:  Stable           There are no Patient Instructions on file for this visit  Return in about 2 months (around 9/30/2020) for Annual Wellness Visit  Subjective:      Patient ID: Greg Dc is a 65 y o  female  Chief Complaint   Patient presents with    Rash     very itchy  was seen for this previously and it is not getting better ac/cma        She has had a rash since May  She saw the dermatologist and the prednisone felt like it was burning a hole in her stomach  She also saw an allergist and the hydroxyzine made her agitated  She has needed a xanax to sleep  The rash is all over chest and head  She examined her bedroom and she doesn't have any bed bugs  Her  doesn't have any rashes  Hypothyroidism-patient reports that she is feeling more tired on the 75 microgram dose of levothyroxine and would like to go back to the 88 microgram dose  Restless leg-patient reports that the rash and itching is making hard for sleep    She has been needing to take to gabapentin sometimes to sleep  Otherwise she has been taking his Xanax to sleep  The following portions of the patient's history were reviewed and updated as appropriate:  past social history    Review of Systems   Respiratory: Negative  Cardiovascular: Negative  Current Outpatient Medications   Medication Sig Dispense Refill    ALPRAZolam (XANAX) 0 25 mg tablet Take 1 tablet (0 25 mg total) by mouth as needed for anxiety (Take 1 time daily for anxiety) 90 tablet 1    dicyclomine (BENTYL) 10 mg capsule PRN as instructed      gabapentin (NEURONTIN) 100 mg capsule Take 2 capsules (200 mg total) by mouth daily at bedtime 60 capsule 3    levothyroxine 88 mcg tablet Take 1 tablet (88 mcg total) by mouth daily 90 tablet 1    Magnesium 250 MG TABS Take by mouth daily      metoprolol succinate (TOPROL-XL) 25 mg 24 hr tablet TAKE 1 TABLET BY MOUTH  DAILY AND TAKE 1 EXTRA  TABLET AS NEEDED FOR  SYSTOLIC BLOOD PRESSURE  ABOVE 140 180 tablet 0    spironolactone (ALDACTONE) 25 mg tablet Take 1 tablet (25 mg total) by mouth daily 90 tablet 1    clobetasol (TEMOVATE) 0 05 % GEL Apply 1 application topically 2 (two) times a day For no longer than 2 weeks at a time (Patient not taking: Reported on 6/30/2020) 15 each 0     No current facility-administered medications for this visit  Objective:    /76   Pulse 64   Temp (!) 96 °F (35 6 °C)   Resp 16   Ht 4' 10 5" (1 486 m)   Wt 68 5 kg (151 lb)   BMI 31 02 kg/m²      Physical Exam   Constitutional: She appears well-developed and well-nourished  HENT:   Head: Normocephalic and atraumatic  Right Ear: External ear normal    Left Ear: External ear normal    Cardiovascular: Normal rate, regular rhythm and normal heart sounds  Exam reveals no friction rub  No murmur heard  Pulmonary/Chest: Effort normal and breath sounds normal  No respiratory distress  She has no wheezes  She has no rales     Musculoskeletal: She exhibits no edema or deformity  Skin: Rash (Erythematous rash scattered over chest and completely over her back) noted  Nursing note and vitals reviewed            Carmela Welch DO

## 2020-07-30 NOTE — ASSESSMENT & PLAN NOTE
Having a hard time sleeping due to her rash and restless leg  Dose of gabapentin increased from 100 to 200 mg a day    Encouraged patient to stop taking Xanax every night    We discussed that Xanax can be addicting

## 2020-08-05 ENCOUNTER — OFFICE VISIT (OUTPATIENT)
Dept: FAMILY MEDICINE CLINIC | Facility: CLINIC | Age: 70
End: 2020-08-05
Payer: COMMERCIAL

## 2020-08-05 ENCOUNTER — TELEPHONE (OUTPATIENT)
Dept: FAMILY MEDICINE CLINIC | Facility: CLINIC | Age: 70
End: 2020-08-05

## 2020-08-05 VITALS
OXYGEN SATURATION: 97 % | SYSTOLIC BLOOD PRESSURE: 140 MMHG | RESPIRATION RATE: 16 BRPM | TEMPERATURE: 98.1 F | HEART RATE: 67 BPM | DIASTOLIC BLOOD PRESSURE: 78 MMHG

## 2020-08-05 DIAGNOSIS — I10 BENIGN ESSENTIAL HYPERTENSION: ICD-10-CM

## 2020-08-05 DIAGNOSIS — R60.9 EDEMA, UNSPECIFIED TYPE: Primary | ICD-10-CM

## 2020-08-05 DIAGNOSIS — E03.9 PRIMARY HYPOTHYROIDISM: ICD-10-CM

## 2020-08-05 DIAGNOSIS — R21 RASH: ICD-10-CM

## 2020-08-05 PROCEDURE — 1160F RVW MEDS BY RX/DR IN RCRD: CPT | Performed by: NURSE PRACTITIONER

## 2020-08-05 PROCEDURE — 99214 OFFICE O/P EST MOD 30 MIN: CPT | Performed by: NURSE PRACTITIONER

## 2020-08-05 PROCEDURE — 3078F DIAST BP <80 MM HG: CPT | Performed by: NURSE PRACTITIONER

## 2020-08-05 PROCEDURE — 1036F TOBACCO NON-USER: CPT | Performed by: NURSE PRACTITIONER

## 2020-08-05 PROCEDURE — 3077F SYST BP >= 140 MM HG: CPT | Performed by: NURSE PRACTITIONER

## 2020-08-05 RX ORDER — HYDROCHLOROTHIAZIDE 25 MG/1
25 TABLET ORAL DAILY
Qty: 90 TABLET | Refills: 0 | Status: SHIPPED | OUTPATIENT
Start: 2020-08-05 | End: 2020-11-06 | Stop reason: SDUPTHER

## 2020-08-05 NOTE — PROGRESS NOTES
Assessment/Plan:    1  Edema, unspecified type  Comments:  discussed about lasix use and possible side effects and concerned if start more risk for her getting dehydrated [place] and wants to restart HCTZ at 25 mg and tolerated well in past and did not help with her rash after stopping and also had low sodium on it in past and its chronic for her  Orders:  -     hydrochlorothiazide (HYDRODIURIL) 25 mg tablet; Take 1 tablet (25 mg total) by mouth daily    2  Benign essential hypertension  Comments:  stable  Orders:  -     hydrochlorothiazide (HYDRODIURIL) 25 mg tablet; Take 1 tablet (25 mg total) by mouth daily    3  Primary hypothyroidism  Comments:  complaint with levothyroxine    4  Rash  Comments:  Discussed that there is a possibility that her rash could be related to anxiety too and has atarax at home and will try that                  Patient Instructions:  Supportive care discussed and advised  Advised to RTO for any worsening and no improvement  Follow up for no improvement and worsening of conditions  Patient advised and educated when to see immediate medical care  Return if symptoms worsen or fail to improve  Future Appointments   Date Time Provider Zuleika Hoover   10/5/2020  9:45 AM Radha Banks DO [facility]           Subjective:      Patient ID: Author Ilana is a 65 y o  female  Chief Complaint   Patient presents with    edema     patient has leg and foot edema , also some had swelling jlopezcma          Vitals:  /78   Pulse 67   Temp 98 1 °F (36 7 °C)   Resp 16   SpO2 97%     HPI  Patient is been dealing with rash since may 2020  Stated that Dr Mario Higgins stopped her HCTZ on 07/6/2020 but that did not help with rash then on 07/31/2020, stopped aldactone which thinks helping with rash  Stated that but after stopping her diuretics, she is retaining water all over her body and worse in hands and legs  Denies any chest pain and sob   Stated that leaving for vacation to the beach tomorrow for 7 days and wants some diuretic and thinking about lasix  Stated that been very anxious and stressed since COVID-19 started and seen the derm too          The following portions of the patient's history were reviewed and updated as appropriate: allergies, current medications, past family history, past medical history, past social history, past surgical history and problem list       Review of Systems   Constitutional: Negative  HENT: Negative  Respiratory: Negative  Cardiovascular: Positive for leg swelling  As noted in HPI     Genitourinary: Negative  Musculoskeletal: Negative for myalgias  Skin: Positive for rash  Neurological: Negative for dizziness, light-headedness and headaches  Psychiatric/Behavioral: The patient is nervous/anxious            Objective:    Social History     Tobacco Use   Smoking Status Former Smoker   Smokeless Tobacco Never Used       Allergies: No Known Allergies      Current Outpatient Medications   Medication Sig Dispense Refill    ALPRAZolam (XANAX) 0 25 mg tablet Take 1 tablet (0 25 mg total) by mouth as needed for anxiety (Take 1 time daily for anxiety) 90 tablet 1    dicyclomine (BENTYL) 10 mg capsule PRN as instructed      gabapentin (NEURONTIN) 100 mg capsule Take 2 capsules (200 mg total) by mouth daily at bedtime 60 capsule 3    levothyroxine 88 mcg tablet Take 1 tablet (88 mcg total) by mouth daily 90 tablet 1    Magnesium 250 MG TABS Take by mouth daily      metoprolol succinate (TOPROL-XL) 25 mg 24 hr tablet TAKE 1 TABLET BY MOUTH  DAILY AND TAKE 1 EXTRA  TABLET AS NEEDED FOR  SYSTOLIC BLOOD PRESSURE  ABOVE 140 180 tablet 0    clobetasol (TEMOVATE) 0 05 % GEL Apply 1 application topically 2 (two) times a day For no longer than 2 weeks at a time (Patient not taking: Reported on 6/30/2020) 15 each 0    hydrochlorothiazide (HYDRODIURIL) 25 mg tablet Take 1 tablet (25 mg total) by mouth daily 90 tablet 0  spironolactone (ALDACTONE) 25 mg tablet Take 1 tablet (25 mg total) by mouth daily (Patient not taking: Reported on 8/5/2020) 90 tablet 1     No current facility-administered medications for this visit  Physical Exam   Cardiovascular: Normal rate, regular rhythm and normal heart sounds  Pulmonary/Chest: Effort normal and breath sounds normal    Abdominal: Normal appearance  Musculoskeletal:      Right lower leg: Edema present  Left lower leg: Edema present  Comments: Fingers of bilateral hands are mildly swollen too   Neurological: She is alert  Skin: Skin is warm and dry  Rash (pruritic papular rash on arms and chest and back) noted     Psychiatric: Her behavior is normal  Mood, judgment and thought content normal                    COURT Cano

## 2020-08-05 NOTE — TELEPHONE ENCOUNTER
Dr Larissa Pagandemetria Bowling came to office as she is retaining water as you stopped HCTZ and aldactone for her rash  Stated that leaving for a beach for a week and was thinking about taking lasix but I explained adverse effects of it and concerned of more chances of her getting dehydrated at beach and she did not want to do that  She stated that stopping HCTZ did not make any difference to her rash but thinks that stopping aldactone helping slighltly with rash  She decided to go back on HCTZ 25 mg on her previous dose and noticed that she has chronic hyponatremia      COURT Parry

## 2020-09-09 ENCOUNTER — TELEPHONE (OUTPATIENT)
Dept: DERMATOLOGY | Facility: CLINIC | Age: 70
End: 2020-09-09

## 2020-09-09 NOTE — TELEPHONE ENCOUNTER
Return call made to patient who wanted a FU appointment with Dr Peace Dubose  Her next available is in October, the patient wanted to be seen sooner so was scheduled to be seen by Dr Florencio Gaviria on 09/17/2020

## 2020-09-17 ENCOUNTER — OFFICE VISIT (OUTPATIENT)
Dept: DERMATOLOGY | Facility: CLINIC | Age: 70
End: 2020-09-17
Payer: COMMERCIAL

## 2020-09-17 ENCOUNTER — TELEPHONE (OUTPATIENT)
Dept: DERMATOLOGY | Facility: CLINIC | Age: 70
End: 2020-09-17

## 2020-09-17 VITALS — TEMPERATURE: 96.7 F

## 2020-09-17 DIAGNOSIS — L23.9 DERMAL HYPERSENSITIVITY REACTION: Primary | ICD-10-CM

## 2020-09-17 PROCEDURE — 99213 OFFICE O/P EST LOW 20 MIN: CPT | Performed by: STUDENT IN AN ORGANIZED HEALTH CARE EDUCATION/TRAINING PROGRAM

## 2020-09-17 RX ORDER — CLOBETASOL PROPIONATE 0.46 MG/ML
SOLUTION TOPICAL 2 TIMES DAILY
Qty: 50 ML | Refills: 0 | Status: SHIPPED | OUTPATIENT
Start: 2020-09-17 | End: 2020-11-06 | Stop reason: ALTCHOICE

## 2020-09-17 NOTE — Clinical Note
Hello! Can we please set her up for narrow band UVB (nbUVB akak the phototherapy light box)?     Please see below, which is also in the note for info for starting it:     Narrow Band UVB Therapy Plan:    Skin Type:  II    Treatment Type:  Hi UVB    Schedule:  3 X WEEK     Secondary Treatment:  Yes     Topical Application:  Mineral oil     Starting Mjoules: 300 mJ/cm2    Max Joules: 2000 mJ/cm2    Diagnosis:  Dermal Hypersensitivity Reaction    Recommendations: N/A

## 2020-09-17 NOTE — PATIENT INSTRUCTIONS
Assessment and Plan:  Based on a thorough discussion of this condition and the management approach to it (including a comprehensive discussion of the known risks, side effects and potential benefits of treatment), the patient (family) agrees to implement the following specific plan:   We will contact you to schedule you UV therapy   Start applying Triamcinolone Ointment twice a day to affected area avoid face and groin area   Apply Clobetasol Solution to Scalp twice a day   Start taking OTC Zyrtec 10 mg twice daily, must take everyday   Continue to apply Sarna for itching (Can place in the fridge for cooling feeling)    We need to make sure you are up to date with your cancer screenings;   o  Contact your OB-GYN to schedule your routine exam  o  Contact Central Scheduling to schedule your mammogram   Reassured not contagious     Continue to monitor Thyroid with your Primary care doctor   Can use Vanicream products and deodorant       We will revisit stopping HTCZ for a longer period of time      Follow up in 2 1/2 to 3 months

## 2020-09-17 NOTE — Clinical Note
Hello,     Just wanted to forward you on this note from Ml's visit today with us  We discussed making sure she is up to date with cancer screenings and are planning on trialing light therapy  We may need ot revisit holding her HCTZ in the future for longer pending her response  Thanks so much!     Roxanne Paulson

## 2020-09-17 NOTE — PROGRESS NOTES
[facility] Follow Up Note    Patient Name: Corey Green  Encounter Date: 09/17/2020    Today's Chief Concerns:  Andrew Garcia Concern #1:  Follow up rash       Current Medications:    Current Outpatient Medications:     ALPRAZolam (XANAX) 0 25 mg tablet, Take 1 tablet (0 25 mg total) by mouth as needed for anxiety (Take 1 time daily for anxiety), Disp: 90 tablet, Rfl: 1    clobetasol (TEMOVATE) 0 05 % GEL, Apply 1 application topically 2 (two) times a day For no longer than 2 weeks at a time (Patient not taking: Reported on 6/30/2020), Disp: 15 each, Rfl: 0    dicyclomine (BENTYL) 10 mg capsule, PRN as instructed, Disp: , Rfl:     gabapentin (NEURONTIN) 100 mg capsule, Take 2 capsules (200 mg total) by mouth daily at bedtime, Disp: 60 capsule, Rfl: 3    hydrochlorothiazide (HYDRODIURIL) 25 mg tablet, Take 1 tablet (25 mg total) by mouth daily, Disp: 90 tablet, Rfl: 0    levothyroxine 88 mcg tablet, Take 1 tablet (88 mcg total) by mouth daily, Disp: 90 tablet, Rfl: 1    Magnesium 250 MG TABS, Take by mouth daily, Disp: , Rfl:     metoprolol succinate (TOPROL-XL) 25 mg 24 hr tablet, TAKE 1 TABLET BY MOUTH  DAILY AND TAKE 1 EXTRA  TABLET AS NEEDED FOR  SYSTOLIC BLOOD PRESSURE  ABOVE 140, Disp: 180 tablet, Rfl: 0    spironolactone (ALDACTONE) 25 mg tablet, Take 1 tablet (25 mg total) by mouth daily (Patient not taking: Reported on 8/5/2020), Disp: 90 tablet, Rfl: 1    CONSTITUTIONAL:   Vitals:    09/17/20 1600   Temp: (!) 96 7 °F (35 9 °C)       Specific Alerts:    Have you been seen by a [facility] Dermatologist in the last 3 years? YES    Are you pregnant or planning to become pregnant? N/A    Are you currently or planning to be nursing or breast feeding? N/A    No Known Allergies    May we call your Preferred Phone number to discuss your specific medical information? YES    May we leave a detailed message that includes your specific medical information?  YES    Review of Systems:  Have you recently had or currently have any of the following? · Fever or chills: No  · Night Sweats: No  · Headaches: No  · Weight Gain: No  · Weight Loss: No  · Blurry Vision: No  · Nausea: No  · Vomiting: No  · Diarrhea: No  · Blood in Stool: No  · Abdominal Pain: No  · Itchy Skin: YES  · Painful Joints: No  · Swollen Joints: No  · Muscle Pain: No  · Irregular Mole: No  · Sun Burn: No  · Dry Skin: No  · Skin Color Changes: No  · Scar or Keloid: No  · Cold Sores/Fever Blisters: No  · Bacterial Infections/MRSA: No  · Anxiety: No  · Depression: No  · Suicidal or Homicidal Thoughts: No      PSYCH: Normal mood and affect  EYES: Normal conjunctiva  ENT: Normal lips and oral mucosa  CARDIOVASCULAR: No edema  RESPIRATORY: Normal respirations  HEME/LYMPH/IMMUNO:  No regional lymphadenopathy except as noted below in ASSESSMENT AND PLAN BY DIAGNOSIS    FULL ORGAN SYSTEM SKIN EXAM (SKIN)  Hair, Scalp, Ears, Face Normal except as noted below in Assessment   Neck, Cervical Chain Nodes Normal except as noted below in Assessment   Right Arm/Hand/Fingers Normal except as noted below in Assessment   Left Arm/Hand/Fingers Normal except as noted below in Assessment   Chest/Breasts/Axillae Viewed areas Normal except as noted below in Assessment   Abdomen, Umbilicus Normal except as noted below in Assessment   Back/Spine Normal except as noted below in Assessment   Groin/Genitalia/Buttocks    Right Leg Normal except as noted below in Assessment   Left Leg, Foot, Toes        Dermal Hypersensitivity     Physical Exam:   (Anatomic Location); (Size and Morphological Description); (Differential Diagnosis):  o Upper extremities, trunk, scalp, a few dermal pink papules with many excoriated, crusted papules on scalp      Pertinent Positives:   Pertinent Negatives: No involvement of b/l palmar hands, wrists, AC fossa    Additional History of Present Condition:  Patient has had this rash since May in different areas of the body, very itchy, irritation, and burning  Has bx by Dr Desiree De La Garza that showed "Sparse superficial perivascular lymphocytic infiltrate with rare eosinophils and intraluminal neutrophils (see note)        Note: Pathogenic microorganisms are not seen on PAS stain  Though non-specific, the histologic findings are consistent with a HYPERSENSITIVITY REACTION (e g , to a drug, contactant)  Clinical pathological correlation is advised "      Was prescribed Prednisone but could not take due to GI issues  Has history of stomach ulcers, and diverticulitis   Notes prior to this also had a methylPREDNISolone acetate (DEPO-MEDROL) injection 80 mg with PCP and this may have helped a bit  Was advised to trial off of HCTZ by us  She notes she did for 1 month only and then it was restarted given no change in rash  Has been prescribed Clobetasol for another issue in past and patient only applied once or twice as only with small tube at home -- unsure if it helped  Has continued to use OTC Sarna, has also tried gold bond with lidocaine, Benadryl Spray, Calamine lotion, Desitin and nothing has helped  Notes hx of many endoscopies for gastric ulceration- no e/o H pylori infection in past    Up to date with colonoscopies, has not had mammo but order in place, has not had pap smear in >5 years  No new medications in 6 months prior to occurrence of rash  Hx of hypothyroidism  Thyroid medication has been changed recently as she has been sx  TSH lab has been ordered but pt has not yet checked this  Assessment and Plan:  Discussed non specific bx s/o dermal hypersensitivity that can be difficult to treat  This can be due to medications, occult malignancy, infectious agents (such as H Pylori),  topical contact allergens among other factors  Hyper/hypo thyroidism has also been a/w this when not appropriately controlled  Discussed oral prednisone as usually good acute management but as unable to tolerate, will hold off Rx'ing  Based on a thorough discussion of this condition and the management approach to it (including a comprehensive discussion of the known risks, side effects and potential benefits of treatment), the patient (family) agrees to implement the following specific plan:     Discussed sensitive skin care and minimizing possible topical allergens (includign perfumes)  Discussed Eileen-cream line   We will plan to start nbUVB therapy 3 x weekly, office to call to schedule this   Topicals:  o Start applying Triamcinolone Ointment twice a day to affected areas on trunk, extremities  To avoid face and groin area    o Start Clobetasol Solution to Scalp twice a day while flared  o Continue to use Sarna for itching PRN (Can place in the fridge for cooling feeling)    Antihistamines:   o Start taking OTC Zyrtec 10 mg twice daily, must take everyday  o Discussed trial benadryl at night PRN for itch but pt unable to tolerate as causes restless legs to worsen  o Pt with hydroxyzine 10 mg at home- can cont to take this PRN for itch at night  Avoid operating heavy machinery with this medication  Discussed it often causes drowsiness   We need to make sure you are up to date with your cancer screenings, will CC PCP to make aware and assistance with coordinating if needed  o  Contact your OB-GYN to schedule your routine exam  o  Contact Central Scheduling to schedule your mammogram   Reassured not contagious, no e/o scabies and no need for permetherin (pt did ask about this)    Continue to monitor Thyroid with your Primary care doctor as this can contribute   We will revisit stopping HTCZ for a longer period of time in 2 5 months on follow up pending response with above therapies and optimization of thyroid dz management       Follow up in 2 1/2 to 3 months    Narrow Band UVB Therapy Plan:    Skin Type:  II    Treatment Type:  Hi UVB    Schedule:  3 X WEEK     Secondary Treatment:  Yes     Topical Application:  Mineral oil     Starting Mjoules: 300 mJ/cm2    Max Joules: 2000 mJ/cm2    Diagnosis:  Dermal Hypersensitivity Reaction    Recommendations: N/A        Scribe Attestation    I,:   Natalie Walls MA am acting as a scribe while in the presence of the attending physician :        I,:   Seth Laura MD personally performed the services described in this documentation    as scribed in my presence :

## 2020-09-26 DIAGNOSIS — I10 BENIGN ESSENTIAL HYPERTENSION: ICD-10-CM

## 2020-09-28 RX ORDER — METOPROLOL SUCCINATE 25 MG/1
TABLET, EXTENDED RELEASE ORAL
Qty: 180 TABLET | Refills: 3 | Status: SHIPPED | OUTPATIENT
Start: 2020-09-28 | End: 2020-10-05 | Stop reason: SINTOL

## 2020-10-01 ENCOUNTER — TRANSCRIBE ORDERS (OUTPATIENT)
Dept: ADMINISTRATIVE | Facility: HOSPITAL | Age: 70
End: 2020-10-01

## 2020-10-01 ENCOUNTER — APPOINTMENT (OUTPATIENT)
Dept: LAB | Facility: HOSPITAL | Age: 70
End: 2020-10-01
Payer: COMMERCIAL

## 2020-10-01 DIAGNOSIS — E03.9 PRIMARY HYPOTHYROIDISM: ICD-10-CM

## 2020-10-01 LAB — TSH SERPL DL<=0.05 MIU/L-ACNC: 0.55 UIU/ML (ref 0.36–3.74)

## 2020-10-01 PROCEDURE — 36415 COLL VENOUS BLD VENIPUNCTURE: CPT

## 2020-10-01 PROCEDURE — 84443 ASSAY THYROID STIM HORMONE: CPT

## 2020-10-05 ENCOUNTER — OFFICE VISIT (OUTPATIENT)
Dept: FAMILY MEDICINE CLINIC | Facility: CLINIC | Age: 70
End: 2020-10-05
Payer: COMMERCIAL

## 2020-10-05 VITALS
SYSTOLIC BLOOD PRESSURE: 140 MMHG | HEIGHT: 59 IN | WEIGHT: 150 LBS | RESPIRATION RATE: 16 BRPM | TEMPERATURE: 96 F | BODY MASS INDEX: 30.24 KG/M2 | DIASTOLIC BLOOD PRESSURE: 90 MMHG | HEART RATE: 60 BPM

## 2020-10-05 DIAGNOSIS — Z00.00 MEDICARE ANNUAL WELLNESS VISIT, SUBSEQUENT: Primary | ICD-10-CM

## 2020-10-05 DIAGNOSIS — I10 BENIGN ESSENTIAL HYPERTENSION: ICD-10-CM

## 2020-10-05 DIAGNOSIS — L30.9 ECZEMA, UNSPECIFIED TYPE: ICD-10-CM

## 2020-10-05 DIAGNOSIS — Z23 NEED FOR VACCINATION: ICD-10-CM

## 2020-10-05 PROCEDURE — 90662 IIV NO PRSV INCREASED AG IM: CPT | Performed by: FAMILY MEDICINE

## 2020-10-05 PROCEDURE — 1160F RVW MEDS BY RX/DR IN RCRD: CPT | Performed by: FAMILY MEDICINE

## 2020-10-05 PROCEDURE — G0439 PPPS, SUBSEQ VISIT: HCPCS | Performed by: FAMILY MEDICINE

## 2020-10-05 PROCEDURE — 3725F SCREEN DEPRESSION PERFORMED: CPT | Performed by: FAMILY MEDICINE

## 2020-10-05 PROCEDURE — 3077F SYST BP >= 140 MM HG: CPT | Performed by: FAMILY MEDICINE

## 2020-10-05 PROCEDURE — 1125F AMNT PAIN NOTED PAIN PRSNT: CPT | Performed by: FAMILY MEDICINE

## 2020-10-05 PROCEDURE — 1036F TOBACCO NON-USER: CPT | Performed by: FAMILY MEDICINE

## 2020-10-05 PROCEDURE — 3080F DIAST BP >= 90 MM HG: CPT | Performed by: FAMILY MEDICINE

## 2020-10-05 PROCEDURE — 1170F FXNL STATUS ASSESSED: CPT | Performed by: FAMILY MEDICINE

## 2020-10-05 PROCEDURE — G0008 ADMIN INFLUENZA VIRUS VAC: HCPCS | Performed by: FAMILY MEDICINE

## 2020-10-05 RX ORDER — SACCHAROMYCES BOULARDII 250 MG
250 CAPSULE ORAL 2 TIMES DAILY
COMMUNITY
End: 2021-01-28

## 2020-10-05 RX ORDER — CLOBETASOL PROPIONATE 0.05 MG/G
1 GEL TOPICAL 2 TIMES DAILY
Qty: 15 EACH | Refills: 0 | Status: SHIPPED | OUTPATIENT
Start: 2020-10-05 | End: 2020-11-06 | Stop reason: ALTCHOICE

## 2020-10-05 RX ORDER — CHOLECALCIFEROL (VITAMIN D3) 25 MCG
200 TABLET ORAL
COMMUNITY
End: 2021-09-01 | Stop reason: ALTCHOICE

## 2020-10-05 RX ORDER — AMLODIPINE BESYLATE 5 MG/1
5 TABLET ORAL DAILY
Qty: 30 TABLET | Refills: 3 | Status: SHIPPED | OUTPATIENT
Start: 2020-10-05 | End: 2020-11-06 | Stop reason: SDUPTHER

## 2020-11-06 ENCOUNTER — OFFICE VISIT (OUTPATIENT)
Dept: FAMILY MEDICINE CLINIC | Facility: CLINIC | Age: 70
End: 2020-11-06
Payer: COMMERCIAL

## 2020-11-06 VITALS
SYSTOLIC BLOOD PRESSURE: 134 MMHG | HEART RATE: 72 BPM | DIASTOLIC BLOOD PRESSURE: 84 MMHG | TEMPERATURE: 96.6 F | RESPIRATION RATE: 16 BRPM

## 2020-11-06 DIAGNOSIS — F33.0 MILD EPISODE OF RECURRENT MAJOR DEPRESSIVE DISORDER (HCC): ICD-10-CM

## 2020-11-06 DIAGNOSIS — Z12.31 SCREENING MAMMOGRAM, ENCOUNTER FOR: ICD-10-CM

## 2020-11-06 DIAGNOSIS — I10 BENIGN ESSENTIAL HYPERTENSION: ICD-10-CM

## 2020-11-06 DIAGNOSIS — R60.9 EDEMA, UNSPECIFIED TYPE: ICD-10-CM

## 2020-11-06 DIAGNOSIS — I10 BENIGN ESSENTIAL HYPERTENSION: Primary | ICD-10-CM

## 2020-11-06 DIAGNOSIS — E03.9 PRIMARY HYPOTHYROIDISM: ICD-10-CM

## 2020-11-06 PROCEDURE — 1036F TOBACCO NON-USER: CPT | Performed by: FAMILY MEDICINE

## 2020-11-06 PROCEDURE — 3075F SYST BP GE 130 - 139MM HG: CPT | Performed by: FAMILY MEDICINE

## 2020-11-06 PROCEDURE — 3079F DIAST BP 80-89 MM HG: CPT | Performed by: FAMILY MEDICINE

## 2020-11-06 PROCEDURE — 99214 OFFICE O/P EST MOD 30 MIN: CPT | Performed by: FAMILY MEDICINE

## 2020-11-06 PROCEDURE — 1160F RVW MEDS BY RX/DR IN RCRD: CPT | Performed by: FAMILY MEDICINE

## 2020-11-06 RX ORDER — HYDROCHLOROTHIAZIDE 25 MG/1
25 TABLET ORAL DAILY
Qty: 90 TABLET | Refills: 3 | Status: SHIPPED | OUTPATIENT
Start: 2020-11-06 | End: 2021-09-01

## 2020-11-06 RX ORDER — AMLODIPINE BESYLATE 5 MG/1
5 TABLET ORAL DAILY
Qty: 90 TABLET | Refills: 3 | Status: SHIPPED | OUTPATIENT
Start: 2020-11-06 | End: 2021-03-30 | Stop reason: SINTOL

## 2020-11-12 ENCOUNTER — LAB (OUTPATIENT)
Dept: LAB | Facility: HOSPITAL | Age: 70
End: 2020-11-12
Attending: INTERNAL MEDICINE
Payer: COMMERCIAL

## 2020-11-12 ENCOUNTER — TRANSCRIBE ORDERS (OUTPATIENT)
Dept: ADMINISTRATIVE | Facility: HOSPITAL | Age: 70
End: 2020-11-12

## 2020-11-12 DIAGNOSIS — R10.13 ABDOMINAL PAIN, EPIGASTRIC: ICD-10-CM

## 2020-11-12 DIAGNOSIS — R10.11 ABDOMINAL PAIN, RIGHT UPPER QUADRANT: ICD-10-CM

## 2020-11-12 DIAGNOSIS — R10.11 ABDOMINAL PAIN, RIGHT UPPER QUADRANT: Primary | ICD-10-CM

## 2020-11-12 LAB
ALBUMIN SERPL BCP-MCNC: 3.6 G/DL (ref 3.5–5)
ALP SERPL-CCNC: 84 U/L (ref 46–116)
ALT SERPL W P-5'-P-CCNC: 26 U/L (ref 12–78)
ANION GAP SERPL CALCULATED.3IONS-SCNC: 5 MMOL/L (ref 4–13)
AST SERPL W P-5'-P-CCNC: 22 U/L (ref 5–45)
BASOPHILS # BLD AUTO: 0.04 THOUSANDS/ΜL (ref 0–0.1)
BASOPHILS NFR BLD AUTO: 1 % (ref 0–1)
BILIRUB SERPL-MCNC: 0.4 MG/DL (ref 0.2–1)
BUN SERPL-MCNC: 14 MG/DL (ref 5–25)
CALCIUM SERPL-MCNC: 9 MG/DL (ref 8.3–10.1)
CHLORIDE SERPL-SCNC: 99 MMOL/L (ref 100–108)
CO2 SERPL-SCNC: 29 MMOL/L (ref 21–32)
CREAT SERPL-MCNC: 0.73 MG/DL (ref 0.6–1.3)
EOSINOPHIL # BLD AUTO: 0.18 THOUSAND/ΜL (ref 0–0.61)
EOSINOPHIL NFR BLD AUTO: 5 % (ref 0–6)
ERYTHROCYTE [DISTWIDTH] IN BLOOD BY AUTOMATED COUNT: 12.2 % (ref 11.6–15.1)
FERRITIN SERPL-MCNC: 34 NG/ML (ref 8–388)
GFR SERPL CREATININE-BSD FRML MDRD: 83 ML/MIN/1.73SQ M
GLUCOSE P FAST SERPL-MCNC: 97 MG/DL (ref 65–99)
HCT VFR BLD AUTO: 41.3 % (ref 34.8–46.1)
HGB BLD-MCNC: 13.4 G/DL (ref 11.5–15.4)
IMM GRANULOCYTES # BLD AUTO: 0.01 THOUSAND/UL (ref 0–0.2)
IMM GRANULOCYTES NFR BLD AUTO: 0 % (ref 0–2)
IRON SATN MFR SERPL: 24 %
IRON SERPL-MCNC: 72 UG/DL (ref 50–170)
LIPASE SERPL-CCNC: 153 U/L (ref 73–393)
LYMPHOCYTES # BLD AUTO: 0.97 THOUSANDS/ΜL (ref 0.6–4.47)
LYMPHOCYTES NFR BLD AUTO: 24 % (ref 14–44)
MCH RBC QN AUTO: 32.7 PG (ref 26.8–34.3)
MCHC RBC AUTO-ENTMCNC: 32.4 G/DL (ref 31.4–37.4)
MCV RBC AUTO: 101 FL (ref 82–98)
MONOCYTES # BLD AUTO: 0.41 THOUSAND/ΜL (ref 0.17–1.22)
MONOCYTES NFR BLD AUTO: 10 % (ref 4–12)
NEUTROPHILS # BLD AUTO: 2.42 THOUSANDS/ΜL (ref 1.85–7.62)
NEUTS SEG NFR BLD AUTO: 60 % (ref 43–75)
NRBC BLD AUTO-RTO: 0 /100 WBCS
PLATELET # BLD AUTO: 336 THOUSANDS/UL (ref 149–390)
PMV BLD AUTO: 9.2 FL (ref 8.9–12.7)
POTASSIUM SERPL-SCNC: 3.7 MMOL/L (ref 3.5–5.3)
PROT SERPL-MCNC: 7.1 G/DL (ref 6.4–8.2)
RBC # BLD AUTO: 4.1 MILLION/UL (ref 3.81–5.12)
SODIUM SERPL-SCNC: 133 MMOL/L (ref 136–145)
TIBC SERPL-MCNC: 299 UG/DL (ref 250–450)
WBC # BLD AUTO: 4.03 THOUSAND/UL (ref 4.31–10.16)

## 2020-11-12 PROCEDURE — 85025 COMPLETE CBC W/AUTO DIFF WBC: CPT

## 2020-11-12 PROCEDURE — 80053 COMPREHEN METABOLIC PANEL: CPT | Performed by: INTERNAL MEDICINE

## 2020-11-12 PROCEDURE — 36415 COLL VENOUS BLD VENIPUNCTURE: CPT | Performed by: INTERNAL MEDICINE

## 2020-11-12 PROCEDURE — 83540 ASSAY OF IRON: CPT

## 2020-11-12 PROCEDURE — 86038 ANTINUCLEAR ANTIBODIES: CPT

## 2020-11-12 PROCEDURE — 83516 IMMUNOASSAY NONANTIBODY: CPT

## 2020-11-12 PROCEDURE — 83690 ASSAY OF LIPASE: CPT

## 2020-11-12 PROCEDURE — 83550 IRON BINDING TEST: CPT

## 2020-11-12 PROCEDURE — 82728 ASSAY OF FERRITIN: CPT

## 2020-11-13 LAB — RYE IGE QN: NEGATIVE

## 2020-11-14 LAB
TTG IGA SER-ACNC: <2 U/ML (ref 0–3)
TTG IGG SER-ACNC: <2 U/ML (ref 0–5)

## 2020-12-04 NOTE — TELEPHONE ENCOUNTER
Called patient left message on voicemail advising  we are scheduling phototherapy and if she ready to start she is to return my call

## 2020-12-13 DIAGNOSIS — E03.9 PRIMARY HYPOTHYROIDISM: ICD-10-CM

## 2020-12-14 RX ORDER — LEVOTHYROXINE SODIUM 88 UG/1
TABLET ORAL
Qty: 90 TABLET | Refills: 3 | Status: SHIPPED | OUTPATIENT
Start: 2020-12-14 | End: 2021-11-04

## 2021-01-12 ENCOUNTER — TELEPHONE (OUTPATIENT)
Dept: GASTROENTEROLOGY | Facility: CLINIC | Age: 71
End: 2021-01-12

## 2021-01-12 NOTE — TELEPHONE ENCOUNTER
Patient left message in regards to scheduling an egd with Dr Isai Cardoso  Please call her @ [phone]   Thank you

## 2021-01-13 ENCOUNTER — PREP FOR PROCEDURE (OUTPATIENT)
Dept: GASTROENTEROLOGY | Facility: CLINIC | Age: 71
End: 2021-01-13

## 2021-01-13 DIAGNOSIS — R10.9 ABDOMINAL PAIN, UNSPECIFIED ABDOMINAL LOCATION: Primary | ICD-10-CM

## 2021-01-27 ENCOUNTER — TELEPHONE (OUTPATIENT)
Dept: FAMILY MEDICINE CLINIC | Facility: CLINIC | Age: 71
End: 2021-01-27

## 2021-01-27 NOTE — TELEPHONE ENCOUNTER
I spoke to patient and she was able to go on the [employer] site and answer the questionnaire  I informed her that the phase is still in 76 and above age group  She will check back with the 1-866- [place] [facility] number to inquire when the next phase will be rolled out for her age group  No further action needed     Jenna Fabian, LPN

## 2021-01-27 NOTE — TELEPHONE ENCOUNTER
----- Message from Joss Barton sent at 1/27/2021  9:03 AM EST -----  Regarding: RE: Visit Follow-Up Question  Contact: [phone]  Thank you for getting back to me  I'm trying to register for the vaccine on my chart but I'm having trouble finding the path to register on the site      ----- Message -----  From: Zee Damon MA  Sent: 1/26/21, 6:33 PM  To: Joss Barton  Subject: RE: Visit Follow-Up Question    Good evening Shaylee Griggs,  We will not be administering the vaccine here in the office  You need to register for the vaccine either through [address] or by calling [phone]  Then you will get updates through [address] or you can refer to [facility] for the most up to date information on the vaccine and our distribution efforts  Zee Damon MA      ----- Message -----       From:Ml Barton       Sent:1/26/2021  5:53 PM EST         To:Meme Dorsey DO    Subject:Visit Follow-Up Question    Hi Doctor Willian,   I would like to get the vaccine soon  I was wondering if you will be getting any and if I would be able to receive it     Thank you,  Sylvia Johnson

## 2021-01-28 RX ORDER — PHENOL 1.4 %
AEROSOL, SPRAY (ML) MUCOUS MEMBRANE
COMMUNITY
End: 2022-04-07 | Stop reason: ALTCHOICE

## 2021-01-28 RX ORDER — ZINC GLUCONATE 50 MG
50 TABLET ORAL DAILY
COMMUNITY
End: 2021-02-10

## 2021-01-29 ENCOUNTER — HOSPITAL ENCOUNTER (OUTPATIENT)
Dept: GASTROENTEROLOGY | Facility: AMBULATORY SURGERY CENTER | Age: 71
Discharge: HOME | End: 2021-01-29
Payer: COMMERCIAL

## 2021-01-29 ENCOUNTER — ANESTHESIA EVENT (OUTPATIENT)
Dept: GASTROENTEROLOGY | Facility: AMBULATORY SURGERY CENTER | Age: 71
End: 2021-01-29

## 2021-01-29 ENCOUNTER — ANESTHESIA (OUTPATIENT)
Dept: GASTROENTEROLOGY | Facility: AMBULATORY SURGERY CENTER | Age: 71
End: 2021-01-29
Payer: COMMERCIAL

## 2021-01-29 VITALS
HEART RATE: 62 BPM | WEIGHT: 150 LBS | SYSTOLIC BLOOD PRESSURE: 143 MMHG | RESPIRATION RATE: 19 BRPM | HEIGHT: 60 IN | OXYGEN SATURATION: 98 % | TEMPERATURE: 95 F | BODY MASS INDEX: 29.45 KG/M2 | DIASTOLIC BLOOD PRESSURE: 87 MMHG

## 2021-01-29 VITALS — HEART RATE: 70 BPM

## 2021-01-29 DIAGNOSIS — R10.9 ABDOMINAL PAIN, UNSPECIFIED ABDOMINAL LOCATION: ICD-10-CM

## 2021-01-29 PROCEDURE — 43239 EGD BIOPSY SINGLE/MULTIPLE: CPT | Performed by: INTERNAL MEDICINE

## 2021-01-29 PROCEDURE — 00731 ANES UPR GI NDSC PX NOS: CPT | Performed by: NURSE ANESTHETIST, CERTIFIED REGISTERED

## 2021-01-29 PROCEDURE — 99100 ANES PT EXTEME AGE<1 YR&>70: CPT | Performed by: NURSE ANESTHETIST, CERTIFIED REGISTERED

## 2021-01-29 RX ORDER — PANTOPRAZOLE SODIUM 40 MG/1
40 TABLET, DELAYED RELEASE ORAL
Status: DISCONTINUED | OUTPATIENT
Start: 2021-01-29 | End: 2021-02-02 | Stop reason: HOSPADM

## 2021-01-29 RX ORDER — PROPOFOL 10 MG/ML
INJECTION, EMULSION INTRAVENOUS AS NEEDED
Status: DISCONTINUED | OUTPATIENT
Start: 2021-01-29 | End: 2021-01-29

## 2021-01-29 RX ORDER — SODIUM CHLORIDE 9 MG/ML
20 INJECTION, SOLUTION INTRAVENOUS CONTINUOUS
Status: DISCONTINUED | OUTPATIENT
Start: 2021-01-29 | End: 2021-02-02 | Stop reason: HOSPADM

## 2021-01-29 RX ORDER — SODIUM CHLORIDE 9 MG/ML
30 INJECTION, SOLUTION INTRAVENOUS CONTINUOUS
Status: DISCONTINUED | OUTPATIENT
Start: 2021-01-29 | End: 2021-02-02 | Stop reason: HOSPADM

## 2021-01-29 RX ORDER — ALPRAZOLAM 0.25 MG/1
TABLET ORAL
COMMUNITY
End: 2021-08-09 | Stop reason: SDUPTHER

## 2021-01-29 RX ORDER — LIDOCAINE HYDROCHLORIDE 10 MG/ML
INJECTION, SOLUTION EPIDURAL; INFILTRATION; INTRACAUDAL; PERINEURAL AS NEEDED
Status: DISCONTINUED | OUTPATIENT
Start: 2021-01-29 | End: 2021-01-29

## 2021-01-29 RX ORDER — PANTOPRAZOLE SODIUM 40 MG/1
40 TABLET, DELAYED RELEASE ORAL DAILY
Qty: 30 TABLET | Refills: 5 | Status: SHIPPED | OUTPATIENT
Start: 2021-01-29 | End: 2021-07-26

## 2021-01-29 RX ADMIN — SODIUM CHLORIDE: 9 INJECTION, SOLUTION INTRAVENOUS at 10:24

## 2021-01-29 RX ADMIN — PROPOFOL 50 MG: 10 INJECTION, EMULSION INTRAVENOUS at 10:34

## 2021-01-29 RX ADMIN — PROPOFOL 150 MG: 10 INJECTION, EMULSION INTRAVENOUS at 10:31

## 2021-01-29 RX ADMIN — LIDOCAINE HYDROCHLORIDE 30 MG: 10 INJECTION, SOLUTION EPIDURAL; INFILTRATION; INTRACAUDAL; PERINEURAL at 10:31

## 2021-01-29 NOTE — DISCHARGE INSTRUCTIONS
Hiatal Hernia   WHAT YOU NEED TO KNOW:   What is a hiatal hernia? A hiatal hernia is a condition that causes part of your stomach to bulge through the hiatus (small opening) in your diaphragm  The part of the stomach may move up and down, or it may get trapped above the diaphragm  What increases my risk for a hiatal hernia? The exact cause of a hiatal hernia is not known  You may have been born with a large hiatus  The following may increase your risk of a hiatal hernia:  · Obesity    · Older age    · Medical conditions such as diverticulosis or esophagitis    · Previous surgery of the esophagus or stomach or trauma such as from a motor vehicle accident    What are the types of hiatal hernia? · Type I (sliding hiatal hernia): A portion of the stomach slides in and out of the hiatus  This type is the most common and usually causes gastroesophageal reflux disease (GERD)  GERD occurs when the esophageal sphincter does not close properly and causes acid reflux  The esophageal sphincter is the lower muscle of the esophagus  · Type II (paraesophageal hiatal hernia):  Type II hiatal hernia forms when a part of the stomach squeezes through the hiatus and lies next to the esophagus  · Type III (combined):  Type III hiatal hernia is a combination of a sliding and a paraesophageal hiatal hernia  · Type IV (complex paraesophageal hiatal hernia): The whole stomach, the small and large bowels, spleen, pancreas, or liver is pushed up into the chest     What are the signs and symptoms of a hiatal hernia? The most common symptom is heartburn  This usually occurs after meals and spreads to your neck, jaw, or shoulder   You may have no signs or symptoms, or you may have any of the following:  · Abdominal pain, especially in the area just above your navel    · Bitter or acid taste in your mouth    · Trouble swallowing    · Coughing or hoarseness    · Chest pain or shortness of breath that occurs after eating    · Frequent burping or hiccups    · Uncomfortable feeling of fullness after eating    How is a hiatal hernia diagnosed? · An upper GI series test  includes x-rays of your esophagus, stomach, and your small intestines  It is also called a barium swallow test  You will be given barium (a chalky liquid) to drink before the pictures are taken  This liquid helps your stomach and intestines show up better on the x-rays  An upper GI series can show if you have an ulcer, a blocked intestine, or other problems  · An endoscopy  uses a scope to see the inside of your digestive tract  A scope is a long, bendable tube with a light on the end of it  A camera may be hooked to the scope to take pictures  How is a hiatal hernia treated? Treatment depends on the type of hiatal hernia you have and on your symptoms  You may not need any treatment  You may need any of the following:  · Medicines  may be given to relieve heartburn symptoms  These medicines help to decrease or block stomach acid  You may also be given medicines that help to tighten the esophageal sphincter  · Surgery  may be done when medicines cannot control your symptoms, or other problems are present  Your healthcare provider may also suggest surgery depending on the type of hernia you have  Your healthcare provider can put your stomach back into its normal location  He may make the hiatus (hole) smaller and anchor your stomach in your abdomen  Fundoplication is a surgery that wraps the upper part of the stomach around the esophageal sphincter to strengthen it  How can I manage symptoms? The following nutrition and lifestyle changes may be recommended to relieve symptoms of heartburn  · Avoid foods that make your symptoms worse  These may include spicy foods, fruit juices, alcohol, caffeine, chocolate, and mint  · Eat several small meals during the day    Small meals give your stomach less food to digest     · Avoid lying down and bending forward after you eat  Do not eat meals 2 to 3 hours before bedtime  This decreases your risk for reflux  · Maintain a healthy weight  If you are overweight, weight loss may help relieve your symptoms  · Sleep with your head elevated  at least 6 inches  · Do not smoke  Smoking can increase your symptoms of heartburn  When should I seek immediate care? · You have severe abdominal pain  · You try to vomit but nothing comes out (retching)  · You have severe chest pain and sudden trouble breathing  · Your bowel movements are black or bloody  · Your vomit looks like coffee grounds or has blood in it  When should I contact my healthcare provider? · Your symptoms are getting worse  · You have nausea, and you are vomiting  · You are losing weight without trying  · You have questions or concerns about your condition or care  CARE AGREEMENT:   You have the right to help plan your care  Learn about your health condition and how it may be treated  Discuss treatment options with your healthcare providers to decide what care you want to receive  You always have the right to refuse treatment  The above information is an  only  It is not intended as medical advice for individual conditions or treatments  Talk to your doctor, nurse or pharmacist before following any medical regimen to see if it is safe and effective for you  © Copyright [address] Information is for End User's use only and may not be sold, redistributed or otherwise used for commercial purposes  All illustrations and images included in CareNotes® are the copyrighted property of [employer]  or [facility] [place] [employer] Rosen   Gastroesophageal Reflux Disease   WHAT YOU NEED TO KNOW:   Gastroesophageal reflux disease (GERD) is reflux that occurs more than twice a week for a few weeks  Reflux means acid and food in the stomach back up into the esophagus   It usually causes heartburn and other symptoms  GERD can cause other health problems over time if it is not treated  DISCHARGE INSTRUCTIONS:   Call your local emergency number (911 in the [address]) if:   · You have severe chest pain and sudden trouble breathing  Seek care immediately if:   · You have trouble breathing after you vomit  · You have trouble swallowing, or pain with swallowing  · Your bowel movements are black, bloody, or tarry-looking  · Your vomit looks like coffee grounds or has blood in it  Call your doctor or gastroenterologist if:   · You feel full and cannot burp or vomit  · You vomit large amounts, or you vomit often  · You are losing weight without trying  · Your symptoms get worse or do not improve with treatment  · You have questions or concerns about your condition or care  Medicines:   · Medicines  are used to decrease stomach acid  Medicine may also be used to help your lower esophageal sphincter and stomach contract (tighten) more  · Take your medicine as directed  Contact your healthcare provider if you think your medicine is not helping or if you have side effects  Tell him or her if you are allergic to any medicine  Keep a list of the medicines, vitamins, and herbs you take  Include the amounts, and when and why you take them  Bring the list or the pill bottles to follow-up visits  Carry your medicine list with you in case of an emergency  Manage GERD:   · Do not have foods or drinks that may increase heartburn  These include chocolate, peppermint, fried or fatty foods, drinks that contain caffeine, or carbonated drinks (soda)  Other foods include spicy foods, onions, tomatoes, and tomato-based foods  Do not have foods or drinks that can irritate your esophagus, such as citrus fruits, juices, and alcohol  · Do not eat large meals  When you eat a lot of food at one time, your stomach needs more acid to digest it  Eat 6 small meals each day instead of 3 large ones, and eat slowly   Do not eat meals 2 to 3 hours before bedtime  · Elevate the head of your bed  Place 6-inch blocks under the head of your bed frame  You may also use more than one pillow under your head and shoulders while you sleep  · Maintain a healthy weight  If you are overweight, weight loss may help relieve symptoms of GERD  · Do not smoke  Smoking weakens the lower esophageal sphincter and increases the risk of GERD  Ask your healthcare provider for information if you currently smoke and need help to quit  E-cigarettes or smokeless tobacco still contain nicotine  Talk to your healthcare provider before you use these products  · Do not wear clothing that is tight around your waist   Tight clothing can put pressure on your stomach and cause or worsen GERD symptoms  Follow up with your doctor or gastroenterologist as directed:  Write down your questions so you remember to ask them during your visits  © Copyright [address] Information is for End User's use only and may not be sold, redistributed or otherwise used for commercial purposes  All illustrations and images included in CareNotes® are the copyrighted property of [employer]  or [facility] Alexus Rosen   The above information is an  only  It is not intended as medical advice for individual conditions or treatments  Talk to your doctor, nurse or pharmacist before following any medical regimen to see if it is safe and effective for you

## 2021-01-29 NOTE — ANESTHESIA PREPROCEDURE EVALUATION
Procedure:  EGD    Relevant Problems   CARDIO   (+) Benign essential hypertension   (+) Hyperlipidemia   (+) Shortness of breath on exertion      ENDO   (+) Primary hypothyroidism      NEURO/PSYCH   (+) Generalized anxiety disorder   (+) Headache   (+) Mild episode of recurrent major depressive disorder (HCC)      PULMONARY   (+) Shortness of breath on exertion        Physical Exam    Airway    Mallampati score: II  TM Distance: >3 FB  Neck ROM: full     Dental   No notable dental hx     Cardiovascular  Cardiovascular exam normal    Pulmonary  Pulmonary exam normal     Other Findings        Anesthesia Plan  ASA Score- 2     Anesthesia Type- IV sedation with anesthesia with ASA Monitors  Additional Monitors:   Airway Plan:           Plan Factors-Exercise tolerance (METS): >4 METS  Chart reviewed  Existing labs reviewed  Patient summary reviewed  Patient is not a current smoker  Patient instructed to abstain from smoking on day of procedure  Patient did not smoke on day of surgery  Induction- intravenous  Postoperative Plan-     Informed Consent- Anesthetic plan and risks discussed with patient  I personally reviewed this patient with the CRNA  Discussed and agreed on the Anesthesia Plan with the CRNA  Ryland Wren

## 2021-01-29 NOTE — ANESTHESIA POSTPROCEDURE EVALUATION
Post-Op Assessment Note    CV Status:  Stable  Pain Score: 0    Pain management: adequate     Mental Status:  Alert and awake   Hydration Status:  Euvolemic   PONV Controlled:  Controlled   Airway Patency:  Patent      Post Op Vitals Reviewed: Yes      Staff: CRNA         No complications documented      BP   99/68   Temp 98   Pulse 72   Resp 12   SpO2 98

## 2021-02-03 ENCOUNTER — TELEPHONE (OUTPATIENT)
Dept: GASTROENTEROLOGY | Facility: CLINIC | Age: 71
End: 2021-02-03

## 2021-02-03 NOTE — TELEPHONE ENCOUNTER
Please call pt to schedule f/u with Dr Victor Hugo Jeffery to egd & abdominal discomfort   Thank you

## 2021-02-10 ENCOUNTER — OFFICE VISIT (OUTPATIENT)
Dept: GASTROENTEROLOGY | Facility: CLINIC | Age: 71
End: 2021-02-10
Payer: COMMERCIAL

## 2021-02-10 VITALS
HEIGHT: 60 IN | SYSTOLIC BLOOD PRESSURE: 143 MMHG | HEART RATE: 79 BPM | BODY MASS INDEX: 29.29 KG/M2 | DIASTOLIC BLOOD PRESSURE: 92 MMHG

## 2021-02-10 DIAGNOSIS — K44.9 HIATAL HERNIA WITH GERD AND ESOPHAGITIS: Primary | ICD-10-CM

## 2021-02-10 DIAGNOSIS — K57.30 DIVERTICULOSIS LARGE INTESTINE W/O PERFORATION OR ABSCESS W/O BLEEDING: ICD-10-CM

## 2021-02-10 DIAGNOSIS — Z86.010 HISTORY OF COLON POLYPS: ICD-10-CM

## 2021-02-10 DIAGNOSIS — K21.00 HIATAL HERNIA WITH GERD AND ESOPHAGITIS: Primary | ICD-10-CM

## 2021-02-10 PROBLEM — Z86.0100 HISTORY OF COLON POLYPS: Status: ACTIVE | Noted: 2021-02-10

## 2021-02-10 PROCEDURE — 1036F TOBACCO NON-USER: CPT | Performed by: NURSE PRACTITIONER

## 2021-02-10 PROCEDURE — 99214 OFFICE O/P EST MOD 30 MIN: CPT | Performed by: NURSE PRACTITIONER

## 2021-02-10 PROCEDURE — 3077F SYST BP >= 140 MM HG: CPT | Performed by: NURSE PRACTITIONER

## 2021-02-10 PROCEDURE — 1160F RVW MEDS BY RX/DR IN RCRD: CPT | Performed by: NURSE PRACTITIONER

## 2021-02-10 PROCEDURE — 3080F DIAST BP >= 90 MM HG: CPT | Performed by: NURSE PRACTITIONER

## 2021-02-10 NOTE — PROGRESS NOTES
[facility] - Outpatient Follow-up Note  Syd Anaya 65 y o  female MRN: [id number]  Encounter: [id number]          ASSESSMENT AND PLAN:      1  Hiatal hernia with GERD and esophagitis  Patient continues to have epigastric pain despite being on pantoprazole 40 mg daily  He Patient informed she may take dicyclomine 10 mg 2 times a day as needed for esophageal spasms  Anti-reflux measures and anti-reflux diet reviewed with patient and advised to follow  Patient informed to limit coffee intake to 1 cup daily  Patient informed to try relaxation techniques to decrease anxiety and continue with Xanax as needed for her anxiety as ordered by her primary care physician  2  History of colon polyps  Patient has a history of colon polyps  Next colonoscopy is due April 2021  Patient would like to hold off on repeat colonoscopy in total COVID-19 is more under control  Patient is currently not having no bowel issues  Patient denies any blood in stool, blood from rectal area, or black tarry stool  3  Diverticulosis large intestine w/o perforation or abscess w/o bleeding  Diverticulosis is well controlled  Patient should follow high-fiber diet  No current symptoms of diverticulitis  When patient has her colonoscopy will do further evaluation of diverticulosis  ______________________________________________________________________    SUBJECTIVE/HPI:    Rebecca Alvarez is a 70-year-old female with a past medical history of restless leg syndrome, depression, hyperlipidemia, and anxiety who presents to office for follow-up after EGD  EGD results reviewed with patient  Biopsy is pending patient is aware  EGD done 01/29/2021 showed small hiatal hernia  Erythematous mucosa in lower 3rd of esophagus  Moderate patchy ulcerated mucosa with erosions in the antrum  Patient continues to have epigastric pain despite being on pantoprazole 40 mg daily patient   She reports she is very anxious concerning COVID-19 and inability to obtain vaccine  Patient denies nausea, vomiting, acid reflux, heartburn, and dysphagia  Patient denies blood in stool, blood from rectal area, or black tarry stool  No diarrhea or constipation  Patient is due for repeat colonoscopy in April but states she wants to hold off and to the COVID-19 is more under control  Patient has not been following anti-reflux diet or anti-reflux measures  Patient reports she drinks multiple cups of coffee daily  Patient is a former smoker  Patient drinks alcohol socially  Patient reports she has to do yoga to help with her anxiety but since COVID 19 she has been unable to do her yoga  Patient is on no anticoagulation or anti-platelet medication No family history of gastric or colon cancer  REVIEW OF SYSTEMS IS OTHERWISE NEGATIVE  Historical Information   Past Medical History:   Diagnosis Date    Abdominal pain     recent upper abd      Acute diverticulitis     last assessed 16    Adenoma of left adrenal gland     Anxiety     Arthritis     Benign paroxysmal positional vertigo     last assessed 03/24/15    Colon polyp     Disease of thyroid gland     Diverticulitis     hx of    Gastric ulcer     hx of    Hypertension     Malignant hypertension     Obesity     Primary hypothyroidism     Primary hypothyroidism     SOB (shortness of breath) on exertion     with exertion when taking a certain medication    Vertigo     last assessed 13     Past Surgical History:   Procedure Laterality Date    APPENDECTOMY       SECTION      CHOLECYSTECTOMY      COLONOSCOPY      KNEE SURGERY      History of Arthrotomy of Knee; Open Meniscus Tear     Social History   Social History     Substance and Sexual Activity   Alcohol Use Yes    Alcohol/week: 2 0 standard drinks    Types: 2 Glasses of wine per week    Frequency: 2-3 times a week    Drinks per session: 1 or 2    Binge frequency: Never    Comment: occasional     Social History     Substance and Sexual Activity   Drug Use Never     Social History     Tobacco Use   Smoking Status Former Smoker   Smokeless Tobacco Never Used   Tobacco Comment    quit 20 years ago     Family History   Adopted: Yes   Problem Relation Age of Onset    Heart disease Family         Adopted but heard family member    No Known Problems Mother        Meds/Allergies       Current Outpatient Medications:     ALPRAZolam (Xanax) 0 25 mg tablet    amLODIPine (NORVASC) 5 mg tablet    Ascorbic Acid (Vitamin C Immune Health) 500 MG CHEW    Cholecalciferol (Vitamin D3) 25 MCG TABS    dicyclomine (BENTYL) 10 mg capsule    hydrochlorothiazide (HYDRODIURIL) 25 mg tablet    levothyroxine 88 mcg tablet    pantoprazole (PROTONIX) 40 mg tablet    No Known Allergies        Objective     Blood pressure 143/92, pulse 79, height 5' (1 524 m)  Body mass index is 29 29 kg/m²  PHYSICAL EXAM:      General Appearance:   Alert, cooperative, no distress   HEENT:   Normocephalic, atraumatic, anicteric      Neck:  Supple, symmetrical, trachea midline   Lungs:   Clear to auscultation bilaterally; no rales, rhonchi or wheezing; respirations unlabored    Heart[de-identified]   Regular rate and rhythm; no murmur, rub, or gallop  Abdomen:   Soft, epigastric area tender to palpation, non-distended; normal bowel sounds; no masses, no organomegaly    Genitalia:   Deferred    Rectal:   Deferred    Extremities:  No cyanosis, clubbing or edema    Pulses:  2+ and symmetric    Skin:  No jaundice, rashes, or lesions    Lymph nodes:  No palpable cervical lymphadenopathy        Lab Results:   No visits with results within 1 Day(s) from this visit     Latest known visit with results is:   Lab on 11/12/2020   Component Date Value    WBC 11/12/2020 4 03*    RBC 11/12/2020 4 10     Hemoglobin 11/12/2020 13 4     Hematocrit 11/12/2020 41 3     MCV 11/12/2020 101*    MCH 11/12/2020 32 7     MCHC 11/12/2020 32 4     RDW 11/12/2020 12 2     MPV 11/12/2020 9 2     Platelets 78/67/1986 336     nRBC 11/12/2020 0     Neutrophils Relative 11/12/2020 60     Immat GRANS % 11/12/2020 0     Lymphocytes Relative 11/12/2020 24     Monocytes Relative 11/12/2020 10     Eosinophils Relative 11/12/2020 5     Basophils Relative 11/12/2020 1     Neutrophils Absolute 11/12/2020 2 42     Immature Grans Absolute 11/12/2020 0 01     Lymphocytes Absolute 11/12/2020 0 97     Monocytes Absolute 11/12/2020 0 41     Eosinophils Absolute 11/12/2020 0 18     Basophils Absolute 11/12/2020 0 04     Lipase 11/12/2020 153     MARLEN 11/12/2020 Negative     TISSUE TRANSGLUTAMINASE * 11/12/2020 <2     Tissue Transglut Ab IGG 11/12/2020 <2     Iron Saturation 11/12/2020 24     TIBC 11/12/2020 299     Iron 11/12/2020 72     Ferritin 11/12/2020 34          Radiology Results:   No results found

## 2021-03-01 DIAGNOSIS — Z23 ENCOUNTER FOR IMMUNIZATION: ICD-10-CM

## 2021-03-30 DIAGNOSIS — I10 BENIGN ESSENTIAL HYPERTENSION: Primary | ICD-10-CM

## 2021-03-30 RX ORDER — LOSARTAN POTASSIUM 50 MG/1
50 TABLET ORAL DAILY
Qty: 90 TABLET | Refills: 1 | Status: SHIPPED | OUTPATIENT
Start: 2021-03-30 | End: 2021-06-02 | Stop reason: SDUPTHER

## 2021-05-28 DIAGNOSIS — K58.9 IRRITABLE BOWEL SYNDROME, UNSPECIFIED TYPE: Primary | ICD-10-CM

## 2021-05-28 RX ORDER — DICYCLOMINE HYDROCHLORIDE 10 MG/1
CAPSULE ORAL
Qty: 90 CAPSULE | Refills: 2 | Status: SHIPPED | OUTPATIENT
Start: 2021-05-28 | End: 2022-04-25

## 2021-06-02 DIAGNOSIS — I10 BENIGN ESSENTIAL HYPERTENSION: ICD-10-CM

## 2021-06-02 RX ORDER — LOSARTAN POTASSIUM 50 MG/1
50 TABLET ORAL DAILY
Qty: 90 TABLET | Refills: 1 | Status: SHIPPED | OUTPATIENT
Start: 2021-06-02 | End: 2021-10-20 | Stop reason: SDUPTHER

## 2021-07-21 ENCOUNTER — OFFICE VISIT (OUTPATIENT)
Dept: OBGYN CLINIC | Facility: CLINIC | Age: 71
End: 2021-07-21
Payer: COMMERCIAL

## 2021-07-21 VITALS
WEIGHT: 150 LBS | SYSTOLIC BLOOD PRESSURE: 168 MMHG | HEART RATE: 62 BPM | BODY MASS INDEX: 29.45 KG/M2 | HEIGHT: 60 IN | DIASTOLIC BLOOD PRESSURE: 92 MMHG

## 2021-07-21 DIAGNOSIS — G89.29 CHRONIC PAIN OF LEFT KNEE: ICD-10-CM

## 2021-07-21 DIAGNOSIS — M17.12 PRIMARY OSTEOARTHRITIS OF LEFT KNEE: Primary | ICD-10-CM

## 2021-07-21 DIAGNOSIS — M25.562 CHRONIC PAIN OF LEFT KNEE: ICD-10-CM

## 2021-07-21 PROCEDURE — 3008F BODY MASS INDEX DOCD: CPT | Performed by: ORTHOPAEDIC SURGERY

## 2021-07-21 PROCEDURE — 1160F RVW MEDS BY RX/DR IN RCRD: CPT | Performed by: ORTHOPAEDIC SURGERY

## 2021-07-21 PROCEDURE — 1036F TOBACCO NON-USER: CPT | Performed by: ORTHOPAEDIC SURGERY

## 2021-07-21 PROCEDURE — 3080F DIAST BP >= 90 MM HG: CPT | Performed by: ORTHOPAEDIC SURGERY

## 2021-07-21 PROCEDURE — 99203 OFFICE O/P NEW LOW 30 MIN: CPT | Performed by: ORTHOPAEDIC SURGERY

## 2021-07-21 PROCEDURE — 3077F SYST BP >= 140 MM HG: CPT | Performed by: ORTHOPAEDIC SURGERY

## 2021-07-21 NOTE — PROGRESS NOTES
Assessment/Plan:  1  Primary osteoarthritis of left knee     2  Chronic pain of left knee       Scribe Attestation    I,:  Obi Granados am acting as a scribe while in the presence of the attending physician :       I,:  Yao Penn DO personally performed the services described in this documentation    as scribed in my presence :           Roseanne Inman is a pleasant 64-year-old female who presents today for initial evaluation of her left knee pain  After reviewing her imaging and performing a thorough history and physical exam I explained that she is symptomatic of her moderate to severe underlying osteoarthritis  Unfortunately, she continues to experience persistent pain due to her disease despite extensive nonoperative treatment including corticosteroid and viscosupplementation injection therapy  I explained that based on her persistent pain in the progression of her disease, she would be indicated for total knee arthroplasty  We spent time discussing the procedure as well as the expected recovery  She would like to move forward with this but will take time to consider the most convenient timing for her  She plans to return to the office in the near future for preoperative planning and consent  All of her questions and concerns were addressed today  Subjective:   Initial evaluation for left knee pain    Patient ID: Lacey Miranda is a 66 y o  female who presents today for initial evaluation for left knee pain  She states her knee has been painful for many years  She has received treatment with Dr Isabella Ramos  She reports undergoing a left knee corticosteroid injection on 05/19/2021  This provided no relief for her  She was graduated to viscosupplementation injection series which was initiated approximately 3 weeks ago  She did not tolerate the 1st injection in the series and shows to discontinue it    She was prescribed tramadol for symptomatic relief after this but states that she has not been taking it   She instead uses ibuprofen and gabapentin which was prescribed by her primary care physician for symptomatic relief  At today's visit, she complains of severe aching pain about the anterolateral aspect of her knee that occurs with activity  She also complains fullness about the posterior aspect of her knee that she states comes and goes  She denies any recent injury or trauma  Review of Systems   Constitutional: Positive for activity change  Negative for chills, fever and unexpected weight change  HENT: Negative for hearing loss, nosebleeds and sore throat  Eyes: Negative for pain, redness and visual disturbance  Respiratory: Negative for cough, shortness of breath and wheezing  Cardiovascular: Negative for chest pain, palpitations and leg swelling  Gastrointestinal: Negative for abdominal pain, nausea and vomiting  Endocrine: Negative for polydipsia and polyuria  Genitourinary: Negative for dysuria and hematuria  Musculoskeletal: Positive for arthralgias and myalgias  Negative for joint swelling  See HPI   Skin: Negative for rash and wound  Neurological: Negative for dizziness, numbness and headaches  Psychiatric/Behavioral: Negative for decreased concentration and suicidal ideas  The patient is not nervous/anxious  Past Medical History:   Diagnosis Date    Abdominal pain     recent upper abd      Acute diverticulitis     last assessed 07/18/16    Adenoma of left adrenal gland     Anxiety     Arthritis     Benign paroxysmal positional vertigo     last assessed 03/24/15    Colon polyp     Disease of thyroid gland     Diverticulitis     hx of    Gastric ulcer     hx of    Hypertension     Malignant hypertension     Obesity     Primary hypothyroidism     Primary hypothyroidism     SOB (shortness of breath) on exertion     with exertion when taking a certain medication    Vertigo     last assessed 03/13/13       Past Surgical History:   Procedure Laterality Date    APPENDECTOMY       SECTION      CHOLECYSTECTOMY      COLONOSCOPY      KNEE SURGERY      History of Arthrotomy of Knee; Open Meniscus Tear       Family History   Adopted: Yes   Problem Relation Age of Onset    Heart disease Family         Adopted but heard family member    No Known Problems Mother        Social History     Occupational History    Occupation: Retired   Tobacco Use    Smoking status: Former Smoker    Smokeless tobacco: Never Used    Tobacco comment: quit 20 years ago- only a social smoker    Vaping Use    Vaping Use: Never used   Substance and Sexual Activity    Alcohol use: Yes     Alcohol/week: 2 0 standard drinks     Types: 2 Glasses of wine per week     Comment: occasional- once or twice week     Drug use: Never    Sexual activity: Not on file         Current Outpatient Medications:     ALPRAZolam (Xanax) 0 25 mg tablet, Take by mouth daily at bedtime as needed for anxiety, Disp: , Rfl:     Alum & Mag Hydroxide-Simeth (MYLANTA PO), Take by mouth as needed, Disp: , Rfl:     Cholecalciferol (Vitamin D3) 25 MCG TABS, Take 200 tablets by mouth, Disp: , Rfl:     dicyclomine (BENTYL) 10 mg capsule, TAKE 1 CAPSULE BY MOUTH AT  AT BEDTIME AS NEEDED, Disp: 90 capsule, Rfl: 2    hydrochlorothiazide (HYDRODIURIL) 25 mg tablet, Take 1 tablet (25 mg total) by mouth daily, Disp: 90 tablet, Rfl: 3    levothyroxine 88 mcg tablet, TAKE 1 TABLET BY MOUTH  DAILY, Disp: 90 tablet, Rfl: 3    losartan (COZAAR) 50 mg tablet, Take 1 tablet (50 mg total) by mouth daily, Disp: 90 tablet, Rfl: 1    Ascorbic Acid (Vitamin C Immune Health) 500 MG CHEW, Chew (Patient not taking: Reported on 2021), Disp: , Rfl:     pantoprazole (PROTONIX) 40 mg tablet, Take 1 tablet (40 mg total) by mouth daily, Disp: 30 tablet, Rfl: 5    No Known Allergies    Objective:  Vitals:    21 1418   BP: 168/92   Pulse: 62       Body mass index is 29 29 kg/m²      Left Knee Exam     Tenderness The patient is experiencing tenderness in the lateral joint line and patella  Range of Motion   Left knee extension: 0  Left knee flexion: 120 with pain  Tests   Varus: negative Valgus: negative  Drawer:  Anterior - negative     Posterior - negative    Other   Erythema: absent  Scars: absent  Sensation: normal  Pulse: present  Swelling: none  Effusion: effusion present    Comments:  Fullness in popliteal fossa  Stable at 0, 30 and 90 degrees  Neurovascularly in tact distally  Parapatellar crepitance noted  Patellofemoral grind: positive          Observations   Left Knee   Positive for effusion  Physical Exam  Vitals and nursing note reviewed  Constitutional:       Appearance: She is well-developed  HENT:      Head: Normocephalic and atraumatic  Eyes:      General: No scleral icterus  Conjunctiva/sclera: Conjunctivae normal    Cardiovascular:      Rate and Rhythm: Normal rate  Pulmonary:      Effort: Pulmonary effort is normal  No respiratory distress  Musculoskeletal:      Cervical back: Normal range of motion and neck supple  Left knee: Effusion present  Comments: As noted in HPI   Skin:     General: Skin is warm and dry  Neurological:      Mental Status: She is alert and oriented to person, place, and time  Psychiatric:         Behavior: Behavior normal          I have personally reviewed pertinent films in PACS  X-ray of the left knee from an outside source reviewed demonstrating moderate to severe degenerative change with medial joint space narrowing  There is sclerosis and marginal osteophytosis  There is no acute fracture, dislocation, lytic or blastic lesion

## 2021-07-25 DIAGNOSIS — R10.9 ABDOMINAL PAIN, UNSPECIFIED ABDOMINAL LOCATION: ICD-10-CM

## 2021-07-26 RX ORDER — PANTOPRAZOLE SODIUM 40 MG/1
TABLET, DELAYED RELEASE ORAL
Qty: 30 TABLET | Refills: 5 | Status: SHIPPED | OUTPATIENT
Start: 2021-07-26 | End: 2021-10-27

## 2021-08-09 DIAGNOSIS — F41.1 GENERALIZED ANXIETY DISORDER: Primary | ICD-10-CM

## 2021-08-09 RX ORDER — ALPRAZOLAM 0.25 MG/1
0.25 TABLET ORAL
Qty: 20 TABLET | Refills: 0 | Status: SHIPPED | OUTPATIENT
Start: 2021-08-09 | End: 2021-10-27

## 2021-08-31 DIAGNOSIS — R60.9 EDEMA, UNSPECIFIED TYPE: ICD-10-CM

## 2021-08-31 DIAGNOSIS — I10 BENIGN ESSENTIAL HYPERTENSION: ICD-10-CM

## 2021-09-01 ENCOUNTER — OFFICE VISIT (OUTPATIENT)
Dept: FAMILY MEDICINE CLINIC | Facility: CLINIC | Age: 71
End: 2021-09-01
Payer: COMMERCIAL

## 2021-09-01 ENCOUNTER — TELEPHONE (OUTPATIENT)
Dept: FAMILY MEDICINE CLINIC | Facility: CLINIC | Age: 71
End: 2021-09-01

## 2021-09-01 VITALS
TEMPERATURE: 98.6 F | SYSTOLIC BLOOD PRESSURE: 130 MMHG | RESPIRATION RATE: 20 BRPM | DIASTOLIC BLOOD PRESSURE: 80 MMHG | HEART RATE: 81 BPM | OXYGEN SATURATION: 97 %

## 2021-09-01 DIAGNOSIS — I10 BENIGN ESSENTIAL HYPERTENSION: Primary | ICD-10-CM

## 2021-09-01 DIAGNOSIS — E03.9 PRIMARY HYPOTHYROIDISM: ICD-10-CM

## 2021-09-01 DIAGNOSIS — Z79.899 ENCOUNTER FOR LONG-TERM CURRENT USE OF MEDICATION: ICD-10-CM

## 2021-09-01 DIAGNOSIS — F33.0 MILD EPISODE OF RECURRENT MAJOR DEPRESSIVE DISORDER (HCC): ICD-10-CM

## 2021-09-01 PROCEDURE — 1160F RVW MEDS BY RX/DR IN RCRD: CPT | Performed by: FAMILY MEDICINE

## 2021-09-01 PROCEDURE — 3725F SCREEN DEPRESSION PERFORMED: CPT | Performed by: FAMILY MEDICINE

## 2021-09-01 PROCEDURE — 3075F SYST BP GE 130 - 139MM HG: CPT | Performed by: FAMILY MEDICINE

## 2021-09-01 PROCEDURE — 1036F TOBACCO NON-USER: CPT | Performed by: FAMILY MEDICINE

## 2021-09-01 PROCEDURE — 99214 OFFICE O/P EST MOD 30 MIN: CPT | Performed by: FAMILY MEDICINE

## 2021-09-01 PROCEDURE — 3079F DIAST BP 80-89 MM HG: CPT | Performed by: FAMILY MEDICINE

## 2021-09-01 RX ORDER — MULTIVITAMIN WITH IRON
1 TABLET ORAL DAILY
COMMUNITY

## 2021-09-01 RX ORDER — TRAZODONE HYDROCHLORIDE 100 MG/1
100 TABLET ORAL
Qty: 30 TABLET | Refills: 3 | Status: SHIPPED | OUTPATIENT
Start: 2021-09-01 | End: 2021-12-28

## 2021-09-01 RX ORDER — GABAPENTIN 100 MG/1
CAPSULE ORAL
COMMUNITY
Start: 2021-06-28 | End: 2022-05-23 | Stop reason: SDUPTHER

## 2021-09-01 RX ORDER — HYDROCHLOROTHIAZIDE 25 MG/1
TABLET ORAL
Qty: 90 TABLET | Refills: 3 | Status: SHIPPED | OUTPATIENT
Start: 2021-09-01 | End: 2022-08-04

## 2021-09-01 NOTE — TELEPHONE ENCOUNTER
----- Message from Yohan Barton sent at 9/1/2021  9:07 AM EDT -----  Regarding: Non-Urgent Medical Question  Contact: [phone]  Hi   I called to make an appointment with you but it looks like I have to wait until the 17th  I really need to see someone sooner can you suggest another doctor I could see?  Thank you   Juan R Romero

## 2021-09-01 NOTE — PROGRESS NOTES
Assessment/Plan:    1  Benign essential hypertension  Assessment & Plan:  Well controlled    Orders:  -     CBC; Future  -     Comprehensive metabolic panel; Future  -     Lipid Panel with Direct LDL reflex; Future    2  Mild episode of recurrent major depressive disorder [facility])  Assessment & Plan:  Not controlled and having insomnia  Will try trazodone  Risks and benefits of medication discussed  Orders:  -     traZODone (DESYREL) 100 mg tablet; Take 1 tablet (100 mg total) by mouth daily at bedtime as needed for sleep    3  Primary hypothyroidism  Assessment & Plan:  Stable  Continue levothyroxine 88 mcg daily    Orders:  -     TSH, 3rd generation; Future  -     T4, free; Future    4  Encounter for long-term current use of medication  -     Vitamin B12; Future  -     Magnesium; Future    BMI Counseling: There is no height or weight on file to calculate BMI  The BMI is above normal  Exercise recommendations include exercising 3-5 times per week  There are no Patient Instructions on file for this visit  Return in about 6 weeks (around 10/13/2021) for Annual Wellness Visit  Subjective:      Patient ID: Nata Francis is a 66 y o  female  Chief Complaint   Patient presents with    Follow-up     on blood pressure, [id number]     Depression       She has been trying to avoid the xanax  She doesn't want to take it  She has been having a hard time sleeping  She also has issues with depression  She would like a medication that would help with both  The following portions of the patient's history were reviewed and updated as appropriate: allergies, current medications, past family history, past medical history, past social history, past surgical history and problem list     Review of Systems   Constitutional: Negative  Respiratory: Negative  Cardiovascular: Negative  Psychiatric/Behavioral: Positive for dysphoric mood           Current Outpatient Medications   Medication Sig Dispense Refill    ALPRAZolam (Xanax) 0 25 mg tablet Take 1 tablet (0 25 mg total) by mouth daily at bedtime as needed for anxiety 20 tablet 0    Alum & Mag Hydroxide-Simeth (MYLANTA PO) Take by mouth as needed      Ascorbic Acid (Vitamin C Immune Health) 500 MG CHEW Chew       dicyclomine (BENTYL) 10 mg capsule TAKE 1 CAPSULE BY MOUTH AT  AT BEDTIME AS NEEDED 90 capsule 2    gabapentin (NEURONTIN) 100 mg capsule       hydrochlorothiazide (HYDRODIURIL) 25 mg tablet Take 1 tablet (25 mg total) by mouth daily 90 tablet 3    levothyroxine 88 mcg tablet TAKE 1 TABLET BY MOUTH  DAILY 90 tablet 3    losartan (COZAAR) 50 mg tablet Take 1 tablet (50 mg total) by mouth daily 90 tablet 1    Magnesium 250 MG TABS Take 1 tablet by mouth daily      pantoprazole (PROTONIX) 40 mg tablet TAKE ONE TABLET BY MOUTH EVERY DAY 30 tablet 5    traZODone (DESYREL) 100 mg tablet Take 1 tablet (100 mg total) by mouth daily at bedtime as needed for sleep 30 tablet 3     No current facility-administered medications for this visit  Objective:    /80   Pulse 81   Temp 98 6 °F (37 °C)   Resp 20   SpO2 97%        Physical Exam  Vitals and nursing note reviewed  Constitutional:       Appearance: She is well-developed  HENT:      Head: Normocephalic and atraumatic  Right Ear: Tympanic membrane and external ear normal       Left Ear: Tympanic membrane and external ear normal    Cardiovascular:      Rate and Rhythm: Normal rate and regular rhythm  Heart sounds: Normal heart sounds  No murmur heard  No friction rub  Pulmonary:      Effort: Pulmonary effort is normal  No respiratory distress  Breath sounds: Normal breath sounds  No wheezing or rales  Musculoskeletal:      Right lower leg: No edema  Left lower leg: No edema                  Pedraza Pimple, DO

## 2021-09-01 NOTE — TELEPHONE ENCOUNTER
I spoke with pt, Dr Kelby Najjar had some cancellations today so I was able to get the pt in today  No further action needed   Kalyn Dumont LPN

## 2021-10-05 ENCOUNTER — HOSPITAL ENCOUNTER (OUTPATIENT)
Dept: RADIOLOGY | Facility: HOSPITAL | Age: 71
Discharge: HOME | End: 2021-10-05
Payer: COMMERCIAL

## 2021-10-05 VITALS — WEIGHT: 147 LBS | BODY MASS INDEX: 28.86 KG/M2 | HEIGHT: 60 IN

## 2021-10-05 DIAGNOSIS — Z12.31 SCREENING MAMMOGRAM, ENCOUNTER FOR: ICD-10-CM

## 2021-10-05 PROCEDURE — 77063 BREAST TOMOSYNTHESIS BI: CPT

## 2021-10-05 PROCEDURE — 77067 SCR MAMMO BI INCL CAD: CPT

## 2021-10-08 ENCOUNTER — APPOINTMENT (OUTPATIENT)
Dept: LAB | Facility: HOSPITAL | Age: 71
End: 2021-10-08
Payer: COMMERCIAL

## 2021-10-08 DIAGNOSIS — E03.9 PRIMARY HYPOTHYROIDISM: ICD-10-CM

## 2021-10-08 DIAGNOSIS — I10 BENIGN ESSENTIAL HYPERTENSION: ICD-10-CM

## 2021-10-08 DIAGNOSIS — Z79.899 ENCOUNTER FOR LONG-TERM CURRENT USE OF MEDICATION: ICD-10-CM

## 2021-10-08 LAB
ALBUMIN SERPL BCP-MCNC: 3.6 G/DL (ref 3.5–5)
ALP SERPL-CCNC: 72 U/L (ref 46–116)
ALT SERPL W P-5'-P-CCNC: 28 U/L (ref 12–78)
ANION GAP SERPL CALCULATED.3IONS-SCNC: 7 MMOL/L (ref 4–13)
AST SERPL W P-5'-P-CCNC: 14 U/L (ref 5–45)
BILIRUB SERPL-MCNC: 0.25 MG/DL (ref 0.2–1)
BUN SERPL-MCNC: 27 MG/DL (ref 5–25)
CALCIUM SERPL-MCNC: 9 MG/DL (ref 8.3–10.1)
CHLORIDE SERPL-SCNC: 101 MMOL/L (ref 100–108)
CHOLEST SERPL-MCNC: 250 MG/DL (ref 50–200)
CO2 SERPL-SCNC: 29 MMOL/L (ref 21–32)
CREAT SERPL-MCNC: 0.78 MG/DL (ref 0.6–1.3)
ERYTHROCYTE [DISTWIDTH] IN BLOOD BY AUTOMATED COUNT: 12.1 % (ref 11.6–15.1)
GFR SERPL CREATININE-BSD FRML MDRD: 76 ML/MIN/1.73SQ M
GLUCOSE P FAST SERPL-MCNC: 96 MG/DL (ref 65–99)
HCT VFR BLD AUTO: 38.9 % (ref 34.8–46.1)
HDLC SERPL-MCNC: 67 MG/DL
HGB BLD-MCNC: 12.8 G/DL (ref 11.5–15.4)
LDLC SERPL CALC-MCNC: 169 MG/DL (ref 0–100)
MAGNESIUM SERPL-MCNC: 1.9 MG/DL (ref 1.6–2.6)
MCH RBC QN AUTO: 32.6 PG (ref 26.8–34.3)
MCHC RBC AUTO-ENTMCNC: 32.9 G/DL (ref 31.4–37.4)
MCV RBC AUTO: 99 FL (ref 82–98)
PLATELET # BLD AUTO: 285 THOUSANDS/UL (ref 149–390)
PMV BLD AUTO: 9.1 FL (ref 8.9–12.7)
POTASSIUM SERPL-SCNC: 3.9 MMOL/L (ref 3.5–5.3)
PROT SERPL-MCNC: 7 G/DL (ref 6.4–8.2)
RBC # BLD AUTO: 3.93 MILLION/UL (ref 3.81–5.12)
SODIUM SERPL-SCNC: 137 MMOL/L (ref 136–145)
T4 FREE SERPL-MCNC: 1.4 NG/DL (ref 0.76–1.46)
TRIGL SERPL-MCNC: 68 MG/DL
TSH SERPL DL<=0.05 MIU/L-ACNC: 0.74 UIU/ML (ref 0.36–3.74)
VIT B12 SERPL-MCNC: 459 PG/ML (ref 100–900)
WBC # BLD AUTO: 5.02 THOUSAND/UL (ref 4.31–10.16)

## 2021-10-08 PROCEDURE — 84439 ASSAY OF FREE THYROXINE: CPT

## 2021-10-08 PROCEDURE — 85027 COMPLETE CBC AUTOMATED: CPT

## 2021-10-08 PROCEDURE — 80061 LIPID PANEL: CPT

## 2021-10-08 PROCEDURE — 84443 ASSAY THYROID STIM HORMONE: CPT

## 2021-10-08 PROCEDURE — 82607 VITAMIN B-12: CPT

## 2021-10-08 PROCEDURE — 36415 COLL VENOUS BLD VENIPUNCTURE: CPT

## 2021-10-08 PROCEDURE — 80053 COMPREHEN METABOLIC PANEL: CPT

## 2021-10-08 PROCEDURE — 83735 ASSAY OF MAGNESIUM: CPT

## 2021-10-14 ENCOUNTER — RA CDI HCC (OUTPATIENT)
Dept: OTHER | Facility: HOSPITAL | Age: 71
End: 2021-10-14

## 2021-10-18 ENCOUNTER — TELEPHONE (OUTPATIENT)
Dept: GASTROENTEROLOGY | Facility: CLINIC | Age: 71
End: 2021-10-18

## 2021-10-18 DIAGNOSIS — R10.9 ABDOMINAL PAIN, UNSPECIFIED ABDOMINAL LOCATION: Primary | ICD-10-CM

## 2021-10-18 RX ORDER — SUCRALFATE 1 G/1
1 TABLET ORAL
Qty: 120 TABLET | Refills: 0 | Status: SHIPPED | OUTPATIENT
Start: 2021-10-18 | End: 2022-04-07 | Stop reason: ALTCHOICE

## 2021-10-20 ENCOUNTER — OFFICE VISIT (OUTPATIENT)
Dept: FAMILY MEDICINE CLINIC | Facility: CLINIC | Age: 71
End: 2021-10-20
Payer: COMMERCIAL

## 2021-10-20 VITALS
HEART RATE: 60 BPM | HEIGHT: 60 IN | DIASTOLIC BLOOD PRESSURE: 76 MMHG | SYSTOLIC BLOOD PRESSURE: 130 MMHG | RESPIRATION RATE: 16 BRPM | TEMPERATURE: 97.3 F | BODY MASS INDEX: 28.71 KG/M2

## 2021-10-20 DIAGNOSIS — I10 BENIGN ESSENTIAL HYPERTENSION: ICD-10-CM

## 2021-10-20 DIAGNOSIS — Z23 NEED FOR VACCINATION: ICD-10-CM

## 2021-10-20 DIAGNOSIS — Z00.00 MEDICARE ANNUAL WELLNESS VISIT, SUBSEQUENT: Primary | ICD-10-CM

## 2021-10-20 DIAGNOSIS — E78.5 HYPERLIPIDEMIA, UNSPECIFIED HYPERLIPIDEMIA TYPE: ICD-10-CM

## 2021-10-20 DIAGNOSIS — E03.9 PRIMARY HYPOTHYROIDISM: ICD-10-CM

## 2021-10-20 PROBLEM — D17.79 ADRENAL MYELOLIPOMA: Status: ACTIVE | Noted: 2017-12-12

## 2021-10-20 PROCEDURE — 90670 PCV13 VACCINE IM: CPT

## 2021-10-20 PROCEDURE — 1125F AMNT PAIN NOTED PAIN PRSNT: CPT | Performed by: FAMILY MEDICINE

## 2021-10-20 PROCEDURE — 3725F SCREEN DEPRESSION PERFORMED: CPT | Performed by: FAMILY MEDICINE

## 2021-10-20 PROCEDURE — G0009 ADMIN PNEUMOCOCCAL VACCINE: HCPCS

## 2021-10-20 PROCEDURE — 1170F FXNL STATUS ASSESSED: CPT | Performed by: FAMILY MEDICINE

## 2021-10-20 PROCEDURE — 3288F FALL RISK ASSESSMENT DOCD: CPT | Performed by: FAMILY MEDICINE

## 2021-10-20 PROCEDURE — G0439 PPPS, SUBSEQ VISIT: HCPCS | Performed by: FAMILY MEDICINE

## 2021-10-20 RX ORDER — ROSUVASTATIN CALCIUM 10 MG/1
10 TABLET, COATED ORAL DAILY
Qty: 90 TABLET | Refills: 3 | Status: SHIPPED | OUTPATIENT
Start: 2021-10-20 | End: 2022-04-07 | Stop reason: SINTOL

## 2021-10-20 RX ORDER — LOSARTAN POTASSIUM 100 MG/1
100 TABLET ORAL DAILY
Qty: 90 TABLET | Refills: 1 | Status: SHIPPED | OUTPATIENT
Start: 2021-10-20 | End: 2022-01-04

## 2021-10-27 ENCOUNTER — OFFICE VISIT (OUTPATIENT)
Dept: GASTROENTEROLOGY | Facility: CLINIC | Age: 71
End: 2021-10-27
Payer: COMMERCIAL

## 2021-10-27 VITALS
HEART RATE: 73 BPM | DIASTOLIC BLOOD PRESSURE: 82 MMHG | BODY MASS INDEX: 28.71 KG/M2 | SYSTOLIC BLOOD PRESSURE: 119 MMHG | HEIGHT: 60 IN

## 2021-10-27 DIAGNOSIS — K44.9 HIATAL HERNIA WITH GERD AND ESOPHAGITIS: Primary | ICD-10-CM

## 2021-10-27 DIAGNOSIS — I10 BENIGN ESSENTIAL HYPERTENSION: ICD-10-CM

## 2021-10-27 DIAGNOSIS — K57.90 DIVERTICULOSIS: ICD-10-CM

## 2021-10-27 DIAGNOSIS — R10.13 EPIGASTRIC PAIN: ICD-10-CM

## 2021-10-27 DIAGNOSIS — K21.00 HIATAL HERNIA WITH GERD AND ESOPHAGITIS: Primary | ICD-10-CM

## 2021-10-27 DIAGNOSIS — Z86.010 HX OF COLONIC POLYPS: ICD-10-CM

## 2021-10-27 PROCEDURE — 1036F TOBACCO NON-USER: CPT | Performed by: NURSE PRACTITIONER

## 2021-10-27 PROCEDURE — 1160F RVW MEDS BY RX/DR IN RCRD: CPT | Performed by: NURSE PRACTITIONER

## 2021-10-27 PROCEDURE — 3079F DIAST BP 80-89 MM HG: CPT | Performed by: NURSE PRACTITIONER

## 2021-10-27 PROCEDURE — 4040F PNEUMOC VAC/ADMIN/RCVD: CPT | Performed by: NURSE PRACTITIONER

## 2021-10-27 PROCEDURE — 99214 OFFICE O/P EST MOD 30 MIN: CPT | Performed by: NURSE PRACTITIONER

## 2021-10-27 PROCEDURE — 3074F SYST BP LT 130 MM HG: CPT | Performed by: NURSE PRACTITIONER

## 2021-10-27 RX ORDER — PANTOPRAZOLE SODIUM 40 MG/1
40 TABLET, DELAYED RELEASE ORAL 2 TIMES DAILY WITH MEALS
Qty: 180 TABLET | Refills: 0 | Status: SHIPPED | OUTPATIENT
Start: 2021-10-27 | End: 2022-01-02 | Stop reason: SDUPTHER

## 2021-11-01 ENCOUNTER — ANESTHESIA (OUTPATIENT)
Dept: GASTROENTEROLOGY | Facility: AMBULATORY SURGERY CENTER | Age: 71
End: 2021-11-01

## 2021-11-01 ENCOUNTER — HOSPITAL ENCOUNTER (OUTPATIENT)
Dept: GASTROENTEROLOGY | Facility: AMBULATORY SURGERY CENTER | Age: 71
Discharge: HOME | End: 2021-11-01
Payer: COMMERCIAL

## 2021-11-01 ENCOUNTER — ANESTHESIA EVENT (OUTPATIENT)
Dept: GASTROENTEROLOGY | Facility: AMBULATORY SURGERY CENTER | Age: 71
End: 2021-11-01

## 2021-11-01 VITALS
HEIGHT: 60 IN | TEMPERATURE: 96.6 F | RESPIRATION RATE: 18 BRPM | OXYGEN SATURATION: 96 % | WEIGHT: 145 LBS | SYSTOLIC BLOOD PRESSURE: 144 MMHG | BODY MASS INDEX: 28.47 KG/M2 | HEART RATE: 60 BPM | DIASTOLIC BLOOD PRESSURE: 71 MMHG

## 2021-11-01 DIAGNOSIS — Z86.010 HX OF COLONIC POLYPS: ICD-10-CM

## 2021-11-01 DIAGNOSIS — R10.13 EPIGASTRIC PAIN: ICD-10-CM

## 2021-11-01 PROCEDURE — 00813 ANES UPR LWR GI NDSC PX: CPT | Performed by: NURSE ANESTHETIST, CERTIFIED REGISTERED

## 2021-11-01 PROCEDURE — 45380 COLONOSCOPY AND BIOPSY: CPT | Performed by: INTERNAL MEDICINE

## 2021-11-01 PROCEDURE — 99100 ANES PT EXTEME AGE<1 YR&>70: CPT | Performed by: NURSE ANESTHETIST, CERTIFIED REGISTERED

## 2021-11-01 PROCEDURE — 43239 EGD BIOPSY SINGLE/MULTIPLE: CPT | Performed by: INTERNAL MEDICINE

## 2021-11-01 RX ORDER — PROPOFOL 10 MG/ML
INJECTION, EMULSION INTRAVENOUS AS NEEDED
Status: DISCONTINUED | OUTPATIENT
Start: 2021-11-01 | End: 2021-11-01

## 2021-11-01 RX ORDER — LIDOCAINE HYDROCHLORIDE 10 MG/ML
INJECTION, SOLUTION EPIDURAL; INFILTRATION; INTRACAUDAL; PERINEURAL AS NEEDED
Status: DISCONTINUED | OUTPATIENT
Start: 2021-11-01 | End: 2021-11-01

## 2021-11-01 RX ORDER — SODIUM CHLORIDE 9 MG/ML
30 INJECTION, SOLUTION INTRAVENOUS CONTINUOUS
Status: DISCONTINUED | OUTPATIENT
Start: 2021-11-01 | End: 2021-11-05 | Stop reason: HOSPADM

## 2021-11-01 RX ADMIN — LIDOCAINE HYDROCHLORIDE 50 MG: 10 INJECTION, SOLUTION EPIDURAL; INFILTRATION; INTRACAUDAL; PERINEURAL at 10:56

## 2021-11-01 RX ADMIN — PROPOFOL 50 MG: 10 INJECTION, EMULSION INTRAVENOUS at 10:59

## 2021-11-01 RX ADMIN — PROPOFOL 20 MG: 10 INJECTION, EMULSION INTRAVENOUS at 11:24

## 2021-11-01 RX ADMIN — PROPOFOL 50 MG: 10 INJECTION, EMULSION INTRAVENOUS at 11:04

## 2021-11-01 RX ADMIN — PROPOFOL 50 MG: 10 INJECTION, EMULSION INTRAVENOUS at 11:01

## 2021-11-01 RX ADMIN — PROPOFOL 50 MG: 10 INJECTION, EMULSION INTRAVENOUS at 11:21

## 2021-11-01 RX ADMIN — PROPOFOL 50 MG: 10 INJECTION, EMULSION INTRAVENOUS at 11:10

## 2021-11-01 RX ADMIN — PROPOFOL 50 MG: 10 INJECTION, EMULSION INTRAVENOUS at 11:15

## 2021-11-01 RX ADMIN — SODIUM CHLORIDE: 9 INJECTION, SOLUTION INTRAVENOUS at 10:22

## 2021-11-01 RX ADMIN — PROPOFOL 200 MG: 10 INJECTION, EMULSION INTRAVENOUS at 10:56

## 2021-11-03 DIAGNOSIS — E03.9 PRIMARY HYPOTHYROIDISM: ICD-10-CM

## 2021-11-04 RX ORDER — LEVOTHYROXINE SODIUM 88 UG/1
TABLET ORAL
Qty: 90 TABLET | Refills: 3 | Status: SHIPPED | OUTPATIENT
Start: 2021-11-04

## 2021-12-08 ENCOUNTER — TELEPHONE (OUTPATIENT)
Dept: FAMILY MEDICINE CLINIC | Facility: CLINIC | Age: 71
End: 2021-12-08

## 2021-12-09 ENCOUNTER — TELEPHONE (OUTPATIENT)
Dept: GASTROENTEROLOGY | Facility: CLINIC | Age: 71
End: 2021-12-09

## 2021-12-14 ENCOUNTER — VBI (OUTPATIENT)
Dept: ADMINISTRATIVE | Facility: OTHER | Age: 71
End: 2021-12-14

## 2021-12-20 ENCOUNTER — OFFICE VISIT (OUTPATIENT)
Dept: FAMILY MEDICINE CLINIC | Facility: CLINIC | Age: 71
End: 2021-12-20
Payer: COMMERCIAL

## 2021-12-20 ENCOUNTER — TELEPHONE (OUTPATIENT)
Dept: FAMILY MEDICINE CLINIC | Facility: CLINIC | Age: 71
End: 2021-12-20

## 2021-12-20 VITALS
DIASTOLIC BLOOD PRESSURE: 80 MMHG | RESPIRATION RATE: 20 BRPM | TEMPERATURE: 97.7 F | SYSTOLIC BLOOD PRESSURE: 144 MMHG | OXYGEN SATURATION: 98 % | HEART RATE: 83 BPM

## 2021-12-20 DIAGNOSIS — L30.9 DERMATITIS: Primary | ICD-10-CM

## 2021-12-20 PROCEDURE — 3077F SYST BP >= 140 MM HG: CPT | Performed by: FAMILY MEDICINE

## 2021-12-20 PROCEDURE — 3079F DIAST BP 80-89 MM HG: CPT | Performed by: FAMILY MEDICINE

## 2021-12-20 PROCEDURE — 99213 OFFICE O/P EST LOW 20 MIN: CPT | Performed by: FAMILY MEDICINE

## 2021-12-20 RX ORDER — HYDROXYZINE HYDROCHLORIDE 25 MG/1
25 TABLET, FILM COATED ORAL 3 TIMES DAILY
Qty: 90 TABLET | Refills: 0 | Status: SHIPPED | OUTPATIENT
Start: 2021-12-20 | End: 2022-07-19

## 2021-12-20 RX ORDER — CLOBETASOL PROPIONATE 0.05 MG/G
1 GEL TOPICAL 2 TIMES DAILY
Qty: 30 G | Refills: 0 | Status: SHIPPED | OUTPATIENT
Start: 2021-12-20 | End: 2022-04-07 | Stop reason: ALTCHOICE

## 2021-12-23 ENCOUNTER — OFFICE VISIT (OUTPATIENT)
Dept: GASTROENTEROLOGY | Facility: CLINIC | Age: 71
End: 2021-12-23
Payer: COMMERCIAL

## 2021-12-23 VITALS
WEIGHT: 144 LBS | HEIGHT: 60 IN | HEART RATE: 68 BPM | SYSTOLIC BLOOD PRESSURE: 126 MMHG | DIASTOLIC BLOOD PRESSURE: 91 MMHG | BODY MASS INDEX: 28.27 KG/M2

## 2021-12-23 DIAGNOSIS — K44.9 HIATAL HERNIA WITH GERD AND ESOPHAGITIS: Primary | ICD-10-CM

## 2021-12-23 DIAGNOSIS — R10.9 ABDOMINAL PAIN, UNSPECIFIED ABDOMINAL LOCATION: ICD-10-CM

## 2021-12-23 DIAGNOSIS — K21.00 HIATAL HERNIA WITH GERD AND ESOPHAGITIS: Primary | ICD-10-CM

## 2021-12-23 DIAGNOSIS — K57.32 DIVERTICULITIS OF COLON: ICD-10-CM

## 2021-12-23 DIAGNOSIS — Z86.010 HISTORY OF COLON POLYPS: ICD-10-CM

## 2021-12-23 DIAGNOSIS — K58.9 IRRITABLE BOWEL SYNDROME, UNSPECIFIED TYPE: ICD-10-CM

## 2021-12-23 DIAGNOSIS — K57.30 DIVERTICULOSIS LARGE INTESTINE W/O PERFORATION OR ABSCESS W/O BLEEDING: ICD-10-CM

## 2021-12-23 PROCEDURE — 99214 OFFICE O/P EST MOD 30 MIN: CPT | Performed by: INTERNAL MEDICINE

## 2021-12-23 PROCEDURE — 3008F BODY MASS INDEX DOCD: CPT | Performed by: INTERNAL MEDICINE

## 2021-12-23 PROCEDURE — 1160F RVW MEDS BY RX/DR IN RCRD: CPT | Performed by: INTERNAL MEDICINE

## 2021-12-28 DIAGNOSIS — F33.0 MILD EPISODE OF RECURRENT MAJOR DEPRESSIVE DISORDER (HCC): ICD-10-CM

## 2021-12-28 RX ORDER — TRAZODONE HYDROCHLORIDE 100 MG/1
TABLET ORAL
Qty: 30 TABLET | Refills: 3 | Status: SHIPPED | OUTPATIENT
Start: 2021-12-28 | End: 2022-04-07 | Stop reason: SDUPTHER

## 2022-01-02 DIAGNOSIS — K44.9 HIATAL HERNIA WITH GERD AND ESOPHAGITIS: ICD-10-CM

## 2022-01-02 DIAGNOSIS — R10.13 EPIGASTRIC PAIN: ICD-10-CM

## 2022-01-02 DIAGNOSIS — K21.00 HIATAL HERNIA WITH GERD AND ESOPHAGITIS: ICD-10-CM

## 2022-01-02 RX ORDER — PANTOPRAZOLE SODIUM 40 MG/1
TABLET, DELAYED RELEASE ORAL
Qty: 180 TABLET | Refills: 3 | Status: SHIPPED | OUTPATIENT
Start: 2022-01-02

## 2022-01-03 DIAGNOSIS — I10 BENIGN ESSENTIAL HYPERTENSION: ICD-10-CM

## 2022-01-04 RX ORDER — LOSARTAN POTASSIUM 100 MG/1
TABLET ORAL
Qty: 90 TABLET | Refills: 3 | Status: SHIPPED | OUTPATIENT
Start: 2022-01-04

## 2022-02-19 ENCOUNTER — TELEPHONE (OUTPATIENT)
Dept: FAMILY MEDICINE CLINIC | Facility: CLINIC | Age: 72
End: 2022-02-19

## 2022-02-19 NOTE — TELEPHONE ENCOUNTER
She can certainly do that  The book would say she should taper it    So 1/2 of that for a few days then every other day for a week or two then every two days and then she can stop

## 2022-04-07 ENCOUNTER — OFFICE VISIT (OUTPATIENT)
Dept: FAMILY MEDICINE CLINIC | Facility: CLINIC | Age: 72
End: 2022-04-07
Payer: COMMERCIAL

## 2022-04-07 VITALS
RESPIRATION RATE: 18 BRPM | TEMPERATURE: 97.4 F | SYSTOLIC BLOOD PRESSURE: 130 MMHG | DIASTOLIC BLOOD PRESSURE: 80 MMHG | HEIGHT: 60 IN | HEART RATE: 76 BPM | WEIGHT: 141 LBS | BODY MASS INDEX: 27.68 KG/M2 | OXYGEN SATURATION: 99 %

## 2022-04-07 DIAGNOSIS — E03.9 PRIMARY HYPOTHYROIDISM: ICD-10-CM

## 2022-04-07 DIAGNOSIS — E78.5 HYPERLIPIDEMIA, UNSPECIFIED HYPERLIPIDEMIA TYPE: ICD-10-CM

## 2022-04-07 DIAGNOSIS — I88.9 LYMPHADENITIS: ICD-10-CM

## 2022-04-07 DIAGNOSIS — F33.0 MILD EPISODE OF RECURRENT MAJOR DEPRESSIVE DISORDER (HCC): Primary | ICD-10-CM

## 2022-04-07 DIAGNOSIS — I10 BENIGN ESSENTIAL HYPERTENSION: ICD-10-CM

## 2022-04-07 PROCEDURE — 1003F LEVEL OF ACTIVITY ASSESS: CPT | Performed by: FAMILY MEDICINE

## 2022-04-07 PROCEDURE — 3075F SYST BP GE 130 - 139MM HG: CPT | Performed by: FAMILY MEDICINE

## 2022-04-07 PROCEDURE — 1160F RVW MEDS BY RX/DR IN RCRD: CPT | Performed by: FAMILY MEDICINE

## 2022-04-07 PROCEDURE — 3288F FALL RISK ASSESSMENT DOCD: CPT | Performed by: FAMILY MEDICINE

## 2022-04-07 PROCEDURE — 99214 OFFICE O/P EST MOD 30 MIN: CPT | Performed by: FAMILY MEDICINE

## 2022-04-07 PROCEDURE — 3725F SCREEN DEPRESSION PERFORMED: CPT | Performed by: FAMILY MEDICINE

## 2022-04-07 PROCEDURE — 1101F PT FALLS ASSESS-DOCD LE1/YR: CPT | Performed by: FAMILY MEDICINE

## 2022-04-07 PROCEDURE — 3008F BODY MASS INDEX DOCD: CPT | Performed by: FAMILY MEDICINE

## 2022-04-07 PROCEDURE — 3079F DIAST BP 80-89 MM HG: CPT | Performed by: FAMILY MEDICINE

## 2022-04-07 PROCEDURE — 1036F TOBACCO NON-USER: CPT | Performed by: FAMILY MEDICINE

## 2022-04-07 RX ORDER — AMOXICILLIN AND CLAVULANATE POTASSIUM 875; 125 MG/1; MG/1
1 TABLET, FILM COATED ORAL EVERY 12 HOURS SCHEDULED
Qty: 14 TABLET | Refills: 0 | Status: SHIPPED | OUTPATIENT
Start: 2022-04-07 | End: 2022-04-14

## 2022-04-07 RX ORDER — TRAZODONE HYDROCHLORIDE 50 MG/1
50 TABLET ORAL
Qty: 90 TABLET | Refills: 1 | Status: SHIPPED | OUTPATIENT
Start: 2022-04-07 | End: 2022-07-19 | Stop reason: SDUPTHER

## 2022-04-07 NOTE — ASSESSMENT & PLAN NOTE
He has stopped the Crestor because it was giving her nightmares  she has been following a weight watchers diet      will check labs

## 2022-04-07 NOTE — ASSESSMENT & PLAN NOTE
Has been stable but she is concerned about taking the generic   we discussed changing to name brand but suggested that we wait on see what her lab results are before doing that

## 2022-04-07 NOTE — PROGRESS NOTES
Assessment/Plan:    1  Mild episode of recurrent major depressive disorder ([facility] [address] )  Assessment & Plan:  Stable   will continue trazodone but is doing well on a lower dose  dose decreased from 100-50 mg q h s  Orders:  -     traZODone (DESYREL) 50 mg tablet; Take 1 tablet (50 mg total) by mouth daily at bedtime    2  Primary hypothyroidism  Assessment & Plan:  Has been stable but she is concerned about taking the generic   we discussed changing to name brand but suggested that we wait on see what her lab results are before doing that    Orders:  -     TSH, 3rd generation; Future  -     T4, free; Future    3  Benign essential hypertension  Assessment & Plan:  Stable   continue losartan 100 mg daily and hydrochlorothiazide 25 mg daily    Orders:  -     CBC; Future  -     Comprehensive metabolic panel; Future  -     Lipid Panel with Direct LDL reflex; Future    4  Lymphadenitis  Comments:  Secondary to dental infection  Orders:  -     amoxicillin-clavulanate (Augmentin) 875-125 mg per tablet; Take 1 tablet by mouth every 12 (twelve) hours for 7 days    5  Hyperlipidemia, unspecified hyperlipidemia type  Assessment & Plan:  He has stopped the Crestor because it was giving her nightmares  she has been following a weight watchers diet  will check labs      BMI Counseling: Body mass index is 27 54 kg/m²  The BMI is above normal  Exercise recommendations include exercising 3-5 times per week  Rationale for BMI follow-up plan is due to patient being overweight or obese  There are no Patient Instructions on file for this visit  Return for Annual Wellness Visit end of October   Subjective:      Patient ID: Marlon Croft is a 74 y o  female  Chief Complaint   Patient presents with    L ear lump behind ear     found it 1 week ago  [id number]       She has a tender lump behind her left ear  It has been there for a month  She even had her glasses adjusted to see if that helps         The following portions of the patient's history were reviewed and updated as appropriate: allergies, current medications, past family history, past medical history, past social history, past surgical history and problem list     Review of Systems      Current Outpatient Medications   Medication Sig Dispense Refill    Alum & Mag Hydroxide-Simeth (MYLANTA PO) Take by mouth as needed      dicyclomine (BENTYL) 10 mg capsule TAKE 1 CAPSULE BY MOUTH AT  AT BEDTIME AS NEEDED 90 capsule 2    hydrochlorothiazide (HYDRODIURIL) 25 mg tablet TAKE 1 TABLET BY MOUTH  DAILY 90 tablet 3    hydrOXYzine HCL (ATARAX) 25 mg tablet Take 1 tablet (25 mg total) by mouth 3 (three) times a day 90 tablet 0    levothyroxine 88 mcg tablet TAKE 1 TABLET BY MOUTH  DAILY 90 tablet 3    losartan (COZAAR) 100 MG tablet TAKE 1 TABLET BY MOUTH  DAILY 90 tablet 3    pantoprazole (PROTONIX) 40 mg tablet TAKE 1 TABLET BY MOUTH  TWICE DAILY WITH MEALS 180 tablet 3    traZODone (DESYREL) 50 mg tablet Take 1 tablet (50 mg total) by mouth daily at bedtime 90 tablet 1    amoxicillin-clavulanate (Augmentin) 875-125 mg per tablet Take 1 tablet by mouth every 12 (twelve) hours for 7 days 14 tablet 0    gabapentin (NEURONTIN) 100 mg capsule  (Patient not taking: Reported on 12/23/2021 )      Magnesium 250 MG TABS Take 1 tablet by mouth daily (Patient not taking: Reported on 12/20/2021 )       No current facility-administered medications for this visit  Objective:    /80   Pulse 76   Temp (!) 97 4 °F (36 3 °C)   Resp 18   Ht 5' (1 524 m)   Wt 64 kg (141 lb)   SpO2 99%   BMI 27 54 kg/m²        Physical Exam  Vitals and nursing note reviewed  Constitutional:       Appearance: She is well-developed  HENT:      Head: Normocephalic and atraumatic  Right Ear: Tympanic membrane and external ear normal       Left Ear: Tympanic membrane and external ear normal    Cardiovascular:      Rate and Rhythm: Normal rate and regular rhythm        Heart sounds: Normal heart sounds  No murmur heard  No friction rub  Pulmonary:      Effort: Pulmonary effort is normal  No respiratory distress  Breath sounds: Normal breath sounds  No wheezing or rales  Musculoskeletal:      Right lower leg: No edema  Left lower leg: No edema  Lymphadenopathy:      Cervical: Cervical adenopathy (left posterior auricular ) present                  Danilo Nicole DO

## 2022-04-07 NOTE — ASSESSMENT & PLAN NOTE
Stable   will continue trazodone but is doing well on a lower dose  dose decreased from 100-50 mg q h s

## 2022-04-25 ENCOUNTER — TELEPHONE (OUTPATIENT)
Dept: FAMILY MEDICINE CLINIC | Facility: CLINIC | Age: 72
End: 2022-04-25

## 2022-04-25 DIAGNOSIS — K58.9 IRRITABLE BOWEL SYNDROME, UNSPECIFIED TYPE: ICD-10-CM

## 2022-04-25 DIAGNOSIS — R42 VERTIGO: Primary | ICD-10-CM

## 2022-04-25 RX ORDER — MECLIZINE HCL 12.5 MG/1
12.5 TABLET ORAL 3 TIMES DAILY PRN
Qty: 30 TABLET | Refills: 0 | Status: SHIPPED | OUTPATIENT
Start: 2022-04-25 | End: 2022-07-19 | Stop reason: ALTCHOICE

## 2022-04-25 RX ORDER — DICYCLOMINE HYDROCHLORIDE 10 MG/1
CAPSULE ORAL
Qty: 90 CAPSULE | Refills: 2 | Status: SHIPPED | OUTPATIENT
Start: 2022-04-25

## 2022-04-25 NOTE — TELEPHONE ENCOUNTER
Durga Medley is calling asking what she can take for her vertigo  She states she gets this every spring when the weather changes  She was just in office and really doesn't want to come in for a visit    Please call patient back     Thank you

## 2022-04-25 NOTE — TELEPHONE ENCOUNTER
4/25/2022 10:15 AM returned to Durga Medley  Her vertigo is acting up and she would like medication for it  She has had this before        Meclizine sent to pharmacy    Message shamika Reece DO

## 2022-05-23 ENCOUNTER — TELEPHONE (OUTPATIENT)
Dept: FAMILY MEDICINE CLINIC | Facility: CLINIC | Age: 72
End: 2022-05-23

## 2022-05-23 DIAGNOSIS — G25.81 RESTLESS LEG SYNDROME: Primary | ICD-10-CM

## 2022-05-23 RX ORDER — GABAPENTIN 100 MG/1
200 CAPSULE ORAL
Qty: 180 CAPSULE | Refills: 1 | Status: SHIPPED | OUTPATIENT
Start: 2022-05-23

## 2022-05-23 NOTE — TELEPHONE ENCOUNTER
5/23/2022 3:53 PM call patient regarding her medication request   She has been on gabapentin for and tolerated it well  New prescription sent to her mail order pharmacy as requested      Martha Barraza DO

## 2022-05-23 NOTE — TELEPHONE ENCOUNTER
----- Message from Diane Mallory sent at 5/23/2022  3:05 PM EDT -----  Regarding: FW: Meds    ----- Message -----  From: Bertrand Dyer  Sent: 5/23/2022   2:54 PM EDT  To: [facility]  Subject: Pari Parker  Im off trazadone, but still need something for sleep and rls  I dont like requip and was wondering about gabapentin which I had a few from your last script  It was 100 mg to take 2 at bedtime  If you think this would be ok or do you suggest anything else  Whatever you think please call it into [facility]   Thank you, Kristal Olivarez

## 2022-07-13 NOTE — TELEPHONE ENCOUNTER
9/11/2019 1:03 PM Called Rony Bocanegra regarding her lab results  Message left on home machine to call the office  Sodium level is down to 130  Fasting sugar is up to 103  Thyroid levels are in the over active range  Her levothyroxine dose needs to be adjusted  She is currently on 88 mcg daily  Iron and blood count are normal     Total cholesterol is up to 245      Sidney Garzon DO
9/12/2019 1:13 PM spoke with patient  We discussed her results  We reviewed her medication  Both her lisinopril and diuretic could be causing her low sodium levels  She is feeling tired  Recommended that she stop both the or diuretic and lisinopril  Dose of metoprolol was raised from 25-50 mg  She will continue to monitor her blood pressure at home  Dose of levothyroxine was changed from 88-75 mcg  Slip to get her sodium and thyroid levels recheck mailed to patient  Advised to get it done in about 6 weeks      Message complete  Milo Bergeron DO
Fabio Trejo will not be in office until tomorrow
Spoke with pt, I did give her the results, she would like a call from dr Jayna Salazar to go over new levothyroxine dose, what to do about sodium and cholesterol  Please call pt on her cell phone number  bchurch lpn
Opt out

## 2022-07-18 ENCOUNTER — RA CDI HCC (OUTPATIENT)
Dept: OTHER | Facility: HOSPITAL | Age: 72
End: 2022-07-18

## 2022-07-18 NOTE — PROGRESS NOTES
[facility]  coding opportunities       Chart reviewed, no opportunity found:   [address]        Patients Insurance     Medicare Insurance: [employer]

## 2022-07-19 ENCOUNTER — OFFICE VISIT (OUTPATIENT)
Dept: FAMILY MEDICINE CLINIC | Facility: CLINIC | Age: 72
End: 2022-07-19
Payer: COMMERCIAL

## 2022-07-19 VITALS
OXYGEN SATURATION: 99 % | DIASTOLIC BLOOD PRESSURE: 94 MMHG | HEART RATE: 65 BPM | HEIGHT: 60 IN | BODY MASS INDEX: 27.54 KG/M2 | TEMPERATURE: 97.5 F | SYSTOLIC BLOOD PRESSURE: 168 MMHG | RESPIRATION RATE: 16 BRPM

## 2022-07-19 DIAGNOSIS — F41.9 ANXIETY: ICD-10-CM

## 2022-07-19 DIAGNOSIS — E55.9 VITAMIN D DEFICIENCY: ICD-10-CM

## 2022-07-19 DIAGNOSIS — G47.00 INSOMNIA, UNSPECIFIED TYPE: ICD-10-CM

## 2022-07-19 DIAGNOSIS — F33.0 MILD EPISODE OF RECURRENT MAJOR DEPRESSIVE DISORDER (HCC): Primary | ICD-10-CM

## 2022-07-19 PROCEDURE — 99214 OFFICE O/P EST MOD 30 MIN: CPT | Performed by: FAMILY MEDICINE

## 2022-07-19 PROCEDURE — 3725F SCREEN DEPRESSION PERFORMED: CPT | Performed by: FAMILY MEDICINE

## 2022-07-19 RX ORDER — TRAZODONE HYDROCHLORIDE 50 MG/1
25 TABLET ORAL
Qty: 90 TABLET | Refills: 1 | Status: SHIPPED | OUTPATIENT
Start: 2022-07-19

## 2022-07-19 RX ORDER — HYDROXYZINE HYDROCHLORIDE 25 MG/1
25 TABLET, FILM COATED ORAL EVERY 6 HOURS PRN
Qty: 90 TABLET | Refills: 0 | Status: SHIPPED | OUTPATIENT
Start: 2022-07-19

## 2022-07-19 NOTE — PROGRESS NOTES
Assessment/Plan:       Diagnoses and all orders for this visit:    Mild episode of recurrent major depressive disorder ([facility] [address] )  Anxiety  Insomnia        - Jahaira Poon has been struggling with depression, anxiety and insomnia  Has tried trazodone 50mg daily which made her feel groggy  Recommend trying 25mg daily for the next month  She will also take as needed hydroxyzine for anxiety attacks  I did discuss other options for anxiety/depression such as SSRIs, but she would like to try lower dose of trazodone first  I did discuss therapy, but she would like to hold off for now  Follow up with Dr Sariah Andino in 1-2 months  -     traZODone (DESYREL) 50 mg tablet; Take 0 5 tablets (25 mg total) by mouth daily at bedtime    Vitamin D deficiency  -     Vitamin D 25 hydroxy; Future            Subjective:      Patient ID: Dinesh Sen is a 74 y o  female  HPI   Jahaira Poon presents today to discuss depression, anxiety and sleep issues  She has been struggling with these issues for a while  Has tried trazodone 50mg for sleep, but feels groggy in the mornings  Has also tried gabapentin at bedtime which causes similar issues  PHQ-2/9 Depression Screening    Little interest or pleasure in doing things: 2 - more than half the days  Feeling down, depressed, or hopeless: 2 - more than half the days  Trouble falling or staying asleep, or sleeping too much: 3 - nearly every day  Feeling tired or having little energy: 1 - several days  Poor appetite or overeatin - more than half the days  Feeling bad about yourself - or that you are a failure or have let yourself or your family down: 3 - nearly every day  Trouble concentrating on things, such as reading the newspaper or watching television: 0 - not at all  Moving or speaking so slowly that other people could have noticed   Or the opposite - being so fidgety or restless that you have been moving around a lot more than usual: 0 - not at all  Thoughts that you would be better off dead, or of hurting yourself in some way: 0 - not at all  PHQ-9 Score: 13   PHQ-9 Interpretation: Moderate depression        STEPHIE-7 Flowsheet Screening    Flowsheet Row Most Recent Value   Over the last 2 weeks, how often have you been bothered by any of the following problems? Feeling nervous, anxious, or on edge 3   Not being able to stop or control worrying 3   Worrying too much about different things 2   Trouble relaxing 1   Being so restless that it is hard to sit still 2   Becoming easily annoyed or irritable 2   Feeling afraid as if something awful might happen 0   STEPHIE-7 Total Score 13        The following portions of the patient's history were reviewed and updated as appropriate: allergies, current medications, past family history, past medical history, past social history, past surgical history and problem list     Review of Systems   Constitutional: Negative  HENT: Negative  Eyes: Negative  Respiratory: Negative  Cardiovascular: Negative  Gastrointestinal: Negative  Endocrine: Negative  Genitourinary: Negative  Musculoskeletal: Negative  Skin: Negative  Allergic/Immunologic: Negative  Neurological: Negative  Hematological: Negative  Psychiatric/Behavioral: Positive for dysphoric mood and sleep disturbance  The patient is nervous/anxious  Objective:      /94   Pulse 65   Temp 97 5 °F (36 4 °C)   Resp 16   Ht 5' (1 524 m)   SpO2 99%   BMI 27 54 kg/m²          Physical Exam  Constitutional:       General: She is not in acute distress  Appearance: She is well-developed  She is not diaphoretic  HENT:      Head: Normocephalic and atraumatic  Eyes:      General: No scleral icterus  Right eye: No discharge  Left eye: No discharge  Conjunctiva/sclera: Conjunctivae normal    Pulmonary:      Effort: Pulmonary effort is normal    Musculoskeletal:      Cervical back: Normal range of motion  Skin:     General: Skin is warm  Neurological:      Mental Status: She is alert and oriented to person, place, and time  Psychiatric:         Behavior: Behavior normal          Thought Content:  Thought content normal          Judgment: Judgment normal

## 2022-07-20 ENCOUNTER — APPOINTMENT (OUTPATIENT)
Dept: LAB | Facility: HOSPITAL | Age: 72
End: 2022-07-20
Payer: COMMERCIAL

## 2022-07-20 DIAGNOSIS — I10 BENIGN ESSENTIAL HYPERTENSION: ICD-10-CM

## 2022-07-20 DIAGNOSIS — E55.9 VITAMIN D DEFICIENCY: ICD-10-CM

## 2022-07-20 DIAGNOSIS — E03.9 PRIMARY HYPOTHYROIDISM: ICD-10-CM

## 2022-07-20 LAB
25(OH)D3 SERPL-MCNC: 52 NG/ML (ref 30–100)
ALBUMIN SERPL BCP-MCNC: 3.7 G/DL (ref 3.5–5)
ALP SERPL-CCNC: 66 U/L (ref 46–116)
ALT SERPL W P-5'-P-CCNC: 15 U/L (ref 12–78)
ANION GAP SERPL CALCULATED.3IONS-SCNC: 9 MMOL/L (ref 4–13)
AST SERPL W P-5'-P-CCNC: 13 U/L (ref 5–45)
BILIRUB SERPL-MCNC: 0.44 MG/DL (ref 0.2–1)
BUN SERPL-MCNC: 20 MG/DL (ref 5–25)
CALCIUM SERPL-MCNC: 8.8 MG/DL (ref 8.3–10.1)
CHLORIDE SERPL-SCNC: 93 MMOL/L (ref 96–108)
CHOLEST SERPL-MCNC: 245 MG/DL
CO2 SERPL-SCNC: 28 MMOL/L (ref 21–32)
CREAT SERPL-MCNC: 0.71 MG/DL (ref 0.6–1.3)
ERYTHROCYTE [DISTWIDTH] IN BLOOD BY AUTOMATED COUNT: 12.1 % (ref 11.6–15.1)
GFR SERPL CREATININE-BSD FRML MDRD: 84 ML/MIN/1.73SQ M
GLUCOSE P FAST SERPL-MCNC: 97 MG/DL (ref 65–99)
HCT VFR BLD AUTO: 39.3 % (ref 34.8–46.1)
HDLC SERPL-MCNC: 77 MG/DL
HGB BLD-MCNC: 13.4 G/DL (ref 11.5–15.4)
LDLC SERPL CALC-MCNC: 155 MG/DL (ref 0–100)
MCH RBC QN AUTO: 32.8 PG (ref 26.8–34.3)
MCHC RBC AUTO-ENTMCNC: 34.1 G/DL (ref 31.4–37.4)
MCV RBC AUTO: 96 FL (ref 82–98)
PLATELET # BLD AUTO: 330 THOUSANDS/UL (ref 149–390)
PMV BLD AUTO: 9.1 FL (ref 8.9–12.7)
POTASSIUM SERPL-SCNC: 3.6 MMOL/L (ref 3.5–5.3)
PROT SERPL-MCNC: 7.3 G/DL (ref 6.4–8.4)
RBC # BLD AUTO: 4.09 MILLION/UL (ref 3.81–5.12)
SODIUM SERPL-SCNC: 130 MMOL/L (ref 135–147)
T4 FREE SERPL-MCNC: 1.44 NG/DL (ref 0.76–1.46)
TRIGL SERPL-MCNC: 66 MG/DL
TSH SERPL DL<=0.05 MIU/L-ACNC: 1.11 UIU/ML (ref 0.45–4.5)
WBC # BLD AUTO: 5.28 THOUSAND/UL (ref 4.31–10.16)

## 2022-07-20 PROCEDURE — 84443 ASSAY THYROID STIM HORMONE: CPT

## 2022-07-20 PROCEDURE — 80053 COMPREHEN METABOLIC PANEL: CPT

## 2022-07-20 PROCEDURE — 36415 COLL VENOUS BLD VENIPUNCTURE: CPT

## 2022-07-20 PROCEDURE — 82306 VITAMIN D 25 HYDROXY: CPT

## 2022-07-20 PROCEDURE — 80061 LIPID PANEL: CPT

## 2022-07-20 PROCEDURE — 84439 ASSAY OF FREE THYROXINE: CPT

## 2022-07-20 PROCEDURE — 85027 COMPLETE CBC AUTOMATED: CPT

## 2022-07-27 ENCOUNTER — OFFICE VISIT (OUTPATIENT)
Dept: FAMILY MEDICINE CLINIC | Facility: CLINIC | Age: 72
End: 2022-07-27
Payer: COMMERCIAL

## 2022-07-27 VITALS
OXYGEN SATURATION: 97 % | DIASTOLIC BLOOD PRESSURE: 70 MMHG | BODY MASS INDEX: 27.54 KG/M2 | SYSTOLIC BLOOD PRESSURE: 100 MMHG | HEART RATE: 85 BPM | HEIGHT: 60 IN | RESPIRATION RATE: 18 BRPM | TEMPERATURE: 98.1 F

## 2022-07-27 DIAGNOSIS — B34.9 VIRAL INFECTION, UNSPECIFIED: Primary | ICD-10-CM

## 2022-07-27 PROCEDURE — 3074F SYST BP LT 130 MM HG: CPT | Performed by: FAMILY MEDICINE

## 2022-07-27 PROCEDURE — 99213 OFFICE O/P EST LOW 20 MIN: CPT | Performed by: FAMILY MEDICINE

## 2022-07-27 PROCEDURE — 1160F RVW MEDS BY RX/DR IN RCRD: CPT | Performed by: FAMILY MEDICINE

## 2022-07-27 PROCEDURE — 3078F DIAST BP <80 MM HG: CPT | Performed by: FAMILY MEDICINE

## 2022-07-27 PROCEDURE — 87636 SARSCOV2 & INF A&B AMP PRB: CPT | Performed by: FAMILY MEDICINE

## 2022-07-27 NOTE — PROGRESS NOTES
COVID-19 Outpatient Progress Note    Assessment/Plan:    Problem List Items Addressed This Visit    None     Visit Diagnoses     Viral infection, unspecified    -  Primary    Relevant Orders    Covid/Flu- Office Collect         Disposition:     PCR testing will be performed to test for COVID-19/Influenza  I have spent 15 minutes directly with the patient  Encounter provider Kerri Morris MD    Provider located at  [address]  [address] [place] [id number]    Recent Visits  No visits were found meeting these conditions  Showing recent visits within past 7 days and meeting all other requirements  Today's Visits  Date Type Provider Dept   07/27/22 Office Visit Kerri Morris MD [facility] today's visits and meeting all other requirements  Future Appointments  No visits were found meeting these conditions  Showing future appointments within next 150 days and meeting all other requirements     Subjective:   Estuardo Simpson is a 74 y o  female who is concerned about COVID-19  Patient's symptoms include fatigue, nasal congestion, rhinorrhea, sore throat, cough and headache  Patient denies fever, chills, malaise, anosmia, loss of taste, shortness of breath, chest tightness, abdominal pain, nausea, vomiting, diarrhea and myalgias       - Date of symptom onset: 7/25/2022      COVID-19 vaccination status: Fully vaccinated (primary series)    Exposure:   Contact with a person who is under investigation (PUI) for or who is positive for COVID-19 within the last 14 days?: Yes    Hospitalized recently for fever and/or lower respiratory symptoms?: No      Currently a healthcare worker that is involved in direct patient care?: No      Works in a special setting where the risk of COVID-19 transmission may be high? (this may include long-term care, correctional and penitentiary facilities; homeless shelters; assisted-living facilities and group homes ): No Resident in a special setting where the risk of COVID-19 transmission may be high? (this may include long-term care, correctional and half-way facilities; homeless shelters; assisted-living facilities and group homes ): No      No results found for: Shay Stauffer, [address] [facility], [facility], [address] [facility], [address] [facility]  Past Medical History:   Diagnosis Date    Abdominal pain     recent upper abd      Acute diverticulitis     last assessed 16    Adenoma of left adrenal gland     Anxiety     Arthritis     Benign paroxysmal positional vertigo     last assessed 03/24/15    Colon polyp     DDD (degenerative disc disease), lumbar     PT and yoga helped    Disease of thyroid gland     Diverticulitis     hx of    Diverticulosis     Epigastric pain     wakes her up at night    Gastric ulcer     hx of    Hiatal hernia     Hyperlipidemia     Hypertension     Malignant hypertension     Obesity     Primary hypothyroidism     Primary hypothyroidism     SOB (shortness of breath) on exertion     with exertion when taking a certain medication    Vertigo     last assessed 13     Past Surgical History:   Procedure Laterality Date    APPENDECTOMY      BREAST BIOPSY Left     a long time ago     SECTION      CHOLECYSTECTOMY      COLONOSCOPY      KNEE SURGERY      History of Arthrotomy of Knee; Open Meniscus Tear; left    UPPER GASTROINTESTINAL ENDOSCOPY       Current Outpatient Medications   Medication Sig Dispense Refill    Alum & Mag Hydroxide-Simeth (MYLANTA PO) Take by mouth as needed      dicyclomine (BENTYL) 10 mg capsule TAKE 1 CAPSULE BY MOUTH AT  AT BEDTIME AS NEEDED 90 capsule 2    hydrochlorothiazide (HYDRODIURIL) 25 mg tablet TAKE 1 TABLET BY MOUTH  DAILY 90 tablet 3    hydrOXYzine HCL (ATARAX) 25 mg tablet Take 1 tablet (25 mg total) by mouth every 6 (six) hours as needed for anxiety 90 tablet 0    levothyroxine 88 mcg tablet TAKE 1 TABLET BY MOUTH  DAILY 90 tablet 3    losartan (COZAAR) 100 MG tablet TAKE 1 TABLET BY MOUTH  DAILY 90 tablet 3    Magnesium 250 MG TABS Take 1 tablet by mouth daily      pantoprazole (PROTONIX) 40 mg tablet TAKE 1 TABLET BY MOUTH  TWICE DAILY WITH MEALS 180 tablet 3    traZODone (DESYREL) 50 mg tablet Take 0 5 tablets (25 mg total) by mouth daily at bedtime (Patient taking differently: Take 50 mg by mouth daily at bedtime) 90 tablet 1    gabapentin (NEURONTIN) 100 mg capsule Take 2 capsules (200 mg total) by mouth daily at bedtime (Patient not taking: Reported on 7/27/2022) 180 capsule 1     No current facility-administered medications for this visit  Allergies   Allergen Reactions    Crestor [Rosuvastatin] Other (See Comments)     Nightmares        Review of Systems   Constitutional: Positive for fatigue  Negative for chills and fever  HENT: Positive for congestion, rhinorrhea and sore throat  Eyes: Negative  Respiratory: Positive for cough  Negative for chest tightness and shortness of breath  Cardiovascular: Negative  Gastrointestinal: Negative  Negative for abdominal pain, diarrhea, nausea and vomiting  Endocrine: Negative  Genitourinary: Negative  Musculoskeletal: Negative  Negative for myalgias  Skin: Negative  Allergic/Immunologic: Negative  Neurological: Positive for headaches  Hematological: Negative  Psychiatric/Behavioral: Negative  Objective:    Vitals:    07/27/22 1550   BP: 100/70   Pulse: 85   Resp: 18   Temp: 98 1 °F (36 7 °C)   SpO2: 97%   Height: 5' (1 524 m)       Physical Exam  Constitutional:       General: She is not in acute distress  Appearance: Normal appearance  She is normal weight  She is not ill-appearing, toxic-appearing or diaphoretic  HENT:      Head: Normocephalic and atraumatic  Right Ear: Tympanic membrane, ear canal and external ear normal  There is no impacted cerumen        Left Ear: Tympanic membrane, ear canal and external ear normal  There is no impacted cerumen  Nose: Nose normal       Mouth/Throat:      Mouth: Mucous membranes are moist       Pharynx: Oropharynx is clear  No oropharyngeal exudate or posterior oropharyngeal erythema  Eyes:      General: No scleral icterus  Right eye: No discharge  Left eye: No discharge  Extraocular Movements: Extraocular movements intact  Conjunctiva/sclera: Conjunctivae normal       Pupils: Pupils are equal, round, and reactive to light  Cardiovascular:      Rate and Rhythm: Normal rate and regular rhythm  Pulses: Normal pulses  Heart sounds: Normal heart sounds  No murmur heard  No friction rub  No gallop  Pulmonary:      Effort: Pulmonary effort is normal  No respiratory distress  Breath sounds: Normal breath sounds  No stridor  No wheezing, rhonchi or rales  Chest:      Chest wall: No tenderness  Musculoskeletal:         General: Normal range of motion  Skin:     General: Skin is warm and dry  Neurological:      General: No focal deficit present  Mental Status: She is alert and oriented to person, place, and time  VIRTUAL VISIT DISCLAIMER    Susy Ngbeverly verbally agrees to participate in [employer]  Pt is aware that [employer] could be limited without vital signs or the ability to perform a full hands-on physical exam  Ml Barton understands she or the provider may request at any time to terminate the video visit and request the patient to seek care or treatment in person

## 2022-07-28 LAB
FLUAV RNA RESP QL NAA+PROBE: NEGATIVE
FLUBV RNA RESP QL NAA+PROBE: NEGATIVE
SARS-COV-2 RNA RESP QL NAA+PROBE: POSITIVE

## 2022-07-29 ENCOUNTER — TELEPHONE (OUTPATIENT)
Dept: FAMILY MEDICINE CLINIC | Facility: CLINIC | Age: 72
End: 2022-07-29

## 2022-07-29 DIAGNOSIS — U07.1 COVID-19: Primary | ICD-10-CM

## 2022-07-29 NOTE — TELEPHONE ENCOUNTER
----- Message from Francois Dials sent at 7/28/2022  6:39 PM EDT -----  Regarding: FW: Question regarding COVID19 AND INFLUENZA A/B PCR    ----- Message -----  From: Nick Sampson  Sent: 7/28/2022   6:01 PM EDT  To: THE [facility] Clinical  Subject: Question regarding COVID19 AND INFLUENZA A/B#    Since I tested positive what medications should I be taking?

## 2022-07-29 NOTE — TELEPHONE ENCOUNTER
7/29/2022 8:05 AM spoke with patient  She is feeling worse     She is candidate for paxlovid  Risks and benefits of medication discussed  Emergency use authorization discussed as well  Prescription for Paxlovid sent to pharmacy       Carmela Welch DO

## 2022-08-04 DIAGNOSIS — R60.9 EDEMA, UNSPECIFIED TYPE: ICD-10-CM

## 2022-08-04 DIAGNOSIS — I10 BENIGN ESSENTIAL HYPERTENSION: ICD-10-CM

## 2022-08-04 RX ORDER — HYDROCHLOROTHIAZIDE 25 MG/1
TABLET ORAL
Qty: 90 TABLET | Refills: 1 | Status: SHIPPED | OUTPATIENT
Start: 2022-08-04

## 2022-10-06 DIAGNOSIS — E03.9 PRIMARY HYPOTHYROIDISM: ICD-10-CM

## 2022-10-07 RX ORDER — LEVOTHYROXINE SODIUM 88 UG/1
TABLET ORAL
Qty: 90 TABLET | Refills: 1 | Status: SHIPPED | OUTPATIENT
Start: 2022-10-07

## 2022-11-02 ENCOUNTER — TELEPHONE (OUTPATIENT)
Dept: GASTROENTEROLOGY | Facility: CLINIC | Age: 72
End: 2022-11-02

## 2022-11-07 ENCOUNTER — OFFICE VISIT (OUTPATIENT)
Dept: FAMILY MEDICINE CLINIC | Facility: CLINIC | Age: 72
End: 2022-11-07

## 2022-11-07 VITALS
RESPIRATION RATE: 16 BRPM | HEART RATE: 68 BPM | SYSTOLIC BLOOD PRESSURE: 178 MMHG | DIASTOLIC BLOOD PRESSURE: 98 MMHG | BODY MASS INDEX: 27.54 KG/M2 | HEIGHT: 60 IN | OXYGEN SATURATION: 99 % | TEMPERATURE: 97.5 F

## 2022-11-07 DIAGNOSIS — I10 BENIGN ESSENTIAL HYPERTENSION: Primary | ICD-10-CM

## 2022-11-07 DIAGNOSIS — J44.9 CHRONIC OBSTRUCTIVE PULMONARY DISEASE, UNSPECIFIED COPD TYPE (HCC): ICD-10-CM

## 2022-11-07 DIAGNOSIS — D35.02 ADENOMA OF LEFT ADRENAL GLAND: ICD-10-CM

## 2022-11-07 DIAGNOSIS — E03.9 PRIMARY HYPOTHYROIDISM: ICD-10-CM

## 2022-11-07 RX ORDER — AMLODIPINE BESYLATE 5 MG/1
5 TABLET ORAL DAILY
Qty: 90 TABLET | Refills: 0 | Status: SHIPPED | OUTPATIENT
Start: 2022-11-07 | End: 2022-11-18 | Stop reason: SDUPTHER

## 2022-11-07 NOTE — PATIENT INSTRUCTIONS
Amlodipine (By mouth)   Amlodipine ([id number])  Lowers high blood pressure and relieves chronic stable angina (chest pain)  This medicine is a calcium channel blocker  Brand Name(s): Oswaldo Roberts   There may be other brand names for this medicine  When This Medicine Should Not Be Used: This medicine is not right for everyone  Do not use it if you had an allergic reaction to amlodipine  How to Use This Medicine:   Liquid, Tablet, Dissolving Tablet  Take your medicine as directed  Your dose may need to be changed several times to find what works best for you  Take this medicine at the same time each day  Read and follow the patient instructions that come with this medicine  Talk to your doctor or pharmacist if you have any questions  Missed dose: Take a dose as soon as you remember  If it is almost time for your next dose, wait until then and take a regular dose  Do not take extra medicine to make up for a missed dose  Store the medicine in a closed container at room temperature, away from heat, moisture, and direct light  Store the oral liquid in the refrigerator  Do not freeze  Drugs and Foods to Avoid:   Ask your doctor or pharmacist before using any other medicine, including over-the-counter medicines, vitamins, and herbal products  Some medicines can affect how amlodipine works  Tell your doctor if you are also using clarithromycin, cyclosporine, diltiazem, itraconazole, ritonavir, sildenafil, simvastatin, or tacrolimus  Warnings While Using This Medicine:   Tell your doctor if you are pregnant or breastfeeding, or if you have liver disease, or heart or blood vessel disease (including coronary artery disease, aortic stenosis)  This medicine may cause the following problems:  Low blood pressure  Worsening chest pain  Increased risk of heart attack  This medicine could lower your blood pressure too much, especially when you first use it or if you are dehydrated   Stand or sit up slowly if you feel lightheaded or dizzy  Do not stop using this medicine without asking your doctor, even if you feel well  This medicine will not cure high blood pressure, but it will help keep it in normal range  You may have to take blood pressure medicine for the rest of your life  Your doctor will check your progress and the effects of this medicine at regular visits  Keep all appointments  Keep all medicine out of the reach of children  Never share your medicine with anyone  Possible Side Effects While Using This Medicine:   Call your doctor right away if you notice any of these side effects: Allergic reaction: Itching or hives, swelling in your face or hands, swelling or tingling in your mouth or throat, chest tightness, trouble breathing  Lightheadedness, dizziness, fainting  New or worsening chest pain  Swelling in your hands, ankles, or legs  Trouble breathing, nausea, unusual sweating  If you notice other side effects that you think are caused by this medicine, tell your doctor  Call your doctor for medical advice about side effects  You may report side effects to FDA at 5-556-FDA-7580    © Copyright [employer] [facility] 2022 Information is for End User's use only and may not be sold, redistributed or otherwise used for commercial purposes  The above information is an  only  It is not intended as medical advice for individual conditions or treatments  Talk to your doctor, nurse or pharmacist before following any medical regimen to see if it is safe and effective for you

## 2022-11-07 NOTE — PROGRESS NOTES
Assessment/Plan:    1  Benign essential hypertension  Assessment & Plan: Will start norvasc at night time and will continue with losartan and HCTZ in morning and will follow back in 1 to 2 weeks in office    Orders:  -     amLODIPine (NORVASC) 5 mg tablet; Take 1 tablet (5 mg total) by mouth daily    2  Chronic obstructive pulmonary disease, unspecified COPD type ([address] )    3  Adenoma of left adrenal gland  Assessment & Plan: Will follow back with ENDO    Orders:  -     Ambulatory Referral to Endocrinology; Future    4  Primary hypothyroidism  Assessment & Plan:  Complaint with current regimen and last thyroid blood work was in range in July 2022            Patient Instructions:  Supportive care discussed and advised  Advised to RTO for any worsening and no improvement  Follow up for no improvement and worsening of conditions  Patient advised and educated when to see immediate medical care  Return in about 2 weeks (around 11/21/2022), or if symptoms worsen or fail to improve  Future Appointments   Date Time Provider Zuleika Hoover   11/18/2022  1:15 PM DO LIBBY [facility]           Subjective:      Patient ID: Karla Rodas is a 74 y o  female  Chief Complaint   Patient presents with   • Blood Pressure Check         Vitals:  BP (!) 178/98   Pulse 68   Temp 97 5 °F (36 4 °C)   Resp 16   Ht 5' (1 524 m)   SpO2 99%   BMI 27 54 kg/m²      HPI  Patient stated that from last 4 days her BP is been very rosenda and SBP in 170's and DBP in 90's and having headache  Stated that does happen couple of times a month from last couple of months  Stated that took extra dose of losartan yesterday and half dose of losartan today for BP  Denies chest pain, sob, and dizziness  Stated that has h/o left adrenal gland adenoma and used to follow with endo and due to follow up and not sure if that is acting up  Complaint with medications for chronic illnesses and tolerating it well           The following portions of the patient's history were reviewed and updated as appropriate: allergies, current medications, past family history, past medical history, past social history, past surgical history and problem list       Review of Systems   Constitutional: Negative  HENT: Negative  Respiratory: Negative  Cardiovascular: Negative  As noted in HPI     Skin: Negative  Neurological: Positive for headaches  Negative for dizziness and light-headedness  Objective:    Social History     Tobacco Use   Smoking Status Former Smoker   • Quit date: 1990   • Years since quittin 6   Smokeless Tobacco Never Used   Tobacco Comment    quit 23 years ago- only a social smoker        Allergies:    Allergies   Allergen Reactions   • Crestor [Rosuvastatin] Other (See Comments)     Nightmares          Current Outpatient Medications   Medication Sig Dispense Refill   • Alum & Mag Hydroxide-Simeth (MYLANTA PO) Take by mouth as needed     • amLODIPine (NORVASC) 5 mg tablet Take 1 tablet (5 mg total) by mouth daily 90 tablet 0   • dicyclomine (BENTYL) 10 mg capsule TAKE 1 CAPSULE BY MOUTH AT  AT BEDTIME AS NEEDED 90 capsule 2   • gabapentin (NEURONTIN) 100 mg capsule Take 2 capsules (200 mg total) by mouth daily at bedtime 180 capsule 1   • hydrochlorothiazide (HYDRODIURIL) 25 mg tablet TAKE 1 TABLET BY MOUTH  DAILY 90 tablet 1   • hydrOXYzine HCL (ATARAX) 25 mg tablet Take 1 tablet (25 mg total) by mouth every 6 (six) hours as needed for anxiety 90 tablet 0   • levothyroxine 88 mcg tablet TAKE 1 TABLET BY MOUTH  DAILY 90 tablet 1   • losartan (COZAAR) 100 MG tablet TAKE 1 TABLET BY MOUTH  DAILY 90 tablet 3   • Magnesium 250 MG TABS Take 1 tablet by mouth daily     • pantoprazole (PROTONIX) 40 mg tablet TAKE 1 TABLET BY MOUTH  TWICE DAILY WITH MEALS 180 tablet 3   • traZODone (DESYREL) 50 mg tablet Take 0 5 tablets (25 mg total) by mouth daily at bedtime (Patient taking differently: Take 50 mg by mouth daily at bedtime) 90 tablet 1     No current facility-administered medications for this visit  Physical Exam  Constitutional:       Appearance: Normal appearance  HENT:      Head: Normocephalic and atraumatic  Nose: Nose normal    Eyes:      Conjunctiva/sclera: Conjunctivae normal    Cardiovascular:      Rate and Rhythm: Normal rate and regular rhythm  Pulses: Normal pulses  Heart sounds: Normal heart sounds  Pulmonary:      Effort: Pulmonary effort is normal       Breath sounds: Normal breath sounds  Skin:     General: Skin is warm and dry  Findings: No rash  Neurological:      Mental Status: She is alert and oriented to person, place, and time  Psychiatric:         Mood and Affect: Mood normal          Behavior: Behavior normal          Thought Content:  Thought content normal          Judgment: Judgment normal                      Josesito Maldonado

## 2022-11-07 NOTE — ASSESSMENT & PLAN NOTE
Will start norvasc at night time and will continue with losartan and HCTZ in morning and will follow back in 1 to 2 weeks in office

## 2022-11-18 ENCOUNTER — OFFICE VISIT (OUTPATIENT)
Dept: FAMILY MEDICINE CLINIC | Facility: CLINIC | Age: 72
End: 2022-11-18

## 2022-11-18 VITALS
HEART RATE: 80 BPM | DIASTOLIC BLOOD PRESSURE: 70 MMHG | BODY MASS INDEX: 27.54 KG/M2 | TEMPERATURE: 96.4 F | HEIGHT: 60 IN | OXYGEN SATURATION: 100 % | RESPIRATION RATE: 16 BRPM | SYSTOLIC BLOOD PRESSURE: 122 MMHG

## 2022-11-18 DIAGNOSIS — I10 BENIGN ESSENTIAL HYPERTENSION: Primary | ICD-10-CM

## 2022-11-18 DIAGNOSIS — D17.79 ADRENAL MYELOLIPOMA: ICD-10-CM

## 2022-11-18 RX ORDER — AMLODIPINE BESYLATE 5 MG/1
5 TABLET ORAL DAILY
Qty: 90 TABLET | Refills: 1 | Status: SHIPPED | OUTPATIENT
Start: 2022-11-18

## 2022-11-18 NOTE — PROGRESS NOTES
Name: Susy Perez      : 1950      MRN: [id number]  Encounter Provider: Elie Watkins DO  Encounter Date: 2022   Encounter department: [address] [place] [address]     1  Benign essential hypertension  Assessment & Plan:  Well controlled  Continue amlodipine 5 mg daily, hydrochlorothiazide 25 mg daily and losartan 100 mg daily    Orders:  -     CBC; Future; Expected date: 2023  -     Comprehensive metabolic panel; Future; Expected date: 2023  -     Lipid Panel with Direct LDL reflex; Future; Expected date: 2023  -     amLODIPine (NORVASC) 5 mg tablet; Take 1 tablet (5 mg total) by mouth daily    2  Adrenal myelolipoma  Assessment & Plan:  She is going to see endocrinology regarding if she needs follow up       Shingles vaccine recommended   Due for Prevnar 20, but had her COVID booster yesterday  Will hold off      Return in about 6 months (around 2023) for Annual Wellness Visit  Subjective      Had her COVID booster yesterday   She is otherwise feeling well      Review of Systems    Current Outpatient Medications on File Prior to Visit   Medication Sig   • Alum & Mag Hydroxide-Simeth (MYLANTA PO) Take by mouth as needed   • dicyclomine (BENTYL) 10 mg capsule TAKE 1 CAPSULE BY MOUTH AT  AT BEDTIME AS NEEDED   • gabapentin (NEURONTIN) 100 mg capsule Take 2 capsules (200 mg total) by mouth daily at bedtime   • hydrochlorothiazide (HYDRODIURIL) 25 mg tablet TAKE 1 TABLET BY MOUTH  DAILY   • hydrOXYzine HCL (ATARAX) 25 mg tablet Take 1 tablet (25 mg total) by mouth every 6 (six) hours as needed for anxiety   • levothyroxine 88 mcg tablet TAKE 1 TABLET BY MOUTH  DAILY   • losartan (COZAAR) 100 MG tablet TAKE 1 TABLET BY MOUTH  DAILY   • Magnesium 250 MG TABS Take 1 tablet by mouth daily   • pantoprazole (PROTONIX) 40 mg tablet TAKE 1 TABLET BY MOUTH  TWICE DAILY WITH MEALS   • traZODone (DESYREL) 50 mg tablet Take 0 5 tablets (25 mg total) by mouth daily at bedtime (Patient taking differently: Take 50 mg by mouth daily at bedtime)   • [DISCONTINUED] amLODIPine (NORVASC) 5 mg tablet Take 1 tablet (5 mg total) by mouth daily       Objective     /70 (BP Location: Left arm, Patient Position: Sitting, Cuff Size: Standard)   Pulse 80   Temp (!) 96 4 °F (35 8 °C)   Resp 16   Ht 5' (1 524 m)   SpO2 100%   BMI 27 54 kg/m²     Physical Exam  Vitals and nursing note reviewed  Constitutional:       Appearance: She is well-developed  HENT:      Head: Normocephalic and atraumatic  Right Ear: Tympanic membrane and external ear normal       Left Ear: Tympanic membrane and external ear normal    Cardiovascular:      Rate and Rhythm: Normal rate and regular rhythm  Heart sounds: Normal heart sounds  No murmur heard  No friction rub  Pulmonary:      Effort: Pulmonary effort is normal  No respiratory distress  Breath sounds: Normal breath sounds  No wheezing or rales  Musculoskeletal:      Right lower leg: No edema  Left lower leg: No edema         Kim Brilliant, DO

## 2022-11-18 NOTE — ASSESSMENT & PLAN NOTE
Well controlled  Continue amlodipine 5 mg daily, hydrochlorothiazide 25 mg daily and losartan 100 mg daily

## 2022-12-02 DIAGNOSIS — R10.13 EPIGASTRIC PAIN: ICD-10-CM

## 2022-12-02 DIAGNOSIS — K44.9 HIATAL HERNIA WITH GERD AND ESOPHAGITIS: ICD-10-CM

## 2022-12-02 DIAGNOSIS — K21.00 HIATAL HERNIA WITH GERD AND ESOPHAGITIS: ICD-10-CM

## 2022-12-02 RX ORDER — PANTOPRAZOLE SODIUM 40 MG/1
TABLET, DELAYED RELEASE ORAL
Qty: 180 TABLET | Refills: 3 | Status: SHIPPED | OUTPATIENT
Start: 2022-12-02

## 2023-01-09 ENCOUNTER — CONSULT (OUTPATIENT)
Dept: ENDOCRINOLOGY | Facility: CLINIC | Age: 73
End: 2023-01-09

## 2023-01-09 VITALS
HEART RATE: 94 BPM | SYSTOLIC BLOOD PRESSURE: 120 MMHG | BODY MASS INDEX: 27.54 KG/M2 | DIASTOLIC BLOOD PRESSURE: 82 MMHG | HEIGHT: 60 IN

## 2023-01-09 DIAGNOSIS — I10 HYPERTENSION, UNSPECIFIED TYPE: Primary | ICD-10-CM

## 2023-01-09 DIAGNOSIS — E87.1 HYPONATREMIA: ICD-10-CM

## 2023-01-09 DIAGNOSIS — D35.02 ADENOMA OF LEFT ADRENAL GLAND: ICD-10-CM

## 2023-01-09 DIAGNOSIS — G25.81 RESTLESS LEGS: ICD-10-CM

## 2023-01-09 DIAGNOSIS — E03.9 PRIMARY HYPOTHYROIDISM: ICD-10-CM

## 2023-01-09 NOTE — PROGRESS NOTES
Angelique Gomez 74 y o  female MRN: [id number]    Encounter: [id number]      Assessment/Plan     1  Adrenal incidentaloma - left sided lipid rich adenoma stable in size from 5/2016 to most recent comparison 11/2018  Hormonal evaluation has showed normal plasma metanephrines and suppressible cortisol following LDDST (most recent 2019)  She has had non-suppressed renin levels with ARR <20, neither finding support primary hyperaldosteronism  No history of spontaneous hypokalemia noted  This lesion is incompatible with pheo, and so additional testing for catecholamines is not warranted for this indicatoin  In addition, she has had numerous evaluations showing an appropriate cortisol suppression  Given the overall duration of time this diagnosis has been known and tested, it is unlikely that she will develop hormonal autonomy  However, will include labs for repeat testing of hyperaldosteronism given clinical history of poorly controlled hypertension  2  Hypertension - controlled today  3  Hypothyroidism - stable  Continue MGMT per PCP    4  Hyponatremia, hypotonic - mild hyponatremia  ? SIADH, thiazide  5  Restless legs - consider checking iron studies    Problem List Items Addressed This Visit        Endocrine    Primary hypothyroidism    Adenoma of left adrenal gland    Relevant Orders    Aldosterone/Renin Ratio       Other    Hyponatremia   Other Visit Diagnoses     Hypertension, unspecified type    -  Primary    Relevant Orders    Aldosterone/Renin Ratio    Restless legs                CC: Adrenal myelolipoma     History of Present Illness     HPI:    Rosas Yin presents today for evaluation of adrenal myelolipoma  Patient originally found to have adrenal incidentaloma back in May 2016  She reports no history of hypokalemia  She denies history of diabetes, osteoporosis  She has a history of difficult to control hypertension  At times, she will have DBP >100   She reports intermittent headaches, often at times of high blood pressure  No palpitations, no diaphoresis, no facial flushing  She reports no chronic use of systemic steroids  She does report history of hyponatremia  Hyponatremia appears to be mild  She reports no symptoms of low sodium, she denies prior hospitalizations for hyponatremia  For hypertension she takes amlodipine 5 mg daily, HCTZ 25 mg daily, losartan 100 mg daily  She is not on spironolactone, but was previously on this therapy in the past      She also has a history of hypothyroidism  For hypothyroidism she takes levothyroxine 88 mcg daily  She denies any hyper- or hypothyroid symptoms  She does also report history of restless legs  She takes gabapentin nightly, which has been helpful for her sleep  Since taking gabapentin, she has been able to sleep at night without use of trazodone  Review of Systems   Constitutional: Negative for chills, diaphoresis and unexpected weight change  HENT: Negative for trouble swallowing and voice change  Eyes: Negative  Respiratory: Negative  Cardiovascular: Negative for chest pain and palpitations  Gastrointestinal: Negative for nausea and vomiting  Endocrine: Negative for polydipsia and polyuria  Neurological: Positive for headaches  Negative for tremors  Psychiatric/Behavioral: Positive for dysphoric mood (history of depression, controlled) and sleep disturbance (improving on therapy)  The patient is not nervous/anxious  All other systems reviewed and are negative  Historical Information   Past Medical History:   Diagnosis Date   • Abdominal pain     recent upper abd     • Acute diverticulitis     last assessed 07/18/16   • Adenoma of left adrenal gland    • Anxiety    • Arthritis    • Benign paroxysmal positional vertigo     last assessed 03/24/15   • Colon polyp    • DDD (degenerative disc disease), lumbar     PT and yoga helped   • Disease of thyroid gland    • Diverticulitis     hx of   • Diverticulosis    • Epigastric pain     wakes her up at night   • Gastric ulcer     hx of   • Hiatal hernia    • Hyperlipidemia    • Hypertension    • Malignant hypertension    • Obesity    • Primary hypothyroidism    • Primary hypothyroidism    • SOB (shortness of breath) on exertion     with exertion when taking a certain medication   • Vertigo     last assessed 11     Past Surgical History:   Procedure Laterality Date   • APPENDECTOMY     • BREAST BIOPSY Left     a long time ago   •  SECTION     • CHOLECYSTECTOMY     • COLONOSCOPY     • KNEE SURGERY      History of Arthrotomy of Knee; Open Meniscus Tear; left   • UPPER GASTROINTESTINAL ENDOSCOPY       Social History   Social History     Substance and Sexual Activity   Alcohol Use Yes   • Alcohol/week: 2 0 standard drinks   • Types: 2 Glasses of wine per week    Comment: occasional- once or twice week      Social History     Substance and Sexual Activity   Drug Use Never     Social History     Tobacco Use   Smoking Status Former   • Types: Cigarettes   • Quit date: 1990   • Years since quittin 7   Smokeless Tobacco Never   Tobacco Comments    quit 23 years ago- only a social smoker      Family History:   Family History   Adopted: Yes   Problem Relation Age of Onset   • Heart disease Family         Adopted but heard family member   • No Known Problems Mother        Meds/Allergies   Current Outpatient Medications   Medication Sig Dispense Refill   • amLODIPine (NORVASC) 5 mg tablet Take 1 tablet (5 mg total) by mouth daily 90 tablet 1   • dicyclomine (BENTYL) 10 mg capsule TAKE 1 CAPSULE BY MOUTH AT  AT BEDTIME AS NEEDED 90 capsule 2   • gabapentin (NEURONTIN) 100 mg capsule Take 2 capsules (200 mg total) by mouth daily at bedtime 180 capsule 1   • hydrochlorothiazide (HYDRODIURIL) 25 mg tablet TAKE 1 TABLET BY MOUTH  DAILY 90 tablet 1   • hydrOXYzine HCL (ATARAX) 25 mg tablet Take 1 tablet (25 mg total) by mouth every 6 (six) hours as needed for anxiety 90 tablet 0   • levothyroxine 88 mcg tablet TAKE 1 TABLET BY MOUTH  DAILY 90 tablet 1   • losartan (COZAAR) 100 MG tablet TAKE 1 TABLET BY MOUTH  DAILY 90 tablet 3   • pantoprazole (PROTONIX) 40 mg tablet TAKE 1 TABLET BY MOUTH  TWICE DAILY WITH MEALS 180 tablet 3   • Alum & Mag Hydroxide-Simeth (MYLANTA PO) Take by mouth as needed (Patient not taking: Reported on 1/9/2023)     • Magnesium 250 MG TABS Take 1 tablet by mouth daily     • traZODone (DESYREL) 50 mg tablet Take 0 5 tablets (25 mg total) by mouth daily at bedtime (Patient not taking: Reported on 1/9/2023) 90 tablet 1     No current facility-administered medications for this visit  Allergies   Allergen Reactions   • Crestor [Rosuvastatin] Other (See Comments)     Nightmares        Objective   Vitals: Blood pressure 120/82, pulse 94, height 5' (1 524 m)  Physical Exam  Vitals reviewed  Constitutional:       Appearance: Normal appearance  Comments: No Cushingoid features   HENT:      Head: Normocephalic and atraumatic  Nose: Nose normal    Eyes:      General: No scleral icterus  Conjunctiva/sclera: Conjunctivae normal    Pulmonary:      Effort: Pulmonary effort is normal  No respiratory distress  Musculoskeletal:      Cervical back: Normal range of motion  Skin:     General: Skin is warm and dry  Neurological:      General: No focal deficit present  Mental Status: She is alert  Psychiatric:         Mood and Affect: Mood normal          Behavior: Behavior normal          The history was obtained from the review of the chart, patient      Lab Results:   Lab Results   Component Value Date/Time    Potassium 3 6 07/20/2022 07:34 AM    Chloride 93 (L) 07/20/2022 07:34 AM    CO2 28 07/20/2022 07:34 AM    BUN 20 07/20/2022 07:34 AM    Creatinine 0 71 07/20/2022 07:34 AM    Glucose, Fasting 97 07/20/2022 07:34 AM    Calcium 8 8 07/20/2022 07:34 AM    eGFR 84 07/20/2022 07:34 AM    TSH 3RD GENERATON 1 106 07/20/2022 07:34 AM    Free T4 1 44 07/20/2022 07:34 AM      LDDST  Component 3 yr ago   Dexamethasone, Serum or Plasma  265      Component 3 yr ago   Cortisol  1 6           Ref Range & Units 3/28/19  9:42 AM   Renin Activity ng/mL/h 1 4       Ref Range & Units 3/28/19  9:42 AM   Aldosterone ng/dL 15 0       Ref Range & Units 3 yr ago Comments   Glucose 65 - 99 mg/dL 84     BUN 7 - 25 mg/dL 26 High      Creatinine 0 4 - 1 1 mg/dL 0 81     Sodium 135 - 145 mmol/L 134 Low      Potassium 3 5 - 5 2 mmol/L 4 8     Chloride 100 - 109 mmol/L 99 Low      Carbon Dioxide 23 - 31 mmol/L 26     Calcium 8 5 - 10 1 mg/dL 9 1     Anion Gap 3 - 11 9     GFR, Calculated >60 mL/min/1 73m2 73        Ref Range & Units 3 yr ago Comments   Osmolality 279 - 295 mOsm/kg 278 Low           Ref Range & Units 3 yr ago Comments   Osmolality, Urine 300 - 900 mOsm/kg 803        Ref Range & Units 3 yr ago Comments   Sodium, Urine 30 - 90 mmol/L 79         Component      Latest Ref Rng & Units 12/19/2017   NORMETANEPHRINE FREE PLASMA      0 - 145 pg/mL 105   METANEPHRINE FREE PLASMA      0 - 62 pg/mL 34   TSH 3RD GENERATON      0 358 - 3 740 uIU/mL 0 284 (L)   Free T4      0 76 - 1 46 ng/dL 1 37   Cortisol, Random      ug/dL 1 2 (LL)   Aldosterone      0 0 - 30 0 ng/dL 12 8         Imaging Studies:  ?  ?11/13/2018    CT ABDOMEN AND PELVIS WITH IV CONTRAST     INDICATION:   R10 12: Left upper quadrant pain  R10 13: Epigastric pain        COMPARISON:  CT abdomen 6/30/2017      TECHNIQUE:  CT examination of the abdomen and pelvis was performed  Axial, sagittal, and coronal 2D reformatted images were created from the source data and submitted for interpretation      Radiation dose length product (DLP) for this visit:  387 78 mGy-cm     This examination, like all CT scans performed in the [address] [place] [address], was performed utilizing techniques to minimize radiation dose exposure, including the use of iterative   reconstruction and automated exposure control      IV Contrast: 100 mL of iohexol (OMNIPAQUE)  Enteric Contrast:  Enteric contrast was not administered      FINDINGS:      ABDOMEN     LOWER CHEST:  No clinically significant abnormality identified in the visualized lower chest      LIVER/BILIARY TREE:  Hepatic steatosis  Stable appearance of mainly peripherally enhancing 1 5 x 1 4 cm lesion in the left lobe liver (series 2 image 10) most compatible with a hemangioma      GALLBLADDER:  Surgically removed with stable expected postcholecystectomy dilation of common bile duct     SPLEEN:  Unremarkable      PANCREAS:  Unremarkable      ADRENAL GLANDS:  Stable fat-containing 1 7 cm left adrenal lesion most compatible with benign myelolipoma      KIDNEYS/URETERS:  Stable left renal lower pole simple appearing cyst   No hydronephrosis     STOMACH AND BOWEL:  Stomach and small intestine in appear unremarkable  Left colonic diverticuli without evidence of diverticulitis  There is mild left colon and rectosigmoid mural thickening      APPENDIX:  Appendix is not seen  Appendectomy by history      ABDOMINOPELVIC CAVITY:  No ascites or free intraperitoneal air  No lymphadenopathy      VESSELS:  Unremarkable for patient's age      PELVIS     REPRODUCTIVE ORGANS:  Unremarkable for patient's age      URINARY BLADDER:  Unremarkable      ABDOMINAL WALL/INGUINAL REGIONS:  Unremarkable      OSSEOUS STRUCTURES:  Degenerative changes of lumbar spine with  stable grade 1 anterolisthesis of L4 on L5      IMPRESSION:     1  Hepatic steatosis      2   Mild left colon and rectosigmoid mural thickening, likely sequela of colitis  I have personally reviewed pertinent reports  and I have personally reviewed pertinent films in PACS  Normal appearing right sided gland  Left sided adrenal nodule with low HU, stable morphology  This lesion is consistent with a lipid-laden nodule, perhaps myelolipoma  Portions of the record may have been created with voice recognition software   Occasional wrong word or "sound a like" substitutions may have occurred due to the inherent limitations of voice recognition software  Read the chart carefully and recognize, using context, where substitutions have occurred

## 2023-01-18 DIAGNOSIS — R60.9 EDEMA, UNSPECIFIED TYPE: ICD-10-CM

## 2023-01-18 DIAGNOSIS — I10 BENIGN ESSENTIAL HYPERTENSION: ICD-10-CM

## 2023-01-19 RX ORDER — HYDROCHLOROTHIAZIDE 25 MG/1
TABLET ORAL
Qty: 90 TABLET | Refills: 3 | Status: SHIPPED | OUTPATIENT
Start: 2023-01-19

## 2023-02-12 DIAGNOSIS — I10 BENIGN ESSENTIAL HYPERTENSION: ICD-10-CM

## 2023-02-13 RX ORDER — LOSARTAN POTASSIUM 100 MG/1
TABLET ORAL
Qty: 90 TABLET | Refills: 3 | Status: SHIPPED | OUTPATIENT
Start: 2023-02-13

## 2023-03-16 ENCOUNTER — OFFICE VISIT (OUTPATIENT)
Dept: FAMILY MEDICINE CLINIC | Facility: CLINIC | Age: 73
End: 2023-03-16

## 2023-03-16 VITALS
BODY MASS INDEX: 27.54 KG/M2 | RESPIRATION RATE: 17 BRPM | SYSTOLIC BLOOD PRESSURE: 138 MMHG | DIASTOLIC BLOOD PRESSURE: 84 MMHG | TEMPERATURE: 97.4 F | HEART RATE: 84 BPM | HEIGHT: 60 IN

## 2023-03-16 DIAGNOSIS — E03.9 PRIMARY HYPOTHYROIDISM: ICD-10-CM

## 2023-03-16 DIAGNOSIS — F33.0 MILD EPISODE OF RECURRENT MAJOR DEPRESSIVE DISORDER (HCC): ICD-10-CM

## 2023-03-16 DIAGNOSIS — J44.9 CHRONIC OBSTRUCTIVE PULMONARY DISEASE, UNSPECIFIED COPD TYPE (HCC): ICD-10-CM

## 2023-03-16 DIAGNOSIS — I10 BENIGN ESSENTIAL HYPERTENSION: ICD-10-CM

## 2023-03-16 DIAGNOSIS — Z23 NEED FOR VACCINATION: ICD-10-CM

## 2023-03-16 DIAGNOSIS — Z00.00 MEDICARE ANNUAL WELLNESS VISIT, SUBSEQUENT: Primary | ICD-10-CM

## 2023-03-16 DIAGNOSIS — F41.9 ANXIETY: ICD-10-CM

## 2023-03-16 DIAGNOSIS — Z78.0 POSTMENOPAUSAL: ICD-10-CM

## 2023-03-16 NOTE — PATIENT INSTRUCTIONS
Medicare Preventive Visit Patient Instructions  Thank you for completing your Welcome to Medicare Visit or Medicare Annual Wellness Visit today  Your next wellness visit will be due in one year (3/16/2024)  The screening/preventive services that you may require over the next 5-10 years are detailed below  Some tests may not apply to you based off risk factors and/or age  Screening tests ordered at today's visit but not completed yet may show as past due  Also, please note that scanned in results may not display below  Preventive Screenings:  Service Recommendations Previous Testing/Comments   Colorectal Cancer Screening  * Colonoscopy    * Fecal Occult Blood Test (FOBT)/Fecal Immunochemical Test (FIT)  * Fecal DNA/Cologuard Test  * Flexible Sigmoidoscopy Age: 39-70 years old   Colonoscopy: every 10 years (may be performed more frequently if at higher risk)  OR  FOBT/FIT: every 1 year  OR  Cologuard: every 3 years  OR  Sigmoidoscopy: every 5 years  Screening may be recommended earlier than age 39 if at higher risk for colorectal cancer  Also, an individualized decision between you and your healthcare provider will decide whether screening between the ages of 74-80 would be appropriate  Colonoscopy: 11/01/2021  FOBT/FIT: Not on file  Cologuard: Not on file  Sigmoidoscopy: Not on file    Screening Current     Breast Cancer Screening Age: 36 years old  Frequency: every 1-2 years  Not required if history of left and right mastectomy Mammogram: 10/05/2021    Screening Current   Cervical Cancer Screening Between the ages of 21-29, pap smear recommended once every 3 years  Between the ages of 33-67, can perform pap smear with HPV co-testing every 5 years     Recommendations may differ for women with a history of total hysterectomy, cervical cancer, or abnormal pap smears in past  Pap Smear: Not on file    Screening Not Indicated   Hepatitis C Screening Once for adults born between 1945 and 1965  More frequently in patients at high risk for Hepatitis C Hep C Antibody: 03/15/2019    Screening Current   Diabetes Screening 1-2 times per year if you're at risk for diabetes or have pre-diabetes Fasting glucose: 97 mg/dL (7/20/2022)  A1C: No results in last 5 years (No results in last 5 years)  Screening Current   Cholesterol Screening Once every 5 years if you don't have a lipid disorder  May order more often based on risk factors  Lipid panel: 07/20/2022    Screening Not Indicated  History Lipid Disorder     Other Preventive Screenings Covered by Medicare:  1  Abdominal Aortic Aneurysm (AAA) Screening: covered once if your at risk  You're considered to be at risk if you have a family history of AAA  2  Lung Cancer Screening: covers low dose CT scan once per year if you meet all of the following conditions: (1) Age 50-69; (2) No signs or symptoms of lung cancer; (3) Current smoker or have quit smoking within the last 15 years; (4) You have a tobacco smoking history of at least 20 pack years (packs per day multiplied by number of years you smoked); (5) You get a written order from a healthcare provider  3  Glaucoma Screening: covered annually if you're considered high risk: (1) You have diabetes OR (2) Family history of glaucoma OR (3)  aged 48 and older OR (3)  American aged 72 and older  3  Osteoporosis Screening: covered every 2 years if you meet one of the following conditions: (1) You're estrogen deficient and at risk for osteoporosis based off medical history and other findings; (2) Have a vertebral abnormality; (3) On glucocorticoid therapy for more than 3 months; (4) Have primary hyperparathyroidism; (5) On osteoporosis medications and need to assess response to drug therapy  · Last bone density test (DXA Scan): Not on file  5  HIV Screening: covered annually if you're between the age of 12-76  Also covered annually if you are younger than 13 and older than 72 with risk factors for HIV infection  For pregnant patients, it is covered up to 3 times per pregnancy  Immunizations:  Immunization Recommendations   Influenza Vaccine Annual influenza vaccination during flu season is recommended for all persons aged >= 6 months who do not have contraindications   Pneumococcal Vaccine   * Pneumococcal conjugate vaccine = PCV13 (Prevnar 13), PCV15 (Vaxneuvance), PCV20 (Prevnar 20)  * Pneumococcal polysaccharide vaccine = PPSV23 (Pneumovax) Adults 25-60 years old: 1-3 doses may be recommended based on certain risk factors  Adults 72 years old: 1-2 doses may be recommended based off what pneumonia vaccine you previously received   Hepatitis B Vaccine 3 dose series if at intermediate or high risk (ex: diabetes, end stage renal disease, liver disease)   Tetanus (Td) Vaccine - COST NOT COVERED BY MEDICARE PART B Following completion of primary series, a booster dose should be given every 10 years to maintain immunity against tetanus  Td may also be given as tetanus wound prophylaxis  Tdap Vaccine - COST NOT COVERED BY MEDICARE PART B Recommended at least once for all adults  For pregnant patients, recommended with each pregnancy  Shingles Vaccine (Shingrix) - COST NOT COVERED BY MEDICARE PART B  2 shot series recommended in those aged 48 and above     Health Maintenance Due:      Topic Date Due   • DXA SCAN  Never done   • Breast Cancer Screening: Mammogram  10/05/2023   • Colorectal Cancer Screening  10/31/2024   • Hepatitis C Screening  Completed     Immunizations Due:  There are no preventive care reminders to display for this patient  Advance Directives   What are advance directives? Advance directives are legal documents that state your wishes and plans for medical care  These plans are made ahead of time in case you lose your ability to make decisions for yourself  Advance directives can apply to any medical decision, such as the treatments you want, and if you want to donate organs     What are the types of advance directives? There are many types of advance directives, and each state has rules about how to use them  You may choose a combination of any of the following:  · Living will: This is a written record of the treatment you want  You can also choose which treatments you do not want, which to limit, and which to stop at a certain time  This includes surgery, medicine, IV fluid, and tube feedings  · Durable power of  for healthcare [facility]): This is a written record that states who you want to make healthcare choices for you when you are unable to make them for yourself  This person, called a proxy, is usually a family member or a friend  You may choose more than 1 proxy  · Do not resuscitate (DNR) order:  A DNR order is used in case your heart stops beating or you stop breathing  It is a request not to have certain forms of treatment, such as CPR  A DNR order may be included in other types of advance directives  · Medical directive: This covers the care that you want if you are in a coma, near death, or unable to make decisions for yourself  You can list the treatments you want for each condition  Treatment may include pain medicine, surgery, blood transfusions, dialysis, IV or tube feedings, and a ventilator (breathing machine)  · Values history: This document has questions about your views, beliefs, and how you feel and think about life  This information can help others choose the care that you would choose  Why are advance directives important? An advance directive helps you control your care  Although spoken wishes may be used, it is better to have your wishes written down  Spoken wishes can be misunderstood, or not followed  Treatments may be given even if you do not want them  An advance directive may make it easier for your family to make difficult choices about your care     Weight Management   Why it is important to manage your weight:  Being overweight increases your risk of health conditions such as heart disease, high blood pressure, type 2 diabetes, and certain types of cancer  It can also increase your risk for osteoarthritis, sleep apnea, and other respiratory problems  Aim for a slow, steady weight loss  Even a small amount of weight loss can lower your risk of health problems  How to lose weight safely:  A safe and healthy way to lose weight is to eat fewer calories and get regular exercise  You can lose up about 1 pound a week by decreasing the number of calories you eat by 500 calories each day  Healthy meal plan for weight management:  A healthy meal plan includes a variety of foods, contains fewer calories, and helps you stay healthy  A healthy meal plan includes the following:  · Eat whole-grain foods more often  A healthy meal plan should contain fiber  Fiber is the part of grains, fruits, and vegetables that is not broken down by your body  Whole-grain foods are healthy and provide extra fiber in your diet  Some examples of whole-grain foods are whole-wheat breads and pastas, oatmeal, brown rice, and bulgur  · Eat a variety of vegetables every day  Include dark, leafy greens such as spinach, kale, jodie greens, and mustard greens  Eat yellow and orange vegetables such as carrots, sweet potatoes, and winter squash  · Eat a variety of fruits every day  Choose fresh or canned fruit (canned in its own juice or light syrup) instead of juice  Fruit juice has very little or no fiber  · Eat low-fat dairy foods  Drink fat-free (skim) milk or 1% milk  Eat fat-free yogurt and low-fat cottage cheese  Try low-fat cheeses such as mozzarella and other reduced-fat cheeses  · Choose meat and other protein foods that are low in fat  Choose beans or other legumes such as split peas or lentils  Choose fish, skinless poultry (chicken or turkey), or lean cuts of red meat (beef or pork)  Before you cook meat or poultry, cut off any visible fat  · Use less fat and oil    Try baking foods instead of frying them  Add less fat, such as margarine, sour cream, regular salad dressing and mayonnaise to foods  Eat fewer high-fat foods  Some examples of high-fat foods include french fries, doughnuts, ice cream, and cakes  · Eat fewer sweets  Limit foods and drinks that are high in sugar  This includes candy, cookies, regular soda, and sweetened drinks  Exercise:  Exercise at least 30 minutes per day on most days of the week  Some examples of exercise include walking, biking, dancing, and swimming  You can also fit in more physical activity by taking the stairs instead of the elevator or parking farther away from stores  Ask your healthcare provider about the best exercise plan for you  © Copyright [employer] 2018 Information is for End User's use only and may not be sold, redistributed or otherwise used for commercial purposes   All illustrations and images included in CareNotes® are the copyrighted property of [employer]  or [address] [place] [address]

## 2023-03-16 NOTE — PROGRESS NOTES
Assessment and Plan:   1  Medicare annual wellness visit, subsequent    2  Chronic obstructive pulmonary disease, unspecified COPD type ([facility] )  Comments:  Stable  3  Mild episode of recurrent major depressive disorder ([facility] )  Comments:  She has been down recently, because she recently had a money scam where she lost $8,000  She will restart trazodone which helps her sleep  4  Primary hypothyroidism  Comments:  continue levothyroxine  Orders:  -     TSH, 3rd generation; Future  -     T4, free; Future    5  Postmenopausal  -     DXA bone density spine hip and pelvis; Future; Expected date: 03/16/2023    6  Need for vaccination  -     Pneumococcal Conjugate Vaccine 20-valent (Pcv20)    7  Anxiety  Comments:  She has been down/anxious recently, because she recently had a money scam where she lost $8,000  Restart trazodone which helps her sleep and use PRN atarax  8  Benign essential hypertension  Comments:  Controlled on current medication regimen  Preventive health issues were discussed with patient, and age appropriate screening tests were ordered as noted in patient's After Visit Summary  Personalized health advice and appropriate referrals for health education or preventive services given if needed, as noted in patient's After Visit Summary  History of Present Illness:     Patient presents for a Medicare Wellness Visit    HPI   Pinky Lemos is here today to discuss anxiety which has worsened recently  Currently not taking anything for her anxiety  She does have atarax and trazodone at home, but was unsure about starting  Patient Care Team:  Gary Mosqueda DO as PCP - MD Gary Lowry, Rayetta Mcburney, MD Laveda Stabile, MD     Review of Systems:     Review of Systems   Constitutional: Negative  HENT: Negative  Eyes: Negative  Respiratory: Negative  Cardiovascular: Negative  Gastrointestinal: Negative  Endocrine: Negative  Genitourinary: Negative  Musculoskeletal: Negative  Skin: Negative  Allergic/Immunologic: Negative  Neurological: Negative  Hematological: Negative  Psychiatric/Behavioral: Positive for dysphoric mood  The patient is nervous/anxious  Problem List:     Patient Active Problem List   Diagnosis   • Headache   • Primary hypothyroidism   • Adenoma of left adrenal gland   • Shortness of breath on exertion   • Abnormal cortisol level   • Adrenal myelolipoma   • Arthropathy   • Benign essential hypertension   • Benign paroxysmal vertigo   • Colitis   • Mild episode of recurrent major depressive disorder (HCC)   • Diverticulitis of colon   • Generalized anxiety disorder   • Hyperlipidemia   • Hyponatremia   • Nontoxic single thyroid nodule   • Obesity   • Sigmoid diverticulosis   • Vitamin D deficiency   • Restless leg syndrome   • Hiatal hernia with GERD and esophagitis   • Hx of colonic polyps   • Diverticulosis large intestine w/o perforation or abscess w/o bleeding   • Epigastric pain   • Chronic obstructive pulmonary disease, unspecified COPD type ([facility] )      Past Medical and Surgical History:     Past Medical History:   Diagnosis Date   • Abdominal pain     recent upper abd     • Acute diverticulitis     last assessed 07/18/16   • Adenoma of left adrenal gland    • Anxiety    • Arthritis    • Benign paroxysmal positional vertigo     last assessed 03/24/15   • Colon polyp    • DDD (degenerative disc disease), lumbar     PT and yoga helped   • Disease of thyroid gland    • Diverticulitis     hx of   • Diverticulosis    • Epigastric pain     wakes her up at night   • Gastric ulcer     hx of   • Hiatal hernia    • Hyperlipidemia    • Hypertension    • Malignant hypertension    • Obesity    • Primary hypothyroidism    • Primary hypothyroidism    • SOB (shortness of breath) on exertion     with exertion when taking a certain medication   • Vertigo     last assessed 03/13/13     Past Surgical History:   Procedure Laterality Date • APPENDECTOMY     • BREAST BIOPSY Left     a long time ago   •  SECTION     • CHOLECYSTECTOMY     • COLONOSCOPY     • KNEE SURGERY      History of Arthrotomy of Knee; Open Meniscus Tear; left   • UPPER GASTROINTESTINAL ENDOSCOPY        Family History:     Family History   Adopted: Yes   Problem Relation Age of Onset   • Heart disease Family         Adopted but heard family member   • No Known Problems Mother       Social History:     Social History     Socioeconomic History   • Marital status: /Civil Union     Spouse name: None   • Number of children: None   • Years of education: None   • Highest education level: None   Occupational History   • Occupation: Retired   Tobacco Use   • Smoking status: Former     Types: Cigarettes     Quit date: 1990     Years since quittin 9   • Smokeless tobacco: Never   • Tobacco comments:     quit 23 years ago- only a social smoker    Vaping Use   • Vaping Use: Never used   Substance and Sexual Activity   • Alcohol use: Yes     Alcohol/week: 2 0 standard drinks     Types: 2 Glasses of wine per week     Comment: occasional- once or twice week    • Drug use: Never   • Sexual activity: None   Other Topics Concern   • None   Social History Narrative    Adopted Father and Mother unknown    Caffeine use     Social Determinants of Health     Financial Resource Strain: Low Risk    • Difficulty of Paying Living Expenses: Not hard at all   Food Insecurity: Not on file   Transportation Needs: No Transportation Needs   • Lack of Transportation (Medical): No   • Lack of Transportation (Non-Medical):  No   Physical Activity: Not on file   Stress: Not on file   Social Connections: Not on file   Intimate Partner Violence: Not on file   Housing Stability: Not on file      Medications and Allergies:     Current Outpatient Medications   Medication Sig Dispense Refill   • Alum & Mag Hydroxide-Simeth (MYLANTA PO) Take by mouth as needed     • amLODIPine (NORVASC) 5 mg tablet Take 1 tablet (5 mg total) by mouth daily 90 tablet 1   • dicyclomine (BENTYL) 10 mg capsule TAKE 1 CAPSULE BY MOUTH AT  AT BEDTIME AS NEEDED 90 capsule 2   • gabapentin (NEURONTIN) 100 mg capsule Take 2 capsules (200 mg total) by mouth daily at bedtime 180 capsule 1   • hydrochlorothiazide (HYDRODIURIL) 25 mg tablet TAKE 1 TABLET BY MOUTH  DAILY 90 tablet 3   • levothyroxine 88 mcg tablet TAKE 1 TABLET BY MOUTH  DAILY 90 tablet 1   • losartan (COZAAR) 100 MG tablet TAKE 1 TABLET BY MOUTH  DAILY 90 tablet 3   • pantoprazole (PROTONIX) 40 mg tablet TAKE 1 TABLET BY MOUTH  TWICE DAILY WITH MEALS 180 tablet 3   • traZODone (DESYREL) 50 mg tablet Take 0 5 tablets (25 mg total) by mouth daily at bedtime (Patient taking differently: Take 25 mg by mouth as needed) 90 tablet 1     No current facility-administered medications for this visit  Allergies   Allergen Reactions   • Crestor [Rosuvastatin] Other (See Comments)     Nightmares       Immunizations:     Immunization History   Administered Date(s) Administered   • COVID-19 MODERNA VACC 0 5 ML IM 02/16/2021, 03/17/2021   • INFLUENZA 10/03/2021   • Influenza Split High Dose Preservative Free IM 10/20/2019   • Influenza, high dose seasonal 0 7 mL 10/05/2020, 10/01/2022   • Pneumococcal Conjugate 13-Valent 10/20/2021   • Pneumococcal Conjugate Vaccine 20-valent (Pcv20), Polysace 03/16/2023      Health Maintenance:         Topic Date Due   • DXA SCAN  Never done   • Breast Cancer Screening: Mammogram  10/05/2023   • Colorectal Cancer Screening  10/31/2024   • Hepatitis C Screening  Completed     There are no preventive care reminders to display for this patient  Medicare Screening Tests and Risk Assessments:     Iza Eller is here for her Subsequent Wellness visit  Health Risk Assessment:   Patient rates overall health as very good  Patient feels that their physical health rating is same  Patient is satisfied with their life  Eyesight was rated as same   Hearing was rated as slightly worse  Patient feels that their emotional and mental health rating is same  Patients states they are sometimes angry  Patient states they are often unusually tired/fatigued  Pain experienced in the last 7 days has been some  Patient's pain rating has been 7/10  Patient states that she has experienced no weight loss or gain in last 6 months  Depression Screening:   PHQ-9 Score: 19      Fall Risk Screening: In the past year, patient has experienced: no history of falling in past year      Urinary Incontinence Screening:   Patient has not leaked urine accidently in the last six months  Home Safety:  Patient does not have trouble with stairs inside or outside of their home  Patient has working smoke alarms and has working carbon monoxide detector  Home safety hazards include: none  Nutrition:   Current diet is Regular  Medications:   Patient is currently taking over-the-counter supplements  OTC medications include: see medication list  Patient is able to manage medications  Activities of Daily Living (ADLs)/Instrumental Activities of Daily Living (IADLs):   Walk and transfer into and out of bed and chair?: Yes  Dress and groom yourself?: Yes    Bathe or shower yourself?: Yes    Feed yourself?  Yes  Do your laundry/housekeeping?: Yes  Manage your money, pay your bills and track your expenses?: Yes  Make your own meals?: Yes    Do your own shopping?: Yes    Previous Hospitalizations:   Any hospitalizations or ED visits within the last 12 months?: No      Advance Care Planning:   Living will: Yes    Durable POA for healthcare: No    Advanced directive: Yes      PREVENTIVE SCREENINGS      Cardiovascular Screening:    General: Screening Not Indicated and History Lipid Disorder      Diabetes Screening:     General: Screening Current      Colorectal Cancer Screening:     General: Screening Current      Breast Cancer Screening:     General: Screening Current      Cervical Cancer Screening: General: Screening Not Indicated      Lung Cancer Screening:     General: Screening Not Indicated      Hepatitis C Screening:    General: Screening Current    Screening, Brief Intervention, and Referral to Treatment (SBIRT)    Screening  Typical number of drinks in a day: 0  Typical number of drinks in a week: 1  Interpretation: Low risk drinking behavior  Single Item Drug Screening:  How often have you used an illegal drug (including marijuana) or a prescription medication for non-medical reasons in the past year? never    Single Item Drug Screen Score: 0  Interpretation: Negative screen for possible drug use disorder    No results found  Physical Exam:     /84   Pulse 84   Temp (!) 97 4 °F (36 3 °C)   Resp 17   Ht 5' (1 524 m)   BMI 27 54 kg/m²     Physical Exam  Vitals and nursing note reviewed  Constitutional:       General: She is not in acute distress  Appearance: She is well-developed  HENT:      Head: Normocephalic and atraumatic  Right Ear: Tympanic membrane, ear canal and external ear normal  There is no impacted cerumen  Left Ear: Tympanic membrane, ear canal and external ear normal  There is no impacted cerumen  Nose: Nose normal       Mouth/Throat:      Mouth: Mucous membranes are moist    Eyes:      Conjunctiva/sclera: Conjunctivae normal    Cardiovascular:      Rate and Rhythm: Normal rate and regular rhythm  Heart sounds: No murmur heard  Pulmonary:      Effort: Pulmonary effort is normal  No respiratory distress  Breath sounds: Normal breath sounds  Abdominal:      General: Abdomen is flat  There is no distension  Palpations: Abdomen is soft  There is no mass  Tenderness: There is no abdominal tenderness  There is no guarding or rebound  Hernia: No hernia is present  Musculoskeletal:         General: Normal range of motion  Cervical back: Neck supple  Skin:     General: Skin is warm and dry     Neurological:      General: No focal deficit present  Mental Status: She is alert and oriented to person, place, and time            Hali Naylor MD

## 2023-03-21 ENCOUNTER — OFFICE VISIT (OUTPATIENT)
Dept: OTOLARYNGOLOGY | Facility: CLINIC | Age: 73
End: 2023-03-21

## 2023-03-21 VITALS — TEMPERATURE: 97.9 F | WEIGHT: 143 LBS | BODY MASS INDEX: 27.93 KG/M2

## 2023-03-21 DIAGNOSIS — H61.23 BILATERAL IMPACTED CERUMEN: Primary | ICD-10-CM

## 2023-03-21 NOTE — ASSESSMENT & PLAN NOTE
On exam noted bilateral cerumen impaction and unable to fully view tympanic membrane  Cerumen impaction removed bilateral eac with alligator forceps, irrigation and suction, pt tolerated procedure well  Upon removal, improved hearing and decreased clogged sensation of bilateral ears  Discussed routine cerumen care including avoidance of q-tips, may use cerumen softeners every one to two months  Hydrocortisone cream pea sized amount on finger as needed for itching in ears  Encourage ongoing follow up prn to monitor for cerumen and hearing  Audiogram if symptoms worsen

## 2023-03-21 NOTE — PROGRESS NOTES
Assessment/Plan:    Bilateral impacted cerumen    On exam noted bilateral cerumen impaction and unable to fully view tympanic membrane  Cerumen impaction removed bilateral eac with alligator forceps, irrigation and suction, pt tolerated procedure well  Upon removal, improved hearing and decreased clogged sensation of bilateral ears  Discussed routine cerumen care including avoidance of q-tips, may use cerumen softeners every one to two months  Hydrocortisone cream pea sized amount on finger as needed for itching in ears  Encourage ongoing follow up prn to monitor for cerumen and hearing  Audiogram if symptoms worsen  Diagnoses and all orders for this visit:    Bilateral impacted cerumen  -     Ear cerumen removal          Subjective:      Patient ID: Evgeny Schafer is a 74 y o  female  Presents today as a new patient due to ear concerns  Hearing gradually worsening  Bilateral ears feel blocked  No tinnitus  No otalgia or otorrhea  No history of ear surgery  No current hearing aids  The following portions of the patient's history were reviewed and updated as appropriate: allergies, current medications, past family history, past medical history, past social history, past surgical history and problem list     Review of Systems   Constitutional: Negative  HENT: Negative for congestion, ear discharge, ear pain, hearing loss, nosebleeds, postnasal drip, rhinorrhea, sinus pressure, sinus pain, sore throat, tinnitus and voice change  Eyes: Negative  Respiratory: Negative for chest tightness and shortness of breath  Cardiovascular: Negative  Gastrointestinal: Negative  Endocrine: Negative  Musculoskeletal: Negative  Skin: Negative for color change  Neurological: Negative for dizziness, numbness and headaches  Psychiatric/Behavioral: Negative            Objective:      Temp 97 9 °F (36 6 °C)   Wt 64 9 kg (143 lb)   BMI 27 93 kg/m²          Physical Exam  Constitutional:       Appearance: She is well-developed  HENT:      Head: Normocephalic  Right Ear: Hearing, tympanic membrane, ear canal and external ear normal  No decreased hearing noted  No drainage or tenderness  There is impacted cerumen  Tympanic membrane is not perforated or erythematous  Left Ear: Hearing, tympanic membrane, ear canal and external ear normal  No decreased hearing noted  No drainage or tenderness  There is impacted cerumen  Tympanic membrane is not perforated or erythematous  Nose: Nose normal  No nasal deformity or septal deviation  Mouth/Throat:      Mouth: Mucous membranes are not pale and not dry  No oral lesions  Dentition: Normal dentition  Pharynx: Uvula midline  No oropharyngeal exudate  Neck:      Trachea: No tracheal deviation  Cardiovascular:      Rate and Rhythm: Normal rate  Pulmonary:      Effort: Pulmonary effort is normal  No accessory muscle usage or respiratory distress  Musculoskeletal:      Cervical back: Full passive range of motion without pain, normal range of motion and neck supple  Lymphadenopathy:      Cervical: No cervical adenopathy  Skin:     General: Skin is warm and dry  Neurological:      Mental Status: She is alert and oriented to person, place, and time  Cranial Nerves: No cranial nerve deficit  Sensory: No sensory deficit  Psychiatric:         Behavior: Behavior is cooperative  Ear cerumen removal    Date/Time: 3/21/2023 3:15 PM  Performed by: COURT Martin  Authorized by: COURT Martin   Universal Protocol:  Consent: Verbal consent obtained    Risks and benefits: risks, benefits and alternatives were discussed  Consent given by: patient  Patient understanding: patient states understanding of the procedure being performed      Patient location:  Clinic  Procedure details:     Local anesthetic:  None    Location:  L ear and R ear    Approach:  External  Post-procedure details: Complication:  None    Hearing quality:  Normal    Patient tolerance of procedure:   Tolerated well, no immediate complications

## 2023-03-21 NOTE — PATIENT INSTRUCTIONS
Wax care at home - avoidance of q-tips, may use cerumen softeners every one to two months  Hydrocortisone cream pea sized amount on finger as needed for itching in ears

## 2023-03-23 DIAGNOSIS — E03.9 PRIMARY HYPOTHYROIDISM: ICD-10-CM

## 2023-03-23 RX ORDER — LEVOTHYROXINE SODIUM 88 UG/1
TABLET ORAL
Qty: 90 TABLET | Refills: 3 | Status: SHIPPED | OUTPATIENT
Start: 2023-03-23

## 2023-03-29 ENCOUNTER — APPOINTMENT (OUTPATIENT)
Dept: LAB | Facility: HOSPITAL | Age: 73
End: 2023-03-29

## 2023-03-29 DIAGNOSIS — I10 HYPERTENSION, UNSPECIFIED TYPE: ICD-10-CM

## 2023-03-29 DIAGNOSIS — D35.02 ADENOMA OF LEFT ADRENAL GLAND: ICD-10-CM

## 2023-03-29 DIAGNOSIS — E03.9 PRIMARY HYPOTHYROIDISM: ICD-10-CM

## 2023-03-29 DIAGNOSIS — I10 BENIGN ESSENTIAL HYPERTENSION: ICD-10-CM

## 2023-03-29 LAB
T4 FREE SERPL-MCNC: 1.46 NG/DL (ref 0.76–1.46)
TSH SERPL DL<=0.05 MIU/L-ACNC: 0.66 UIU/ML (ref 0.45–4.5)

## 2023-03-31 DIAGNOSIS — G25.81 RESTLESS LEG SYNDROME: ICD-10-CM

## 2023-03-31 RX ORDER — GABAPENTIN 100 MG/1
CAPSULE ORAL
Qty: 180 CAPSULE | Refills: 3 | Status: SHIPPED | OUTPATIENT
Start: 2023-03-31

## 2023-04-04 DIAGNOSIS — E03.9 PRIMARY HYPOTHYROIDISM: Primary | ICD-10-CM

## 2023-04-04 RX ORDER — LEVOTHYROXINE SODIUM 0.07 MG/1
75 TABLET ORAL
Qty: 90 TABLET | Refills: 0 | Status: SHIPPED | OUTPATIENT
Start: 2023-04-04

## 2023-05-15 ENCOUNTER — OFFICE VISIT (OUTPATIENT)
Dept: FAMILY MEDICINE CLINIC | Facility: CLINIC | Age: 73
End: 2023-05-15

## 2023-05-15 VITALS
OXYGEN SATURATION: 99 % | TEMPERATURE: 96.3 F | HEIGHT: 60 IN | SYSTOLIC BLOOD PRESSURE: 130 MMHG | BODY MASS INDEX: 28.86 KG/M2 | RESPIRATION RATE: 16 BRPM | HEART RATE: 71 BPM | WEIGHT: 147 LBS | DIASTOLIC BLOOD PRESSURE: 78 MMHG

## 2023-05-15 DIAGNOSIS — F41.1 GENERALIZED ANXIETY DISORDER: Primary | ICD-10-CM

## 2023-05-15 DIAGNOSIS — F32.2 SEVERE DEPRESSION (HCC): ICD-10-CM

## 2023-05-15 RX ORDER — ALPRAZOLAM 0.25 MG/1
0.25 TABLET ORAL DAILY PRN
Qty: 30 TABLET | Refills: 0 | Status: SHIPPED | OUTPATIENT
Start: 2023-05-15

## 2023-05-15 RX ORDER — BUPROPION HYDROCHLORIDE 150 MG/1
150 TABLET ORAL EVERY MORNING
Qty: 30 TABLET | Refills: 1 | Status: SHIPPED | OUTPATIENT
Start: 2023-05-15 | End: 2023-07-14

## 2023-05-15 NOTE — PROGRESS NOTES
Name: Demi Ling      : 1950      MRN: [id number]  Encounter Provider: Jimena Gil MD  Encounter Date: 5/15/2023   Encounter department: [address] [place] [address]     1  Generalized anxiety disorder  -     buPROPion (WELLBUTRIN XL) 150 mg 24 hr tablet; Take 1 tablet (150 mg total) by mouth every morning  -     ALPRAZolam (XANAX) 0 25 mg tablet; Take 1 tablet (0 25 mg total) by mouth daily as needed for anxiety    2  Severe depression (HCC)  -     buPROPion (WELLBUTRIN XL) 150 mg 24 hr tablet; Take 1 tablet (150 mg total) by mouth every morning  -     ALPRAZolam (XANAX) 0 25 mg tablet; Take 1 tablet (0 25 mg total) by mouth daily as needed for anxiety     Zuleima Graf is here today for severe depression and anxiety  Discussed starting medication  She is worried about weight gain so would prefer to start wellbutrin  Has xanax PRN which she will use sparingly  Return in 1 month  Advise finding a therapist as well  Subjective      HPI   Zuleima Graf is here today with her daughter Jessica Fabry to discuss depression/anxiety which she has been struggling with for a while now, but gradually worsening  Her son is currently in the hospital after having a heart attack so symptoms of depression have significantly worsened  PHQ-2/9 Depression Screening    Little interest or pleasure in doing things: 3 - nearly every day  Feeling down, depressed, or hopeless: 3 - nearly every day  Trouble falling or staying asleep, or sleeping too much: 3 - nearly every day  Feeling tired or having little energy: 3 - nearly every day  Poor appetite or overeatin - more than half the days  Feeling bad about yourself - or that you are a failure or have let yourself or your family down: 3 - nearly every day  Trouble concentrating on things, such as reading the newspaper or watching television: 1 - several days  Moving or speaking so slowly that other people could have noticed   Or the opposite - being so fidgety or restless that you have been moving around a lot more than usual: 3 - nearly every day  Thoughts that you would be better off dead, or of hurting yourself in some way: 1 - several days  PHQ-9 Score: 22   PHQ-9 Interpretation: Severe depression        STEPHIE-7 Flowsheet Screening    Flowsheet Row Most Recent Value   Over the last 2 weeks, how often have you been bothered by any of the following problems? Feeling nervous, anxious, or on edge 3   Not being able to stop or control worrying 3   Worrying too much about different things 3   Trouble relaxing 2   Being so restless that it is hard to sit still 3   Becoming easily annoyed or irritable 3   Feeling afraid as if something awful might happen 3   STEPHIE-7 Total Score 20        Review of Systems   Constitutional: Positive for fatigue  HENT: Negative  Eyes: Negative  Respiratory: Negative  Cardiovascular: Negative  Gastrointestinal: Negative  Endocrine: Negative  Genitourinary: Negative  Musculoskeletal: Negative  Skin: Negative  Allergic/Immunologic: Negative  Neurological: Negative  Hematological: Negative  Psychiatric/Behavioral: Positive for decreased concentration, dysphoric mood and sleep disturbance  Negative for self-injury  The patient is nervous/anxious          Current Outpatient Medications on File Prior to Visit   Medication Sig   • Alum & Mag Hydroxide-Simeth (MYLANTA PO) Take by mouth as needed   • amLODIPine (NORVASC) 5 mg tablet Take 1 tablet (5 mg total) by mouth daily   • dicyclomine (BENTYL) 10 mg capsule TAKE 1 CAPSULE BY MOUTH AT  AT BEDTIME AS NEEDED   • gabapentin (NEURONTIN) 100 mg capsule TAKE 2 CAPSULES BY MOUTH  DAILY AT BEDTIME   • hydrochlorothiazide (HYDRODIURIL) 25 mg tablet TAKE 1 TABLET BY MOUTH  DAILY   • levothyroxine (Euthyrox) 75 mcg tablet Take 1 tablet (75 mcg total) by mouth daily in the early morning   • losartan (COZAAR) 100 MG tablet Take 1 tablet (100 mg total) by mouth daily   • pantoprazole (PROTONIX) 40 mg tablet TAKE 1 TABLET BY MOUTH  TWICE DAILY WITH MEALS   • traZODone (DESYREL) 50 mg tablet Take 0 5 tablets (25 mg total) by mouth daily at bedtime (Patient taking differently: Take 25 mg by mouth as needed)       Objective     /78   Pulse 71   Temp (!) 96 3 °F (35 7 °C)   Resp 16   Ht 5' (1 524 m) Comment: per patient  Wt 66 7 kg (147 lb) Comment: per patient  SpO2 99%   BMI 28 71 kg/m²     Physical Exam  Constitutional:       General: She is not in acute distress  Appearance: She is well-developed  She is not diaphoretic  HENT:      Head: Normocephalic and atraumatic  Eyes:      General: No scleral icterus  Right eye: No discharge  Left eye: No discharge  Conjunctiva/sclera: Conjunctivae normal    Pulmonary:      Effort: Pulmonary effort is normal    Musculoskeletal:      Cervical back: Normal range of motion  Skin:     General: Skin is warm  Neurological:      Mental Status: She is alert and oriented to person, place, and time  Psychiatric:         Behavior: Behavior normal          Thought Content:  Thought content normal          Judgment: Judgment normal        Criss Dance, MD

## 2023-05-20 PROBLEM — H61.23 BILATERAL IMPACTED CERUMEN: Status: RESOLVED | Noted: 2023-03-21 | Resolved: 2023-05-20

## 2023-05-29 DIAGNOSIS — I10 BENIGN ESSENTIAL HYPERTENSION: ICD-10-CM

## 2023-05-30 RX ORDER — AMLODIPINE BESYLATE 5 MG/1
TABLET ORAL
Qty: 90 TABLET | Refills: 3 | Status: SHIPPED | OUTPATIENT
Start: 2023-05-30

## 2023-06-08 ENCOUNTER — APPOINTMENT (OUTPATIENT)
Dept: LAB | Facility: CLINIC | Age: 73
End: 2023-06-08
Payer: COMMERCIAL

## 2023-06-08 ENCOUNTER — TELEPHONE (OUTPATIENT)
Dept: FAMILY MEDICINE CLINIC | Facility: CLINIC | Age: 73
End: 2023-06-08

## 2023-06-08 DIAGNOSIS — H91.93 BILATERAL HEARING LOSS, UNSPECIFIED HEARING LOSS TYPE: Primary | ICD-10-CM

## 2023-06-08 DIAGNOSIS — E03.9 PRIMARY HYPOTHYROIDISM: ICD-10-CM

## 2023-06-08 LAB
ALBUMIN SERPL BCP-MCNC: 4 G/DL (ref 3.5–5)
ALP SERPL-CCNC: 65 U/L (ref 46–116)
ALT SERPL W P-5'-P-CCNC: 15 U/L (ref 12–78)
ANION GAP SERPL CALCULATED.3IONS-SCNC: 4 MMOL/L (ref 4–13)
AST SERPL W P-5'-P-CCNC: 15 U/L (ref 5–45)
BILIRUB SERPL-MCNC: 0.52 MG/DL (ref 0.2–1)
BUN SERPL-MCNC: 12 MG/DL (ref 5–25)
CALCIUM SERPL-MCNC: 9.4 MG/DL (ref 8.3–10.1)
CHLORIDE SERPL-SCNC: 97 MMOL/L (ref 96–108)
CHOLEST SERPL-MCNC: 258 MG/DL
CO2 SERPL-SCNC: 27 MMOL/L (ref 21–32)
CREAT SERPL-MCNC: 0.88 MG/DL (ref 0.6–1.3)
ERYTHROCYTE [DISTWIDTH] IN BLOOD BY AUTOMATED COUNT: 12.2 % (ref 11.6–15.1)
GFR SERPL CREATININE-BSD FRML MDRD: 64 ML/MIN/1.73SQ M
GLUCOSE P FAST SERPL-MCNC: 100 MG/DL (ref 65–99)
HCT VFR BLD AUTO: 39.6 % (ref 34.8–46.1)
HDLC SERPL-MCNC: 78 MG/DL
HGB BLD-MCNC: 13.7 G/DL (ref 11.5–15.4)
LDLC SERPL CALC-MCNC: 167 MG/DL (ref 0–100)
MCH RBC QN AUTO: 32.9 PG (ref 26.8–34.3)
MCHC RBC AUTO-ENTMCNC: 34.6 G/DL (ref 31.4–37.4)
MCV RBC AUTO: 95 FL (ref 82–98)
PLATELET # BLD AUTO: 284 THOUSANDS/UL (ref 149–390)
PMV BLD AUTO: 10.1 FL (ref 8.9–12.7)
POTASSIUM SERPL-SCNC: 3.8 MMOL/L (ref 3.5–5.3)
PROT SERPL-MCNC: 7.4 G/DL (ref 6.4–8.4)
RBC # BLD AUTO: 4.16 MILLION/UL (ref 3.81–5.12)
SODIUM SERPL-SCNC: 128 MMOL/L (ref 135–147)
T4 FREE SERPL-MCNC: 1.34 NG/DL (ref 0.61–1.12)
TRIGL SERPL-MCNC: 65 MG/DL
TSH SERPL DL<=0.05 MIU/L-ACNC: 3.3 UIU/ML (ref 0.45–4.5)
WBC # BLD AUTO: 4.55 THOUSAND/UL (ref 4.31–10.16)

## 2023-06-08 PROCEDURE — 84439 ASSAY OF FREE THYROXINE: CPT

## 2023-06-08 PROCEDURE — 84443 ASSAY THYROID STIM HORMONE: CPT

## 2023-06-08 NOTE — TELEPHONE ENCOUNTER
She would like to get her hearing checked  She had a visit and her ears were cleaned but it didn't help  She needs a referral put in the system before she can schedule  Call when complete

## 2023-06-12 ENCOUNTER — OFFICE VISIT (OUTPATIENT)
Dept: FAMILY MEDICINE CLINIC | Facility: CLINIC | Age: 73
End: 2023-06-12
Payer: COMMERCIAL

## 2023-06-12 VITALS
HEART RATE: 69 BPM | DIASTOLIC BLOOD PRESSURE: 68 MMHG | SYSTOLIC BLOOD PRESSURE: 122 MMHG | BODY MASS INDEX: 28.71 KG/M2 | HEIGHT: 60 IN | TEMPERATURE: 97 F | RESPIRATION RATE: 17 BRPM

## 2023-06-12 DIAGNOSIS — E87.1 HYPONATREMIA: ICD-10-CM

## 2023-06-12 DIAGNOSIS — F33.0 MILD EPISODE OF RECURRENT MAJOR DEPRESSIVE DISORDER (HCC): Primary | ICD-10-CM

## 2023-06-12 DIAGNOSIS — E03.9 PRIMARY HYPOTHYROIDISM: ICD-10-CM

## 2023-06-12 DIAGNOSIS — F41.1 GENERALIZED ANXIETY DISORDER: ICD-10-CM

## 2023-06-12 PROCEDURE — 99214 OFFICE O/P EST MOD 30 MIN: CPT | Performed by: FAMILY MEDICINE

## 2023-06-12 RX ORDER — MIRTAZAPINE 7.5 MG/1
7.5 TABLET, FILM COATED ORAL
Qty: 30 TABLET | Refills: 1 | Status: SHIPPED | OUTPATIENT
Start: 2023-06-12 | End: 2023-06-14 | Stop reason: ALTCHOICE

## 2023-06-12 RX ORDER — MIRTAZAPINE 7.5 MG/1
7.5 TABLET, FILM COATED ORAL
Qty: 30 TABLET | Refills: 0 | Status: SHIPPED | OUTPATIENT
Start: 2023-06-12 | End: 2023-06-12 | Stop reason: SDUPTHER

## 2023-06-12 NOTE — PROGRESS NOTES
Name: Daniel Poole      : 1950      MRN: [id number]  Encounter Provider: Brittany Vargas MD  Encounter Date: 2023   Encounter department: [address] [place] [address]     1  Mild episode of recurrent major depressive disorder ([facility] [address] )  Comments:  Given hyponatremia, will d/c wellbutrin and trazodone  Start remeron  F/u in 1 month  Orders:  -     mirtazapine (REMERON) 7 5 MG tablet; Take 1 tablet (7 5 mg total) by mouth daily at bedtime    2  Hyponatremia  Comments:  Recently worsened  Likely due to addition of antidepressent medication  Will switch trazodone/wellbutrin to remeron  Orders:  -     Comprehensive metabolic panel; Future    3  Generalized anxiety disorder  Comments:  Given hyponatremia, will d/c wellbutrin and trazodone  Start remeron  F/u in 1 month  Orders:  -     mirtazapine (REMERON) 7 5 MG tablet; Take 1 tablet (7 5 mg total) by mouth daily at bedtime    4  Primary hypothyroidism  Comments:  Based on recent blood work, recommend that she continue levothyroxine 75 mcg daily, but skip dose on 1 day of the week  Repeat labs in 6 weeks  Orders:  -     T4, free; Future  -     TSH, 3rd generation; Future           Subjective      HPI   She is here today for routine follow up and to review blood work  Still feels anxious/nervous  More stressed than usual lately  Review of Systems   Constitutional: Negative  HENT: Negative  Eyes: Negative  Respiratory: Negative  Cardiovascular: Negative  Gastrointestinal: Negative  Endocrine: Negative  Genitourinary: Negative  Musculoskeletal: Negative  Skin: Negative  Allergic/Immunologic: Negative  Neurological: Negative  Hematological: Negative  Psychiatric/Behavioral: The patient is nervous/anxious          Current Outpatient Medications on File Prior to Visit   Medication Sig   • ALPRAZolam (XANAX) 0 25 mg tablet Take 1 tablet (0 25 mg total) by mouth daily as needed for anxiety   • Alum & Mag Hydroxide-Simeth (MYLANTA PO) Take by mouth as needed   • amLODIPine (NORVASC) 5 mg tablet TAKE 1 TABLET BY MOUTH DAILY   • dicyclomine (BENTYL) 10 mg capsule TAKE 1 CAPSULE BY MOUTH AT  AT BEDTIME AS NEEDED   • gabapentin (NEURONTIN) 100 mg capsule TAKE 2 CAPSULES BY MOUTH  DAILY AT BEDTIME (Patient taking differently: if needed)   • hydrochlorothiazide (HYDRODIURIL) 25 mg tablet TAKE 1 TABLET BY MOUTH  DAILY   • levothyroxine 75 mcg tablet TAKE 1 TABLET BY MOUTH DAILY IN  THE EARLY MORNING  EQUIVALENT TO EUTHYROX  • losartan (COZAAR) 100 MG tablet Take 1 tablet (100 mg total) by mouth daily   • pantoprazole (PROTONIX) 40 mg tablet TAKE 1 TABLET BY MOUTH  TWICE DAILY WITH MEALS   • [DISCONTINUED] buPROPion (WELLBUTRIN XL) 150 mg 24 hr tablet Take 1 tablet (150 mg total) by mouth every morning   • [DISCONTINUED] traZODone (DESYREL) 50 mg tablet Take 0 5 tablets (25 mg total) by mouth daily at bedtime (Patient taking differently: Take 25 mg by mouth as needed)       Objective     /68   Pulse 69   Temp (!) 97 °F (36 1 °C)   Resp 17   Ht 5' (1 524 m)   BMI 28 71 kg/m²     Physical Exam  Constitutional:       General: She is not in acute distress  Appearance: She is well-developed  She is not diaphoretic  HENT:      Head: Normocephalic and atraumatic  Cardiovascular:      Rate and Rhythm: Normal rate and regular rhythm  Heart sounds: Normal heart sounds  No murmur heard  No friction rub  No gallop  Pulmonary:      Effort: Pulmonary effort is normal  No respiratory distress  Breath sounds: Normal breath sounds  No wheezing or rales  Chest:      Chest wall: No tenderness  Musculoskeletal:         General: No deformity  Normal range of motion  Cervical back: Normal range of motion and neck supple  Skin:     General: Skin is warm and dry  Neurological:      Mental Status: She is alert and oriented to person, place, and time     Psychiatric:         Behavior: Behavior normal          Thought Content:  Thought content normal          Judgment: Judgment normal        Carlos Prado MD

## 2023-06-14 DIAGNOSIS — F33.0 MILD EPISODE OF RECURRENT MAJOR DEPRESSIVE DISORDER (HCC): Primary | ICD-10-CM

## 2023-06-14 RX ORDER — FLUOXETINE 10 MG/1
10 CAPSULE ORAL DAILY
Qty: 30 CAPSULE | Refills: 1 | Status: SHIPPED | OUTPATIENT
Start: 2023-06-14

## 2023-06-25 DIAGNOSIS — E03.9 PRIMARY HYPOTHYROIDISM: ICD-10-CM

## 2023-06-26 RX ORDER — LEVOTHYROXINE SODIUM 0.07 MG/1
75 TABLET ORAL
Qty: 90 TABLET | Refills: 1 | Status: SHIPPED | OUTPATIENT
Start: 2023-06-26

## 2023-06-29 ENCOUNTER — OFFICE VISIT (OUTPATIENT)
Dept: AUDIOLOGY | Facility: CLINIC | Age: 73
End: 2023-06-29
Payer: COMMERCIAL

## 2023-06-29 DIAGNOSIS — H90.3 SENSORY HEARING LOSS, BILATERAL: Primary | ICD-10-CM

## 2023-06-29 DIAGNOSIS — H91.93 BILATERAL HEARING LOSS, UNSPECIFIED HEARING LOSS TYPE: ICD-10-CM

## 2023-06-29 PROCEDURE — 92567 TYMPANOMETRY: CPT | Performed by: AUDIOLOGIST

## 2023-06-29 PROCEDURE — 92557 COMPREHENSIVE HEARING TEST: CPT | Performed by: AUDIOLOGIST

## 2023-06-29 NOTE — PROGRESS NOTES
ADULT HEARING EVALUATION - [place] [address] AUDIOLOGY      Patient Name: Jacky Wesley   MRN:  [id number]   :  1950   Age: 74 y o  Gender: female   DOS: 2023     HISTORY:     Jacky Wesley, a 74 y o  female, was seen on 2023 at the referral of Dr Salvador Proctor for an audiometric evaluation  Ms Ken Sherman was unaccompanied to today's visit  Ms Ken Sherman is a new patient to our practice  Today, Ms Barton's primary complaint(s) was decreased hearing  Onset of symptoms reportedly began a few years ago  Ms Ken Sherman denied otalgia, otorrhea, aural fullness and tinnitus  She did report a significant episode of vertigo lasting 3 days and needing emergency care at the hospital  She has since has less intense episodes of vertigo that seem to clear up if she uses Mucinex OTC medication  History of otitis was negative  Ms Ken Sherman has no history of ear surgeries  Family history of hearing loss was negative  Communication difficulties include needing repetition and turning TV up louder  Regenia Miles History of noise exposure is negative  Other documented medical history states that Ms Ken Sherman  has a past medical history of Abdominal pain, Acute diverticulitis, Adenoma of left adrenal gland, Anxiety, Arthritis, Benign paroxysmal positional vertigo, Colon polyp, DDD (degenerative disc disease), lumbar, Disease of thyroid gland, Diverticulitis, Diverticulosis, Epigastric pain, Gastric ulcer, Hiatal hernia, Hyperlipidemia, Hypertension, Malignant hypertension, Obesity, Primary hypothyroidism, Primary hypothyroidism, SOB (shortness of breath) on exertion, and Vertigo  Ms Ken Sherman has not had her hearing tested previously       RESULTS:    Otoscopic Evaluation:   Right Ear: Unremarkable, canal clear   Left Ear: Unremarkable, canal clear    Tympanometry:   Right Ear: Type C; significant negative middle ear pressure in the presence of normal static compliance, consistent with Eustachian tube dysfunction or middle ear pathology  Left Ear: Type C; significant negative middle ear pressure in the presence of normal static compliance, consistent with Eustachian tube dysfunction or middle ear pathology  Audiometry:  Conventional pure tone audiometry from 250 - 8000 Hz  obtained with good reliability and revealed the following:     Right Ear: moderate sensorineural hearing loss (SNHL) with mild mixed conductive components at 1000 Hz and 4000 Hz  Left Ear: moderate sensorineural hearing loss (SNHL) with mild mixed conductive components at 1000 Hz and 4000 Hz  Distortion Product Otoacoustic Emissions (DPOAEs)   Right Ear: DNT    Left Ear: DNT    Speech Audiometry:    Speech Reception (SRT)   Right Ear: 35 dB HL   Left Ear: 45 dB HL    Word Recognition Scores (WRS):  Right Ear: good (96 % correct)     Left Ear: good (88 % correct)   Stimuli: [id number]    IMPRESSIONS:  Jen Rosas has a moderate degree of hearing loss bilaterally with some conductive components at 1000 Hz and 4000 Hz for both ears  Ml's tympanoetric results indicate significant negative pressure in each ear needing medical/otologic evaluation  The results of today's findings were reviewed with Ms Barton and her hearing thresholds were explained at length  Treatment options, including amplification and communication strategies, were discussed as appropriate  Ms Sugey Raines voiced understanding of her test results and had no further questions  RECOMMENDATIONS:    1 ) Follow-up with referring provider  2 ) See ENT as soon as possible to evaluate for middle ear pathology contributing to the hearing loss  3 ) Hearing aid evaluation after otologic management  Consider evaluation at local [facility] since her  is a  salinas Rosas may be eligible for [facility] hearing [place] benefits  *see attached audiogram*    It was a pleasure working with Ms Barton today  Thank you for referring this patient       Eligio Menjivar , [facility]  Clinical Audiologist    [address] [id number]

## 2023-07-10 ENCOUNTER — OFFICE VISIT (OUTPATIENT)
Dept: OTOLARYNGOLOGY | Facility: CLINIC | Age: 73
End: 2023-07-10
Payer: COMMERCIAL

## 2023-07-10 VITALS — BODY MASS INDEX: 27.48 KG/M2 | WEIGHT: 140 LBS | TEMPERATURE: 97.3 F | HEIGHT: 60 IN

## 2023-07-10 DIAGNOSIS — H90.6 MIXED HEARING LOSS, BILATERAL: Primary | ICD-10-CM

## 2023-07-10 PROCEDURE — 99213 OFFICE O/P EST LOW 20 MIN: CPT | Performed by: NURSE PRACTITIONER

## 2023-07-10 NOTE — PATIENT INSTRUCTIONS
Fluticasone nasal spray one spray each nostril twice per day    May proceed with hearing aid evaluation

## 2023-07-10 NOTE — ASSESSMENT & PLAN NOTE
Symptoms include gradually worsening bilateral hearing. History cerumen removal 4 months ago  On exam today, no cerumen impaction, no otorrhea, bilateral TM intact. Reviewed and interpreted recent Audiogram - indicating bilateral moderate rising to mild then sloping back to moderate mixed SNHL and conductive hearing loss, tymps type C bilaterally, good word discrimination. Reviewed causes of mixed SNHL and conductive hearing loss including inflammatory process, ETD vs otosclerosis. No otitis media. Reviewed treatment options including acceptance, repeat audiogram in 3 months to one year, hearing amplification, imaging, and consultation with otologist for surgical options including stapedectomy. Most likely hearing changes associated with stiffened TM's and scarring. May use nasal steroids (flonase) for ETD concerns.     After discussion agreed to nasal steroids and HAE  Follow up one year with repeat audiogram

## 2023-07-10 NOTE — PROGRESS NOTES
Assessment/Plan:    Mixed hearing loss, bilateral  Symptoms include gradually worsening bilateral hearing. History cerumen removal 4 months ago  On exam today, no cerumen impaction, no otorrhea, bilateral TM intact. Reviewed and interpreted recent Audiogram - indicating bilateral moderate rising to mild then sloping back to moderate mixed SNHL and conductive hearing loss, tymps type C bilaterally, good word discrimination. Reviewed causes of mixed SNHL and conductive hearing loss including inflammatory process, ETD vs otosclerosis. No otitis media. Reviewed treatment options including acceptance, repeat audiogram in 3 months to one year, hearing amplification, imaging, and consultation with otologist for surgical options including stapedectomy. Most likely hearing changes associated with stiffened TM's and scarring. May use nasal steroids (flonase) for ETD concerns. After discussion agreed to nasal steroids and HAE  Follow up one year with repeat audiogram           Diagnoses and all orders for this visit:    Mixed hearing loss, bilateral          Subjective:      Patient ID: Jean-Claude Orozco is a 74 y.o. female. Presents today as a follow up due to ear concerns. Hearing gradually worsening. Bilateral ears feel blocked. Cerumen removal 4 months ago. No tinnitus. No otalgia or otorrhea. No history of ear surgery. No current hearing aids. Recent audiogram.        The following portions of the patient's history were reviewed and updated as appropriate: allergies, current medications, past family history, past medical history, past social history, past surgical history and problem list.    Review of Systems   Constitutional: Negative. HENT: Positive for hearing loss. Negative for congestion, ear discharge, ear pain, nosebleeds, postnasal drip, rhinorrhea, sinus pressure, sinus pain, sore throat, tinnitus and voice change.     Respiratory: Negative for chest tightness and shortness of breath. Skin: Negative for color change. Neurological: Negative for dizziness, numbness and headaches. Psychiatric/Behavioral: Negative. Objective:      Temp (!) 97.3 °F (36.3 °C) (Temporal)   Ht 5' (1.524 m)   Wt 63.5 kg (140 lb)   BMI 27.34 kg/m²          Physical Exam  Constitutional:       Appearance: She is well-developed. HENT:      Head: Normocephalic. Right Ear: Hearing, tympanic membrane, ear canal and external ear normal. No decreased hearing noted. No drainage or tenderness. Tympanic membrane is not perforated or erythematous. Left Ear: Hearing, tympanic membrane, ear canal and external ear normal. No decreased hearing noted. No drainage or tenderness. Tympanic membrane is not perforated or erythematous. Nose: Nose normal. No nasal deformity or septal deviation. Mouth/Throat:      Mouth: Mucous membranes are not pale and not dry. No oral lesions. Dentition: Normal dentition. Pharynx: Uvula midline. No oropharyngeal exudate. Neck:      Trachea: No tracheal deviation. Pulmonary:      Effort: Pulmonary effort is normal. No accessory muscle usage or respiratory distress. Musculoskeletal:      Cervical back: Full passive range of motion without pain and neck supple. Lymphadenopathy:      Cervical: No cervical adenopathy. Skin:     General: Skin is warm and dry. Neurological:      Mental Status: She is alert and oriented to person, place, and time. Cranial Nerves: No cranial nerve deficit. Sensory: No sensory deficit. Psychiatric:         Behavior: Behavior is cooperative.

## 2023-07-10 NOTE — LETTER
July 10, 2023     Eligio Boyer. Patient: Kash Del Rosario   YOB: 1950   Date of Visit: 7/10/2023       Dear Dr. Mark Muse: Thank you for referring Raquel Lombard to me for evaluation. Below are my notes for this consultation. If you have questions, please do not hesitate to call me. I look forward to following your patient along with you. Sincerely,        COURT Coombs        CC: No Recipients    COURT Coombs  7/10/2023  1:30 PM  Sign when Signing Visit  Assessment/Plan:    Mixed hearing loss, bilateral  Symptoms include gradually worsening bilateral hearing. History cerumen removal 4 months ago  On exam today, no cerumen impaction, no otorrhea, bilateral TM intact. Reviewed and interpreted recent Audiogram - indicating bilateral moderate rising to mild then sloping back to moderate mixed SNHL and conductive hearing loss, tymps type C bilaterally, good word discrimination. Reviewed causes of mixed SNHL and conductive hearing loss including inflammatory process, ETD vs otosclerosis. No otitis media. Reviewed treatment options including acceptance, repeat audiogram in 3 months to one year, hearing amplification, imaging, and consultation with otologist for surgical options including stapedectomy. Most likely hearing changes associated with stiffened TM's and scarring. May use nasal steroids (flonase) for ETD concerns. After discussion agreed to nasal steroids and HAE  Follow up one year with repeat audiogram          Diagnoses and all orders for this visit:    Mixed hearing loss, bilateral         Subjective:     Patient ID: Kash Del Rosario is a 74 y.o. female. Presents today as a follow up due to ear concerns. Hearing gradually worsening. Bilateral ears feel blocked. Cerumen removal 4 months ago. No tinnitus. No otalgia or otorrhea. No history of ear surgery. No current hearing aids.   Recent audiogram.        The following portions of the patient's history were reviewed and updated as appropriate: allergies, current medications, past family history, past medical history, past social history, past surgical history and problem list.    Review of Systems   Constitutional: Negative. HENT: Positive for hearing loss. Negative for congestion, ear discharge, ear pain, nosebleeds, postnasal drip, rhinorrhea, sinus pressure, sinus pain, sore throat, tinnitus and voice change. Respiratory: Negative for chest tightness and shortness of breath. Skin: Negative for color change. Neurological: Negative for dizziness, numbness and headaches. Psychiatric/Behavioral: Negative. Objective:      Temp (!) 97.3 °F (36.3 °C) (Temporal)   Ht 5' (1.524 m)   Wt 63.5 kg (140 lb)   BMI 27.34 kg/m²         Physical Exam  Constitutional:       Appearance: She is well-developed. HENT:      Head: Normocephalic. Right Ear: Hearing, tympanic membrane, ear canal and external ear normal. No decreased hearing noted. No drainage or tenderness. Tympanic membrane is not perforated or erythematous. Left Ear: Hearing, tympanic membrane, ear canal and external ear normal. No decreased hearing noted. No drainage or tenderness. Tympanic membrane is not perforated or erythematous. Nose: Nose normal. No nasal deformity or septal deviation. Mouth/Throat:      Mouth: Mucous membranes are not pale and not dry. No oral lesions. Dentition: Normal dentition. Pharynx: Uvula midline. No oropharyngeal exudate. Neck:      Trachea: No tracheal deviation. Pulmonary:      Effort: Pulmonary effort is normal. No accessory muscle usage or respiratory distress. Musculoskeletal:      Cervical back: Full passive range of motion without pain and neck supple. Lymphadenopathy:      Cervical: No cervical adenopathy. Skin:     General: Skin is warm and dry. Neurological:      Mental Status: She is alert and oriented to person, place, and time. Cranial Nerves: No cranial nerve deficit. Sensory: No sensory deficit. Psychiatric:         Behavior: Behavior is cooperative.

## 2023-07-11 DIAGNOSIS — F33.0 MILD EPISODE OF RECURRENT MAJOR DEPRESSIVE DISORDER (HCC): ICD-10-CM

## 2023-07-12 RX ORDER — TRAZODONE HYDROCHLORIDE 50 MG/1
50 TABLET ORAL
Qty: 90 TABLET | Refills: 1 | Status: SHIPPED | OUTPATIENT
Start: 2023-07-12 | End: 2023-07-12 | Stop reason: SDUPTHER

## 2023-07-12 RX ORDER — TRAZODONE HYDROCHLORIDE 100 MG/1
100 TABLET ORAL
Qty: 90 TABLET | Refills: 1 | Status: SHIPPED | OUTPATIENT
Start: 2023-07-12

## 2023-07-12 RX ORDER — TRAZODONE HYDROCHLORIDE 100 MG/1
TABLET ORAL
Qty: 30 TABLET | Refills: 3 | OUTPATIENT
Start: 2023-07-12

## 2023-07-19 ENCOUNTER — OFFICE VISIT (OUTPATIENT)
Dept: AUDIOLOGY | Facility: CLINIC | Age: 73
End: 2023-07-19

## 2023-07-19 DIAGNOSIS — H90.3 SENSORY HEARING LOSS, BILATERAL: Primary | ICD-10-CM

## 2023-07-19 NOTE — PROGRESS NOTES
HEARING AID EVALUTION [facility] AUDIOLOGY    Patient Name: Abbie Carreon   MRN:  [id number]   :  1950   Age: 74 y.o. Gender: female   DOS: 2023     Abbie Carreon was seen today for a hearing aid evaluation following her audiometric testing performed on 23. Ms. Socorro Oseguera was unaccompanied to today's visit. Ms. Socorro Oseguera was referred by Dr. Alex Schulz. The audiometric evaluation on 23 revealed moderate to moderately-severe bilateral hearing loss. Word recognition scores were good in the right ear and good in the left ear. This findings were reviewed with Ms. Barton. All of her questions regarding her hearing status were addressed, and the importance of realistic expectations of hearing loss and amplification were emphasized. Hearing aids are assistive devices and are not designed to restore normal hearing. The difference between peripheral hearing loss and speech understanding (central hearing loss) was explained. While improvement with amplification is possible, there will always be word understanding problems due to the inherent nature of hearing loss. These problems will be greater in background noise than in quiet situations. Ms. Socorro Oseguera was counseled on the importance of self-advocacy, motivation, as well as effective communication strategies that can be used to optimize hearing aid success. These effective communication strategies include:   1.) Maintaining face-to-face communication, allowing for speechreading of facial expressions, lips, and gestures. 2.) Reducing background noise and distance between communication partners. 3.) Having communication partners reduce their rate of speech when appropriate. 4.) Beginning conversation by getting communication partner's attention and stating the topic, allowing for context clues to help fill in gaps in comprehension.     5.) Asking for rephrasing of aspects of conversation that are missed when needed rather than asking for repetitions. To better determine which communication difficulties they are looking to improve upon, a client-oriented scale of improvement (COSI) questionnaire was completed. Based on these results, Ms. Barton's prioritized communication difficulties include:     1.) Hearing one on one conversations better   2.) Turning TV down    3.) Hearing group conversations better  4.) Hearing better in the movie theater   5.) Understanding accents better    Strengths and limitations of amplification, including various hearing aid styles, options, and circuitry were discussed at length with Ms. Barton. Based on the degree of her hearing loss, preferences, and lifestyle needs, a trial with [employer] REAL 3 [employer]ra[employer]E hearing aids was recommended with size 2/65 receivers and 6 mm domes. Ms. Karime Wick preferred the dark silver #91 color. [facility]'s office policies regarding our hearing aid program were reviewed, including the 45 day trial period, non-refundable fees, as well as the  warranties. At this time, Ms. Barton wished to proceed with purchase of the above hearing aids. Devices ordered as specified (confirmation # [id number]). She will return for a hearing aid fitting on 8/2/23. It was a pleasure working with Ms. Barton. They were encouraged to contact the clinic with any further questions or concerns in the meantime.      Eligio Staton., CCC-A  Clinical Audiologist    [address] [id number]

## 2023-07-21 ENCOUNTER — APPOINTMENT (OUTPATIENT)
Dept: LAB | Facility: CLINIC | Age: 73
End: 2023-07-21
Payer: COMMERCIAL

## 2023-07-21 DIAGNOSIS — E03.9 PRIMARY HYPOTHYROIDISM: ICD-10-CM

## 2023-07-21 DIAGNOSIS — E87.1 HYPONATREMIA: ICD-10-CM

## 2023-07-21 LAB
ALBUMIN SERPL BCP-MCNC: 4 G/DL (ref 3.5–5)
ALP SERPL-CCNC: 64 U/L (ref 46–116)
ALT SERPL W P-5'-P-CCNC: 17 U/L (ref 12–78)
ANION GAP SERPL CALCULATED.3IONS-SCNC: 5 MMOL/L
AST SERPL W P-5'-P-CCNC: 13 U/L (ref 5–45)
BILIRUB SERPL-MCNC: 0.48 MG/DL (ref 0.2–1)
BUN SERPL-MCNC: 14 MG/DL (ref 5–25)
CALCIUM SERPL-MCNC: 9.2 MG/DL (ref 8.3–10.1)
CHLORIDE SERPL-SCNC: 97 MMOL/L (ref 96–108)
CO2 SERPL-SCNC: 28 MMOL/L (ref 21–32)
CREAT SERPL-MCNC: 0.74 MG/DL (ref 0.6–1.3)
GFR SERPL CREATININE-BSD FRML MDRD: 79 ML/MIN/1.73SQ M
GLUCOSE P FAST SERPL-MCNC: 102 MG/DL (ref 65–99)
POTASSIUM SERPL-SCNC: 3.9 MMOL/L (ref 3.5–5.3)
PROT SERPL-MCNC: 7.5 G/DL (ref 6.4–8.4)
SODIUM SERPL-SCNC: 130 MMOL/L (ref 135–147)
T4 FREE SERPL-MCNC: 1.17 NG/DL (ref 0.61–1.12)
TSH SERPL DL<=0.05 MIU/L-ACNC: 3.09 UIU/ML (ref 0.45–4.5)

## 2023-07-21 PROCEDURE — 84443 ASSAY THYROID STIM HORMONE: CPT

## 2023-07-21 PROCEDURE — 84439 ASSAY OF FREE THYROXINE: CPT

## 2023-07-21 PROCEDURE — 80053 COMPREHEN METABOLIC PANEL: CPT

## 2023-07-21 PROCEDURE — 36415 COLL VENOUS BLD VENIPUNCTURE: CPT

## 2023-07-22 DIAGNOSIS — E87.1 HYPONATREMIA: ICD-10-CM

## 2023-07-22 DIAGNOSIS — E03.9 PRIMARY HYPOTHYROIDISM: Primary | ICD-10-CM

## 2023-07-22 RX ORDER — LEVOTHYROXINE SODIUM 0.05 MG/1
50 TABLET ORAL
Qty: 30 TABLET | Refills: 1 | Status: SHIPPED | OUTPATIENT
Start: 2023-07-22

## 2023-08-02 ENCOUNTER — OFFICE VISIT (OUTPATIENT)
Dept: AUDIOLOGY | Facility: CLINIC | Age: 73
End: 2023-08-02
Payer: COMMERCIAL

## 2023-08-02 DIAGNOSIS — H90.3 SENSORY HEARING LOSS, BILATERAL: Primary | ICD-10-CM

## 2023-08-02 PROCEDURE — V5261 HEARING AID, DIGIT, BIN, BTE: HCPCS | Performed by: AUDIOLOGIST

## 2023-08-02 PROCEDURE — V5160 DISPENSING FEE BINAURAL: HCPCS | Performed by: AUDIOLOGIST

## 2023-08-02 NOTE — PROGRESS NOTES
[place] Hearing Aid Fitting    Tello Rsoales was seen today (8/2/2023) for a binaural hearing aid fitting of her Oticon real 3  in the canal (CHAIM) hearing aid(s). Ms. Ruben Becker was unaccompanied to today's visit    Device Information    Hearing Aid Fitting Date: 08/02/2023       Left Device Right Device   Hearing Aid Make: Joi Acosta   Hearing Aid Model: Real 3 Real 3   Serial Number: [id number]   Repair Warranty Expiration Date: 08/18/2026 08/18/2026   Loss/Damage Warranty Expiration Date:  08/18/2026 08/18/2026    Length: 2 2    Output: 85 85   Wax System: [employer]   Dome Size/Style: 6 mm 6 mm   Battery: Lithium-ion Rechargeable Lithium-ion Rechargeable   Earmold Serial Number: N/A N/A   Tal Phenes Date:  N/A N/A      Serial Number: [id number]  Accessories: N/A      Initial Hearing Aid Settings    Hearing aid(s) were programmed using [employer] software's VAC fitting formula and was validated by Ms. Barton for perceived comfort levels. • [place] program set with neuro adaptive directional microphone input  • Manual control button was deactivated  • Hearing aids set to adaptation level 2 per Ms. Barton's subjective listening preference    Device Orientation    Ms. Barton was counseled on device components and component function. Proper insertion and removal of the aid(s) was demonstrated. The importance of realistic expectations with expectation, especially in the presence of background noise, was emphasized. Hearing aids are assistive devices and are not designed to restore normal hearing sensitivity. The need for daily consistent usage (8-12 hours per day) for proper device acclimatization, as well as the importance of self-advocacy and practicing effective communication strategies was outlined.      Effective communication strategies include:  1.) Maintaining face-to-face communication, allowing for speechreading of facial expressions, lips, and gestures. 2.) Reducing background noise and distance between communication partners. 3.) Having communication partners reduce their rate of speech when appropriate. 4.) Beginning conversation by getting communication partner's attention and stating the topic, allowing for context clues to help fill in gaps in comprehension. 5.) Asking for rephrasing of missed aspects of conversation rather than asking for repetitions. Ms. Ferdinand Lennox was given the information booklet (or QR code pamphlet to the booklet) regarding hearing aid usage and operation, hearing aid cleaning tools, and hearing aid carrying case. Hearing aid repair and loss and damage warranties offered through the  were outlined thoroughly. Ms. Ferdinand Lennox agreed to the terms of sale listed on the purchase agreement containing device specifications, warranties, pricing information, as well as [facility] 45-day trial period timeline. After this period has elapsed, hearing aids cannot be returned. Recommendations & Follow-up    Ms. Barton demonstrated understanding of the topics discussed. It appears that she has realistic expectations regarding the benefits and limitations with the use of amplification. The prognosis for successful hearing aid use is good. While improvement with amplification is expected, there will always be word understanding problems due to the inherent nature of hearing loss. These problems will be greater in background noise and adverse listening environments than in quiet situations. Ms. Ferdinand Lennox is to follow-up in 2-3 weeks for a hearing aid check within the trial period as scheduled. At this time of Ms. Barton's next visit, the following topics should be reviewed: wax guard removal/replacement, increasing adaptation/gain settings after initial acclimatization, activation of push button/toggle switches for manual volume control, as well as noise reductive features in Buddy 2 software.      The above recommendations were discussed with Ms. Barton. It was a pleasure working with Ms. Barton today. She was encouraged to contact the clinic with any further questions/concerns in the meantime.     FORREST Ledesma  3rd Year Audiology Student    Derik Quintero, Elig[place], [address] [id number]

## 2023-08-07 DIAGNOSIS — F33.0 MILD EPISODE OF RECURRENT MAJOR DEPRESSIVE DISORDER (HCC): ICD-10-CM

## 2023-08-07 RX ORDER — FLUOXETINE 10 MG/1
10 CAPSULE ORAL DAILY
Qty: 30 CAPSULE | Refills: 5 | Status: SHIPPED | OUTPATIENT
Start: 2023-08-07

## 2023-08-23 ENCOUNTER — OFFICE VISIT (OUTPATIENT)
Dept: AUDIOLOGY | Facility: CLINIC | Age: 73
End: 2023-08-23

## 2023-08-23 DIAGNOSIS — H90.3 SENSORY HEARING LOSS, BILATERAL: Primary | ICD-10-CM

## 2023-08-23 NOTE — PROGRESS NOTES
[facility] AUDIOLOGY HEARING AID CHECK    Kenny Moran was seen today (8/23/2023) for a hearing aid check of her bilateral hearing aids. Today, Ms. Barton reported that she is wearing the hearing aids during all of her waking hours and is doing well charging the hearing aids each night. She is experiencing itching in her ears mostly in her right ear. She also noticed that the hearing aids seem to close off her ear canals at times. Otoscopy revealed Non-occluding cerumen in each ear and a red spot and overall redness in her right ear canal.     Device Information     Hearing Aid Fitting Date: 08/02/2023          Left Device Right Device   Hearing Aid Make: [employer]   Hearing Aid Model: Real 3 Real 3   Serial Number: [id number]   Repair Warranty Expiration Date: 08/18/2026 08/18/2026   Loss/Damage Warranty Expiration Date:  08/18/2026 08/18/2026    Length: 2 2    Output: 85 85   Wax System: [employer] ProWax   Dome Size/Style: 6 mm open bass 6 mm open bass   Battery: Lithium-ion Rechargeable Lithium-ion Rechargeable   Earmold Serial Number: N/A N/A   Brendan Lav Date:  N/A N/A       Serial Number: [id number]  Accessories: N/A    Visual inspection of the device(s) revealed no noticeable damage or defects. A listening check revealed good sound quality from the device(s). Changes to Device(s)    • The domes were changed to 6 mm open bass to allow for more air circulation. • I gave Milla Nikia to ease the itch in the ear canal and instructed her to avoid pushing the dome in the ear constantly as she has been doing. • I suggested a small amount of neosprin in the ear canal at night to help the irritation in the right ear canal.  • We loaded the Oticon On alan on Ml's phone and paired the hearing aids. • I went over the use of the Oticon alan and all of the features. Recommendations & Follow-up    Hearing aids(s) are in good working condition.  Ms. Kirby Quiros stated that she has no further questions with her hearing aid(s) and does not feed that any other adjustments are needed at this time. She will follow-up in 2 weeks, sooner if other problems/concerns arise. It was a pleasure working with Ms. Barton today. She was encouraged to contact the clinic with any further questions in the meantime.     Eligio Goodwin., CCC-A  Clinical Audiologist    [address] [phone]

## 2023-08-29 ENCOUNTER — TELEPHONE (OUTPATIENT)
Dept: FAMILY MEDICINE CLINIC | Facility: CLINIC | Age: 73
End: 2023-08-29
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
## 2023-08-29 DIAGNOSIS — E87.1 HYPONATREMIA: Primary | ICD-10-CM

## 2023-08-29 DIAGNOSIS — E03.9 PRIMARY HYPOTHYROIDISM: ICD-10-CM

## 2023-08-29 DIAGNOSIS — R55 SYNCOPE, UNSPECIFIED SYNCOPE TYPE: ICD-10-CM

## 2023-08-29 DIAGNOSIS — Z13.6 SCREENING FOR CARDIOVASCULAR CONDITION: ICD-10-CM

## 2023-08-29 NOTE — TELEPHONE ENCOUNTER
8/29/2023 12:48 PM returned call to Cherie Gerber     She has been dizzy for a while. Then on the 27th she woke up and walked across the room and passed out. She did see the eye doctor. She is no longer dizzy. We will have her get blood work done. She also reports having a recent tick bite.   Referred her to cardiology for further evaluation    Message complete  Lizzy Moreira, DO

## 2023-08-30 ENCOUNTER — APPOINTMENT (OUTPATIENT)
Dept: LAB | Facility: CLINIC | Age: 73
End: 2023-08-30
Payer: COMMERCIAL
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
## 2023-08-30 DIAGNOSIS — R55 SYNCOPE, UNSPECIFIED SYNCOPE TYPE: ICD-10-CM

## 2023-08-30 DIAGNOSIS — E87.1 HYPONATREMIA: ICD-10-CM

## 2023-08-30 LAB
ANION GAP SERPL CALCULATED.3IONS-SCNC: 9 MMOL/L
BASOPHILS # BLD AUTO: 0.04 THOUSANDS/ÂΜL (ref 0–0.1)
BASOPHILS NFR BLD AUTO: 1 % (ref 0–1)
BUN SERPL-MCNC: 14 MG/DL (ref 5–25)
CALCIUM SERPL-MCNC: 10.4 MG/DL (ref 8.4–10.2)
CHLORIDE SERPL-SCNC: 91 MMOL/L (ref 96–108)
CO2 SERPL-SCNC: 29 MMOL/L (ref 21–32)
CREAT SERPL-MCNC: 0.8 MG/DL (ref 0.6–1.3)
EOSINOPHIL # BLD AUTO: 0.08 THOUSAND/ÂΜL (ref 0–0.61)
EOSINOPHIL NFR BLD AUTO: 2 % (ref 0–6)
ERYTHROCYTE [DISTWIDTH] IN BLOOD BY AUTOMATED COUNT: 12.5 % (ref 11.6–15.1)
GFR SERPL CREATININE-BSD FRML MDRD: 72 ML/MIN/1.73SQ M
GLUCOSE P FAST SERPL-MCNC: 91 MG/DL (ref 65–99)
HCT VFR BLD AUTO: 39 % (ref 34.8–46.1)
HGB BLD-MCNC: 13.3 G/DL (ref 11.5–15.4)
IMM GRANULOCYTES # BLD AUTO: 0.02 THOUSAND/UL (ref 0–0.2)
IMM GRANULOCYTES NFR BLD AUTO: 1 % (ref 0–2)
LYMPHOCYTES # BLD AUTO: 0.79 THOUSANDS/ÂΜL (ref 0.6–4.47)
LYMPHOCYTES NFR BLD AUTO: 20 % (ref 14–44)
MCH RBC QN AUTO: 32.7 PG (ref 26.8–34.3)
MCHC RBC AUTO-ENTMCNC: 34.1 G/DL (ref 31.4–37.4)
MCV RBC AUTO: 96 FL (ref 82–98)
MONOCYTES # BLD AUTO: 0.39 THOUSAND/ÂΜL (ref 0.17–1.22)
MONOCYTES NFR BLD AUTO: 10 % (ref 4–12)
NEUTROPHILS # BLD AUTO: 2.62 THOUSANDS/ÂΜL (ref 1.85–7.62)
NEUTS SEG NFR BLD AUTO: 66 % (ref 43–75)
NRBC BLD AUTO-RTO: 0 /100 WBCS
PLATELET # BLD AUTO: 288 THOUSANDS/UL (ref 149–390)
PMV BLD AUTO: 9.6 FL (ref 8.9–12.7)
POTASSIUM SERPL-SCNC: 3.8 MMOL/L (ref 3.5–5.3)
RBC # BLD AUTO: 4.07 MILLION/UL (ref 3.81–5.12)
SODIUM SERPL-SCNC: 129 MMOL/L (ref 135–147)
WBC # BLD AUTO: 3.94 THOUSAND/UL (ref 4.31–10.16)

## 2023-08-30 PROCEDURE — 86618 LYME DISEASE ANTIBODY: CPT

## 2023-08-30 PROCEDURE — 85025 COMPLETE CBC W/AUTO DIFF WBC: CPT

## 2023-08-30 PROCEDURE — 36415 COLL VENOUS BLD VENIPUNCTURE: CPT

## 2023-08-30 PROCEDURE — 80048 BASIC METABOLIC PNL TOTAL CA: CPT

## 2023-08-31 ENCOUNTER — TELEPHONE (OUTPATIENT)
Dept: FAMILY MEDICINE CLINIC | Facility: CLINIC | Age: 73
End: 2023-08-31

## 2023-08-31 DIAGNOSIS — E87.1 HYPONATREMIA: Primary | ICD-10-CM

## 2023-08-31 LAB — B BURGDOR IGG+IGM SER QL IA: NEGATIVE

## 2023-09-01 ENCOUNTER — HOSPITAL ENCOUNTER (OUTPATIENT)
Dept: RADIOLOGY | Facility: HOSPITAL | Age: 73
Discharge: HOME | End: 2023-09-01
Payer: COMMERCIAL

## 2023-09-01 DIAGNOSIS — Z13.6 SCREENING FOR CARDIOVASCULAR CONDITION: ICD-10-CM

## 2023-09-01 PROCEDURE — 93922 UPR/L XTREMITY ART 2 LEVELS: CPT

## 2023-09-05 ENCOUNTER — TELEPHONE (OUTPATIENT)
Dept: FAMILY MEDICINE CLINIC | Facility: CLINIC | Age: 73
End: 2023-09-05

## 2023-09-05 DIAGNOSIS — I65.23 ATHEROSCLEROSIS OF BOTH CAROTID ARTERIES: Primary | ICD-10-CM

## 2023-09-05 NOTE — TELEPHONE ENCOUNTER
9/5/2023 12:45 PM Called Orlando Childs regarding her vascular study    Reviewed results  She is unable to tolerate statins   She agreed to recheck the vascular study in 1 year    David Nix DO

## 2023-09-06 ENCOUNTER — OFFICE VISIT (OUTPATIENT)
Dept: AUDIOLOGY | Facility: CLINIC | Age: 73
End: 2023-09-06

## 2023-09-06 DIAGNOSIS — H90.3 SENSORY HEARING LOSS, BILATERAL: Primary | ICD-10-CM

## 2023-09-06 NOTE — PROGRESS NOTES
[facility] AUDIOLOGY HEARING AID CHECK    Humberto Real was seen today (9/6/2023) for a hearing aid check of her bilateral hearing aids. Today, Ms. Barton reported that she is using the hearing aids from morning until about 5:30 pm every day. I encouraged her to use them even longer each day. In the evening, she uses her noise cancelling headpones to watch television shows and prefers to use the headphones to cancel out the extraneous noise. Otoscopy revealed Non-occluding cerumen that might be occluding the hearing aids. I encouraged Mrs. Barton to clean the hearing aids every 1-2 days. Device Information    Hearing Aid Fitting Date: 08/02/2023          Left Device Right Device   Hearing Aid Make: Blanca Jade   Hearing Aid Model: Real 3 Real 3   Serial Number: [id number]   Repair Warranty Expiration Date: 08/18/2026 08/18/2026   Loss/Damage Warranty Expiration Date:  08/18/2026 08/18/2026    Length: 2 2    Output: 85 85   Wax System: [employer] ProWax   Dome Size/Style: 6 mm open bass 6 mm open bass   Battery: Lithium-ion Rechargeable Lithium-ion Rechargeable   Earmold Serial Number: N/A N/A   Earmold Warranty Date:  N/A N/A       Serial IYQSKS: [id number]  Accessories: N/A           Visual inspection of the device(s) revealed no noticeable damage or defects. A listening check revealed good sound quality from the device(s). Changes to Device(s)    • Cleaned and suctioned both hearing aids. • Changed domes  • Turned adaptation level up to 3. Patientt was agreeable with change    Recommendations & Follow-up    Hearing aids(s) are in good working condition. Ms. Bradley Avelar stated that she has no further questions with her hearing aid(s) and does not feed that any other adjustments are needed at this time. She will follow-up in 4 months, sooner if other problems/concerns arise. It was a pleasure working with Ms. Barton today.  She was encouraged to contact the clinic with any further questions in the meantime.     Eligio Quezada., CCC-A  Clinical Audiologist    [address] [phone]

## 2023-09-07 ENCOUNTER — OFFICE VISIT (OUTPATIENT)
Dept: OBGYN CLINIC | Facility: CLINIC | Age: 73
End: 2023-09-07
Payer: COMMERCIAL

## 2023-09-07 VITALS
HEART RATE: 65 BPM | BODY MASS INDEX: 27.48 KG/M2 | WEIGHT: 140 LBS | DIASTOLIC BLOOD PRESSURE: 85 MMHG | HEIGHT: 60 IN | SYSTOLIC BLOOD PRESSURE: 144 MMHG

## 2023-09-07 DIAGNOSIS — G89.29 CHRONIC PAIN OF LEFT KNEE: ICD-10-CM

## 2023-09-07 DIAGNOSIS — M25.562 CHRONIC PAIN OF LEFT KNEE: ICD-10-CM

## 2023-09-07 DIAGNOSIS — M17.12 PRIMARY OSTEOARTHRITIS OF LEFT KNEE: Primary | ICD-10-CM

## 2023-09-07 PROCEDURE — 99214 OFFICE O/P EST MOD 30 MIN: CPT | Performed by: ORTHOPAEDIC SURGERY

## 2023-09-07 PROCEDURE — 20610 DRAIN/INJ JOINT/BURSA W/O US: CPT | Performed by: ORTHOPAEDIC SURGERY

## 2023-09-07 RX ORDER — TRIAMCINOLONE ACETONIDE 40 MG/ML
80 INJECTION, SUSPENSION INTRA-ARTICULAR; INTRAMUSCULAR
Status: COMPLETED | OUTPATIENT
Start: 2023-09-07 | End: 2023-09-07

## 2023-09-07 RX ORDER — BUPIVACAINE HYDROCHLORIDE 5 MG/ML
2 INJECTION, SOLUTION EPIDURAL; INTRACAUDAL
Status: COMPLETED | OUTPATIENT
Start: 2023-09-07 | End: 2023-09-07

## 2023-09-07 RX ADMIN — TRIAMCINOLONE ACETONIDE 80 MG: 40 INJECTION, SUSPENSION INTRA-ARTICULAR; INTRAMUSCULAR at 09:45

## 2023-09-07 RX ADMIN — BUPIVACAINE HYDROCHLORIDE 2 ML: 5 INJECTION, SOLUTION EPIDURAL; INTRACAUDAL at 09:45

## 2023-09-07 NOTE — PROGRESS NOTES
Assessment/Plan:  1. Primary osteoarthritis of left knee  Large joint arthrocentesis: L knee      2. Chronic pain of left knee          Eloisa Marquez is a pleasant 65 yo female who presents for follow up of her left knee pain. She has long standing knee pain and arthritis. She is a good candidate for a knee replacement but she is not ready to undergo that right now due to family issues. Unfortunately, she continues to experience persistent pain with nonoperatively treatment. A corticosteroid injection was performed today and was tolerated well. We talked about trying visco again but she is hesitant due to her previous experience of worsening pain after the first injection. She understands that she cannot undergo TKA within 3 months of having an injection. She can follow up in 3 months for another injection as needed. Large joint arthrocentesis: L knee  Universal Protocol:  Consent: Verbal consent obtained. Risks and benefits: risks, benefits and alternatives were discussed  Consent given by: patient  Timeout called at: 9/7/2023 9:57 AM.  Site marked: the operative site was marked  Patient identity confirmed: verbally with patient    Supporting Documentation  Indications: pain   Procedure Details  Location: knee - L knee  Needle size: 20 G  Ultrasound guidance: no  Approach: anterolateral  Medications administered: 2 mL bupivacaine (PF) 0.5 %; 80 mg triamcinolone acetonide 40 mg/mL    Patient tolerance: patient tolerated the procedure well with no immediate complications  Dressing:  Sterile dressing applied        Subjective: f/u left knee OA    Patient ID: Delgado Thomas is a 74 y.o. female who presents today with f/u of her left knee OA. She was last seen 2 years ago, at that time we recommended a TKA however she is not ready to purse that. She would like to try another injection today. Her last injection was 2 years ago at [facility]. She has tried visco in the past which did not help, HEP, and NSAIDs.  At today's visit, she complains of severe aching pain about the anterolateral aspect of her knee that occurs with activity. She also complains fullness about the posterior aspect of her knee that she states comes and goes. She denies any recent injury or trauma. Her pain is intermittent and relieved with rest. She is going on vacation soon and would like a shot today.        Review of Systems   Constitutional: Positive for activity change. HENT: Negative. Eyes: Negative. Respiratory: Negative. Cardiovascular: Negative. Gastrointestinal: Negative. Endocrine: Negative. Genitourinary: Negative. Musculoskeletal: Positive for arthralgias. Allergic/Immunologic: Negative. Neurological: Negative. Hematological: Negative. Psychiatric/Behavioral: Negative. Past Medical History:   Diagnosis Date   • Abdominal pain     recent upper abd.    • Acute diverticulitis     last assessed 16   • Adenoma of left adrenal gland    • Anxiety    • Arthritis    • Benign paroxysmal positional vertigo     last assessed 03/24/15   • Colon polyp    • DDD (degenerative disc disease), lumbar     PT and yoga helped   • Disease of thyroid gland    • Diverticulitis     hx of   • Diverticulosis    • Epigastric pain     wakes her up at night   • Gastric ulcer     hx of   • Hiatal hernia    • Hyperlipidemia    • Hypertension    • Malignant hypertension    • Obesity    • Primary hypothyroidism    • Primary hypothyroidism    • SOB (shortness of breath) on exertion     with exertion when taking a certain medication   • Vertigo     last assessed 13       Past Surgical History:   Procedure Laterality Date   • APPENDECTOMY     • BREAST BIOPSY Left     a long time ago   •  SECTION     • CHOLECYSTECTOMY     • COLONOSCOPY     • KNEE SURGERY      History of Arthrotomy of Knee; Open Meniscus Tear; left   • UPPER GASTROINTESTINAL ENDOSCOPY         Family History   Adopted: Yes   Problem Relation Age of Onset   • Heart disease Family         Adopted but heard family member   • No Known Problems Mother        Social History     Occupational History   • Occupation: Retired   Tobacco Use   • Smoking status: Former     Types: Cigarettes     Quit date: 1990     Years since quittin.4   • Smokeless tobacco: Never   • Tobacco comments:     quit 23 years ago- only a social smoker    Vaping Use   • Vaping Use: Never used   Substance and Sexual Activity   • Alcohol use:  Yes     Alcohol/week: 2.0 standard drinks of alcohol     Types: 2 Glasses of wine per week     Comment: occasional- once or twice week    • Drug use: Never   • Sexual activity: Not on file         Current Outpatient Medications:   •  ALPRAZolam (XANAX) 0.25 mg tablet, Take 1 tablet (0.25 mg total) by mouth daily as needed for anxiety, Disp: 30 tablet, Rfl: 0  •  Alum & Mag Hydroxide-Simeth (MYLANTA PO), Take by mouth as needed, Disp: , Rfl:   •  amLODIPine (NORVASC) 5 mg tablet, TAKE 1 TABLET BY MOUTH DAILY, Disp: 90 tablet, Rfl: 3  •  dicyclomine (BENTYL) 10 mg capsule, TAKE 1 CAPSULE BY MOUTH AT  AT BEDTIME AS NEEDED, Disp: 90 capsule, Rfl: 2  •  FLUoxetine (PROzac) 10 mg capsule, TAKE ONE CAPSULE BY MOUTH EVERY DAY, Disp: 30 capsule, Rfl: 5  •  levothyroxine (Euthyrox) 50 mcg tablet, Take 1 tablet (50 mcg total) by mouth daily in the early morning, Disp: 30 tablet, Rfl: 1  •  losartan (COZAAR) 100 MG tablet, Take 1 tablet (100 mg total) by mouth daily, Disp: 90 tablet, Rfl: 1  •  pantoprazole (PROTONIX) 40 mg tablet, TAKE 1 TABLET BY MOUTH  TWICE DAILY WITH MEALS, Disp: 180 tablet, Rfl: 3  •  traZODone (DESYREL) 100 mg tablet, Take 1 tablet (100 mg total) by mouth daily at bedtime, Disp: 90 tablet, Rfl: 1  •  gabapentin (NEURONTIN) 100 mg capsule, TAKE 2 CAPSULES BY MOUTH  DAILY AT BEDTIME (Patient not taking: Reported on 7/10/2023), Disp: 180 capsule, Rfl: 3    Allergies   Allergen Reactions   • Crestor [Rosuvastatin] Other (See Comments) Nightmares        Objective:  Vitals:    09/07/23 0940   BP: 144/85   Pulse: 65       Body mass index is 27.34 kg/m². Left Knee Exam     Tenderness   The patient is experiencing tenderness in the lateral joint line. Range of Motion   Extension: 0   Flexion: 120     Tests   Varus: negative Valgus: negative  Lachman:  Anterior - negative    Posterior - negative  Patellar apprehension: negative    Other   Erythema: absent  Scars: absent  Sensation: normal  Pulse: present  Swelling: none  Effusion: no effusion present    Comments:  +PF crepitation          Observations   Left Knee   Negative for effusion. Physical Exam  Vitals and nursing note reviewed. Constitutional:       Appearance: Normal appearance. HENT:      Head: Normocephalic and atraumatic. Right Ear: External ear normal.      Left Ear: External ear normal.      Nose: Nose normal.      Mouth/Throat:      Mouth: Mucous membranes are moist.      Pharynx: Oropharynx is clear. Eyes:      Extraocular Movements: Extraocular movements intact. Conjunctiva/sclera: Conjunctivae normal.   Cardiovascular:      Rate and Rhythm: Normal rate. Pulses: Normal pulses. Pulmonary:      Effort: Pulmonary effort is normal.   Musculoskeletal:      Cervical back: Normal range of motion. Left knee: No effusion. Comments: See ortho exam     Skin:     General: Skin is warm and dry. Capillary Refill: Capillary refill takes less than 2 seconds. Neurological:      General: No focal deficit present. Mental Status: She is alert and oriented to person, place, and time.    Psychiatric:         Mood and Affect: Mood normal.         Behavior: Behavior normal.

## 2023-09-08 DIAGNOSIS — F33.0 MILD EPISODE OF RECURRENT MAJOR DEPRESSIVE DISORDER (HCC): ICD-10-CM

## 2023-09-08 RX ORDER — FLUOXETINE 10 MG/1
20 CAPSULE ORAL DAILY
Qty: 30 CAPSULE | Refills: 5 | Status: SHIPPED | OUTPATIENT
Start: 2023-09-08

## 2023-09-09 DIAGNOSIS — E03.9 PRIMARY HYPOTHYROIDISM: ICD-10-CM

## 2023-09-09 RX ORDER — LEVOTHYROXINE SODIUM 0.05 MG/1
50 TABLET ORAL
Qty: 30 TABLET | Refills: 3 | Status: SHIPPED | OUTPATIENT
Start: 2023-09-09

## 2023-09-11 ENCOUNTER — RA CDI HCC (OUTPATIENT)
Dept: OTHER | Facility: HOSPITAL | Age: 73
End: 2023-09-11

## 2023-09-11 NOTE — PROGRESS NOTES
[address] [facility] coding opportunities       Chart reviewed, no opportunity found: [address]        Patients Insurance     Medicare Insurance: [employer] Advantage

## 2023-09-14 ENCOUNTER — OFFICE VISIT (OUTPATIENT)
Dept: FAMILY MEDICINE CLINIC | Facility: CLINIC | Age: 73
End: 2023-09-14
Payer: COMMERCIAL

## 2023-09-14 VITALS
RESPIRATION RATE: 18 BRPM | TEMPERATURE: 97.6 F | OXYGEN SATURATION: 98 % | HEART RATE: 60 BPM | DIASTOLIC BLOOD PRESSURE: 80 MMHG | SYSTOLIC BLOOD PRESSURE: 130 MMHG | WEIGHT: 137 LBS | HEIGHT: 60 IN | BODY MASS INDEX: 26.9 KG/M2

## 2023-09-14 DIAGNOSIS — E87.1 HYPONATREMIA: Primary | ICD-10-CM

## 2023-09-14 DIAGNOSIS — Z12.31 ENCOUNTER FOR SCREENING MAMMOGRAM FOR BREAST CANCER: ICD-10-CM

## 2023-09-14 DIAGNOSIS — E83.52 HYPERCALCEMIA: ICD-10-CM

## 2023-09-14 DIAGNOSIS — T75.3XXS MOTION SICKNESS, SEQUELA: ICD-10-CM

## 2023-09-14 DIAGNOSIS — E03.9 PRIMARY HYPOTHYROIDISM: ICD-10-CM

## 2023-09-14 DIAGNOSIS — F33.0 MILD EPISODE OF RECURRENT MAJOR DEPRESSIVE DISORDER (HCC): ICD-10-CM

## 2023-09-14 DIAGNOSIS — I10 BENIGN ESSENTIAL HYPERTENSION: ICD-10-CM

## 2023-09-14 PROCEDURE — 99214 OFFICE O/P EST MOD 30 MIN: CPT | Performed by: FAMILY MEDICINE

## 2023-09-14 RX ORDER — SCOLOPAMINE TRANSDERMAL SYSTEM 1 MG/1
1 PATCH, EXTENDED RELEASE TRANSDERMAL
Qty: 5 PATCH | Refills: 0 | Status: SHIPPED | OUTPATIENT
Start: 2023-09-14

## 2023-09-14 NOTE — ASSESSMENT & PLAN NOTE
Just started 20 mg a day dose  Mood still not doing well but may be complicated by hyponatremia  Of note her hyponatremia started before she was on fluoxetine

## 2023-09-14 NOTE — PROGRESS NOTES
Name: Weston Gamboa      : 1950      MRN: [id number]  Encounter Provider: Estee Zaragoza DO  Encounter Date: 2023   Encounter department: [facility]     1. Hyponatremia  Assessment & Plan:  Patient still feeling tired  She agreed to get the repeat BMP done    Orders:  -     Basic metabolic panel; Future    2. Encounter for screening mammogram for breast cancer  -     Mammo screening bilateral w 3d & cad; Future; Expected date: 2023    3. Benign essential hypertension  Assessment & Plan:  Blood pressure is controlled even off of hydrochlorothiazide      4. Primary hypothyroidism  -     TSH, 3rd generation; Future  -     T4, free; Future    5. Mild episode of recurrent major depressive disorder ([address])  Assessment & Plan:  Just started 20 mg a day dose  Mood still not doing well but may be complicated by hyponatremia  Of note her hyponatremia started before she was on fluoxetine      6. Hypercalcemia  -     PTH, intact; Future; Expected date: 2023    7. Motion sickness, sequela  -     scopolamine (TRANSDERM-SCOP) 1 mg/3 days TD 72 hr patch; Place 1 patch on the skin over 72 hours every third day    Return in about 6 months (around 3/14/2024) for Annual Wellness Visit. Declined flu vaccine         Subjective      She is still not feeling right. Head still feels off  She feels like a wet rag. She has no desire to do anything  Cardiology appointment scheduled next month. She has had a lot of stress. Her son had a heart attack. She is still having poor mood. She would like a patch for seasickness.   She is going on a cruise in the [address] [place] [address] and is concerned    Review of Systems    Current Outpatient Medications on File Prior to Visit   Medication Sig   • ALPRAZolam (XANAX) 0.25 mg tablet Take 1 tablet (0.25 mg total) by mouth daily as needed for anxiety   • Alum & Mag Hydroxide-Simeth (MYLANTA PO) Take by mouth as needed   • amLODIPine (NORVASC) 5 mg tablet TAKE 1 TABLET BY MOUTH DAILY   • dicyclomine (BENTYL) 10 mg capsule TAKE 1 CAPSULE BY MOUTH AT  AT BEDTIME AS NEEDED   • FLUoxetine (PROzac) 10 mg capsule Take 2 capsules (20 mg total) by mouth daily   • levothyroxine (Euthyrox) 50 mcg tablet Take 1 tablet (50 mcg total) by mouth daily in the early morning   • losartan (COZAAR) 100 MG tablet Take 1 tablet (100 mg total) by mouth daily   • pantoprazole (PROTONIX) 40 mg tablet TAKE 1 TABLET BY MOUTH  TWICE DAILY WITH MEALS   • traZODone (DESYREL) 100 mg tablet Take 1 tablet (100 mg total) by mouth daily at bedtime   • [DISCONTINUED] gabapentin (NEURONTIN) 100 mg capsule TAKE 2 CAPSULES BY MOUTH  DAILY AT BEDTIME (Patient not taking: Reported on 7/10/2023)       Objective     /80   Pulse 60   Temp 97.6 °F (36.4 °C)   Resp 18   Ht 5' (1.524 m)   Wt 62.1 kg (137 lb)   SpO2 98%   BMI 26.76 kg/m²     Physical Exam  Vitals and nursing note reviewed. Constitutional:       Appearance: She is well-developed. HENT:      Head: Normocephalic and atraumatic. Right Ear: Tympanic membrane and external ear normal.      Left Ear: Tympanic membrane and external ear normal.   Cardiovascular:      Rate and Rhythm: Normal rate and regular rhythm. Heart sounds: Normal heart sounds. No murmur heard. No friction rub. Pulmonary:      Effort: Pulmonary effort is normal. No respiratory distress. Breath sounds: Normal breath sounds. No wheezing or rales. Musculoskeletal:      Right lower leg: No edema. Left lower leg: No edema.        Trice Porter,

## 2023-09-19 ENCOUNTER — APPOINTMENT (OUTPATIENT)
Dept: LAB | Facility: CLINIC | Age: 73
End: 2023-09-19
Payer: COMMERCIAL

## 2023-09-19 DIAGNOSIS — E87.1 HYPONATREMIA: ICD-10-CM

## 2023-09-19 DIAGNOSIS — E03.9 PRIMARY HYPOTHYROIDISM: ICD-10-CM

## 2023-09-19 DIAGNOSIS — E83.52 HYPERCALCEMIA: ICD-10-CM

## 2023-09-19 LAB
ANION GAP SERPL CALCULATED.3IONS-SCNC: 9 MMOL/L
BUN SERPL-MCNC: 24 MG/DL (ref 5–25)
CALCIUM SERPL-MCNC: 8.6 MG/DL (ref 8.4–10.2)
CHLORIDE SERPL-SCNC: 103 MMOL/L (ref 96–108)
CO2 SERPL-SCNC: 25 MMOL/L (ref 21–32)
CREAT SERPL-MCNC: 0.93 MG/DL (ref 0.6–1.3)
GFR SERPL CREATININE-BSD FRML MDRD: 60 ML/MIN/1.73SQ M
GLUCOSE P FAST SERPL-MCNC: 97 MG/DL (ref 65–99)
POTASSIUM SERPL-SCNC: 4 MMOL/L (ref 3.5–5.3)
PTH-INTACT SERPL-MCNC: 62.7 PG/ML (ref 12–88)
SODIUM SERPL-SCNC: 137 MMOL/L (ref 135–147)
T4 FREE SERPL-MCNC: 0.7 NG/DL (ref 0.61–1.12)
TSH SERPL DL<=0.05 MIU/L-ACNC: 17.7 UIU/ML (ref 0.45–4.5)

## 2023-09-19 PROCEDURE — 84443 ASSAY THYROID STIM HORMONE: CPT

## 2023-09-19 PROCEDURE — 36415 COLL VENOUS BLD VENIPUNCTURE: CPT

## 2023-09-19 PROCEDURE — 80048 BASIC METABOLIC PNL TOTAL CA: CPT

## 2023-09-19 PROCEDURE — 83970 ASSAY OF PARATHORMONE: CPT

## 2023-09-19 PROCEDURE — 84439 ASSAY OF FREE THYROXINE: CPT

## 2023-09-20 ENCOUNTER — TELEPHONE (OUTPATIENT)
Dept: FAMILY MEDICINE CLINIC | Facility: CLINIC | Age: 73
End: 2023-09-20

## 2023-09-20 DIAGNOSIS — E03.9 PRIMARY HYPOTHYROIDISM: ICD-10-CM

## 2023-09-20 RX ORDER — LEVOTHYROXINE SODIUM 75 MCG
75 TABLET ORAL DAILY
Qty: 90 TABLET | Refills: 1 | Status: SHIPPED | OUTPATIENT
Start: 2023-09-20

## 2023-09-20 NOTE — TELEPHONE ENCOUNTER
9/20/2023 8:22 AM called Gillian Farias regarding her lab results    Her TSH is very elevated at 17.699    Dose of medication increased to 75 mcg and will have her switch to name brand   Her TSH changed in a matter of 2 months very significantly. Also, her sodium is much better now that she is off the Hydrochlorothiazide.   Hydrochlorothiazide added to her allergy list    Franko Panda, DO

## 2023-10-21 DIAGNOSIS — R10.13 EPIGASTRIC PAIN: ICD-10-CM

## 2023-10-21 DIAGNOSIS — K21.00 HIATAL HERNIA WITH GERD AND ESOPHAGITIS: ICD-10-CM

## 2023-10-21 DIAGNOSIS — K44.9 HIATAL HERNIA WITH GERD AND ESOPHAGITIS: ICD-10-CM

## 2023-10-21 DIAGNOSIS — I10 BENIGN ESSENTIAL HYPERTENSION: ICD-10-CM

## 2023-10-23 RX ORDER — PANTOPRAZOLE SODIUM 40 MG/1
TABLET, DELAYED RELEASE ORAL
Qty: 180 TABLET | Refills: 0 | Status: SHIPPED | OUTPATIENT
Start: 2023-10-23

## 2023-10-23 RX ORDER — LOSARTAN POTASSIUM 100 MG/1
100 TABLET ORAL DAILY
Qty: 90 TABLET | Refills: 1 | Status: SHIPPED | OUTPATIENT
Start: 2023-10-23

## 2023-10-24 DIAGNOSIS — E03.9 PRIMARY HYPOTHYROIDISM: Primary | ICD-10-CM

## 2023-10-27 ENCOUNTER — OFFICE VISIT (OUTPATIENT)
Dept: FAMILY MEDICINE CLINIC | Facility: CLINIC | Age: 73
End: 2023-10-27
Payer: COMMERCIAL

## 2023-10-27 VITALS
RESPIRATION RATE: 18 BRPM | TEMPERATURE: 97.1 F | HEART RATE: 68 BPM | DIASTOLIC BLOOD PRESSURE: 90 MMHG | SYSTOLIC BLOOD PRESSURE: 144 MMHG | OXYGEN SATURATION: 99 %

## 2023-10-27 DIAGNOSIS — M54.50 ACUTE MIDLINE LOW BACK PAIN WITHOUT SCIATICA: Primary | ICD-10-CM

## 2023-10-27 DIAGNOSIS — I10 BENIGN ESSENTIAL HYPERTENSION: ICD-10-CM

## 2023-10-27 DIAGNOSIS — E03.9 PRIMARY HYPOTHYROIDISM: ICD-10-CM

## 2023-10-27 PROCEDURE — 99214 OFFICE O/P EST MOD 30 MIN: CPT | Performed by: FAMILY MEDICINE

## 2023-10-27 RX ORDER — PREDNISONE 20 MG/1
40 TABLET ORAL DAILY
Qty: 10 TABLET | Refills: 0 | Status: SHIPPED | OUTPATIENT
Start: 2023-10-27 | End: 2023-11-01

## 2023-10-27 NOTE — PROGRESS NOTES
Name: Kenny Moran      : 1950      MRN: [id number]  Encounter Provider: Kassie Stokes DO  Encounter Date: 10/27/2023   Encounter department: [facility]     1. Acute midline low back pain without sciatica  Comments: We will treat with course of prednisone and recommended home exercises  Orders:  -     predniSONE 20 mg tablet; Take 2 tablets (40 mg total) by mouth daily for 5 days Take with food    2. Benign essential hypertension  Assessment & Plan:  Blood pressure is not as well-controlled today due to her pain. We will continue losartan 100 mg daily and amlodipine 5 mg daily      3. Primary hypothyroidism  Assessment & Plan:  Would like to switch to [employer] if her levels are good. Will get her thyroid labs done next week          Return if symptoms worsen or fail to improve. Subjective      Her pain started on 10/22/23. She was cleaning and rearranging things the day before. The pain goes across her low back. The pain is sharp and stabbing. It does not radiate. It hurst with getting out of bed and moving. Sitting makes it better.    She has been taking ibuprofen and tylenol arthritis       Review of Systems    Current Outpatient Medications on File Prior to Visit   Medication Sig   • ALPRAZolam (XANAX) 0.25 mg tablet Take 1 tablet (0.25 mg total) by mouth daily as needed for anxiety   • amLODIPine (NORVASC) 5 mg tablet TAKE 1 TABLET BY MOUTH DAILY   • dicyclomine (BENTYL) 10 mg capsule TAKE 1 CAPSULE BY MOUTH AT  AT BEDTIME AS NEEDED   • FLUoxetine (PROzac) 10 mg capsule Take 2 capsules (20 mg total) by mouth daily   • losartan (COZAAR) 100 MG tablet TAKE 1 TABLET BY MOUTH DAILY   • pantoprazole (PROTONIX) 40 mg tablet TAKE 1 TABLET BY MOUTH TWICE  DAILY WITH MEALS   • Synthroid 75 MCG tablet Take 1 tablet (75 mcg total) by mouth daily   • traZODone (DESYREL) 100 mg tablet Take 1 tablet (100 mg total) by mouth daily at bedtime   • Alum & Mag Hydroxide-Simeth (MYLANTA PO) Take by mouth as needed   • [DISCONTINUED] scopolamine (TRANSDERM-SCOP) 1 mg/3 days TD 72 hr patch Place 1 patch on the skin over 72 hours every third day (Patient not taking: Reported on 10/27/2023)       Objective     /90   Pulse 68   Temp (!) 97.1 °F (36.2 °C)   Resp 18   SpO2 99%     Physical Exam  Vitals and nursing note reviewed. Constitutional:       Appearance: She is well-developed. Cardiovascular:      Rate and Rhythm: Normal rate and regular rhythm. Heart sounds: Normal heart sounds. Pulmonary:      Effort: Pulmonary effort is normal. No respiratory distress. Breath sounds: Normal breath sounds. No wheezing. Musculoskeletal:         General: Tenderness (bilateral SI joint, negative straight leg raising bilaterally) present.        Fidel Alex,

## 2023-10-27 NOTE — ASSESSMENT & PLAN NOTE
Blood pressure is not as well-controlled today due to her pain.   We will continue losartan 100 mg daily and amlodipine 5 mg daily

## 2023-11-03 DIAGNOSIS — I10 BENIGN ESSENTIAL HYPERTENSION: ICD-10-CM

## 2023-11-03 RX ORDER — AMLODIPINE BESYLATE 5 MG/1
5 TABLET ORAL DAILY
Qty: 90 TABLET | Refills: 1 | Status: SHIPPED | OUTPATIENT
Start: 2023-11-03

## 2023-11-08 ENCOUNTER — OFFICE VISIT (OUTPATIENT)
Dept: AUDIOLOGY | Facility: CLINIC | Age: 73
End: 2023-11-08

## 2023-11-08 DIAGNOSIS — H90.3 SENSORY HEARING LOSS, BILATERAL: Primary | ICD-10-CM

## 2023-11-08 NOTE — PROGRESS NOTES
[facility] AUDIOLOGY HEARING AID CHECK    Shasha Ball was seen today (11/8/2023) for a(n) in-warranty hearing aid check of her bilateral hearing aids. Today, Ms. Barton reported that she is hearing some "rustling" in her ears and she hears better from the left aid when she pushes the aid deeper into her ear canal.    Otoscopy revealed Non-occluding cerumen in both ear canals with the left worse than the right. The cerumen in the left ear canal is probably causing some "rustling" sound and most likely muffling the output for the left hearing aid. Device Information    Hearing Aid Fitting Date: 08/02/2023          Left Device Right Device   Hearing Aid Make: Rodolfo Gavin   Hearing Aid Model: Real 3 Real 3   Serial Number: [id number]   Repair Warranty Expiration Date: 08/18/2026 08/18/2026   Loss/Damage Warranty Expiration Date:  08/18/2026 08/18/2026    Length: 2 2    Output: 85 85   Wax System: FilmDoo   Dome Size/Style: 6 mm double vent 6 mm double vent   Battery: Lithium-ion Rechargeable Lithium-ion Rechargeable   Earmold Serial Number: N/A N/A   Jesse Foster Date:  N/A N/A       Serial Number: [id number]  Accessories: N/A      Visual inspection of the device(s) revealed no noticeable damage or defects. A listening check revealed good sound quality from the device(s). Changes to Device(s)    Firmware update to both hearing aids  Hearing aids cleaned. Recommendations & Follow-up    Hearing aids(s) are in good working condition. Ms. Guillermo Mar stated that she will have the cerumen removed by Dori Peng as soon as she can. She will follow-up in 2 months, sooner if other problems/concerns arise. At the time of the next HAV, she will discuss with Eligio Cuenca, the possibility of having custom earmolds made for a better and more secure fit of the hearing aids. It was a pleasure working with Ms. Barton today.  She was encouraged to contact the clinic with any further questions in the meantime.     Puma Hammond, Eligio., [facility]  Clinical Audiologist    [address] [id number]

## 2023-11-28 ENCOUNTER — APPOINTMENT (OUTPATIENT)
Dept: LAB | Facility: CLINIC | Age: 73
End: 2023-11-28
Payer: COMMERCIAL

## 2023-11-28 DIAGNOSIS — E03.9 PRIMARY HYPOTHYROIDISM: ICD-10-CM

## 2023-11-28 LAB
ANION GAP SERPL CALCULATED.3IONS-SCNC: 5 MMOL/L
BUN SERPL-MCNC: 11 MG/DL (ref 5–25)
CALCIUM SERPL-MCNC: 9.5 MG/DL (ref 8.4–10.2)
CHLORIDE SERPL-SCNC: 102 MMOL/L (ref 96–108)
CO2 SERPL-SCNC: 30 MMOL/L (ref 21–32)
CREAT SERPL-MCNC: 0.72 MG/DL (ref 0.6–1.3)
GFR SERPL CREATININE-BSD FRML MDRD: 82 ML/MIN/1.73SQ M
GLUCOSE P FAST SERPL-MCNC: 93 MG/DL (ref 65–99)
POTASSIUM SERPL-SCNC: 3.9 MMOL/L (ref 3.5–5.3)
SODIUM SERPL-SCNC: 137 MMOL/L (ref 135–147)
T4 FREE SERPL-MCNC: 0.97 NG/DL (ref 0.61–1.12)
TSH SERPL DL<=0.05 MIU/L-ACNC: 2.28 UIU/ML (ref 0.45–4.5)

## 2023-11-29 DIAGNOSIS — E03.9 PRIMARY HYPOTHYROIDISM: ICD-10-CM

## 2023-11-29 RX ORDER — LEVOTHYROXINE SODIUM 75 MCG
75 TABLET ORAL DAILY
Qty: 90 TABLET | Refills: 1 | Status: SHIPPED | OUTPATIENT
Start: 2023-11-29

## 2023-12-05 DIAGNOSIS — I10 BENIGN ESSENTIAL HYPERTENSION: ICD-10-CM

## 2023-12-05 DIAGNOSIS — F33.0 MILD EPISODE OF RECURRENT MAJOR DEPRESSIVE DISORDER (HCC): ICD-10-CM

## 2023-12-05 RX ORDER — TRAZODONE HYDROCHLORIDE 100 MG/1
100 TABLET ORAL
Qty: 90 TABLET | Refills: 1 | Status: SHIPPED | OUTPATIENT
Start: 2023-12-05

## 2023-12-05 RX ORDER — FLUOXETINE 10 MG/1
20 CAPSULE ORAL DAILY
Qty: 180 CAPSULE | Refills: 1 | Status: SHIPPED | OUTPATIENT
Start: 2023-12-05

## 2023-12-05 RX ORDER — AMLODIPINE BESYLATE 5 MG/1
5 TABLET ORAL DAILY
Qty: 90 TABLET | Refills: 1 | Status: SHIPPED | OUTPATIENT
Start: 2023-12-05

## 2023-12-06 ENCOUNTER — OFFICE VISIT (OUTPATIENT)
Dept: OBGYN CLINIC | Facility: CLINIC | Age: 73
End: 2023-12-06
Payer: COMMERCIAL

## 2023-12-06 ENCOUNTER — APPOINTMENT (OUTPATIENT)
Dept: RADIOLOGY | Facility: CLINIC | Age: 73
End: 2023-12-06
Payer: COMMERCIAL

## 2023-12-06 VITALS
HEART RATE: 65 BPM | SYSTOLIC BLOOD PRESSURE: 160 MMHG | DIASTOLIC BLOOD PRESSURE: 95 MMHG | BODY MASS INDEX: 26.9 KG/M2 | WEIGHT: 137 LBS | HEIGHT: 60 IN

## 2023-12-06 DIAGNOSIS — F41.1 GENERALIZED ANXIETY DISORDER: ICD-10-CM

## 2023-12-06 DIAGNOSIS — K44.9 HIATAL HERNIA WITH GERD AND ESOPHAGITIS: ICD-10-CM

## 2023-12-06 DIAGNOSIS — M17.12 PRIMARY OSTEOARTHRITIS OF LEFT KNEE: ICD-10-CM

## 2023-12-06 DIAGNOSIS — M25.562 CHRONIC PAIN OF LEFT KNEE: ICD-10-CM

## 2023-12-06 DIAGNOSIS — Z01.89 ENCOUNTER FOR LOWER EXTREMITY COMPARISON IMAGING STUDY: ICD-10-CM

## 2023-12-06 DIAGNOSIS — M17.12 PRIMARY OSTEOARTHRITIS OF LEFT KNEE: Primary | ICD-10-CM

## 2023-12-06 DIAGNOSIS — Z01.818 PRE-OPERATIVE EXAMINATION: ICD-10-CM

## 2023-12-06 DIAGNOSIS — G25.81 RESTLESS LEG SYNDROME: ICD-10-CM

## 2023-12-06 DIAGNOSIS — K21.00 HIATAL HERNIA WITH GERD AND ESOPHAGITIS: ICD-10-CM

## 2023-12-06 DIAGNOSIS — J44.9 CHRONIC OBSTRUCTIVE PULMONARY DISEASE, UNSPECIFIED COPD TYPE (HCC): ICD-10-CM

## 2023-12-06 DIAGNOSIS — G89.29 CHRONIC PAIN OF LEFT KNEE: ICD-10-CM

## 2023-12-06 DIAGNOSIS — I10 BENIGN ESSENTIAL HYPERTENSION: ICD-10-CM

## 2023-12-06 DIAGNOSIS — H81.10 BENIGN PAROXYSMAL VERTIGO, UNSPECIFIED LATERALITY: ICD-10-CM

## 2023-12-06 DIAGNOSIS — E78.5 HYPERLIPIDEMIA, UNSPECIFIED HYPERLIPIDEMIA TYPE: ICD-10-CM

## 2023-12-06 PROCEDURE — 73562 X-RAY EXAM OF KNEE 3: CPT

## 2023-12-06 PROCEDURE — 73560 X-RAY EXAM OF KNEE 1 OR 2: CPT

## 2023-12-06 PROCEDURE — 99215 OFFICE O/P EST HI 40 MIN: CPT | Performed by: ORTHOPAEDIC SURGERY

## 2023-12-06 RX ORDER — FOLIC ACID 1 MG/1
1 TABLET ORAL DAILY
Qty: 30 TABLET | Refills: 0 | Status: SHIPPED | OUTPATIENT
Start: 2023-12-06 | End: 2024-01-05

## 2023-12-06 RX ORDER — CHLORHEXIDINE GLUCONATE ORAL RINSE 1.2 MG/ML
15 SOLUTION DENTAL ONCE
OUTPATIENT
Start: 2023-12-06 | End: 2023-12-06

## 2023-12-06 RX ORDER — GABAPENTIN 300 MG/1
300 CAPSULE ORAL ONCE
OUTPATIENT
Start: 2023-12-06 | End: 2023-12-06

## 2023-12-06 RX ORDER — ACETAMINOPHEN 325 MG/1
975 TABLET ORAL ONCE
OUTPATIENT
Start: 2023-12-06 | End: 2023-12-06

## 2023-12-06 RX ORDER — MELATONIN
1000 DAILY
Qty: 30 TABLET | Refills: 0 | Status: SHIPPED | OUTPATIENT
Start: 2023-12-06

## 2023-12-06 RX ORDER — ZINC SULFATE 50(220)MG
220 CAPSULE ORAL DAILY
Qty: 30 CAPSULE | Refills: 0 | Status: SHIPPED | OUTPATIENT
Start: 2023-12-06

## 2023-12-06 RX ORDER — FERROUS SULFATE 324(65)MG
324 TABLET, DELAYED RELEASE (ENTERIC COATED) ORAL
Qty: 30 TABLET | Refills: 0 | Status: SHIPPED | OUTPATIENT
Start: 2023-12-06

## 2023-12-06 NOTE — PROGRESS NOTES
Assessment/Plan:  1. Primary osteoarthritis of left knee  XR knee 3 vw left non injury    zinc sulfate (ZINCATE) 220 mg capsule    ferrous sulfate 324 (65 Fe) mg    folic acid (FOLVITE) 1 mg tablet    cholecalciferol (VITAMIN D3) 1,000 units tablet    ascorbic Acid (VITAMIN C) 500 MG CPCR    Case request operating room: ARTHROPLASTY KNEE TOTAL W ROBOT - same day    Comprehensive metabolic panel    Hemoglobin A1C W/EAG Estimation    CBC and differential    If Symptomatic, order: UA w Reflex to Microscopic w Reflex to Culture    Protime-INR    APTT    MRSA culture    Ambulatory referral to [facility]    Ambulatory referral to Physical Therapy    EKG 12 lead    Ambulatory referral to Cardiology    Case request operating room: [address] - same day      2. Chronic pain of left knee  XR knee 3 vw left non injury    zinc sulfate (ZINCATE) 220 mg capsule    ferrous sulfate 324 (65 Fe) mg    folic acid (FOLVITE) 1 mg tablet    cholecalciferol (VITAMIN D3) 1,000 units tablet    ascorbic Acid (VITAMIN C) 500 MG CPCR    Case request operating room: ARTHROPLASTY KNEE TOTAL W ROBOT - same day    Comprehensive metabolic panel    Hemoglobin A1C W/EAG Estimation    CBC and differential    If Symptomatic, order: UA w Reflex to Microscopic w Reflex to Culture    Protime-INR    APTT    MRSA culture    Ambulatory referral to [facility]    Ambulatory referral to Physical Therapy    EKG 12 lead    Ambulatory referral to Cardiology    Case request operating room: ARTHROPLASTY KNEE TOTAL W ROBOT - same day      3. Restless leg syndrome  Ambulatory referral to [facility]    Ambulatory referral to Cardiology      4. Hyperlipidemia, unspecified hyperlipidemia type  Ambulatory referral to [facility]    Ambulatory referral to Cardiology      5. Generalized anxiety disorder  Ambulatory referral to [facility]    Ambulatory referral to Cardiology      6.  Benign paroxysmal vertigo, unspecified laterality  Ambulatory referral to [facility]    Ambulatory referral to Cardiology      7. Benign essential hypertension  Ambulatory referral to [facility]    Ambulatory referral to Cardiology      8. Chronic obstructive pulmonary disease, unspecified COPD type [facility])  Ambulatory referral to [facility]    Ambulatory referral to Cardiology      9. Hiatal hernia with GERD and esophagitis  Ambulatory referral to [facility]    Ambulatory referral to Cardiology      10. Pre-operative examination  Comprehensive metabolic panel    Hemoglobin A1C W/EAG Estimation    CBC and differential    If Symptomatic, order: UA w Reflex to Microscopic w Reflex to Culture    Protime-INR    APTT    MRSA culture    Ambulatory referral to [facility]    Ambulatory referral to Physical Therapy    EKG 12 lead    Ambulatory referral to Cardiology        Scribe Attestation      I,:  Brian Sky PA-C am acting as a scribe while in the presence of the attending physician.:       I,:  Valery Chavez, DO personally performed the services described in this documentation    as scribed in my presence.:           Jeannette Arredondo is a pleasant 64-year-old presenting today for follow-up of her activity related left knee pain. Updated imaging today demonstrates advancement of her degenerative changes which are now severe. She has demonstrated failure of all forms of conservative treatment including Tylenol, NSAIDs, physical therapy and home exercises, maintenance of appropriate weight, brace wear, and intra-articular injections including cortisone and viscosupplementation. She continues to be limited in her ability to form age-appropriate activities of daily living and enjoyment. As such, we feel that she is a reasonable candidate for robotic assisted left total knee arthroplasty. Pre-, norman-, and postoperative expectations were discussed.   Risk of surgery including but limited to bleeding, infection, stiffness, need for further surgery, persistent limp, persistent pain, damage to vessels or nerves, blood clots, heart attack, stroke, and death were discussed. Consents were signed and placed in the chart for a robotic assisted left total knee arthroplasty. We will utilize cemented implants for her and she will be on our same-day discharge pathway. She will need clearance from her primary care physician and cardiologist prior to the intended procedure. She denies any history of MRSA infection, malignancy, DVT or PE, gastric ulcer or GI bleed, gastric surgery, diabetes, smoking or use of nicotine products, or use of over-the-counter supplements such as tumeric or fish oil. She does have support at home and is an excellent candidate for outpatient physical therapy postoperatively. She was introduced today to our surgical scheduler to look forward to take care of her in the near future. All questions addressed    Subjective: Left knee follow-up    Patient ID: Eddie Osuna is a 74 y.o. female very well-known to our practice presenting today for follow-up of her left knee. She has been undergoing periodic cortisone injections and previously attempted viscosupplementation. She reports that the last cortisone injection only provided 2 weeks worth of relief, before she had a return of her activity related pain and swelling in the back of the knee. The Baker's cyst has been her primary pain generator and limiting her ability to form activities of daily living and and enjoyment. She denies any new injuries    Review of Systems   Constitutional: Negative. HENT: Negative. Eyes: Negative. Respiratory: Negative. Cardiovascular: Negative. Gastrointestinal: Negative. Endocrine: Negative. Genitourinary: Negative. Musculoskeletal:  Positive for arthralgias, joint swelling and myalgias. Skin: Negative. Allergic/Immunologic: Negative. Neurological: Negative. Hematological: Negative. Psychiatric/Behavioral: Negative. Past Medical History:   Diagnosis Date    Abdominal pain     recent upper abd. Acute diverticulitis     last assessed 14    Adenoma of left adrenal gland     Anxiety     Arthritis     Benign paroxysmal positional vertigo     last assessed 03/24/15    Colon polyp     DDD (degenerative disc disease), lumbar     PT and yoga helped    Disease of thyroid gland     Diverticulitis     hx of    Diverticulosis     Epigastric pain     wakes her up at night    Gastric ulcer     hx of    Hiatal hernia     Hyperlipidemia     Hypertension     Malignant hypertension     Obesity     Primary hypothyroidism     Primary hypothyroidism     SOB (shortness of breath) on exertion     with exertion when taking a certain medication    Vertigo     last assessed 13       Past Surgical History:   Procedure Laterality Date    APPENDECTOMY      BREAST BIOPSY Left     a long time ago     [facility]      History of Arthrotomy of Knee; Open Meniscus Tear; left    UPPER GASTROINTESTINAL ENDOSCOPY         Family History   Adopted: Yes   Problem Relation Age of Onset    Heart disease Family         Adopted but heard family member    No Known Problems Mother        Social History     Occupational History    Occupation: Retired   Tobacco Use    Smoking status: Former     Types: Cigarettes     Start date:      Quit date: 1990     Years since quittin.6    Smokeless tobacco: Never    Tobacco comments:     quit 23 years ago- only a social smoker    Vaping Use    Vaping Use: Never used   Substance and Sexual Activity    Alcohol use:  Yes     Alcohol/week: 2.0 standard drinks of alcohol     Types: 2 Glasses of wine per week     Comment: occasional- once or twice week     Drug use: Never    Sexual activity: Not on file         Current Outpatient Medications:     ALPRAZolam (XANAX) 0.25 mg tablet, Take 1 tablet (0.25 mg total) by mouth daily as needed for anxiety, Disp: 30 tablet, Rfl: 0    Alum & Mag Hydroxide-Simeth (MYLANTA PO), Take by mouth as needed, Disp: , Rfl:     amLODIPine (NORVASC) 5 mg tablet, Take 1 tablet (5 mg total) by mouth daily, Disp: 90 tablet, Rfl: 1    ascorbic Acid (VITAMIN C) 500 MG CPCR, Take 1 capsule (500 mg total) by mouth 2 (two) times a day, Disp: 60 capsule, Rfl: 0    cholecalciferol (VITAMIN D3) 1,000 units tablet, Take 1 tablet (1,000 Units total) by mouth daily, Disp: 30 tablet, Rfl: 0    dicyclomine (BENTYL) 10 mg capsule, TAKE 1 CAPSULE BY MOUTH AT  AT BEDTIME AS NEEDED, Disp: 90 capsule, Rfl: 2    ferrous sulfate 324 (65 Fe) mg, Take 1 tablet (324 mg total) by mouth daily before breakfast, Disp: 30 tablet, Rfl: 0    FLUoxetine (PROzac) 10 mg capsule, Take 2 capsules (20 mg total) by mouth daily, Disp: 180 capsule, Rfl: 1    folic acid (FOLVITE) 1 mg tablet, Take 1 tablet (1 mg total) by mouth daily, Disp: 30 tablet, Rfl: 0    losartan (COZAAR) 100 MG tablet, TAKE 1 TABLET BY MOUTH DAILY, Disp: 90 tablet, Rfl: 1    pantoprazole (PROTONIX) 40 mg tablet, TAKE 1 TABLET BY MOUTH TWICE  DAILY WITH MEALS, Disp: 180 tablet, Rfl: 0    Synthroid 75 MCG tablet, Take 1 tablet (75 mcg total) by mouth daily, Disp: 90 tablet, Rfl: 1    traZODone (DESYREL) 100 mg tablet, Take 1 tablet (100 mg total) by mouth daily at bedtime, Disp: 90 tablet, Rfl: 1    zinc sulfate (ZINCATE) 220 mg capsule, Take 1 capsule (220 mg total) by mouth daily, Disp: 30 capsule, Rfl: 0    Allergies   Allergen Reactions    Hydrochlorothiazide Other (See Comments)     hyponatremia    Crestor [Rosuvastatin] Other (See Comments)     Nightmares        Objective:  Vitals:    12/06/23 1435   BP: 160/95   Pulse: 65       Body mass index is 26.76 kg/m². Left Knee Exam     Muscle Strength   The patient has normal left knee strength. Tenderness   The patient is experiencing tenderness in the lateral joint line and patella.     Range of Motion Extension:  0 normal   Flexion:  120 normal     Tests   Varus: negative Valgus: negative  Drawer:  Anterior - negative       Patellar apprehension: negative    Other   Erythema: absent  Scars: absent  Sensation: normal  Pulse: present  Swelling: none  Effusion: effusion present    Comments:  +PF crepitation patellofemoral grind  Collateral ligament stable at 0, 30, 90 degrees  Mild valgus deformity passively correctable to neutral  Thigh calf soft nontender  Ambulates with antalgic gait on the left without assistive device  Grossly distally neurovascularly intact          Observations   Left Knee   Positive for effusion. Physical Exam  Vitals and nursing note reviewed. Constitutional:       Appearance: Normal appearance. She is well-developed. Comments: Body mass index is 26.76 kg/m². HENT:      Head: Normocephalic and atraumatic. Right Ear: External ear normal.      Left Ear: External ear normal.      Nose: Nose normal.      Mouth/Throat:      Mouth: Mucous membranes are moist.   Eyes:      Extraocular Movements: Extraocular movements intact. Conjunctiva/sclera: Conjunctivae normal.   Cardiovascular:      Rate and Rhythm: Normal rate. Pulses: Normal pulses. Pulmonary:      Effort: Pulmonary effort is normal.   Abdominal:      Palpations: Abdomen is soft. Musculoskeletal:      Cervical back: Normal range of motion. Left knee: Effusion present. Comments: See ortho exam   Skin:     General: Skin is warm and dry. Neurological:      General: No focal deficit present. Mental Status: She is alert and oriented to person, place, and time. Mental status is at baseline. Psychiatric:         Mood and Affect: Mood normal.         Behavior: Behavior normal.         Thought Content:  Thought content normal.         Judgment: Judgment normal.         I have personally reviewed pertinent films in PACS of the updated weightbearing mag marker x-rays taken today of her left knee which show severe degenerative changes with tricompartmental narrowing and sclerosis. She has significant osteophytosis as well as squaring of the lateral femoral condyle. There is no evidence of fracture, dislocation, lytic or blastic lesion    This document was created using speech voice recognition software. Grammatical errors, random word insertions, pronoun errors, and incomplete sentences are an occasional consequence of this system due to software limitations, ambient noise, and hardware issues. Any formal questions or concerns about content, text, or information contained within the body of this dictation should be directly addressed to the provider for clarification.

## 2023-12-28 DIAGNOSIS — K21.00 HIATAL HERNIA WITH GERD AND ESOPHAGITIS: ICD-10-CM

## 2023-12-28 DIAGNOSIS — R10.13 EPIGASTRIC PAIN: ICD-10-CM

## 2023-12-28 DIAGNOSIS — K44.9 HIATAL HERNIA WITH GERD AND ESOPHAGITIS: ICD-10-CM

## 2023-12-29 RX ORDER — PANTOPRAZOLE SODIUM 40 MG/1
TABLET, DELAYED RELEASE ORAL
Qty: 180 TABLET | Refills: 3 | Status: SHIPPED | OUTPATIENT
Start: 2023-12-29

## 2024-02-02 NOTE — PROGRESS NOTES
Consultation - Cardiology Office  [facility].    Ml Barton 73 y.o. female MRN: [id number]  : 1950  Unit/Bed#:  Encounter: [id number]      ASSESSMENT:  Perioperative cardiac risk assessment for orthopedic surgery/left TKA under spinal anesthesia on 2024:    Primary osteoarthritis of left knee  Chronic left knee pain.    Hyperlipidemia,   2023: , TG 65, HDL 78, normal AST and ALT  Managed by PCP    High 10-year ASCVD risk score of 31.8%    Stress echo 2017:  Reported as normal at 72% MPHR    TTE, 10/30/2017:  EF 55-60%, trace MR, AR    Essential hypertension  BP is elevated at 140/80 with heart rate of 62/min  Currently on amlodipine 5 mg and losartan 100 mg daily    PAD  Carotid artery    COPD          RECOMMENDATIONS:  Patient has low to intermediate perioperative cardiac risk with orthopedic surgery  There is no cardiac contraindication to orthopedic surgery  Add 2.5 mg of amlodipine in the evening to current 5 mg in the morning and losartan 100 mg  Low-salt diet            Thank you for your consultation.  If you have any question please call me at [phone]      Primary Care Physician Requesting Consult: Meme Dorsey DO      Reason for Consult / Principal Problem: Preop evaluation        HPI :     Ml Barton is a 73 y.o. year old female who was referred by primary care doctor for perioperative cardiac risk assessment prior to undergoing orthopedic surgery/left TKA.  Patient has left knee osteoarthritis and pain  She denies any chest pain or unusual dyspnea  She denies any history of MI, CHF or cardiac arrhythmias  Her blood pressure is elevated and it has been so on her last orthopedic office visit and cording to the patient it is also high at home.  As such I am going to add another 2.5 mg of amlodipine to her current medications and I have advised her on low-salt diet      Review of Systems   Musculoskeletal:  Positive for arthralgias and  gait problem.   All other systems reviewed and are negative.        Historical Information   Past Medical History:   Diagnosis Date    Abdominal pain     recent upper abd.    Acute diverticulitis     last assessed 16    Adenoma of left adrenal gland     Anxiety     Arthritis     Benign paroxysmal positional vertigo     last assessed 03/24/15    Colon polyp     DDD (degenerative disc disease), lumbar     PT and yoga helped    Disease of thyroid gland     Diverticulitis     hx of    Diverticulosis     Epigastric pain     wakes her up at night    Gastric ulcer     hx of    Hiatal hernia     Hyperlipidemia     Hypertension     Malignant hypertension     Obesity     Primary hypothyroidism     Primary hypothyroidism     SOB (shortness of breath) on exertion     with exertion when taking a certain medication    Vertigo     last assessed 13     Past Surgical History:   Procedure Laterality Date    APPENDECTOMY      BREAST BIOPSY Left     a long time ago     SECTION      CHOLECYSTECTOMY      COLONOSCOPY      KNEE SURGERY      History of Arthrotomy of Knee; Open Meniscus Tear; left    UPPER GASTROINTESTINAL ENDOSCOPY       Social History     Substance and Sexual Activity   Alcohol Use Yes    Alcohol/week: 2.0 standard drinks of alcohol    Types: 2 Glasses of wine per week    Comment: occasional- once or twice week      Social History     Substance and Sexual Activity   Drug Use Never     Social History     Tobacco Use   Smoking Status Former    Current packs/day: 0.00    Types: Cigarettes    Start date:     Quit date: 1990    Years since quittin.8   Smokeless Tobacco Never   Tobacco Comments    quit 23 years ago- only a social smoker      Family History:   Family History   Adopted: Yes   Problem Relation Age of Onset    Heart disease Family         Adopted but heard family member    No Known Problems Mother        Meds/Allergies     Allergies   Allergen Reactions    Hydrochlorothiazide Other  (See Comments)     hyponatremia    Crestor [Rosuvastatin] Other (See Comments)     Nightmares        Current Outpatient Medications:     ALPRAZolam (XANAX) 0.25 mg tablet, Take 1 tablet (0.25 mg total) by mouth daily as needed for anxiety, Disp: 30 tablet, Rfl: 0    Alum & Mag Hydroxide-Simeth (MYLANTA PO), Take by mouth as needed, Disp: , Rfl:     amLODIPine (NORVASC) 2.5 mg tablet, Take 1 tablet (2.5 mg total) by mouth daily, Disp: 90 tablet, Rfl: 2    amLODIPine (NORVASC) 5 mg tablet, Take 1 tablet (5 mg total) by mouth daily, Disp: 90 tablet, Rfl: 1    dicyclomine (BENTYL) 10 mg capsule, TAKE 1 CAPSULE BY MOUTH AT  AT BEDTIME AS NEEDED, Disp: 90 capsule, Rfl: 2    FLUoxetine (PROzac) 10 mg capsule, Take 2 capsules (20 mg total) by mouth daily, Disp: 180 capsule, Rfl: 1    losartan (COZAAR) 100 MG tablet, TAKE 1 TABLET BY MOUTH DAILY, Disp: 90 tablet, Rfl: 1    pantoprazole (PROTONIX) 40 mg tablet, TAKE 1 TABLET BY MOUTH TWICE  DAILY WITH MEALS, Disp: 180 tablet, Rfl: 3    Synthroid 75 MCG tablet, Take 1 tablet (75 mcg total) by mouth daily, Disp: 90 tablet, Rfl: 1    traZODone (DESYREL) 100 mg tablet, Take 1 tablet (100 mg total) by mouth daily at bedtime, Disp: 90 tablet, Rfl: 1    zinc sulfate (ZINCATE) 220 mg capsule, Take 1 capsule (220 mg total) by mouth daily, Disp: 30 capsule, Rfl: 0    ascorbic Acid (VITAMIN C) 500 MG CPCR, Take 1 capsule (500 mg total) by mouth 2 (two) times a day, Disp: 60 capsule, Rfl: 0    cholecalciferol (VITAMIN D3) 1,000 units tablet, Take 1 tablet (1,000 Units total) by mouth daily (Patient not taking: Reported on 2/5/2024), Disp: 30 tablet, Rfl: 0    ferrous sulfate 324 (65 Fe) mg, Take 1 tablet (324 mg total) by mouth daily before breakfast (Patient not taking: Reported on 2/5/2024), Disp: 30 tablet, Rfl: 0    folic acid (FOLVITE) 1 mg tablet, Take 1 tablet (1 mg total) by mouth daily, Disp: 30 tablet, Rfl: 0    Vitals: Blood pressure 140/80, pulse 62, height 5' (1.524 m),  weight 60.3 kg (133 lb), SpO2 99%.    Body mass index is 25.97 kg/m².  Vitals:    24 1050   Weight: 60.3 kg (133 lb)     BP Readings from Last 3 Encounters:   24 140/80   23 160/95   10/27/23 144/90       Physical Exam  PHYSICAL EXAMINATION:  Neurologic:  Alert & oriented x 3, no new focal deficits, Not in any acute distress,  Constitutional:  Well developed, well nourished, non-toxic appearance   Eyes:  Pupil equal and reacting to light, conjunctiva normal, No JVP, No LNP   HENT:  Atraumatic, oropharynx moist, Neck- normal range of motion, no tenderness,  Neck supple   Respiratory:  Bilateral air entry, mostly clear to auscultation  Cardiovascular: S1-S2 regular with a I/VI systolic murmur   GI:  Soft, nondistended, normal bowel sounds, nontender, no hepatosplenomegaly appreciated.  Musculoskeletal: Left knee tenderness  Skin:  Well hydrated, no rash   Lymphatic:  No lymphadenopathy noted   Extremities:  No edema and distal pulses are present    Diagnostic Studies Review Cardio:      EKG: Normal sinus rhythm, heart rate 62/min, nonspecific ST and T wave abnormality    Cardiac testing:   Results for orders placed in visit on 17    Echo complete with contrast if indicated    [place] [address]  [phone]    Exercise Stress Echocardiography    Study date:  29-Dec-2017    Patient: SAMARA CURIEL  MR number: [id number]  Account number: [id number]  : 1950  Age: 67 years  Gender: Female  Study date: 29-Dec-2017  Status: Routine  Location: Stress lab  Height: 60 in  Weight: 147 lb  BP: 120// 68 mmHg    Indications: Detection of coronary artery disease.    Diagnosis: 401.9 - HYPERTENSION NOS    Sonographer:  INO Carrillo  Primary Physician:  Meme Dorsey DO  Referring Physician:  Gerson Murillo MD  Group:  [facility]  RN:  JOSÉ ANTONIO Case  Interpreting Physician:  Manolo Stanton  MD    IMPRESSIONS:  Equivocal study after submaximal exercise. Patient achieved only 72 % of MPHR. consider repeat stress echo without Beta Blockers or Chemical nuclear stress test. Pt has moderate dyspnea at this work load. Left ventricular systolic function  was normal. Diagnostic sensitivity was limited by submaximal stress.    SUMMARY    IMPRESSIONS AND RECOMMENDATIONS:  Equivocal study after submaximal exercise. Patient achieved only 72 % of MPHR. consider repeat stress echo without Beta Blockers or Chemical nuclear stress test. Pt has moderate dyspnea at this work load.    STRESS RESULTS:  Duration of exercise was 6 min and 45 sec.  Maximal work rate was 9.2 METs.  Maximal heart rate during stress was 110 bpm ( 72 % of maximal predicted heart rate).  Target heart rate was not achieved.  There was normal resting blood pressure with a hypertensive response to stress.  There was no chest pain during stress.    ECG CONCLUSIONS:  The stress ECG was equivocal for ischemia.    BASELINE:  Estimated left ventricular ejection fraction was 60 %, in the range of 55 % to 60 %.    HISTORY: HTN, Adenoma The patient is a 67 year old  female. Chest pain status: no chest pain. Other symptoms: dyspnea. Coronary artery disease risk factors: hypertension and family history of coronary artery disease.  Cardiovascular history: none significant. Co-morbidity: obesity. Medications: aspirin and thyroid medications.    PHYSICAL EXAM: Baseline physical exam screening: normal.    REST ECG: Normal sinus rhythm.    PROCEDURE: The study was performed in the stress lab. Treadmill exercise testing was performed, using the Olvin protocol. Stress and rest echocardiographic evaluation was performed from multiple acoustic windows for evaluation of  ventricular function.    OLVIN PROTOCOL:  HR bpm SBP mmHg DBP mmHg Symptoms Rhythm/conduct  Baseline 58 130 80 none sinus chantel  Stage 1 93 132 80 none NSR  Stage 2 104 138 80 moderate dyspnea  --  Stage 3 109 -- -- severe dyspnea --  Recovery 1 95 160 80 same as above --  Recovery 2 77 140 80 subsiding --    MEDICATIONS GIVEN: No medications or fluids given.    STRESS RESULTS: Duration of exercise was 6 min and 45 sec. The patient exercised to protocol stage 3. Maximal work rate was 9.2 METs. Maximal heart rate during stress was 110 bpm ( 72 % of maximal predicted heart rate). Target heart rate  was not achieved. The heart rate response to stress was blunted. Maximal systolic blood pressure during stress was 160 mmHg. There was normal resting blood pressure with a hypertensive response to stress. The rate-pressure product for the  peak heart rate and blood pressure was 15878. There was no chest pain during stress. The stress test was terminated due to severe dyspnea and moderate fatigue.    ECG CONCLUSIONS: The stress ECG was equivocal for ischemia. There were no stress arrhythmias or conduction abnormalities.    STRESS 2D ECHO RESULTS:    BASELINE: Left ventricular size was normal. Overall left ventricular systolic function was normal. Estimated left ventricular ejection fraction was 60 %, in the range of 55 % to 60 %.    PEAK STRESS: There was an appropriate reduction in left ventricular size. There was an appropriate augmentation in LV function. Ef 60 to 65%    Prepared and electronically signed by    Manolo Stanton MD  Signed 29-Dec-2017 18:45:20      Imaging:  Chest X-Ray:   No Chest XR results available for this patient.    CT-scan of the chest:     No CTA results available for this patient.  Lab Review   Lab Results   Component Value Date    WBC 3.94 (L) 08/30/2023    HGB 13.3 08/30/2023    HCT 39.0 08/30/2023    MCV 96 08/30/2023    RDW 12.5 08/30/2023     08/30/2023     BMP:  Lab Results   Component Value Date    SODIUM 137 11/28/2023    K 3.9 11/28/2023     11/28/2023    CO2 30 11/28/2023    BUN 11 11/28/2023    CREATININE 0.72 11/28/2023    GLUC 84 03/28/2019    GLUF 93  "11/28/2023    CALCIUM 9.5 11/28/2023    EGFR 82 11/28/2023    MG 1.9 10/08/2021     LFT:  Lab Results   Component Value Date    AST 13 07/21/2023    ALT 17 07/21/2023    ALKPHOS 64 07/21/2023    TP 7.5 07/21/2023    ALB 4.0 07/21/2023      Lab Results   Component Value Date    KJA2HLQDFSJE 2.281 11/28/2023     No components found for: \"TSH3\"  No results found for: \"HGBA1C\"  Lipid Profile:   Lab Results   Component Value Date    CHOLESTEROL 258 (H) 06/08/2023    HDL 78 06/08/2023    LDLCALC 167 (H) 06/08/2023    TRIG 65 06/08/2023     Lab Results   Component Value Date    CHOLESTEROL 258 (H) 06/08/2023    CHOLESTEROL 245 (H) 07/20/2022     Lab Results   Component Value Date    TROPONINI <0.02 10/29/2017     No results found for: \"NTBNP\"   No results found for this or any previous visit (from the past 672 hour(s)).        Dr. Sandor Bob MD, FACC      \"This note has been constructed using a voice recognition system.Therefore there may be syntax, spelling, and/or grammatical errors. Please call if you have any questions. \"  "

## 2024-02-05 ENCOUNTER — CONSULT (OUTPATIENT)
Dept: CARDIOLOGY CLINIC | Facility: CLINIC | Age: 74
End: 2024-02-05
Payer: COMMERCIAL

## 2024-02-05 ENCOUNTER — TELEPHONE (OUTPATIENT)
Dept: OBGYN CLINIC | Facility: HOSPITAL | Age: 74
End: 2024-02-05

## 2024-02-05 ENCOUNTER — OFFICE VISIT (OUTPATIENT)
Dept: AUDIOLOGY | Facility: CLINIC | Age: 74
End: 2024-02-05

## 2024-02-05 VITALS
HEART RATE: 62 BPM | OXYGEN SATURATION: 99 % | DIASTOLIC BLOOD PRESSURE: 80 MMHG | BODY MASS INDEX: 26.11 KG/M2 | HEIGHT: 60 IN | SYSTOLIC BLOOD PRESSURE: 140 MMHG | WEIGHT: 133 LBS

## 2024-02-05 DIAGNOSIS — M17.12 PRIMARY OSTEOARTHRITIS OF LEFT KNEE: ICD-10-CM

## 2024-02-05 DIAGNOSIS — H81.10 BENIGN PAROXYSMAL VERTIGO, UNSPECIFIED LATERALITY: ICD-10-CM

## 2024-02-05 DIAGNOSIS — E78.5 HYPERLIPIDEMIA, UNSPECIFIED HYPERLIPIDEMIA TYPE: ICD-10-CM

## 2024-02-05 DIAGNOSIS — R55 SYNCOPE, UNSPECIFIED SYNCOPE TYPE: ICD-10-CM

## 2024-02-05 DIAGNOSIS — G89.29 CHRONIC PAIN OF LEFT KNEE: ICD-10-CM

## 2024-02-05 DIAGNOSIS — I10 BENIGN ESSENTIAL HYPERTENSION: ICD-10-CM

## 2024-02-05 DIAGNOSIS — Z01.818 PRE-OPERATIVE EXAMINATION: Primary | ICD-10-CM

## 2024-02-05 DIAGNOSIS — K44.9 HIATAL HERNIA WITH GERD AND ESOPHAGITIS: ICD-10-CM

## 2024-02-05 DIAGNOSIS — G25.81 RESTLESS LEG SYNDROME: ICD-10-CM

## 2024-02-05 DIAGNOSIS — J44.9 CHRONIC OBSTRUCTIVE PULMONARY DISEASE, UNSPECIFIED COPD TYPE (HCC): ICD-10-CM

## 2024-02-05 DIAGNOSIS — H90.3 SENSORINEURAL HEARING LOSS (SNHL), BILATERAL: Primary | ICD-10-CM

## 2024-02-05 DIAGNOSIS — M25.562 CHRONIC PAIN OF LEFT KNEE: ICD-10-CM

## 2024-02-05 DIAGNOSIS — F41.1 GENERALIZED ANXIETY DISORDER: ICD-10-CM

## 2024-02-05 DIAGNOSIS — K21.00 HIATAL HERNIA WITH GERD AND ESOPHAGITIS: ICD-10-CM

## 2024-02-05 PROCEDURE — 93000 ELECTROCARDIOGRAM COMPLETE: CPT | Performed by: INTERNAL MEDICINE

## 2024-02-05 PROCEDURE — 99203 OFFICE O/P NEW LOW 30 MIN: CPT | Performed by: INTERNAL MEDICINE

## 2024-02-05 RX ORDER — AMLODIPINE BESYLATE 2.5 MG/1
2.5 TABLET ORAL DAILY
Qty: 90 TABLET | Refills: 2 | Status: SHIPPED | OUTPATIENT
Start: 2024-02-05

## 2024-02-05 NOTE — TELEPHONE ENCOUNTER
Preoperative Elective Admission Assessment    Discussed outstanding preop testing, pt confirms she will complete by 2/19 or 2/20 prior to [facility] appointment.    Living Situation:    Who does pt live with: spouse  What kind of home: multi-level  How do they enter the home: front  How many levels in home: 2   # of steps to enter home: 1  # of steps to second floor: 12-14  Are there handrails: Yes  Are there landings: No  Sleeping arrangement: second floor  Where is Bathroom: first and second floor   Where is the tub or shower: second floor, standard tub/shower     First Floor Setup:   Is there a bathroom: Yes, 1/2 bath  Where would pt sleep: couch     DME:  DME ordered, RW 2/5. Instructed pt to bring RW on DOS, verbalizes understanding.    We discussed clearing pathways in the home and making sure there is accessibly to use the walker, for example, removing throw rugs.      Patient's Current Level of Function: Ambulates: Independently and ADLs: Independent    Post-op Caregiver: spouse  Caregiver Name and phone number for Inpatient discharge needs: OraliaCarlos (Spouse)  [phone]   Currently receive any HHC/aides/community supports: No     Post-op Transport: spouse  To/from hospital: spouse  To/from PT 2-3x/week: spouse  Uses community transport now: No     Outpatient Physical Therapy Site:  Site: [facility]   pre and post-op appts scheduled? No, routed to Kezia LOPEZ     Medication Management: self  [id number] [place] [place]  [facility] [[id number]]   Blood Management Vitamin Regimen:  Pt confirms she has preop BM vitamins at home and will begin taking 30 days prior.     Post-op anticoagulant: to be determined by surgical team postoperatively     DC Plan: Pt plans to be discharged home    Barriers to DC identified preoperatively: none identified    BMI: 25.97    Patient Education:  Pt educated on post-op pain, early mobilization (POD0),  LOS goals, OP PT goals, and preoperative bathing. Patient educated that our goal is to appropriately discharge patient based off their post-op function while striving to maintain maximal independence. The goal is to discharge patient to home and for them to attend outpatient physical therapy.    Assigned to care team? Yes    SW referral: n/a

## 2024-02-06 NOTE — PROGRESS NOTES
Ms. Barton requested a supply of domes and wax guards under warranty. These were provided and she will follow-up as scheduled for [facility]. REM should be performed.     Eligio Bermudez.  Clinical Audiologist    [facility]  [address] [facility] [address] [id number]

## 2024-02-07 LAB
DME PARACHUTE DELIVERY DATE ACTUAL: NORMAL
DME PARACHUTE DELIVERY DATE REQUESTED: NORMAL
DME PARACHUTE ITEM DESCRIPTION: NORMAL
DME PARACHUTE ORDER STATUS: NORMAL
DME PARACHUTE SUPPLIER NAME: NORMAL
DME PARACHUTE SUPPLIER PHONE: NORMAL

## 2024-02-16 ENCOUNTER — APPOINTMENT (OUTPATIENT)
Dept: LAB | Facility: HOSPITAL | Age: 74
End: 2024-02-16
Payer: COMMERCIAL

## 2024-02-16 ENCOUNTER — RA CDI HCC (OUTPATIENT)
Dept: OTHER | Facility: HOSPITAL | Age: 74
End: 2024-02-16

## 2024-02-16 DIAGNOSIS — Z01.818 PRE-OPERATIVE EXAMINATION: ICD-10-CM

## 2024-02-16 DIAGNOSIS — M17.12 PRIMARY OSTEOARTHRITIS OF LEFT KNEE: ICD-10-CM

## 2024-02-16 DIAGNOSIS — G89.29 CHRONIC PAIN OF LEFT KNEE: ICD-10-CM

## 2024-02-16 DIAGNOSIS — M25.562 CHRONIC PAIN OF LEFT KNEE: ICD-10-CM

## 2024-02-16 LAB
ALBUMIN SERPL BCP-MCNC: 4 G/DL (ref 3.5–5)
ALP SERPL-CCNC: 67 U/L (ref 34–104)
ALT SERPL W P-5'-P-CCNC: 8 U/L (ref 7–52)
ANION GAP SERPL CALCULATED.3IONS-SCNC: 5 MMOL/L
APTT PPP: 30 SECONDS (ref 23–37)
AST SERPL W P-5'-P-CCNC: 12 U/L (ref 13–39)
BASOPHILS # BLD AUTO: 0.03 THOUSANDS/ÂΜL (ref 0–0.1)
BASOPHILS NFR BLD AUTO: 1 % (ref 0–1)
BILIRUB SERPL-MCNC: 0.42 MG/DL (ref 0.2–1)
BUN SERPL-MCNC: 12 MG/DL (ref 5–25)
CALCIUM SERPL-MCNC: 8.9 MG/DL (ref 8.4–10.2)
CHLORIDE SERPL-SCNC: 103 MMOL/L (ref 96–108)
CO2 SERPL-SCNC: 29 MMOL/L (ref 21–32)
CREAT SERPL-MCNC: 0.71 MG/DL (ref 0.6–1.3)
EOSINOPHIL # BLD AUTO: 0.08 THOUSAND/ÂΜL (ref 0–0.61)
EOSINOPHIL NFR BLD AUTO: 2 % (ref 0–6)
ERYTHROCYTE [DISTWIDTH] IN BLOOD BY AUTOMATED COUNT: 12.8 % (ref 11.6–15.1)
EST. AVERAGE GLUCOSE BLD GHB EST-MCNC: 105 MG/DL
GFR SERPL CREATININE-BSD FRML MDRD: 83 ML/MIN/1.73SQ M
GLUCOSE P FAST SERPL-MCNC: 101 MG/DL (ref 65–99)
HBA1C MFR BLD: 5.3 %
HCT VFR BLD AUTO: 39.5 % (ref 34.8–46.1)
HGB BLD-MCNC: 13.3 G/DL (ref 11.5–15.4)
IMM GRANULOCYTES # BLD AUTO: 0.02 THOUSAND/UL (ref 0–0.2)
IMM GRANULOCYTES NFR BLD AUTO: 0 % (ref 0–2)
INR PPP: 0.92 (ref 0.84–1.19)
LYMPHOCYTES # BLD AUTO: 0.81 THOUSANDS/ÂΜL (ref 0.6–4.47)
LYMPHOCYTES NFR BLD AUTO: 17 % (ref 14–44)
MCH RBC QN AUTO: 32.5 PG (ref 26.8–34.3)
MCHC RBC AUTO-ENTMCNC: 33.7 G/DL (ref 31.4–37.4)
MCV RBC AUTO: 97 FL (ref 82–98)
MONOCYTES # BLD AUTO: 0.4 THOUSAND/ÂΜL (ref 0.17–1.22)
MONOCYTES NFR BLD AUTO: 8 % (ref 4–12)
NEUTROPHILS # BLD AUTO: 3.45 THOUSANDS/ÂΜL (ref 1.85–7.62)
NEUTS SEG NFR BLD AUTO: 72 % (ref 43–75)
NRBC BLD AUTO-RTO: 0 /100 WBCS
PLATELET # BLD AUTO: 318 THOUSANDS/UL (ref 149–390)
PMV BLD AUTO: 8.7 FL (ref 8.9–12.7)
POTASSIUM SERPL-SCNC: 3.9 MMOL/L (ref 3.5–5.3)
PROT SERPL-MCNC: 6.6 G/DL (ref 6.4–8.4)
PROTHROMBIN TIME: 12.6 SECONDS (ref 11.6–14.5)
RBC # BLD AUTO: 4.09 MILLION/UL (ref 3.81–5.12)
SODIUM SERPL-SCNC: 137 MMOL/L (ref 135–147)
WBC # BLD AUTO: 4.79 THOUSAND/UL (ref 4.31–10.16)

## 2024-02-16 PROCEDURE — 83036 HEMOGLOBIN GLYCOSYLATED A1C: CPT

## 2024-02-16 PROCEDURE — 80053 COMPREHEN METABOLIC PANEL: CPT

## 2024-02-16 PROCEDURE — 85610 PROTHROMBIN TIME: CPT

## 2024-02-16 PROCEDURE — 85730 THROMBOPLASTIN TIME PARTIAL: CPT

## 2024-02-16 PROCEDURE — 36415 COLL VENOUS BLD VENIPUNCTURE: CPT

## 2024-02-16 PROCEDURE — 87081 CULTURE SCREEN ONLY: CPT

## 2024-02-16 PROCEDURE — 85025 COMPLETE CBC W/AUTO DIFF WBC: CPT

## 2024-02-17 LAB — MRSA NOSE QL CULT: NORMAL

## 2024-02-21 NOTE — PROGRESS NOTES
[facility] PRE-OPERATIVE EVALUATION  [facility]    NAME: Ml Barton  AGE: 73 y.o. SEX: female  : 1950     DATE: 2024    [facility] Pre-Operative Evaluation      Chief Complaint: Pre-operative Evaluation     Surgery: right total knee replacement - robotic  Anticipated Date of Surgery: 3/11/24  Surgeon: Dr. Branch      Assessment & Recommendations:     Pre-Op Evaluation Plan  1. Further preoperative workup as follows:   - None; no further preoperative work-up is required    2. Medication Management/Recommendations:   -see patient instructions    3. Patient requires further consultation with: None    Clearance  Patient is CLEARED for surgery without any additional cardiac testing.       Problem List Items Addressed This Visit       Benign essential hypertension    Chronic obstructive pulmonary disease, unspecified COPD type (HCC)    Mild episode of recurrent major depressive disorder (HCC)    Primary hypothyroidism    Relevant Orders    T4, free    TSH, 3rd generation     Other Visit Diagnoses       Preoperative examination    -  Primary    Primary osteoarthritis of right knee        Acute midline low back pain with bilateral sciatica        Low back stretches reviewed    Relevant Medications    baclofen 10 mg tablet            Return for Annual Wellness Visit in April.    History of Present Illness:     She has been having knee pain for years.  She is finally ready to get her knee replaced.    She also noticed that she developed midline low back pain in the past 1 to 2 weeks.  She has been doing stretches and Advil with no relief.  The pain sometimes goes down her legs.  Is worse when she first gets up standing from a chair        Anesthesia:  general  Bleeding Risk: no recent abnormal bleeding  Current Anti-platelet/anticoagulation medication: none      Prior Anesthesia Reactions: No     Personal history of venous thromboembolic disease? No          Review of Systems:     Review of Systems   Musculoskeletal:  Positive for back pain.        Knee pain       Current Problem List:     Patient Active Problem List   Diagnosis    Headache    Primary hypothyroidism    Adenoma of left adrenal gland    Shortness of breath on exertion    Abnormal cortisol level    Adrenal myelolipoma    Arthropathy    Benign essential hypertension    Benign paroxysmal vertigo    Colitis    Mild episode of recurrent major depressive disorder (HCC)    Diverticulitis of colon    Generalized anxiety disorder    Hyperlipidemia    Hyponatremia    Nontoxic single thyroid nodule    Obesity    Sigmoid diverticulosis    Vitamin D deficiency    Restless leg syndrome    Hiatal hernia with GERD and esophagitis    Hx of colonic polyps    Diverticulosis large intestine w/o perforation or abscess w/o bleeding    Epigastric pain    Chronic obstructive pulmonary disease, unspecified COPD type (HCC)    Mixed hearing loss, bilateral    Atherosclerosis of both carotid arteries       Allergies:     Allergies   Allergen Reactions    Hydrochlorothiazide Other (See Comments)     hyponatremia    Crestor [Rosuvastatin] Other (See Comments)     Nightmares        Current Medications:       Current Outpatient Medications:     Alum & Mag Hydroxide-Simeth (MYLANTA PO), Take by mouth as needed, Disp: , Rfl:     amLODIPine (NORVASC) 2.5 mg tablet, Take 1 tablet (2.5 mg total) by mouth daily, Disp: 90 tablet, Rfl: 2    amLODIPine (NORVASC) 5 mg tablet, Take 1 tablet (5 mg total) by mouth daily, Disp: 90 tablet, Rfl: 1    ascorbic Acid (VITAMIN C) 500 MG CPCR, Take 1 capsule (500 mg total) by mouth 2 (two) times a day, Disp: 60 capsule, Rfl: 0    baclofen 10 mg tablet, Take 1 tablet (10 mg total) by mouth 3 (three) times a day, Disp: 10 tablet, Rfl: 0    cholecalciferol (VITAMIN D3) 1,000 units tablet, Take 1 tablet (1,000 Units total) by mouth daily, Disp: 30 tablet, Rfl: 0    dicyclomine (BENTYL) 10 mg capsule, TAKE 1  CAPSULE BY MOUTH AT  AT BEDTIME AS NEEDED, Disp: 90 capsule, Rfl: 2    ferrous sulfate 324 (65 Fe) mg, Take 1 tablet (324 mg total) by mouth daily before breakfast, Disp: 30 tablet, Rfl: 0    FLUoxetine (PROzac) 10 mg capsule, Take 2 capsules (20 mg total) by mouth daily, Disp: 180 capsule, Rfl: 1    folic acid (FOLVITE) 1 mg tablet, Take 1 tablet (1 mg total) by mouth daily, Disp: 30 tablet, Rfl: 0    losartan (COZAAR) 100 MG tablet, TAKE 1 TABLET BY MOUTH DAILY, Disp: 90 tablet, Rfl: 1    pantoprazole (PROTONIX) 40 mg tablet, TAKE 1 TABLET BY MOUTH TWICE  DAILY WITH MEALS, Disp: 180 tablet, Rfl: 3    Synthroid 75 MCG tablet, Take 1 tablet (75 mcg total) by mouth daily, Disp: 90 tablet, Rfl: 1    traZODone (DESYREL) 100 mg tablet, Take 1 tablet (100 mg total) by mouth daily at bedtime, Disp: 90 tablet, Rfl: 1    zinc sulfate (ZINCATE) 220 mg capsule, Take 1 capsule (220 mg total) by mouth daily, Disp: 30 capsule, Rfl: 0    ALPRAZolam (XANAX) 0.25 mg tablet, Take 1 tablet (0.25 mg total) by mouth daily as needed for anxiety (Patient not taking: Reported on 2/23/2024), Disp: 30 tablet, Rfl: 0    Past Medical History:       Past Medical History:   Diagnosis Date    Abdominal pain     recent upper abd.    Acute diverticulitis     last assessed 07/18/16    Adenoma of left adrenal gland     Anxiety     Arthritis     Benign paroxysmal positional vertigo     last assessed 03/24/15    Colon polyp     DDD (degenerative disc disease), lumbar     PT and yoga helped    Disease of thyroid gland     Diverticulitis     hx of    Diverticulosis     Epigastric pain     wakes her up at night    Gastric ulcer     hx of    Hiatal hernia     Hyperlipidemia     Hypertension     Malignant hypertension     Obesity     Primary hypothyroidism     Primary hypothyroidism     SOB (shortness of breath) on exertion     with exertion when taking a certain medication    Vertigo     last assessed 03/13/13        Past Surgical History:   Procedure  Laterality Date    APPENDECTOMY      BREAST BIOPSY Left     a long time ago     SECTION      CHOLECYSTECTOMY      COLONOSCOPY      KNEE SURGERY      History of Arthrotomy of Knee; Open Meniscus Tear; left    UPPER GASTROINTESTINAL ENDOSCOPY          Family History   Adopted: Yes   Problem Relation Age of Onset    Heart disease Family         Adopted but heard family member    No Known Problems Mother         Social History     Socioeconomic History    Marital status: /Civil Union     Spouse name: Not on file    Number of children: Not on file    Years of education: Not on file    Highest education level: Not on file   Occupational History    Occupation: Retired   Tobacco Use    Smoking status: Former     Current packs/day: 0.00     Types: Cigarettes     Start date:      Quit date: 1990     Years since quittin.9     Passive exposure: Current    Smokeless tobacco: Never    Tobacco comments:     quit 23 years ago- only a social smoker    Vaping Use    Vaping status: Never Used   Substance and Sexual Activity    Alcohol use: Yes     Alcohol/week: 2.0 standard drinks of alcohol     Types: 2 Glasses of wine per week     Comment: occasional- once or twice week     Drug use: Never    Sexual activity: Not on file   Other Topics Concern    Not on file   Social History Narrative    Adopted Father and Mother unknown    Caffeine use     Social Determinants of Health     Financial Resource Strain: Low Risk  (3/16/2023)    Overall Financial Resource Strain (CARDIA)     Difficulty of Paying Living Expenses: Not hard at all   Food Insecurity: Not on file   Transportation Needs: No Transportation Needs (3/16/2023)    PRAPARE - Transportation     Lack of Transportation (Medical): No     Lack of Transportation (Non-Medical): No   Physical Activity: Not on file   Stress: Not on file   Social Connections: Not on file   Intimate Partner Violence: Not on file   Housing Stability: Not on file        Physical  Exam:     /80   Pulse 65   Temp (!) 97.2 °F (36.2 °C)   Resp 16   Ht 5' (1.524 m)   Wt 60.1 kg (132 lb 9.6 oz)   BMI 25.90 kg/m²     Physical Exam  Vitals and nursing note reviewed.   Constitutional:       Appearance: She is well-developed.   HENT:      Head: Normocephalic and atraumatic.      Right Ear: Tympanic membrane and external ear normal.      Left Ear: Tympanic membrane and external ear normal.   Cardiovascular:      Rate and Rhythm: Normal rate and regular rhythm.      Heart sounds: Normal heart sounds. No murmur heard.     No friction rub.   Pulmonary:      Effort: Pulmonary effort is normal. No respiratory distress.      Breath sounds: Normal breath sounds. No wheezing or rales.   Musculoskeletal:         General: Tenderness (midline low back) present.      Right lower leg: No edema.      Left lower leg: No edema.          Data:     Pre-operative work-up    Laboratory Results: I have personally reviewed the pertinent laboratory results/reports      EKG: I have personally reviewed pertinent reports.   and cardiac clearance done by cardiologist     Recent Results (from the past 672 hour(s))   Wheeled Walker    Collection Time: 02/05/24  4:27 PM   Result Value Ref Range    Supplier Name [employer]     Supplier Phone Number [phone]     Order Status Delivery Successful     Delivery Note      Delivery Request Date 02/05/2024     Date Delivered  02/06/2024     Item Description Wheeled Walker, Adult    Comprehensive metabolic panel    Collection Time: 02/16/24  8:26 AM   Result Value Ref Range    Sodium 137 135 - 147 mmol/L    Potassium 3.9 3.5 - 5.3 mmol/L    Chloride 103 96 - 108 mmol/L    CO2 29 21 - 32 mmol/L    ANION GAP 5 mmol/L    BUN 12 5 - 25 mg/dL    Creatinine 0.71 0.60 - 1.30 mg/dL    Glucose, Fasting 101 (H) 65 - 99 mg/dL    Calcium 8.9 8.4 - 10.2 mg/dL    AST 12 (L) 13 - 39 U/L    ALT 8 7 - 52 U/L    Alkaline Phosphatase 67 34 - 104 U/L    Total Protein  6.6 6.4 - 8.4 g/dL    Albumin 4.0 3.5 - 5.0 g/dL    Total Bilirubin 0.42 0.20 - 1.00 mg/dL    eGFR 83 ml/min/1.73sq m   CBC and differential    Collection Time: 02/16/24  8:26 AM   Result Value Ref Range    WBC 4.79 4.31 - 10.16 Thousand/uL    RBC 4.09 3.81 - 5.12 Million/uL    Hemoglobin 13.3 11.5 - 15.4 g/dL    Hematocrit 39.5 34.8 - 46.1 %    MCV 97 82 - 98 fL    MCH 32.5 26.8 - 34.3 pg    MCHC 33.7 31.4 - 37.4 g/dL    RDW 12.8 11.6 - 15.1 %    MPV 8.7 (L) 8.9 - 12.7 fL    Platelets 318 149 - 390 Thousands/uL    nRBC 0 /100 WBCs    Neutrophils Relative 72 43 - 75 %    Immat GRANS % 0 0 - 2 %    Lymphocytes Relative 17 14 - 44 %    Monocytes Relative 8 4 - 12 %    Eosinophils Relative 2 0 - 6 %    Basophils Relative 1 0 - 1 %    Neutrophils Absolute 3.45 1.85 - 7.62 Thousands/µL    Immature Grans Absolute 0.02 0.00 - 0.20 Thousand/uL    Lymphocytes Absolute 0.81 0.60 - 4.47 Thousands/µL    Monocytes Absolute 0.40 0.17 - 1.22 Thousand/µL    Eosinophils Absolute 0.08 0.00 - 0.61 Thousand/µL    Basophils Absolute 0.03 0.00 - 0.10 Thousands/µL   Protime-INR    Collection Time: 02/16/24  8:26 AM   Result Value Ref Range    Protime 12.6 11.6 - 14.5 seconds    INR 0.92 0.84 - 1.19   APTT    Collection Time: 02/16/24  8:26 AM   Result Value Ref Range    PTT 30 23 - 37 seconds   MRSA culture    Collection Time: 02/16/24  8:26 AM    Specimen: Nose; Nares   Result Value Ref Range    MRSA Culture Only       No Methicillin Resistant Staphlyococcus aureus (MRSA) isolated   Hemoglobin A1C    Collection Time: 02/16/24  8:26 AM   Result Value Ref Range    Hemoglobin A1C 5.3 Normal 4.0-5.6%; PreDiabetic 5.7-6.4%; Diabetic >=6.5%; Glycemic control for adults with diabetes <7.0% %     mg/dl       Meme Dorsey, [facility]  [address]  [address]  [facility] [facility] [address]  Phone#  [phone]  Fax#  [phone]

## 2024-02-21 NOTE — H&P (VIEW-ONLY)
[facility] PRE-OPERATIVE EVALUATION  [facility]    NAME: Ml Barton  AGE: 73 y.o. SEX: female  : 1950     DATE: 2024    [facility] Pre-Operative Evaluation      Chief Complaint: Pre-operative Evaluation     Surgery: right total knee replacement - robotic  Anticipated Date of Surgery: 3/11/24  Surgeon: Dr. Branch      Assessment & Recommendations:     Pre-Op Evaluation Plan  1. Further preoperative workup as follows:   - None; no further preoperative work-up is required    2. Medication Management/Recommendations:   -see patient instructions    3. Patient requires further consultation with: None    Clearance  Patient is CLEARED for surgery without any additional cardiac testing.       Problem List Items Addressed This Visit       Benign essential hypertension    Chronic obstructive pulmonary disease, unspecified COPD type (HCC)    Mild episode of recurrent major depressive disorder (HCC)    Primary hypothyroidism    Relevant Orders    T4, free    TSH, 3rd generation     Other Visit Diagnoses       Preoperative examination    -  Primary    Primary osteoarthritis of right knee        Acute midline low back pain with bilateral sciatica        Low back stretches reviewed    Relevant Medications    baclofen 10 mg tablet            Return for Annual Wellness Visit in April.    History of Present Illness:     She has been having knee pain for years.  She is finally ready to get her knee replaced.    She also noticed that she developed midline low back pain in the past 1 to 2 weeks.  She has been doing stretches and Advil with no relief.  The pain sometimes goes down her legs.  Is worse when she first gets up standing from a chair        Anesthesia:  general  Bleeding Risk: no recent abnormal bleeding  Current Anti-platelet/anticoagulation medication: none      Prior Anesthesia Reactions: No     Personal history of venous thromboembolic disease? No          Review of Systems:     Review of Systems   Musculoskeletal:  Positive for back pain.        Knee pain       Current Problem List:     Patient Active Problem List   Diagnosis    Headache    Primary hypothyroidism    Adenoma of left adrenal gland    Shortness of breath on exertion    Abnormal cortisol level    Adrenal myelolipoma    Arthropathy    Benign essential hypertension    Benign paroxysmal vertigo    Colitis    Mild episode of recurrent major depressive disorder (HCC)    Diverticulitis of colon    Generalized anxiety disorder    Hyperlipidemia    Hyponatremia    Nontoxic single thyroid nodule    Obesity    Sigmoid diverticulosis    Vitamin D deficiency    Restless leg syndrome    Hiatal hernia with GERD and esophagitis    Hx of colonic polyps    Diverticulosis large intestine w/o perforation or abscess w/o bleeding    Epigastric pain    Chronic obstructive pulmonary disease, unspecified COPD type (HCC)    Mixed hearing loss, bilateral    Atherosclerosis of both carotid arteries       Allergies:     Allergies   Allergen Reactions    Hydrochlorothiazide Other (See Comments)     hyponatremia    Crestor [Rosuvastatin] Other (See Comments)     Nightmares        Current Medications:       Current Outpatient Medications:     Alum & Mag Hydroxide-Simeth (MYLANTA PO), Take by mouth as needed, Disp: , Rfl:     amLODIPine (NORVASC) 2.5 mg tablet, Take 1 tablet (2.5 mg total) by mouth daily, Disp: 90 tablet, Rfl: 2    amLODIPine (NORVASC) 5 mg tablet, Take 1 tablet (5 mg total) by mouth daily, Disp: 90 tablet, Rfl: 1    ascorbic Acid (VITAMIN C) 500 MG CPCR, Take 1 capsule (500 mg total) by mouth 2 (two) times a day, Disp: 60 capsule, Rfl: 0    baclofen 10 mg tablet, Take 1 tablet (10 mg total) by mouth 3 (three) times a day, Disp: 10 tablet, Rfl: 0    cholecalciferol (VITAMIN D3) 1,000 units tablet, Take 1 tablet (1,000 Units total) by mouth daily, Disp: 30 tablet, Rfl: 0    dicyclomine (BENTYL) 10 mg capsule, TAKE 1  CAPSULE BY MOUTH AT  AT BEDTIME AS NEEDED, Disp: 90 capsule, Rfl: 2    ferrous sulfate 324 (65 Fe) mg, Take 1 tablet (324 mg total) by mouth daily before breakfast, Disp: 30 tablet, Rfl: 0    FLUoxetine (PROzac) 10 mg capsule, Take 2 capsules (20 mg total) by mouth daily, Disp: 180 capsule, Rfl: 1    folic acid (FOLVITE) 1 mg tablet, Take 1 tablet (1 mg total) by mouth daily, Disp: 30 tablet, Rfl: 0    losartan (COZAAR) 100 MG tablet, TAKE 1 TABLET BY MOUTH DAILY, Disp: 90 tablet, Rfl: 1    pantoprazole (PROTONIX) 40 mg tablet, TAKE 1 TABLET BY MOUTH TWICE  DAILY WITH MEALS, Disp: 180 tablet, Rfl: 3    Synthroid 75 MCG tablet, Take 1 tablet (75 mcg total) by mouth daily, Disp: 90 tablet, Rfl: 1    traZODone (DESYREL) 100 mg tablet, Take 1 tablet (100 mg total) by mouth daily at bedtime, Disp: 90 tablet, Rfl: 1    zinc sulfate (ZINCATE) 220 mg capsule, Take 1 capsule (220 mg total) by mouth daily, Disp: 30 capsule, Rfl: 0    ALPRAZolam (XANAX) 0.25 mg tablet, Take 1 tablet (0.25 mg total) by mouth daily as needed for anxiety (Patient not taking: Reported on 2/23/2024), Disp: 30 tablet, Rfl: 0    Past Medical History:       Past Medical History:   Diagnosis Date    Abdominal pain     recent upper abd.    Acute diverticulitis     last assessed 07/18/16    Adenoma of left adrenal gland     Anxiety     Arthritis     Benign paroxysmal positional vertigo     last assessed 03/24/15    Colon polyp     DDD (degenerative disc disease), lumbar     PT and yoga helped    Disease of thyroid gland     Diverticulitis     hx of    Diverticulosis     Epigastric pain     wakes her up at night    Gastric ulcer     hx of    Hiatal hernia     Hyperlipidemia     Hypertension     Malignant hypertension     Obesity     Primary hypothyroidism     Primary hypothyroidism     SOB (shortness of breath) on exertion     with exertion when taking a certain medication    Vertigo     last assessed 03/13/13        Past Surgical History:   Procedure  Laterality Date    APPENDECTOMY      BREAST BIOPSY Left     a long time ago     SECTION      CHOLECYSTECTOMY      COLONOSCOPY      KNEE SURGERY      History of Arthrotomy of Knee; Open Meniscus Tear; left    UPPER GASTROINTESTINAL ENDOSCOPY          Family History   Adopted: Yes   Problem Relation Age of Onset    Heart disease Family         Adopted but heard family member    No Known Problems Mother         Social History     Socioeconomic History    Marital status: /Civil Union     Spouse name: Not on file    Number of children: Not on file    Years of education: Not on file    Highest education level: Not on file   Occupational History    Occupation: Retired   Tobacco Use    Smoking status: Former     Current packs/day: 0.00     Types: Cigarettes     Start date:      Quit date: 1990     Years since quittin.9     Passive exposure: Current    Smokeless tobacco: Never    Tobacco comments:     quit 23 years ago- only a social smoker    Vaping Use    Vaping status: Never Used   Substance and Sexual Activity    Alcohol use: Yes     Alcohol/week: 2.0 standard drinks of alcohol     Types: 2 Glasses of wine per week     Comment: occasional- once or twice week     Drug use: Never    Sexual activity: Not on file   Other Topics Concern    Not on file   Social History Narrative    Adopted Father and Mother unknown    Caffeine use     Social Determinants of Health     Financial Resource Strain: Low Risk  (3/16/2023)    Overall Financial Resource Strain (CARDIA)     Difficulty of Paying Living Expenses: Not hard at all   Food Insecurity: Not on file   Transportation Needs: No Transportation Needs (3/16/2023)    PRAPARE - Transportation     Lack of Transportation (Medical): No     Lack of Transportation (Non-Medical): No   Physical Activity: Not on file   Stress: Not on file   Social Connections: Not on file   Intimate Partner Violence: Not on file   Housing Stability: Not on file        Physical  Exam:     /80   Pulse 65   Temp (!) 97.2 °F (36.2 °C)   Resp 16   Ht 5' (1.524 m)   Wt 60.1 kg (132 lb 9.6 oz)   BMI 25.90 kg/m²     Physical Exam  Vitals and nursing note reviewed.   Constitutional:       Appearance: She is well-developed.   HENT:      Head: Normocephalic and atraumatic.      Right Ear: Tympanic membrane and external ear normal.      Left Ear: Tympanic membrane and external ear normal.   Cardiovascular:      Rate and Rhythm: Normal rate and regular rhythm.      Heart sounds: Normal heart sounds. No murmur heard.     No friction rub.   Pulmonary:      Effort: Pulmonary effort is normal. No respiratory distress.      Breath sounds: Normal breath sounds. No wheezing or rales.   Musculoskeletal:         General: Tenderness (midline low back) present.      Right lower leg: No edema.      Left lower leg: No edema.          Data:     Pre-operative work-up    Laboratory Results: I have personally reviewed the pertinent laboratory results/reports      EKG: I have personally reviewed pertinent reports.   and cardiac clearance done by cardiologist     Recent Results (from the past 672 hour(s))   Wheeled Walker    Collection Time: 02/05/24  4:27 PM   Result Value Ref Range    Supplier Name [employer]     Supplier Phone Number [phone]     Order Status Delivery Successful     Delivery Note      Delivery Request Date 02/05/2024     Date Delivered  02/06/2024     Item Description Wheeled Walker, Adult    Comprehensive metabolic panel    Collection Time: 02/16/24  8:26 AM   Result Value Ref Range    Sodium 137 135 - 147 mmol/L    Potassium 3.9 3.5 - 5.3 mmol/L    Chloride 103 96 - 108 mmol/L    CO2 29 21 - 32 mmol/L    ANION GAP 5 mmol/L    BUN 12 5 - 25 mg/dL    Creatinine 0.71 0.60 - 1.30 mg/dL    Glucose, Fasting 101 (H) 65 - 99 mg/dL    Calcium 8.9 8.4 - 10.2 mg/dL    AST 12 (L) 13 - 39 U/L    ALT 8 7 - 52 U/L    Alkaline Phosphatase 67 34 - 104 U/L    Total Protein  6.6 6.4 - 8.4 g/dL    Albumin 4.0 3.5 - 5.0 g/dL    Total Bilirubin 0.42 0.20 - 1.00 mg/dL    eGFR 83 ml/min/1.73sq m   CBC and differential    Collection Time: 02/16/24  8:26 AM   Result Value Ref Range    WBC 4.79 4.31 - 10.16 Thousand/uL    RBC 4.09 3.81 - 5.12 Million/uL    Hemoglobin 13.3 11.5 - 15.4 g/dL    Hematocrit 39.5 34.8 - 46.1 %    MCV 97 82 - 98 fL    MCH 32.5 26.8 - 34.3 pg    MCHC 33.7 31.4 - 37.4 g/dL    RDW 12.8 11.6 - 15.1 %    MPV 8.7 (L) 8.9 - 12.7 fL    Platelets 318 149 - 390 Thousands/uL    nRBC 0 /100 WBCs    Neutrophils Relative 72 43 - 75 %    Immat GRANS % 0 0 - 2 %    Lymphocytes Relative 17 14 - 44 %    Monocytes Relative 8 4 - 12 %    Eosinophils Relative 2 0 - 6 %    Basophils Relative 1 0 - 1 %    Neutrophils Absolute 3.45 1.85 - 7.62 Thousands/µL    Immature Grans Absolute 0.02 0.00 - 0.20 Thousand/uL    Lymphocytes Absolute 0.81 0.60 - 4.47 Thousands/µL    Monocytes Absolute 0.40 0.17 - 1.22 Thousand/µL    Eosinophils Absolute 0.08 0.00 - 0.61 Thousand/µL    Basophils Absolute 0.03 0.00 - 0.10 Thousands/µL   Protime-INR    Collection Time: 02/16/24  8:26 AM   Result Value Ref Range    Protime 12.6 11.6 - 14.5 seconds    INR 0.92 0.84 - 1.19   APTT    Collection Time: 02/16/24  8:26 AM   Result Value Ref Range    PTT 30 23 - 37 seconds   MRSA culture    Collection Time: 02/16/24  8:26 AM    Specimen: Nose; Nares   Result Value Ref Range    MRSA Culture Only       No Methicillin Resistant Staphlyococcus aureus (MRSA) isolated   Hemoglobin A1C    Collection Time: 02/16/24  8:26 AM   Result Value Ref Range    Hemoglobin A1C 5.3 Normal 4.0-5.6%; PreDiabetic 5.7-6.4%; Diabetic >=6.5%; Glycemic control for adults with diabetes <7.0% %     mg/dl       Meme Dorsey, [facility]  [address]  [address]  [facility] [address]  Phone#  [phone]  Fax#  [phone]

## 2024-02-23 ENCOUNTER — CONSULT (OUTPATIENT)
Dept: FAMILY MEDICINE CLINIC | Facility: CLINIC | Age: 74
End: 2024-02-23
Payer: COMMERCIAL

## 2024-02-23 VITALS
WEIGHT: 132.6 LBS | RESPIRATION RATE: 16 BRPM | HEIGHT: 60 IN | BODY MASS INDEX: 26.03 KG/M2 | SYSTOLIC BLOOD PRESSURE: 140 MMHG | TEMPERATURE: 97.2 F | DIASTOLIC BLOOD PRESSURE: 80 MMHG | HEART RATE: 65 BPM

## 2024-02-23 DIAGNOSIS — I10 BENIGN ESSENTIAL HYPERTENSION: ICD-10-CM

## 2024-02-23 DIAGNOSIS — M54.42 ACUTE MIDLINE LOW BACK PAIN WITH BILATERAL SCIATICA: ICD-10-CM

## 2024-02-23 DIAGNOSIS — Z01.818 PREOPERATIVE EXAMINATION: Primary | ICD-10-CM

## 2024-02-23 DIAGNOSIS — F33.0 MILD EPISODE OF RECURRENT MAJOR DEPRESSIVE DISORDER (HCC): ICD-10-CM

## 2024-02-23 DIAGNOSIS — E03.9 PRIMARY HYPOTHYROIDISM: ICD-10-CM

## 2024-02-23 DIAGNOSIS — M17.11 PRIMARY OSTEOARTHRITIS OF RIGHT KNEE: ICD-10-CM

## 2024-02-23 DIAGNOSIS — J44.9 CHRONIC OBSTRUCTIVE PULMONARY DISEASE, UNSPECIFIED COPD TYPE (HCC): ICD-10-CM

## 2024-02-23 DIAGNOSIS — M54.41 ACUTE MIDLINE LOW BACK PAIN WITH BILATERAL SCIATICA: ICD-10-CM

## 2024-02-23 PROCEDURE — 99214 OFFICE O/P EST MOD 30 MIN: CPT | Performed by: FAMILY MEDICINE

## 2024-02-23 RX ORDER — BACLOFEN 10 MG/1
10 TABLET ORAL 3 TIMES DAILY
Qty: 10 TABLET | Refills: 0 | Status: SHIPPED | OUTPATIENT
Start: 2024-02-23

## 2024-02-25 ENCOUNTER — NURSE TRIAGE (OUTPATIENT)
Dept: OTHER | Facility: OTHER | Age: 74
End: 2024-02-25

## 2024-02-25 NOTE — TELEPHONE ENCOUNTER
Recommended patient follow up with the office this week to discuss further options or testing that could be performed. Informed patient that if she wanted she could go to a local urgent care for evaluation today. Information given on the [facility] in [place]. Patient verbalized understanding.

## 2024-02-25 NOTE — TELEPHONE ENCOUNTER
"Regarding: back hurting for 2 weeks/would like an xray  ----- Message from Margarita Johnson sent at 2/25/2024  9:54 AM EST -----  Pt called \"My back has been hurting for 2 weeks and I would like to know if I can possibly get an x-ray.\"    "

## 2024-02-25 NOTE — TELEPHONE ENCOUNTER
"Reason for Disposition  • [1] MODERATE back pain (e.g., interferes with normal activities) AND [2] present > 3 days    Answer Assessment - Initial Assessment Questions  1. ONSET: \"When did the pain begin?\"       Over last 2 week s    2. LOCATION: \"Where does it hurt?\" (upper, mid or lower back)      Whole lower back     3. SEVERITY: \"How bad is the pain?\"  (e.g., Scale 1-10; mild, moderate, or severe)    - MILD (1-3): doesn't interfere with normal activities     - MODERATE (4-7): interferes with normal activities or awakens from sleep     - SEVERE (8-10): excruciating pain, unable to do any normal activities       Some times a 10/10 with standing, walking, turning       Sitting- \"annoying\" 1-2/10    4. PATTERN: \"Is the pain constant?\" (e.g., yes, no; constant, intermittent)       Constant     5. RADIATION: \"Does the pain shoot into your legs or elsewhere?\"      Radiates into buttocks but not into legs     6. CAUSE:  \"What do you think is causing the back pain?\"       Unsure- may be due to back overuse     7. BACK OVERUSE:  \"Any recent lifting of heavy objects, strenuous work or exercise?\"      About a month ago patient stated she fell off a step stool and had some bruising type pain, then shoveled some snow and since then has been having back pain     8. MEDICATIONS: \"What have you taken so far for the pain?\" (e.g., nothing, acetaminophen, NSAIDS)      Has been taking prescribed muscle relaxer but hasn't been taking it due to having restless leg      Has been taking Ibuprofen which does help- pain comes down to 6/10    9. NEUROLOGIC SYMPTOMS: \"Do you have any weakness, numbness, or problems with bowel/bladder control?\"      Denies     10. OTHER SYMPTOMS: \"Do you have any other symptoms?\" (e.g., fever, abdominal pain, burning with urination, blood in urine)        Denies     11. PREGNANCY: \"Is there any chance you are pregnant?\" (e.g., yes, no; LMP)        N/a    Protocols used: Back Pain-ADULT-AH    "

## 2024-02-26 NOTE — TELEPHONE ENCOUNTER
2/26/2024 10:16 AM returned call to Ml     Her back is still bothering her. She tried one baclofen and it caused restless leg.    She needs to do her stretches and will take ibuprofen.  Will stop ibuprofen 1 week before her knee surgery.    Reviewed home stretches.      Meme Dorsey, DO

## 2024-02-27 NOTE — PRE-PROCEDURE INSTRUCTIONS
Pre-Surgery Instructions:   Medication Instructions    Alum & Mag Hydroxide-Simeth (MYLANTA PO) Stop taking 7 days prior to surgery.    amLODIPine (NORVASC) 2.5 mg tablet Take day of surgery.    amLODIPine (NORVASC) 5 mg tablet Take day of surgery.    ascorbic Acid (VITAMIN C) 500 MG CPCR Hold day of surgery.    baclofen 10 mg tablet Uses PRN- OK to take day of surgery    cholecalciferol (VITAMIN D3) 1,000 units tablet Hold day of surgery.    dicyclomine (BENTYL) 10 mg capsule Uses PRN- DO NOT take day of surgery    ferrous sulfate 324 (65 Fe) mg Hold day of surgery.    FLUoxetine (PROzac) 10 mg capsule Take day of surgery.    folic acid (FOLVITE) 1 mg tablet Hold day of surgery.    losartan (COZAAR) 100 MG tablet Hold day of surgery.    pantoprazole (PROTONIX) 40 mg tablet Take day of surgery.    Synthroid 75 MCG tablet Take day of surgery.    traZODone (DESYREL) 100 mg tablet Take night before surgery    zinc sulfate (ZINCATE) 220 mg capsule Hold day of surgery.   Medication instructions for day surgery reviewed. Please use only a sip of water to take your instructed medications. Avoid all over the counter vitamins, supplements and NSAIDS for one week prior to surgery per anesthesia guidelines. Tylenol is ok to take as needed.     You will receive a call one business day prior to surgery with an arrival time and hospital directions. If your surgery is scheduled on a Monday, the hospital will be calling you on the Friday prior to your surgery. If you have not heard from anyone by 8pm, please call the hospital supervisor through the hospital  at [phone]. ([place] [phone] or [place] [phone]).    Do not eat or drink anything after midnight the night before your surgery, including candy, mints, lifesavers, or chewing gum. Do not drink alcohol 24hrs before your surgery. Try not to smoke at least 24hrs before your surgery.       Follow the pre surgery showering instructions as listed in the “My  Surgical Experience Booklet” or otherwise provided by your surgeon's office. Do not use a blade to shave the surgical area 1 week before surgery. It is okay to use a clean electric clippers up to 24 hours before surgery. Do not apply any lotions, creams, including makeup, cologne, deodorant, or perfumes after showering on the day of your surgery. Do not use dry shampoo, hair spray, hair gel, or any type of hair products.     No contact lenses, eye make-up, or artificial eyelashes. Remove nail polish, including gel polish, and any artificial, gel, or acrylic nails if possible. Remove all jewelry including rings and body piercing jewelry.     Wear causal clothing that is easy to take on and off. Consider your type of surgery.    Keep any valuables, jewelry, piercings at home. Please bring any specially ordered equipment (sling, braces) if indicated.    Arrange for a responsible person to drive you to and from the hospital on the day of your surgery. Please confirm the visitor policy for the day of your procedure when you receive your phone call with an arrival time.     Call the surgeon's office with any new illnesses, exposures, or additional questions prior to surgery.    Please reference your “My Surgical Experience Booklet” for additional information to prepare for your upcoming surgery.  Pt. Verbalized an understanding of all instructions reviewed and offers no concerns at this time.

## 2024-03-04 ENCOUNTER — EVALUATION (OUTPATIENT)
Dept: PHYSICAL THERAPY | Facility: CLINIC | Age: 74
End: 2024-03-04
Payer: COMMERCIAL

## 2024-03-04 DIAGNOSIS — M17.12 PRIMARY OSTEOARTHRITIS OF LEFT KNEE: ICD-10-CM

## 2024-03-04 DIAGNOSIS — G89.29 CHRONIC PAIN OF LEFT KNEE: ICD-10-CM

## 2024-03-04 DIAGNOSIS — M25.562 CHRONIC PAIN OF LEFT KNEE: ICD-10-CM

## 2024-03-04 DIAGNOSIS — Z01.818 PRE-OPERATIVE EXAMINATION: ICD-10-CM

## 2024-03-04 PROCEDURE — 97112 NEUROMUSCULAR REEDUCATION: CPT

## 2024-03-04 PROCEDURE — 97161 PT EVAL LOW COMPLEX 20 MIN: CPT

## 2024-03-04 NOTE — PROGRESS NOTES
PT Evaluation   Today's date: 3/4/2024  Patient name: Ml Barton  : 1950  MRN: [id number]  Referring provider: Myron Branch DO  Dx:   Encounter Diagnosis     ICD-10-CM    1. Primary osteoarthritis of left knee  M17.12 Ambulatory referral to Physical Therapy      2. Chronic pain of left knee  M25.562 Ambulatory referral to Physical Therapy    G89.29       3. Pre-operative examination  Z01.818 Ambulatory referral to Physical Therapy            Assessment  Assessment details: Patient is a 73 y.o. Female who presents to skilled outpatient PT for pre-operative evaluation prior to left knee arthroscopy scheduled for 3/11/24 . Referring diagnoses include chronic pain of left knee and primary osteoarthritis of left knee.    Patient presents to clinic with pain and strength deficits  in left knee. The aforementioned impairments have limited the patient's ability to ambulate community distances, perform ADLs, sit and stand without pain. No further referral is necessary at this time based upon examination results.    Patient education performed during today's session included: Signs and symptoms of infection (including redness, warmth, drainage, swelling), Importance of keeping incision dry, per MD order, Home safety checklist, which was signed by both parties and uploaded into patient's chart, and Topics of stair negotiation, ambulation with use of appropriate assistive device, and car transfers also reviewed    Impairments: Activity intolerance, Impaired balance, Impaired physical strength, Lacks appropriate HEP, and Poor posture, pain with movement  Understanding of Dx/Px/POC: Excellent  Prognosis: Good    Patient verbalized understanding of POC.    Please contact me if you have any questions or recommendations. Thank you for the referral and the opportunity to share in Ml Barton's care.        Plan  Plan details: see below    Patient  would benefit from: Skilled PT  Planned modality interventions: Biofeedback, Cryotherapy, TENS, and Thermotherapy: Hydrocollator Packs  Planned therapy interventions: Abdominal trunk stabilization, Breathing training, Functional ROM exercises, Gait training, HEP, Joint mobilization, Manual therapy, Walker taping, Neuromuscular re-education, Patient education, Postural training, Strengthening, Stretching, Therapeutic activities, and Therapeutic exercises  Frequency: 3x/wk  Duration in weeks: 12  Plan of Care beginning date: 3/4/24  Plan of Care expiration date: 12 weeks - 2024  Treatment plan discussed with: Patient       Goals  Short Term Goals (4 weeks):    - Patient will improve time on TUG by 2.9 seconds from 7.76 seconds to 4.86 seconds to facilitate improved safety in all ambulation  - Patient will be independent in basic HEP 2-3 weeks  - Patient will demonstrate >100 degrees of knee ROM for reciprocal stair negotiation  - Patient will have 0/10 pain at rest  - Patient will demonstrate >1/3 improvement in MMT grade as applicable    Long Term Goals (8 weeks):  - Patient will be independent in a comprehensive home exercise program  - Patient FOTO score will improve by 10 points  - Patient will ambulation with AD PRN  - Patient will independently ambulate >1000 feet (community ambulation)  - Patient will self-report >75% improvement in function  - Patient will be able to ambulate stairs with no increase in pain  - Patient will be able to walk 1/2 mile with no increase in pain      Subjective    History of Present Illness  - Mechanism of injury: hx of knee pa  - Primary AD: rolling walker  - Assist level at home: none  - Prior level of function: independent      Pain  - Current pain ratin/10  - At best pain ratin/10  - At worst pain rating: 10/10  - Location: left knee  - Aggravating factors: standing, walking, stairs    Social Support  - Steps to enter house: 1  - Stairs in house: 12   -  Bedroom/bathroom set up: 1/2 bath and pull out couch on first floor  - DME available: rolling walker  - Lives in: house  - Lives with:         Objective       LE MMT    L Hip Flexion: 4/5  R Hip Flexion: 4+/5   L Hip Extension: 4/5 R Hip Extension: 4+/5   L Hip Abduction: 4/5 R Hip Abduction: 4+/5   L Hip Adduction: 4/5 R Hip Adduction: 4+/5   L Knee Extension: 4/5 R Knee Extension: 4+/5   L Knee Flexion: 4/5 R Knee Flexion: 4+/5   L Ankle DF: 4/5 R Ankle DF: 4+/5   L Ankle PF: 4+/5  R Ankle PF: 5/5     LE ROM    L knee flexion: 130 degrees R knee flexion: 135 degrees   L knee extension: 0 degrees R knee extension: 0 degrees       L hip flexion: wfl degrees  R hip flexion: wfl degrees    L hip abduction: wfl degrees  R hip abduction: wfl degrees    L hip IR: 55 degrees  R hip IR: 55 degrees    L hip ER: 50 degrees  R hip ER: 45 degrees        Sensation  - Light touch: intact    Skin Check  -     Wells Criteria  -   - Referral out for possible DVT:     Knee Comments  - Gait Assessment: normal gait  - TU.76 seconds with no assistive device  - Stair Negotiation: performed              Insurance:  AMA/CMS Eval/ Re-eval POC expires FOTO Auth #/ Referral # Total   Visits  Start date  Expiration date Extension  Visit limitation?  PT only or  PT+OT? Co-Insurance   CMS 3/4/24                                                                           AUTH #:  Date               Visits  Authed:  Used                Remaining                     Precautions: s/p left knee arthroplasty * request female therapist only  Past Medical History:   Diagnosis Date    Abdominal pain     recent upper abd.    Acute diverticulitis     last assessed 16    Adenoma of left adrenal gland     Anxiety     Arthritis     Benign paroxysmal positional vertigo     last assessed 03/24/15    Colon polyp     DDD (degenerative disc disease), lumbar     PT and yoga helped    Disease of thyroid gland     Diverticulitis     hx of     Diverticulosis     Epigastric pain     wakes her up at night    Gastric ulcer     hx of    Hiatal hernia     Hyperlipidemia     Hypertension     Malignant hypertension     Obesity     Primary hypothyroidism     Primary hypothyroidism     SOB (shortness of breath) on exertion     with exertion when taking a certain medication    Vertigo     last assessed 03/13/13         Date 3/4/2024        Visit Number IE                 ROM         Knee Flexion 130        Knee Extension 0                 TUG 7.76                 Manual         Patellar mobs         STM                  Neuro Re-ed         Quad set  HEP        Glute set HEP                                   TherEx         Knee/Hip PROM         Active w/u         Ankle pumps HEP        SLR         Hip abduction HEP        LAQ HEP        Heel slides HEP        SAQ HEP                                                     TherAct         Patient education         Stair negotiation         Car transfer                           Gait Training         With AD         No AD                  Modalities         CP

## 2024-03-08 NOTE — DISCHARGE INSTR - AVS FIRST PAGE
Total Knee Replacement     WHAT YOU SHOULD KNOW:   Total knee replacement is surgery to replace a knee joint damaged by wear, injury, or disease. It is also called a total knee arthroplasty. The knee joint is where your femur (thigh bone) and tibia (large lower leg bone or shin bone) meet. A small bone called the patella (kneecap) protects your knee joint.            AFTER YOU LEAVE:     Medicines:   Pain medicine:  You may be given a prescription medicine to decrease pain. Do not wait until the pain is severe before you take this medicine. We recommend that you take Tylenol 975mg every 8 hours for baseline pain control. Oxycodone can be used as needed, but no more than 1/2 - 1 tablets every 4-6 hours.    NSAIDs:  These medicines decrease swelling, pain, and fever. NSAIDs are available without a doctor's order. Ask your primary healthcare provider which medicine is right for you. Ask how much to take and when to take it. Take as directed. NSAIDs can cause stomach bleeding and kidney problems if not taken correctly.  You will be given Celebrex 100 mg to take twice a day for the first 2 weeks after surgery.    Stool softeners  make it easier for you to have a bowel movement. You may need this medicine to treat or prevent constipation.  You will be given Colace 100mg to take twice a day    Anticoagulants  are a type of blood thinner medicine that helps prevent clots. Clots can cause strokes, heart attacks, and death. These medicines may cause you to bleed or bruise more easily.  You will be given aspirin 325 mg to take twice a day for 6 weeks after surgery.    Watch for bleeding from your gums or nose. Watch for blood in your urine and bowel movements. Use a soft washcloth and a soft toothbrush. If you shave, use an electric razor. Avoid activities that can cause bruising or bleeding.    Take your medicine as directed.  Call your healthcare provider if you think your medicine is not helping or if you have side effects.  Tell him if you are allergic to any medicine. Keep a list of the medicines, vitamins, and herbs you take. Include the amounts, and when and why you take them. Bring the list or the pill bottles to follow-up visits. Carry your medicine list with you in case of an emergency.    Follow up with your orthopedist as directed:  You may need to return to have your wound checked and stitches, staples, or drain removed. Write down your questions so you remember to ask them during your visits.  Please make an appointment to see Dr. Branch 2 weeks postoperatively.    Physical therapy:  A physical therapist teaches you exercises to help improve movement and strength, and to decrease pain. You will begin outpatient physical therapy later this week as planned.     Self-care:   Wound Care:  Please leave the Mepilex dressing in place for 7 days after surgery. After 7 days, you may remove the dressing and leave the incision open to air.  If the incision is uncomfortable under clothing after the Mepilex is removed, you may cover it with a a dry sterile dressing. Once the Mepilex dressing has been removed, please keep the incision dry for another week until your follow up appointment.    Use ice:  Ice helps decrease swelling and pain. Ice may also help prevent tissue damage. Use an ice pack or put crushed ice in a plastic bag. Cover it with a towel, and place it on your knee for 15 to 20 minutes every hour as directed.    Use a cane, walker, or crutches as directed:  These devices will help decrease your risk of falling. Your therapist will guide you about transitioning from a walker to cane to no assistive device.    Wear pressure stockings:  These are long, tight stockings that put pressure on your legs to promote blood flow and prevent clots. You may remove the stocking on your non-operative leg when you get home. The stocking on your operative leg should remain on for 2-3 days. Afterwards, you may put the stocking on or off as  needed for swelling.             Contact your orthopedist if:  You have a fever over 101.0°F.    You have trouble moving or bending your joint.    You have increased pain and swelling in your joint, even after you take pain medicine.    You have back pain or lower leg pain when you bend your foot upwards.    You have questions or concerns about your condition or care.    Blood soaks through/saturates your bandage.    Your incision comes apart.    Your incision is red, swollen, or draining pus.    You cannot walk or move your joint, or the limb is numb.    Your leg feels warm, tender, and painful. It may look swollen and red.     Seek immediate care or call 911 if:   You feel like you are going to faint.    You have a seizure or feel confused.     You feel lightheaded, short of breath, and have chest pain.    You have chest pain when you take a deep breath or cough. You may cough up blood.    © 2014 [employer]. Information is for End User's use only and may not be sold, redistributed or otherwise used for commercial purposes. All illustrations and images included in CareNotes® are the copyrighted property of [employer] or [employer].  The above information is an  only. It is not intended as medical advice for individual conditions or treatments. Talk to your doctor, nurse or pharmacist before following any medical regimen to see if it is safe and effective for you.

## 2024-03-09 ENCOUNTER — ANESTHESIA EVENT (OUTPATIENT)
Dept: PERIOP | Facility: HOSPITAL | Age: 74
End: 2024-03-09
Payer: COMMERCIAL

## 2024-03-09 NOTE — ANESTHESIA PREPROCEDURE EVALUATION
Procedure:  ARTHROPLASTY KNEE TOTAL W ROBOT - same day (Left: Knee)    Relevant Problems   CARDIO   (+) Benign essential hypertension   (+) Hyperlipidemia   (+) Shortness of breath on exertion      ENDO   (+) Primary hypothyroidism      GI/HEPATIC   (+) Hiatal hernia with GERD and esophagitis      MUSCULOSKELETAL   (+) Hiatal hernia with GERD and esophagitis      NEURO/PSYCH   (+) Generalized anxiety disorder   (+) Headache   (+) Mild episode of recurrent major depressive disorder (HCC)      PULMONARY   (+) Chronic obstructive pulmonary disease, unspecified COPD type (HCC)   (+) Shortness of breath on exertion      Other   (+) Restless leg syndrome        Physical Exam    Airway    Mallampati score: III  TM Distance: >3 FB  Neck ROM: full     Dental       Cardiovascular  Rhythm: regular, Rate: normal    Pulmonary   Breath sounds clear to auscultation    Other Findings  post-pubertal.      Anesthesia Plan  ASA Score- 2     Anesthesia Type- spinal with ASA Monitors.         Additional Monitors:     Airway Plan:     Comment: Spinal with MAC/Sedation; post-procedure adductor canal block.       Plan Factors-            Patient is not a current smoker.              Induction- intravenous.    Postoperative Plan- Plan for postoperative opioid use.     Informed Consent- Anesthetic plan and risks discussed with patient.  I personally reviewed this patient with the CRNA. Discussed and agreed on the Anesthesia Plan with the CRNA..

## 2024-03-11 ENCOUNTER — HOSPITAL ENCOUNTER (OUTPATIENT)
Facility: HOSPITAL | Age: 74
Setting detail: OUTPATIENT SURGERY
Discharge: HOME | End: 2024-03-11
Attending: ORTHOPAEDIC SURGERY | Admitting: ORTHOPAEDIC SURGERY
Payer: COMMERCIAL

## 2024-03-11 ENCOUNTER — APPOINTMENT (OUTPATIENT)
Dept: RADIOLOGY | Facility: HOSPITAL | Age: 74
End: 2024-03-11
Payer: COMMERCIAL

## 2024-03-11 ENCOUNTER — ANESTHESIA (OUTPATIENT)
Dept: PERIOP | Facility: HOSPITAL | Age: 74
End: 2024-03-11
Payer: COMMERCIAL

## 2024-03-11 VITALS
BODY MASS INDEX: 26.01 KG/M2 | TEMPERATURE: 97.5 F | OXYGEN SATURATION: 98 % | DIASTOLIC BLOOD PRESSURE: 70 MMHG | HEART RATE: 51 BPM | WEIGHT: 132.5 LBS | RESPIRATION RATE: 16 BRPM | SYSTOLIC BLOOD PRESSURE: 144 MMHG | HEIGHT: 60 IN

## 2024-03-11 DIAGNOSIS — Z96.652 S/P TOTAL KNEE REPLACEMENT USING CEMENT, LEFT: Primary | ICD-10-CM

## 2024-03-11 LAB — GLUCOSE SERPL-MCNC: 130 MG/DL (ref 65–140)

## 2024-03-11 PROCEDURE — C1776 JOINT DEVICE (IMPLANTABLE): HCPCS | Performed by: ORTHOPAEDIC SURGERY

## 2024-03-11 PROCEDURE — 27447 TOTAL KNEE ARTHROPLASTY: CPT | Performed by: PHYSICIAN ASSISTANT

## 2024-03-11 PROCEDURE — 97110 THERAPEUTIC EXERCISES: CPT

## 2024-03-11 PROCEDURE — 97163 PT EVAL HIGH COMPLEX 45 MIN: CPT

## 2024-03-11 PROCEDURE — 82948 REAGENT STRIP/BLOOD GLUCOSE: CPT

## 2024-03-11 PROCEDURE — 97116 GAIT TRAINING THERAPY: CPT

## 2024-03-11 PROCEDURE — C9290 INJ, BUPIVACAINE LIPOSOME: HCPCS | Performed by: ANESTHESIOLOGY

## 2024-03-11 PROCEDURE — 97530 THERAPEUTIC ACTIVITIES: CPT

## 2024-03-11 PROCEDURE — S2900 ROBOTIC SURGICAL SYSTEM: HCPCS | Performed by: ORTHOPAEDIC SURGERY

## 2024-03-11 PROCEDURE — C1713 ANCHOR/SCREW BN/BN,TIS/BN: HCPCS | Performed by: ORTHOPAEDIC SURGERY

## 2024-03-11 PROCEDURE — 27447 TOTAL KNEE ARTHROPLASTY: CPT | Performed by: ORTHOPAEDIC SURGERY

## 2024-03-11 PROCEDURE — 73560 X-RAY EXAM OF KNEE 1 OR 2: CPT

## 2024-03-11 PROCEDURE — 97166 OT EVAL MOD COMPLEX 45 MIN: CPT

## 2024-03-11 DEVICE — PALACOS® R IS A FAST-CURING, RADIOPAQUE, POLY(METHYL METHACRYLATE)-BASED BONE CEMENT.PALACOS ® R CONTAINS THE X-RAY CONTRAST MEDIUM ZIRCONIUM DIOXIDE. TO IMPROVE VISIBILITY IN THE SURGICAL FIELD PALACOS ® R HAS BEEN COLOURED WITH CHLOROPHYLL (E141). THE BONE CEMENT IS PREPARED DIRECTLY BEFORE USE BY MIXING A POLYMER POWDER COMPONENT WITH A LIQUID MONOMER COMPONENT. A DUCTILE DOUGH FORMS WHICH CURES WITHIN A FEW MINUTES.
Type: IMPLANTABLE DEVICE | Site: KNEE | Status: FUNCTIONAL
Brand: PALACOS®

## 2024-03-11 DEVICE — ATTUNE KNEE SYSTEM TIBIAL INSERT FIXED BEARING MEDIAL STABILIZED LEFT AOX 5, 7MM
Type: IMPLANTABLE DEVICE | Site: KNEE | Status: FUNCTIONAL
Brand: ATTUNE

## 2024-03-11 DEVICE — ATTUNE KNEE SYSTEM FEMORAL CRUCIATE RETAINING NARROW SIZE 5N LEFT CEMENTED
Type: IMPLANTABLE DEVICE | Site: KNEE | Status: FUNCTIONAL
Brand: ATTUNE

## 2024-03-11 DEVICE — ATTUNE PATELLA MEDIALIZED DOME 32MM CEMENTED AOX
Type: IMPLANTABLE DEVICE | Site: KNEE | Status: FUNCTIONAL
Brand: ATTUNE

## 2024-03-11 DEVICE — ATTUNE KNEE SYSTEM TIBIAL BASE FIXED BEARING SIZE 4 CEMENTED
Type: IMPLANTABLE DEVICE | Site: KNEE | Status: FUNCTIONAL
Brand: ATTUNE

## 2024-03-11 RX ORDER — FLUOXETINE HYDROCHLORIDE 20 MG/1
20 CAPSULE ORAL DAILY
Status: DISCONTINUED | OUTPATIENT
Start: 2024-03-12 | End: 2024-03-11 | Stop reason: HOSPADM

## 2024-03-11 RX ORDER — ASPIRIN 325 MG
325 TABLET ORAL 2 TIMES DAILY
Qty: 84 TABLET | Refills: 0 | Status: SHIPPED | OUTPATIENT
Start: 2024-03-11 | End: 2024-04-22

## 2024-03-11 RX ORDER — CHLORHEXIDINE GLUCONATE ORAL RINSE 1.2 MG/ML
15 SOLUTION DENTAL ONCE
Status: COMPLETED | OUTPATIENT
Start: 2024-03-11 | End: 2024-03-11

## 2024-03-11 RX ORDER — TRAZODONE HYDROCHLORIDE 100 MG/1
100 TABLET ORAL
Status: DISCONTINUED | OUTPATIENT
Start: 2024-03-11 | End: 2024-03-11 | Stop reason: HOSPADM

## 2024-03-11 RX ORDER — ASPIRIN 325 MG
325 TABLET ORAL 2 TIMES DAILY
Status: DISCONTINUED | OUTPATIENT
Start: 2024-03-11 | End: 2024-03-11 | Stop reason: HOSPADM

## 2024-03-11 RX ORDER — SODIUM CHLORIDE, SODIUM LACTATE, POTASSIUM CHLORIDE, CALCIUM CHLORIDE 600; 310; 30; 20 MG/100ML; MG/100ML; MG/100ML; MG/100ML
75 INJECTION, SOLUTION INTRAVENOUS CONTINUOUS
Status: DISCONTINUED | OUTPATIENT
Start: 2024-03-11 | End: 2024-03-11 | Stop reason: HOSPADM

## 2024-03-11 RX ORDER — CELECOXIB 100 MG/1
100 CAPSULE ORAL 2 TIMES DAILY
Status: DISCONTINUED | OUTPATIENT
Start: 2024-03-11 | End: 2024-03-11 | Stop reason: HOSPADM

## 2024-03-11 RX ORDER — OXYCODONE HYDROCHLORIDE 5 MG/1
5 TABLET ORAL EVERY 4 HOURS PRN
Status: DISCONTINUED | OUTPATIENT
Start: 2024-03-11 | End: 2024-03-11 | Stop reason: HOSPADM

## 2024-03-11 RX ORDER — ONDANSETRON 2 MG/ML
4 INJECTION INTRAMUSCULAR; INTRAVENOUS EVERY 6 HOURS PRN
Status: DISCONTINUED | OUTPATIENT
Start: 2024-03-11 | End: 2024-03-11 | Stop reason: HOSPADM

## 2024-03-11 RX ORDER — MAGNESIUM HYDROXIDE 1200 MG/15ML
LIQUID ORAL AS NEEDED
Status: DISCONTINUED | OUTPATIENT
Start: 2024-03-11 | End: 2024-03-11 | Stop reason: HOSPADM

## 2024-03-11 RX ORDER — MAGNESIUM HYDROXIDE/ALUMINUM HYDROXICE/SIMETHICONE 120; 1200; 1200 MG/30ML; MG/30ML; MG/30ML
30 SUSPENSION ORAL EVERY 6 HOURS PRN
Status: DISCONTINUED | OUTPATIENT
Start: 2024-03-11 | End: 2024-03-11 | Stop reason: HOSPADM

## 2024-03-11 RX ORDER — BUPIVACAINE HYDROCHLORIDE 7.5 MG/ML
INJECTION, SOLUTION INTRASPINAL AS NEEDED
Status: DISCONTINUED | OUTPATIENT
Start: 2024-03-11 | End: 2024-03-11

## 2024-03-11 RX ORDER — GABAPENTIN 300 MG/1
300 CAPSULE ORAL ONCE
Status: COMPLETED | OUTPATIENT
Start: 2024-03-11 | End: 2024-03-11

## 2024-03-11 RX ORDER — DOCUSATE SODIUM 100 MG/1
100 CAPSULE, LIQUID FILLED ORAL 2 TIMES DAILY
Status: DISCONTINUED | OUTPATIENT
Start: 2024-03-11 | End: 2024-03-11 | Stop reason: HOSPADM

## 2024-03-11 RX ORDER — BUPIVACAINE HYDROCHLORIDE 2.5 MG/ML
INJECTION, SOLUTION EPIDURAL; INFILTRATION; INTRACAUDAL AS NEEDED
Status: DISCONTINUED | OUTPATIENT
Start: 2024-03-11 | End: 2024-03-11 | Stop reason: HOSPADM

## 2024-03-11 RX ORDER — PANTOPRAZOLE SODIUM 40 MG/1
40 TABLET, DELAYED RELEASE ORAL 2 TIMES DAILY WITH MEALS
Status: DISCONTINUED | OUTPATIENT
Start: 2024-03-11 | End: 2024-03-11 | Stop reason: HOSPADM

## 2024-03-11 RX ORDER — LEVOTHYROXINE SODIUM 0.07 MG/1
75 TABLET ORAL
Status: DISCONTINUED | OUTPATIENT
Start: 2024-03-12 | End: 2024-03-11 | Stop reason: HOSPADM

## 2024-03-11 RX ORDER — DOCUSATE SODIUM 100 MG/1
100 CAPSULE, LIQUID FILLED ORAL 2 TIMES DAILY
Qty: 60 CAPSULE | Refills: 0 | Status: SHIPPED | OUTPATIENT
Start: 2024-03-11

## 2024-03-11 RX ORDER — CELECOXIB 100 MG/1
100 CAPSULE ORAL 2 TIMES DAILY
Qty: 28 CAPSULE | Refills: 0 | Status: SHIPPED | OUTPATIENT
Start: 2024-03-11

## 2024-03-11 RX ORDER — MIDAZOLAM HYDROCHLORIDE 2 MG/2ML
INJECTION, SOLUTION INTRAMUSCULAR; INTRAVENOUS AS NEEDED
Status: DISCONTINUED | OUTPATIENT
Start: 2024-03-11 | End: 2024-03-11

## 2024-03-11 RX ORDER — OXYCODONE HCL 10 MG/1
10 TABLET, FILM COATED, EXTENDED RELEASE ORAL ONCE
Status: COMPLETED | OUTPATIENT
Start: 2024-03-11 | End: 2024-03-11

## 2024-03-11 RX ORDER — PROPOFOL 10 MG/ML
INJECTION, EMULSION INTRAVENOUS AS NEEDED
Status: DISCONTINUED | OUTPATIENT
Start: 2024-03-11 | End: 2024-03-11

## 2024-03-11 RX ORDER — BUPIVACAINE HYDROCHLORIDE 5 MG/ML
INJECTION, SOLUTION EPIDURAL; INTRACAUDAL
Status: DISCONTINUED | OUTPATIENT
Start: 2024-03-11 | End: 2024-03-11

## 2024-03-11 RX ORDER — FENTANYL CITRATE/PF 50 MCG/ML
25 SYRINGE (ML) INJECTION
Status: COMPLETED | OUTPATIENT
Start: 2024-03-11 | End: 2024-03-11

## 2024-03-11 RX ORDER — AMLODIPINE BESYLATE 2.5 MG/1
2.5 TABLET ORAL DAILY
Status: DISCONTINUED | OUTPATIENT
Start: 2024-03-11 | End: 2024-03-11 | Stop reason: HOSPADM

## 2024-03-11 RX ORDER — AMLODIPINE BESYLATE 5 MG/1
5 TABLET ORAL DAILY
Status: DISCONTINUED | OUTPATIENT
Start: 2024-03-12 | End: 2024-03-11 | Stop reason: HOSPADM

## 2024-03-11 RX ORDER — ACETAMINOPHEN 325 MG/1
975 TABLET ORAL EVERY 8 HOURS
Start: 2024-03-11

## 2024-03-11 RX ORDER — OXYCODONE HYDROCHLORIDE 5 MG/1
TABLET ORAL
Qty: 30 TABLET | Refills: 0 | Status: SHIPPED | OUTPATIENT
Start: 2024-03-11 | End: 2024-03-18

## 2024-03-11 RX ORDER — CEFAZOLIN SODIUM 2 G/50ML
2000 SOLUTION INTRAVENOUS ONCE
Status: COMPLETED | OUTPATIENT
Start: 2024-03-11 | End: 2024-03-11

## 2024-03-11 RX ORDER — GABAPENTIN 100 MG/1
200 CAPSULE ORAL 2 TIMES DAILY
Status: DISCONTINUED | OUTPATIENT
Start: 2024-03-11 | End: 2024-03-11 | Stop reason: HOSPADM

## 2024-03-11 RX ORDER — CEFAZOLIN SODIUM 2 G/50ML
2000 SOLUTION INTRAVENOUS EVERY 6 HOURS
Status: DISCONTINUED | OUTPATIENT
Start: 2024-03-11 | End: 2024-03-11 | Stop reason: HOSPADM

## 2024-03-11 RX ORDER — PROPOFOL 10 MG/ML
INJECTION, EMULSION INTRAVENOUS CONTINUOUS PRN
Status: DISCONTINUED | OUTPATIENT
Start: 2024-03-11 | End: 2024-03-11

## 2024-03-11 RX ORDER — ACETAMINOPHEN 325 MG/1
975 TABLET ORAL EVERY 8 HOURS
Status: DISCONTINUED | OUTPATIENT
Start: 2024-03-11 | End: 2024-03-11 | Stop reason: HOSPADM

## 2024-03-11 RX ORDER — TRANEXAMIC ACID 10 MG/ML
1000 INJECTION, SOLUTION INTRAVENOUS ONCE
Status: COMPLETED | OUTPATIENT
Start: 2024-03-11 | End: 2024-03-11

## 2024-03-11 RX ORDER — ACETAMINOPHEN 325 MG/1
975 TABLET ORAL ONCE
Status: COMPLETED | OUTPATIENT
Start: 2024-03-11 | End: 2024-03-11

## 2024-03-11 RX ADMIN — GABAPENTIN 300 MG: 300 CAPSULE ORAL at 06:37

## 2024-03-11 RX ADMIN — PROPOFOL 80 MCG/KG/MIN: 10 INJECTION, EMULSION INTRAVENOUS at 07:38

## 2024-03-11 RX ADMIN — PHENYLEPHRINE HYDROCHLORIDE 50 MCG: 10 INJECTION INTRAVENOUS at 07:51

## 2024-03-11 RX ADMIN — OXYCODONE HYDROCHLORIDE 5 MG: 5 TABLET ORAL at 11:33

## 2024-03-11 RX ADMIN — CEFAZOLIN SODIUM 2000 MG: 2 SOLUTION INTRAVENOUS at 07:33

## 2024-03-11 RX ADMIN — SODIUM CHLORIDE, SODIUM LACTATE, POTASSIUM CHLORIDE, AND CALCIUM CHLORIDE: .6; .31; .03; .02 INJECTION, SOLUTION INTRAVENOUS at 09:01

## 2024-03-11 RX ADMIN — BUPIVACAINE HYDROCHLORIDE 5 ML: 5 INJECTION, SOLUTION EPIDURAL; INTRACAUDAL; PERINEURAL at 10:15

## 2024-03-11 RX ADMIN — PHENYLEPHRINE HYDROCHLORIDE 100 MCG: 10 INJECTION INTRAVENOUS at 07:56

## 2024-03-11 RX ADMIN — CHLORHEXIDINE GLUCONATE 15 ML: 1.2 SOLUTION ORAL at 06:37

## 2024-03-11 RX ADMIN — OXYCODONE HYDROCHLORIDE 10 MG: 10 TABLET, FILM COATED, EXTENDED RELEASE ORAL at 06:37

## 2024-03-11 RX ADMIN — PHENYLEPHRINE HYDROCHLORIDE 50 MCG: 10 INJECTION INTRAVENOUS at 08:15

## 2024-03-11 RX ADMIN — ACETAMINOPHEN 975 MG: 325 TABLET ORAL at 14:12

## 2024-03-11 RX ADMIN — BUPIVACAINE HYDROCHLORIDE IN DEXTROSE 1.3 ML: 7.5 INJECTION, SOLUTION SUBARACHNOID at 07:36

## 2024-03-11 RX ADMIN — TRANEXAMIC ACID 1000 MG: 10 INJECTION, SOLUTION INTRAVENOUS at 07:47

## 2024-03-11 RX ADMIN — FENTANYL CITRATE 25 MCG: 50 INJECTION INTRAMUSCULAR; INTRAVENOUS at 10:00

## 2024-03-11 RX ADMIN — SODIUM CHLORIDE, SODIUM LACTATE, POTASSIUM CHLORIDE, AND CALCIUM CHLORIDE 75 ML/HR: .6; .31; .03; .02 INJECTION, SOLUTION INTRAVENOUS at 06:41

## 2024-03-11 RX ADMIN — PROPOFOL 40 MG: 10 INJECTION, EMULSION INTRAVENOUS at 07:38

## 2024-03-11 RX ADMIN — FENTANYL CITRATE 25 MCG: 50 INJECTION INTRAMUSCULAR; INTRAVENOUS at 09:50

## 2024-03-11 RX ADMIN — PHENYLEPHRINE HYDROCHLORIDE 50 MCG: 10 INJECTION INTRAVENOUS at 08:22

## 2024-03-11 RX ADMIN — BUPIVACAINE 20 ML: 13.3 INJECTION, SUSPENSION, LIPOSOMAL INFILTRATION at 10:15

## 2024-03-11 RX ADMIN — MIDAZOLAM 2 MG: 1 INJECTION INTRAMUSCULAR; INTRAVENOUS at 07:30

## 2024-03-11 RX ADMIN — ACETAMINOPHEN 975 MG: 325 TABLET ORAL at 06:37

## 2024-03-11 NOTE — OP NOTE
OPERATIVE REPORT  PATIENT NAME: Ml Barton  : 1950  MRN: [id number]  Pt Location:  [facility] OR ROOM 01    Surgery Date: 3/11/2024    Surgeons and Role:     * Myron Branch, DO - Primary     * Jesus Knowles PA-C - Assisting, no qualified resident was available an assistant was needed for patient positioning, prepping and draping, soft tissue retraction, protection of vital structures throughout the case, the femur and tibia for robotic assisted resection and implantation of final prosthesis, superficial closure, and to complete the case safely.    * Som Good,  ATC/OTC - 2nd Assist    Preop Diagnosis:  Primary osteoarthritis of left knee [M17.12]  Chronic pain of left knee [M25.562, G89.29]    Post-Op Diagnosis Codes:     * Primary osteoarthritis of left knee [M17.12]     * Chronic pain of left knee [M25.562, G89.29]    Procedure(s):  Left - ARTHROPLASTY KNEE TOTAL W ROBOT - cemented    Specimens:  * No specimens in log *    Estimated Blood Loss:   10 mL    Drains: none    Anesthesia Type:   Spinal with sedation, postoperative adductor canal block with Exparel    Intravenous fluids: Crystalloid 1 L    Antibiotics: Ancef 2 g    Urine output: No Lo    Tourniquet time: 54 minutes at 250 mmHg    Implant Name Type Inv. Item Serial No.  Lot No. LRB No. Used Action   COMPONENT PATELLAR 32MM MEDIAL DOME ATTUNE - [id number]  COMPONENT PATELLAR 32MM MEDIAL DOME ATTUNE  [id number] Left 1 Implanted   COMPONENT FEM SZ 5 LT NRW CMNT CR ATTUNE - [id number]  COMPONENT FEM SZ 5 LT NRW CMNT CR ATTUNE  DEPUY [id number] Left 1 Implanted   INSERT TIB SZ 5 7MM LT FX BRNG MED STAB ATTUNE - [id number]  INSERT TIB SZ 5 7MM LT FX BRNG MED STAB ATTUNE  DEPUY [id number] Left 1 Implanted   BASEPLATE TIBIAL SZ 4 CMNT FX BRNG ATTUNE S PLUS - [id number]  BASEPLATE TIBIAL SZ 4 CMNT FX BRNG ATTUNE S PLUS  DEPUY [id number] Left 1 Implanted   CEMENT BN 40 GM PALACOS RADIOPAQUE W/O ANTIBIOTIC - [id number]  CEMENT BN  40 GM PALACOS RADIOPAQUE W/O ANTIBIOTIC  [id number] Left 2 Implanted     Operative Indications:  Primary osteoarthritis of left knee [M17.12]  Chronic pain of left knee [M25.562, G89.29]  The patient is a very pleasant 73-year-old female known to me for treatment of her activity related left knee pain.  The patient has attempted multiple forms of conservative measure treatment for her left knee pain and all have failed to provide long-lasting relief of her discomfort.  With failed conservative treatment, persistent activity related pain, and end-stage osteoarthritis of the left knee I recommend she undergo robotic assisted left total knee arthroplasty.  She was agreeable to this.  Consents were signed and placed in the chart.  She was cleared by medical subspecialist in preparation for the procedure.  Ultimately she underwent robotic assisted left total knee arthroplasty on 3/11/2024.    Operative Findings:  Intraoperatively the patient was found to have a range of motion from 1 degree of extension to 120 degrees of flexion with a 2 degree varus alignment of the left lower extremity.  There is grade 4 degenerative change in all 3 compartments of the knee with the most severe being in the patellofemoral joint.  Robotic assisted left total knee arthroplasty was successfully performed without complication.  The final construct with excellent range of motion from 0 to 135 degrees with excellent stability at 0 degrees 30 degrees and 90 degrees.  The limb was restored to 1 degree of varus alignment.  The patella tracked midline and flat.  After closure of the arthrotomy range of motion, stability, patellar tracking were unchanged.  Patient tolerated the procedure well.  There were no complications.    Complications:   None    Knee Approach: Medial Parapatellar    Procedure and Technique:  The patient was identified and marked in the preoperative holding area. Final questions were addressed. Consents were  visualized for correct laterality and the intended procedure. Patient was taken back to the operating room where they were transferred to the operating room table and administered spinal anesthesia by the anesthesia staff. Tourniquet was then applied to the left thigh. The right lower extremity was draped over the foot break in the bed after the split leg attachment was removed, and the popliteal fossa was well padded and the leg was secured in the flexed position off of the operating table.  The left lower extremity was prepped and draped in the standard sterile fashion with the addition of the knee positioner.  The patient was administered 2 g of IV Ancef. Tranexamic acid was administered by the anesthesia staff.  A final timeout was performed and all members of the operating room staff were in agreement of the intended procedure and the correct laterality was confirmed.  An anterior knee incision was marked over the anterior left knee and carried over the medial portion of the patella down medial to the tibial tubercle.  The leg was exsanguinated and the tourniquet was inflated to 250 mmHg.  An incision was made over the anterior knee using a scalpel, and sharp dissection was carried deep through Sam's fascia creating full thickness flaps.  The extensor mechanism was identified.  A standard medial parapatellar arthrotomy was performed using a scalpel, and upon doing so benign-appearing yellow intra-articular fluid was expressed.  The anterior horn of the medial and lateral meniscus was removed. 50% of the patellar fat pad was removed for proper exposure. The distal arthrotomy was used to initiate a medial release around the proximal tibia at the joint line to the mid coronal plane.  On inspection there was diffuse grade 4 degenerative change in the medial compartment, lateral compartment, and patellofemoral compartment.  The disease was most severe within the patellofemoral joint.    Preparations were made for  robotic assisted total knee arthroplasty.  The periarticular osteophytes were removed from around the distal femur, proximal tibia, and intercondylar notch.  The remnants of the ACL and the PCL were removed electrocautery.  The visualized portions of the medial and lateral meniscus were removed.  The distal femur and proximal tibial arrays were placed without complication.  The checkpoints were created the distal femur proximal tibia.  The hip center was captured.  Anatomic registration was carried out in sequence.  Range of motion was evaluated and the knee was in 2 degrees of varus, and the range of motion was from 1 degree extension-120 degrees.  The Proadjust graph was created.  Through manipulating the position of the femoral and tibial implants a well-balanced Proadjust graph was created and this was excepted as the intraoperative plan.  The robot was brought into the surgical field.  The retractors were placed around the distal femur proximal tibia.  Robotic assisted resection was performed of the distal femur in sequence without damage to the periarticular tissues.  The tibia was subluxed anteriorly and the proximal tibia was resected without complication.  All bony fragments were removed and resection appeared to be complete.  The knee was brought out into the full extension in the posterior compartment was evaluated.  The remnants of the medial and lateral meniscus were removed with electrocautery.  Based on the intraoperative plan a size 5 femoral notch guide was placed and the trochlea was prepared.  It was clear the patient would benefit from a narrow femoral prosthesis.  The size 5 left CR trial was placed upon the distal femur and a size 7 polyethylene medial stabilized insert was placed into the articulation.  The knee was cycled through a range of motion and is range of motion was evaluated.  The overall alignment of the limb was restored to 1 degree of varus, and range of motion was from 0-135  degrees.  This construct had excellent stability at 0 degrees, 30 degrees, 90 degrees.  This was deemed to be the final construct.  The arrays were removed from the distal femur proximal tibia without complication.  The lug holes in the femur were drilled.  The trials were removed and the tibia was subluxed anteriorly.  The tibia was sized and was found that a size 4 base plate would be appropriate.  This was aligned with the medial 1/3 of the proximal tibial tubercle and pinned into place.  The tibia was then reamed, broached, and finished in sequence.  The base plate was removed.    Attention was then turned to the patella. The osteo synovial reflection was revealed using a Bovie. The patella height measured 21 millimeters prior to resection.  The cutting guide was set for the appropriate depth and the patella was evenly resected using oscillating saw. The patella was sized and found to be a 32 mm patellar button.  The 32 mm patellar button was then medialized over the patella and the lug holes were drilled. A trial patella button was placed upon the patella and the pre osteotomy patellar height was restored.  A lateral facetectomy of the patella was performed. The soft tissues of the posterior capsule were cauterized using Bovie to ensure hemostasis. The posterior capsule and medial and lateral periarticular soft tissues were injected with a periarticular analgesic cocktail.     The knee was again irrigated in preparation for cementation.  The tibia was subluxed and all retractors were in place for cementation when final implants were confirmed and placed upon the back table.  2 bags of Palacos cement without gentamicin were mixed and the final size 4  S+ tibial implant, 7 mm AOX CR medial stabilized polyethylene insert, size 5 narrow left CR femur, and 32 mm patella button were cemented in sequence.  Excess cement was removed after each implant was in place.  As the cement cured the remainder of the  periarticular pain cocktail was injected into the soft tissues around the knee.  Irrigation then followed with IrriSept followed by normal saline solution.  After the cement had cured the joint was inspected for any residual loose bodies of cement and these were removed. The tourniquet was released after 54 minutes at 250 mmHg.     The tracking and stability of the final components were assessed. The patella tracked midline without tilt, and the knee was stable in both flexion and extension, coronal stability was unchanged, and the knee demonstrated range of motion from 0-135°.  The knee was again irrigated and a very conservative partial synovectomy was performed.  Hemostasis was achieved using electrocautery.       Closure began using a #1 barbed bidirectional suture for the arthrotomy.  After closure of the arthrotomy range of motion to gravity was tested and was found to be 0-135° of flexion. Quarter percent Marcaine plain was injected in the subcutaneous soft tissues.  The deep subcutaneous tissues were reapproximated with undyed 0 Vicryl, the superficial subcutaneous tissues were reapproximated with undyed 2-0 Vicryl, the skin edges reapproximated with a running 3-0 bidirectional barbed Monocryl suture, and the skin edges were sealed with Exofin.  After the Exofin had dried a silver impregnated Mepilex dressing was placed over the incision.  The drapes were removed and NATHALIA stockings were placed on the bilateral lower extremities. Patient was then awakened from anesthesia without difficulty, and transferred to PACU in stable condition.      I was present for all critical portions of the procedure.    Patient Disposition:  PACU             SIGNATURE: Myron Branch DO  DATE: March 11, 2024  TIME: 3:12 PM

## 2024-03-11 NOTE — ANESTHESIA PROCEDURE NOTES
Spinal Block    Patient location during procedure: OR  Start time: 3/11/2024 7:36 AM  Reason for block: procedure for pain, at surgeon's request, post-op pain management and primary anesthetic  Staffing  Performed by: Delfino Salinas MD  Authorized by: Delfino Salinas MD    Preanesthetic Checklist  Completed: patient identified, IV checked, site marked, risks and benefits discussed, surgical consent, monitors and equipment checked, pre-op evaluation and timeout performed  Spinal Block  Patient position: sitting  Prep: ChloraPrep  Patient monitoring: heart rate and continuous pulse ox  Approach: midline  Location: L2-3  Injection technique: single-shot  Needle  Needle type: pencil-tip   Needle gauge: 25 G  Needle length: 10 cm  Assessment  Injection Assessment:  negative aspiration for heme, no paresthesia on injection and positive aspiration for clear CSF.  Post-procedure:  site cleaned

## 2024-03-11 NOTE — PERIOPERATIVE NURSING NOTE
Ate small amt lunch.  Left knee dsg dry, intact. Pt able to wiggle toes of left foot.  Family at bedside.

## 2024-03-11 NOTE — PERIOPERATIVE NURSING NOTE
Received from PACU, sleepy but responsive.  Left knee dsg , darrell dry, intact. Ice to left knee. Pt able to wiggle toes of left foot. Left pedal pulse dopplerable. Daughter at bedside.

## 2024-03-11 NOTE — ANESTHESIA PROCEDURE NOTES
Peripheral Block    Patient location during procedure: PACU  Start time: 3/11/2024 10:15 AM  Reason for block: procedure for pain, at surgeon's request and post-op pain management  Staffing  Performed by: Delfino Salinas MD  Authorized by: Delfino Salinas MD    Preanesthetic Checklist  Completed: patient identified, IV checked, site marked, risks and benefits discussed, surgical consent, monitors and equipment checked, pre-op evaluation and timeout performed  Peripheral Block  Patient position: supine  Prep: ChloraPrep  Patient monitoring: continuous pulse oximetry, frequent blood pressure checks and heart rate  Block type: Adductor Canal  Laterality: left  Injection technique: single-shot  Procedures: ultrasound guided, Ultrasound guidance required for the procedure to increase accuracy and safety of medication placement and decrease risk of complications.  Ultrasound permanent image savedbupivacaine (PF) (MARCAINE) 0.5 % injection 20 mL - Perineural   5 mL - 3/11/2024 10:15:00 AM  bupivacaine liposomal (EXPAREL) 1.3 % injection 20 mL - Perineural   20 mL - 3/11/2024 10:15:00 AM  Needle  Needle type: Stimuplex   Needle gauge: 20 G  Needle length: 4 in  Needle localization: ultrasound guidance  Assessment  Injection assessment: frequent aspiration, injected with ease, negative aspiration, no paresthesia on injection, no symptoms of intraneural/intravenous injection, negative for heart rate change, needle tip visualized at all times and incremental injection  Paresthesia pain: none  Post-procedure:  site cleaned  patient tolerated the procedure well with no immediate complications

## 2024-03-11 NOTE — PHYSICAL THERAPY NOTE
"     PHYSICAL THERAPY EVALUATION/TREATMENT     03/11/24 1215   PT Last Visit   PT Visit Date 03/11/24   Note Type   Note type Evaluation   Pain Assessment   Pain Assessment Tool 0-10   Pain Score 4   Pain Location/Orientation Orientation: Left;Location: Knee  (Pt had pain meds prior to PT.)   Restrictions/Precautions   Weight Bearing Precautions Per Order Yes   LLE Weight Bearing Per Order WBAT   Other Precautions Fall Risk;Pain   Home Living   Type of Home House   Home Layout Two level;Bed/bath upstairs  (1 MAZIN, can stay on first floor with pullout couch and 1/2 bath.)   Home Equipment Walker   Prior Function   Level of [place] Independent with ADLs;Independent with functional mobility  (Pt is ambulatory without a device.)   Lives With Spouse   Receives Help From Family   Falls in the last 6 months 1 to 4  (fall off of a step stool)   Vocational Retired   General   Additional Pertinent History Pt is POD zero s/p L TKA with anticipated DC home today.   Cognition   Overall Cognitive Status WFL   Arousal/Participation Alert   Orientation Level Oriented X4   Following Commands Follows one step commands with increased time or repetition   Comments Pt is impulsive   Subjective   Subjective \"Let's get this overwith\"   RLE Assessment   RLE Assessment WNL   LLE Assessment   LLE Assessment   (ROM 0-90, Pt able to actively SLR.  MMT hip 3/5, knee 3/5, fair quad set, ankle 4/5)   Light Touch   RLE Light Touch Grossly intact   LLE Light Touch Grossly intact   Bed Mobility   Supine to Sit 5  Supervision   Transfers   Sit to Stand 5  Supervision   Stand to Sit 5  Supervision   Stand pivot   (min assist progressing to supervision)   Ambulation/Elevation   Gait pattern   (step to progressing to step through.)   Gait Assistance   (min assist progressing to supervision)   Assistive Device Rolling walker   Distance 50 feet   Stair Management Assistance 5  Supervision   Additional items Verbal cues   Stair Management Technique " Step to pattern;Two rails   Balance   Static Sitting Good   Static Standing Fair +   Ambulatory Fair   Activity Tolerance   Activity Tolerance Patient tolerated treatment well;Patient limited by fatigue;Patient limited by pain   Nurse Made Aware Pt is cleared to go home from the PT point of view.   Assessment   Prognosis Good   Problem List Decreased strength;Decreased range of motion;Impaired balance;Decreased mobility;Pain   Assessment Pt is 73 y.o. female seen for PT evaluation s/p admit to [facility] on 3/11/2024 w/ S/P total knee replacement using cement, left. PT consulted to assess pt's functional mobility and d/c needs. Order placed for PT eval and tx, w/ WBAT L LE order.      Prior to admission, patient was independent with functional mobility without assistive device and independent with ADLS.  Upon evaluation: Pt is able to ambulate with a walker with min assist/supervision 100 feet and able to negotiate 4 steps with 2 rails with supervision.    Co-morbidities affecting patient's physical performance include: COPD, obesity.  Personal factors affecting patient at time of initial evaluation include: lives in 2 story house, inability to navigate community distances, hearing impairments, and inability to live alone.     Please find objective findings from Physical Therapy assessment regarding body systems outlined above with impairments and limitations including weakness, decreased ROM, gait deviations, pain, decreased activity tolerance, decreased functional mobility tolerance, and fall risk.      The Barthel Index was used as a functional outcome tool presenting with a score of Barthel Index Score: 60 today indicating moderate limitations of functional mobility and ADLS.  Patient's clinical presentation is currently unstable/unpredictable as seen in patient's presentation of changing level of pain, increased fall risk, new onset of impairment of functional mobility, and new onset of weakness. Pt  "would benefit from continued Physical Therapy treatment to address deficits as defined above and maximize level of functional mobility.     As demonstrated by objective findings, the assigned level of complexity for this evaluation is high.The patient's [facility] Basic Mobility Inpatient Short Form Raw Score is 20. A Raw score of greater than 16 suggests the patient may benefit from discharge to home. Please also refer to the recommendation of the Physical Therapist for safe discharge planning.   Goals   Patient Goals \"walk without pain\"   STG Expiration Date 03/18/24   Short Term Goal #1 independent bed mobility, independent transfers, independent ambulation with a walker 150 feet indoor level surfaces with a steady gait, independent up and down 1 flight of steps   LTG Expiration Date 03/25/24   Long Term Goal #1 improve L knee ROM to 0-110, improve L knee strength to at least 4/5, independent ambulation outdoor level surfaces 500 feet with least restrictive device.   Plan   Treatment/Interventions ADL retraining;Functional transfer training;LE strengthening/ROM;Elevations;Therapeutic exercise;Gait training;Bed mobility;Equipment eval/education;Patient/family training   PT Frequency Twice a day   Discharge Recommendation   Rehab Resource Intensity Level, PT III (Minimum Resource Intensity)   Equipment Recommended   (Pt has a rolling walker)   [facility] Basic Mobility Inpatient   Turning in Flat Bed Without Bedrails 4   Lying on Back to Sitting on Edge of Flat Bed Without Bedrails 4   Moving Bed to Chair 3   Standing Up From Chair Using Arms 3   Walk in Room 3   Climb 3-5 Stairs With Railing 3   Basic Mobility Inpatient Raw Score 20   Basic Mobility Standardized Score 43.99   Highest Level Of Mobility   JH-HLM Goal 6: Walk 10 steps or more   JH-HLM Achieved 7: Walk 25 feet or more   Barthel Index   Feeding 10   Bathing 0   Grooming Score 5   Dressing Score 5   Bladder Score 10   Bowels Score 10   Toilet Use Score 5 " "  Transfers (Bed/Chair) Score 10   Mobility (Level Surface) Score 0   Stairs Score 5   Barthel Index Score 60   Additional Treatment Session   Start Time 1150   End Time 1215   Treatment Assessment S: \"I want to get this done\"  O:  x 10 each ankle pumps, quad set, heel slides, LAQ;  x 5 SLR. Gait training with a rolling walker in the buckley x 125 feet with cues to use UEs on walker when WB L LE, to slow down gait speed and not to rush.A:  Pt demonstrates good L knee ROM and strength and good ability to ambulate with a rolling walker on level surfaces POD 0 s/p L TKA. P: Pt is ready to go home from the PT point of view to start OP PT later this week.   End of Consult   Patient Position at End of Consult All needs within reach;Bedside chair   Licensure   [place] License Number  Dayanna Youngblood PT [id number]     "

## 2024-03-11 NOTE — ANESTHESIA POSTPROCEDURE EVALUATION
Post-Op Assessment Note    CV Status:  Stable  Pain Score: 0    Pain management: adequate       Mental Status:  Alert and awake   Hydration Status:  Stable   PONV Controlled:  None   Airway Patency:  Patent and adequate     Post Op Vitals Reviewed: Yes    No anethesia notable event occurred.    Staff: CRNA               /68 (03/11/24 0945)    Temp      Pulse 73 (03/11/24 0945)   Resp 16 (03/11/24 0945)    SpO2 99 % (03/11/24 0945)

## 2024-03-11 NOTE — OCCUPATIONAL THERAPY NOTE
Occupational Therapy Evaluation     Patient Name: Ml Barton  Today's Date: 3/11/2024  Problem List  Principal Problem:    S/P total knee replacement using cement, left    Past Medical History  Past Medical History:   Diagnosis Date    Abdominal pain     recent upper abd.    Acute diverticulitis     last assessed 16    Adenoma of left adrenal gland     Anxiety     Arthritis     Benign paroxysmal positional vertigo     last assessed 03/24/15    Colon polyp     DDD (degenerative disc disease), lumbar     PT and yoga helped    Disease of thyroid gland     Diverticulitis     hx of    Diverticulosis     Epigastric pain     wakes her up at night    Gastric ulcer     hx of    Hiatal hernia     Hyperlipidemia     Hypertension     Malignant hypertension     Obesity     Primary hypothyroidism     Primary hypothyroidism     SOB (shortness of breath) on exertion     with exertion when taking a certain medication    Vertigo     last assessed 13     Past Surgical History  Past Surgical History:   Procedure Laterality Date    APPENDECTOMY      BREAST BIOPSY Left     a long time ago     SECTION      CHOLECYSTECTOMY      COLONOSCOPY      KNEE SURGERY      History of Arthrotomy of Knee; Open Meniscus Tear; left    UPPER GASTROINTESTINAL ENDOSCOPY          24 1308   OT Last Visit   OT Visit Date 24  (Monday)   Note Type   Note type Evaluation   Pain Assessment   Pain Assessment Tool 0-10   Pain Score 7   Pain Location/Orientation Orientation: Left;Location: Knee;Location: Leg   Effect of Pain on Daily Activities limits pace and activity tolerance during ADLs   Patient's Stated Pain Goal No pain   Hospital Pain Intervention(s) Cold applied;Repositioned;Ambulation/increased activity;Emotional support  (RN addressed prior to eval)   Restrictions/Precautions   Weight Bearing Precautions Per Order Yes   LLE Weight Bearing Per Order WBAT   Braces or Orthoses Other (Comment)  (B thigh high  "compression stockings)   Other Precautions WBS;Fall Risk;Pain   Home Living   Type of Home House   Home Layout Two level;1/2 bath on main level;Bed/bath upstairs  (1 MAZIN)   Bathroom Shower/Tub Tub/shower unit   Bathroom Toilet Standard   Bathroom Equipment   (no DME)   Bathroom Accessibility Accessible   Home Equipment Walker;Cane   Additional Comments Pt reports living w/  in 2 SH w/ 1/2 bath on first floor.   Prior Function   Level of [place] Independent with ADLs;Independent with functional mobility;Independent with IADLS   Lives With Spouse   Receives Help From Family  (supportive daughter is a nurse)   IADLs Independent with driving;Independent with meal prep;Independent with medication management   Falls in the last 6 months 1 to 4   Vocational Retired   Comments Pt reports I and active lifestyle at baseline w/ out use of AD   Lifestyle   Autonomy Pt reports I w/ ADLs at baseline   Reciprocal Relationships Supprotive  and daughter present during eval   Service to Others Pt reports retired and worked in [employer]   Intrinsic Gratification Pt reports enjoying active lifestyle at baseline. Has 2 grandchildren (17 and 20years)   General   Additional Pertinent History Pt is POD #0 s/p L TKA w/ Dr. Branch (anticipated same day discharge)   Family/Caregiver Present Yes  ( and daughter present)   Additional General Comments Personal and environmental factors supporting performance includes able to have first floor set- up, supportive spouse / family, independent at baseline; barriers include increased pain, multi level home   Subjective   Subjective \"Can I put this on?\" (grabbing her t-shirt)   ADL   Where Assessed Chair  (vs bathroom)   Eating Assistance 7  Independent   Grooming Assistance 5  Supervision/Setup  (cues for walker mgmt at sink in bathroom)   Grooming Deficit Setup;Standing with assistive device;Supervision/safety;Verbal cueing   UB Bathing Assistance Unable to " assess  (anticipate mod I for + time, set- up while seated)   LB Bathing Assistance Unable to assess  (anticipate mod I seated after set- up w/ + time)   UB Dressing Assistance 6  Modified independent   UB Dressing Deficit Setup;Increased time to complete   LB Dressing Assistance 5  Supervision/Setup   LB Dressing Deficit Setup;Supervision/safety;Verbal cueing  (educated pt on tech to thread L LE first while seated)   Toileting Assistance  5  Supervision/Setup  (attempted to void on toilet in bathroom)   Toileting Deficit Setup;Supervison/safety;Verbal cueing   Bed Mobility   Supine to Sit Unable to assess   Sit to Supine Unable to assess   Additional Comments Pt seated OOB In chair upon arrival and post eval w/ needs met, call bell in reach   Transfers   Sit to Stand 5  Supervision   Additional items Assist x 1;Armrests;Verbal cues   Stand to Sit 5  Supervision   Additional items Assist x 1;Armrests;Verbal cues   Toilet transfer 5  Supervision   Additional items Assist x 1;Standard toilet;Verbal cues   Additional Comments Pt performed sit <> stand 3X w/ S. Reinforced / educated pt on hand placement during sit <> stand transitions   Functional Mobility   Functional Mobility 5  Supervision   Additional Comments to / from bathroom using RW   Additional items Rolling walker   Balance   Static Sitting Good   Dynamic Sitting Fair   Static Standing Fair +   Ambulatory Fair   Activity Tolerance   Activity Tolerance Patient tolerated treatment well;Patient limited by pain   Medical Staff Made Aware spoke w/ PT, Caitlyn   Nurse Made Aware spoke w/ RN pre / post eval; RN made aware that pt is appropriate to DC home w/ family and OPPT when medically stable from an OT perspective   RUE Assessment   RUE Assessment WFL   RUE Strength   RUE Overall Strength Within Functional Limits - able to perform ADL tasks with strength   LUE Assessment   LUE Assessment WFL   LUE Strength   LUE Overall Strength Within Functional Limits - able to  perform ADL tasks with strength   Vision-Basic Assessment   Current Vision Wears glasses all the time   Cognition   Overall Cognitive Status WFL  (cues for hand placement, pacing. Pt impulsive at times)   Arousal/Participation Alert;Cooperative   Attention Attends with cues to redirect   Orientation Level Oriented X4   Memory Within functional limits   Following Commands Follows multistep commands with increased time or repetition   Comments Identified pt by full name and birthdate. Alert, oriented and able to follow directions during ADLs w/ cues/ prompts.   Assessment   Limitation Decreased ADL status;Decreased endurance;Decreased high-level ADLs  (impaired pain, activity tolerance, standing tolerance, anticipated L LE ROM / strength deficits)   Assessment Pt is a 72 yo female s/p L TKA w/ Dr Branch on 3/11/24 and anticipated same day discharge. Pt reports living w/ her  in 2 SH w/ 1 MAZIN and able to stay on first floor w/ 1/2 bath. Pt reports I w/ ADLs/ IADLs at baseline w/ out use of AD. Significant PMH impacting her occupational performance includes COPD, anxiety/depression, osteoarthritis B knee, low back pain. Upon eval, pt alert and oriented. Able to follow directions during ADLs w/ cues / prompts at times. Pt completed grooming w/ S in stance at sink using RW, UBD w/ mod I, LBD w/ S, toileting w/ S, functional mobility using RW w/ S. Pt is completing ADLs below baseline level of I due to increased pain, anticipated L LE ROM / strength deficits. No post acute OT rehab needs when medically stable for discharge. From an OT perspective, pt is appropriate to DC home w/ family support / assist as needed using RW. Will continue to follow pt in acute care.   Goals   Patient Goals Pt stated that she wants to go home and rest today   Plan   Treatment Interventions ADL retraining;Functional transfer training;Patient/family training;Equipment evaluation/education;Activityengagement   Goal Expiration Date 03/18/24    OT Treatment Day 0  (Monday 3/11/24)   OT Frequency 3-5x/wk   Discharge Recommendation   Rehab Resource Intensity Level, OT No post-acute rehabilitation needs  (DC home w/ OPPT s/p L TKA on 3/11/24 w/ Dr. Branch)   AM-PAC Daily Activity Inpatient   Lower Body Dressing 3   Bathing 3   Toileting 3   Upper Body Dressing 4   Grooming 4   Eating 4   Daily Activity Raw Score 21   Daily Activity Standardized Score (Calc for Raw Score >=11) 44.27   AM-PAC Applied Cognition Inpatient   Following a Speech/Presentation 4   Understanding Ordinary Conversation 4   Taking Medications 4   Remembering Where Things Are Placed or Put Away 4   Remembering List of 4-5 Errands 4   Taking Care of Complicated Tasks 4   Applied Cognition Raw Score 24   Applied Cognition Standardized Score 62.21   Barthel Index   Feeding 10   Bathing 0   Grooming Score 5   Dressing Score 10   Bladder Score 10   Bowels Score 5   Toilet Use Score 5   Transfers (Bed/Chair) Score 10   Mobility (Level Surface) Score 0   Stairs Score 5   Barthel Index Score 60   Additional Treatment Session   Start Time 1259   End Time 1308   Treatment Assessment Pt seen for skilled OT tx session day 1 following eval focusing on activity engagement, challenging activity / standing tolerance. Pt agreeable and motivated to participate reporting she would like to walk again. Pt seated OOB in chair following eval. Pt performed sit <> stand w/ consistent S, and continues to benefit from cues / prompts for hand placement and L LE positioning. Pt engaged in functional mobility farther distances using RW w/ S. Therapist educated pt on tech to complete car transfer. Pt and her daughter verbalized understanding of tech. Pt seated OOB In chair at end of session w/ needs met, call bell in reach. Continue to recommend no post acute rehab when medically stable for discharge. Will continue to follow   Additional Treatment Day 1  (Monday 3/11/24)   End of Consult   Education Provided  Yes;Family or social support of family present for education by provider   Patient Position at End of Consult Bedside chair;All needs within reach   Nurse Communication Nurse aware of consult   Licensure   [place] License Number  Kristie Payton, OTR/L [id number]        The patient's raw score on the AM-PAC Daily Activity Inpatient Short Form is 21. A raw score of greater than or equal to 19 suggests the patient may benefit from discharge to home. Please refer to the recommendation of the Occupational Therapist for safe discharge planning.    Pt goals to be met by 3/18/24 to max I w/ ADLs to return home to baseline level of I includes:    -Pt will complete bed mobility supine <> sit independently to return home and access 2nd floor bedroom    -Pt will consistently engage in functional mobility household distances independently using LRAD as needed    -Pt will complete LBD w/ mod I for + time    -Pt will demonstrate improved functional standing tolerance for at least 15 minutes w/ at least fair balance using LRAD while engaged in bathing / grooming w/ mod I to max I w/ light IADLs    -Pt will complete functional transfers to bed, chair, and toilet using LRAD, DME as needed w/ mod I    -Pt will complete tub transfer w/ S to max I w/ bathing      Kristie Payton, OTR/L  [id number]  [id number]

## 2024-03-11 NOTE — INTERVAL H&P NOTE
H&P reviewed. After examining the patient I find no changes in the patients condition since the H&P had been written.  Patient was seen and examined this morning at bedside.  Patient states her activity related left knee pain continues to be severe and radiates diffusely through the left knee.  Please see exam below.  The patient denies any recent changes in her overall medical health or orthopedic exam.  Patient has been seen and cleared by medical subspecialist in preparation for the procedure.  Patient denies any recent fever, chills, nausea, vomiting, headache, chest pain, trouble breathing.  The patient be a good candidate for aspirin postoperatively for DVT prophylaxis.  The patient be a good candidate for outpatient physical therapy following her discharge from the hospital today.  All questions addressed this morning.  Proceed the OR for robotic assisted left total knee arthroplasty.    Vitals:    03/11/24 0620   BP: 136/77   Pulse: 65   Resp: 16   Temp: (!) 97.3 °F (36.3 °C)   SpO2: 97%     Left Knee Exam      Muscle Strength   The patient has normal left knee strength.     Tenderness   The patient is experiencing tenderness in the lateral joint line and patella.     Range of Motion   Extension:  0 normal   Flexion:  120 normal      Tests   Varus: negative Valgus: negative  Drawer:  Anterior - negative       Patellar apprehension: negative     Other   Erythema: absent  Scars: absent  Sensation: normal  Pulse: present  Swelling: none  Effusion: effusion present     Comments:  +PF crepitation patellofemoral grind  Collateral ligament stable at 0, 30, 90 degrees  Mild valgus deformity passively correctable to neutral  Thigh calf soft nontender  Grossly distally neurovascularly intact    Myron Branch D.O.  Division of Adult Reconstruction  Department of Orthopaedic Surgery  [facility]

## 2024-03-12 ENCOUNTER — TELEPHONE (OUTPATIENT)
Dept: OBGYN CLINIC | Facility: HOSPITAL | Age: 74
End: 2024-03-12

## 2024-03-12 NOTE — TELEPHONE ENCOUNTER
"Patient contacted for a postoperative follow up assessment. Patient reports doing \"pretty good.\" Patient states current pain level of a \"no pain\"/10  when sitting and 2-3/10 when walking with RW. Patient denies increase in swelling and dressing is clean, dry and intact. Patient is icing the site regularly.     Reviewed AVS instructions as followed:   Wear pressure stockings: These are long, tight stockings that put pressure on your legs to promote blood  flow and prevent clots. You may remove the stocking on your non-operative leg when you get home. The  stocking on your operative leg should remain on for 2-3 days. Afterwards, you may put the stocking on or  off as needed for swelling.    Confirmed upcoming appointments with patient:   PT: 3/14  Postop: 3/27    We reviewed patients AVS medication list. Patient is taking Tylenol 975mg every 8 hours, Celebrex 100mg BID, Oxycodone 5mg PRN, ASA 325mg BID, Colace 100mg BID. Patient has not yet had a BM but is passing gas. Pt also uses Miralax PRN.     Patient denies nausea, vomiting, abdominal pain, chest pain, shortness of breath, fever, dizziness and calf pain. Patient does not have any other questions or concerns at this time. Pt was encouraged to call with any questions, concerns or issues.    "

## 2024-03-14 ENCOUNTER — OFFICE VISIT (OUTPATIENT)
Dept: PHYSICAL THERAPY | Facility: CLINIC | Age: 74
End: 2024-03-14
Payer: COMMERCIAL

## 2024-03-14 DIAGNOSIS — M17.12 PRIMARY OSTEOARTHRITIS OF LEFT KNEE: Primary | ICD-10-CM

## 2024-03-14 DIAGNOSIS — M25.562 CHRONIC PAIN OF LEFT KNEE: ICD-10-CM

## 2024-03-14 DIAGNOSIS — G89.29 CHRONIC PAIN OF LEFT KNEE: ICD-10-CM

## 2024-03-14 PROCEDURE — 97112 NEUROMUSCULAR REEDUCATION: CPT | Performed by: PHYSICAL THERAPIST

## 2024-03-14 PROCEDURE — 97110 THERAPEUTIC EXERCISES: CPT | Performed by: PHYSICAL THERAPIST

## 2024-03-14 NOTE — PROGRESS NOTES
PT Re-Evaluation   Today's date: 3/14/2024  Patient name: Ml Barton  : 1950  MRN: [id number]  Referring provider: Myron Branch DO  Dx:   Encounter Diagnosis     ICD-10-CM    1. Primary osteoarthritis of left knee  M17.12       2. Chronic pain of left knee  M25.562     G89.29             Assessment  Assessment details: Patient is a 73 y.o. Female who presents to skilled outpatient PT for pre-operative evaluation prior to left knee arthroscopy scheduled for 3/11/24 . Referring diagnoses include chronic pain of left knee and primary osteoarthritis of left knee.    Patient presents to clinic with pain and strength deficits  in left knee. The aforementioned impairments have limited the patient's ability to ambulate community distances, perform ADLs, sit and stand without pain. No further referral is necessary at this time based upon examination results.    Patient education performed during today's session included: Signs and symptoms of infection (including redness, warmth, drainage, swelling), Importance of keeping incision dry, per MD order, Home safety checklist, which was signed by both parties and uploaded into patient's chart, and Topics of stair negotiation, ambulation with use of appropriate assistive device, and car transfers also reviewed    AS of 3/14/24: Pt her for 3rd day post op appt. L TKR on 3/11/24. Pt with silver dressing intact. No signs of infection noted. Moderate swelling noted.     Impairments: Activity intolerance, Impaired balance, Impaired physical strength, Lacks appropriate HEP, and Poor posture, pain with movement  Understanding of Dx/Px/POC: Excellent  Prognosis: Good    Patient verbalized understanding of POC.    Please contact me if you have any questions or recommendations. Thank you for the referral and the opportunity to share in Ml Barton's care.        Plan  Plan details: see below    Patient  would benefit from: Skilled PT  Planned modality interventions: Biofeedback, Cryotherapy, TENS, and Thermotherapy: Hydrocollator Packs  Planned therapy interventions: Abdominal trunk stabilization, Breathing training, Functional ROM exercises, Gait training, HEP, Joint mobilization, Manual therapy, Walker taping, Neuromuscular re-education, Patient education, Postural training, Strengthening, Stretching, Therapeutic activities, and Therapeutic exercises  Frequency: 3x/wk  Duration in weeks: 12  Plan of Care beginning date: 3/4/24  Plan of Care expiration date: 12 weeks - 2024  Treatment plan discussed with: Patient       Goals  Short Term Goals (4 weeks):    - Patient will improve time on TUG by 2.9 seconds from 7.76 seconds to 4.86 seconds to facilitate improved safety in all ambulation  - Patient will be independent in basic HEP 2-3 weeks  - Patient will demonstrate >100 degrees of knee ROM for reciprocal stair negotiation  - Patient will have 0/10 pain at rest  - Patient will demonstrate >1/3 improvement in MMT grade as applicable    Long Term Goals (8 weeks):  - Patient will be independent in a comprehensive home exercise program  - Patient FOTO score will improve by 10 points  - Patient will ambulation with AD PRN  - Patient will independently ambulate >1000 feet (community ambulation)  - Patient will self-report >75% improvement in function  - Patient will be able to ambulate stairs with no increase in pain  - Patient will be able to walk 1/2 mile with no increase in pain      Subjective    History of Present Illness  - Mechanism of injury: hx of knee pa  - Primary AD: rolling walker  - Assist level at home: none  - Prior level of function: independent      Pain  - Current pain ratin/10  - At best pain ratin/10  - At worst pain rating: 10/10  - Location: left knee  - Aggravating factors: standing, walking, stairs    Social Support  - Steps to enter house: 1  - Stairs in house: 12   -  "Bedroom/bathroom set up:  bath and pull out couch on first floor  - DME available: rolling walker  - Lives in: house  - Lives with:         Objective       LE MMT    L Hip Flexion: 4/5  R Hip Flexion: 4+/5   L Hip Extension: 4/5 R Hip Extension: 4+/5   L Hip Abduction: 4/5 R Hip Abduction: 4+/5   L Hip Adduction: 4/5 R Hip Adduction: 4+/5   L Knee Extension: 4/5 R Knee Extension: 4+/5   L Knee Flexion: 4/5 R Knee Flexion: 4+/5   L Ankle DF: 4/5 R Ankle DF: 4+/5   L Ankle PF: 4+/5  R Ankle PF: 5/5     LE ROM    L knee flexion: 130 degrees R knee flexion: 135 degrees   L knee extension: 0 degrees R knee extension: 0 degrees       L hip flexion: wfl degrees  R hip flexion: wfl degrees    L hip abduction: wfl degrees  R hip abduction: wfl degrees    L hip IR: 55 degrees  R hip IR: 55 degrees    L hip ER: 50 degrees  R hip ER: 45 degrees      AS of 3/14/24: L knee AROM 4-108, PROM 0-110 L knee    Strength L knee 4-/5  L hip 4-/5        Sensation  - Light touch: intact    Skin Check  - as of 3/14/24: silver dressing intact. No sign of infection    Wells Criteria  -   - Referral out for possible DVT: (-) 3/14/24    Knee Comments  - Gait Assessment: normal gait  - TU.76 seconds with no assistive device  - Stair Negotiation: performed    As of 3/14/24: Gait with RW step through gait pattern encouraged with L knee \"buckling\" at times.  TUG 14.14 sec              Insurance:  AMA/CMS Eval/ Re-eval POC expires FOTO Auth #/ Referral # Total   Visits  Start date  Expiration date Extension  Visit limitation?  PT only or  PT+OT? Co-Insurance   CMS 3/4/24    12 3/4 12/31                                                                     AUTH #:  Date 3/4 3/14             Visits  Authed:  Used 1               Remaining  11 10                  Precautions: s/p left knee arthroplasty * request female therapist only  Past Medical History:   Diagnosis Date    Abdominal pain     recent upper abd.    Acute diverticulitis     " "last assessed 07/18/16    Adenoma of left adrenal gland     Anxiety     Arthritis     Benign paroxysmal positional vertigo     last assessed 03/24/15    Colon polyp     DDD (degenerative disc disease), lumbar     PT and yoga helped    Disease of thyroid gland     Diverticulitis     hx of    Diverticulosis     Epigastric pain     wakes her up at night    Gastric ulcer     hx of    Hiatal hernia     Hyperlipidemia     Hypertension     Malignant hypertension     Obesity     Primary hypothyroidism     Primary hypothyroidism     SOB (shortness of breath) on exertion     with exertion when taking a certain medication    Vertigo     last assessed 03/13/13         Date 3/4/24 3/14/24       Visit Number IE 1st post op                ROM         Knee Flexion 130 108       Knee Extension 0 4                TUG 7.76 14.14 sec                Manual         Patellar mobs         STM                  Neuro Re-ed         Quad set  HEP X10 hold 5       Glute set HEP X10 hold 5         Standing heel raises x 10                         TherEx         Knee/Hip PROM  performed       Active w/u         Ankle pumps HEP 2x10 seated ankle pumps       SLR  2x10 flexion       Hip abduction HEP x5       LAQ HEP 2x10 hold 5       Heel slides HEP 2x10 hold 5       SAQ HEP          Hip add iso 2x10 hold 5 with glut sets         Hip flexion standing, marches x20                                  TherAct         Patient education  HEP reviewed       Stair negotiation         Car transfer                           Gait Training         With AD  RW with L LE \"giving out\" at times       No AD                  Modalities         CP                 "

## 2024-03-18 ENCOUNTER — TELEPHONE (OUTPATIENT)
Age: 74
End: 2024-03-18

## 2024-03-18 ENCOUNTER — OFFICE VISIT (OUTPATIENT)
Dept: PHYSICAL THERAPY | Facility: CLINIC | Age: 74
End: 2024-03-18
Payer: COMMERCIAL

## 2024-03-18 DIAGNOSIS — M25.562 CHRONIC PAIN OF LEFT KNEE: ICD-10-CM

## 2024-03-18 DIAGNOSIS — R11.0 NAUSEA: ICD-10-CM

## 2024-03-18 DIAGNOSIS — M17.12 PRIMARY OSTEOARTHRITIS OF LEFT KNEE: Primary | ICD-10-CM

## 2024-03-18 DIAGNOSIS — G89.29 CHRONIC PAIN OF LEFT KNEE: ICD-10-CM

## 2024-03-18 DIAGNOSIS — Z96.652 S/P TOTAL KNEE REPLACEMENT USING CEMENT, LEFT: Primary | ICD-10-CM

## 2024-03-18 PROCEDURE — 97110 THERAPEUTIC EXERCISES: CPT | Performed by: PHYSICAL THERAPIST

## 2024-03-18 RX ORDER — TRAMADOL HYDROCHLORIDE 50 MG/1
50 TABLET ORAL EVERY 6 HOURS PRN
Qty: 40 TABLET | Refills: 0 | Status: SHIPPED | OUTPATIENT
Start: 2024-03-18 | End: 2024-03-27 | Stop reason: SDUPTHER

## 2024-03-18 RX ORDER — ONDANSETRON 4 MG/1
4 TABLET, ORALLY DISINTEGRATING ORAL EVERY 6 HOURS PRN
Qty: 20 TABLET | Refills: 0 | Status: SHIPPED | OUTPATIENT
Start: 2024-03-18

## 2024-03-18 NOTE — TELEPHONE ENCOUNTER
Caller: patient     Doctor: Sarina     Reason for call: patient has upset stomach from Oxycodone, feels sick and constipated.    Asking if there is another med besides Tylenol that she can be prescribed for the pain in her L Knee?    Preferred pharmacy is [facility] in [place]     Call back#: [phone]

## 2024-03-18 NOTE — PROGRESS NOTES
Daily Note         Today's date: 3/18/2024  Patient name: Ml Barton  : 1950  MRN: [id number]  Referring provider: Myron Branch DO  Dx:   Encounter Diagnosis     ICD-10-CM    1. Primary osteoarthritis of left knee  M17.12       2. Chronic pain of left knee  M25.562     G89.29           Subjective: Ml Barton reports pain level entering today is 8/10. States pain increased pain overweekend.  State she was not using the walker over the weekend.         Objective: See treatment diary below    Assessment: ploc discussed with primary PT.    patient entered clinic ambulating on RW due to increase pain . Patient did report not using RW over weekend despite instruction last session per primary PT to use rolling walker to avoid overstressing the joint.  Reviewed with patient to maintain use of walker until progressed by PT once pain has reduced. Bandage removed this session. No drainage, no redness no sign of infection. Informed patient she cannot get incision wet until she sees the dr at first follow up appt. Patient vocalized understanding of all the above.   . The goal of the current treatment is to address patient's functional limitations as well as objective findings.       Plan:  continue as indicated by primary PT.              Insurance:  AMA/CMS Eval/ Re-eval POC expires FOTO Auth #/ Referral # Total   Visits  Start date  Expiration date Extension  Visit limitation?  PT only or  PT+OT? Co-Insurance   CMS 3/4/24    12 3/4 12/31                                                                     AUTH #:  Date 3/4 3/14 3/18/24            Visits  Authed:  Used 1 1 1             Remaining  11 10 9                 Precautions: s/p left knee arthroplasty * request female therapist only  Past Medical History:   Diagnosis Date    Abdominal pain     recent upper abd.    Acute diverticulitis     last assessed 16    Adenoma of left adrenal gland     Anxiety     Arthritis     Benign paroxysmal  "positional vertigo     last assessed 03/24/15    Colon polyp     DDD (degenerative disc disease), lumbar     PT and yoga helped    Disease of thyroid gland     Diverticulitis     hx of    Diverticulosis     Epigastric pain     wakes her up at night    Gastric ulcer     hx of    Hiatal hernia     Hyperlipidemia     Hypertension     Malignant hypertension     Obesity     Primary hypothyroidism     Primary hypothyroidism     SOB (shortness of breath) on exertion     with exertion when taking a certain medication    Vertigo     last assessed 03/13/13         Date 3/4/24 3/14/24 3/18/24      Visit Number IE 1st post op                ROM         Knee Flexion 130 108 110      Knee Extension 0 4 4               TUG 7.76 14.14 sec                Manual         Patellar mobs         STM                  Neuro Re-ed         Quad set  HEP X10 hold 5 5 sec x 10       Glute set HEP X10 hold 5 5 sec x20         Standing heel raises x 10 10 x 2 at walker                        TherEx         Knee/Hip PROM  performed Performed       Active w/u   NS lv 1 5 min       Ankle pumps HEP 2x10 seated ankle pumps Supine, leg elevated:       SLR  2x10 flexion 10 x AAROM< 10 x AROM      Hip abduction HEP x5 5 sec x10x 2       LAQ HEP 2x10 hold 5 5 sec x 2 x 10        Heel slides HEP 2x10 hold 5 W/ slideboard: 10x , 10 x AAROM w/ SOS      SAQ HEP          Hip add iso 2x10 hold 5 with glut sets Hip add iso 2x10 hold 5 with glut sets        Hip flexion standing, marches x20 Hip flexion standing, marches x20                  Bandage removal performed                TherAct         Patient education  HEP reviewed       Stair negotiation         Car transfer                           Gait Training         With AD  RW with L LE \"giving out\" at times       No AD                  Modalities         CP                  "

## 2024-03-19 ENCOUNTER — TELEPHONE (OUTPATIENT)
Age: 74
End: 2024-03-19

## 2024-03-19 DIAGNOSIS — I10 BENIGN ESSENTIAL HYPERTENSION: ICD-10-CM

## 2024-03-20 ENCOUNTER — OFFICE VISIT (OUTPATIENT)
Dept: PHYSICAL THERAPY | Facility: CLINIC | Age: 74
End: 2024-03-20
Payer: COMMERCIAL

## 2024-03-20 DIAGNOSIS — G89.29 CHRONIC PAIN OF LEFT KNEE: ICD-10-CM

## 2024-03-20 DIAGNOSIS — M17.12 PRIMARY OSTEOARTHRITIS OF LEFT KNEE: Primary | ICD-10-CM

## 2024-03-20 DIAGNOSIS — M25.562 CHRONIC PAIN OF LEFT KNEE: ICD-10-CM

## 2024-03-20 PROCEDURE — 97112 NEUROMUSCULAR REEDUCATION: CPT | Performed by: PHYSICAL THERAPIST

## 2024-03-20 PROCEDURE — 97110 THERAPEUTIC EXERCISES: CPT | Performed by: PHYSICAL THERAPIST

## 2024-03-20 RX ORDER — LOSARTAN POTASSIUM 100 MG/1
100 TABLET ORAL DAILY
Qty: 90 TABLET | Refills: 1 | Status: SHIPPED | OUTPATIENT
Start: 2024-03-20

## 2024-03-20 NOTE — PROGRESS NOTES
"        Daily Note         Today's date: 3/20/2024  Patient name: Ml Barton  : 1950  MRN: [id number]  Referring provider: Myron Branch DO  Dx:   Encounter Diagnosis     ICD-10-CM    1. Primary osteoarthritis of left knee  M17.12       2. Chronic pain of left knee  M25.562     G89.29           Subjective: Ml Barton reports pain level entering today is 4-5/10. States pain increased pain at night in her L knee. Pt sleeping on her couch. Pt started taking Tramadol to assist with pain levels at night.  States she would like to DC  walker over the weekend.         Objective: See treatment diary below    Assessment:     Pt ambulating with RW.  Reviewed with patient to maintain use of walker until pain levels have reduced. Incision inspected with no signs of infection noted L L E. No drainage, no redness. Informed patient she cannot get incision wet until she sees the dr at first follow up appt. Patient vocalized understanding of all the above.   Pt able to progress with all therex today with increase AROM L knee tolerated. The goal of the current treatment is to address patient's functional limitations as well as objective findings.   Increased time on nustep today as well with reports of decreased L knee \"stiffness\"    Plan:  Progress therex as tolerated. Progress to SC as able             Insurance:  AMA/CMS Eval/ Re-eval POC expires FOTO Auth #/ Referral # Total   Visits  Start date  Expiration date Extension  Visit limitation?  PT only or  PT+OT? Co-Insurance   CMS 3/4/24    12 3/4 12/31                                                                     AUTH #:  Date 3/4 3/14 3/18/24 3/20           Visits  Authed:  Used 1 1 1 1            Remaining  11 10 9 8                Precautions: s/p left knee arthroplasty * request female therapist only  Past Medical History:   Diagnosis Date    Abdominal pain     recent upper abd.    Acute diverticulitis     last assessed 16    Adenoma of left " adrenal gland     Anxiety     Arthritis     Benign paroxysmal positional vertigo     last assessed 03/24/15    Colon polyp     DDD (degenerative disc disease), lumbar     PT and yoga helped    Disease of thyroid gland     Diverticulitis     hx of    Diverticulosis     Epigastric pain     wakes her up at night    Gastric ulcer     hx of    Hiatal hernia     Hyperlipidemia     Hypertension     Malignant hypertension     Obesity     Primary hypothyroidism     Primary hypothyroidism     SOB (shortness of breath) on exertion     with exertion when taking a certain medication    Vertigo     last assessed 03/13/13         Date 3/4/24 3/14/24 3/18/24 3/20/24     Visit Number IE 1st post op                ROM         Knee Flexion 130 108 110 115     Knee Extension 0 4 4 2              TUG 7.76 14.14 sec                Manual         Patellar mobs         STM                  Neuro Re-ed         Quad set  HEP X10 hold 5 5 sec x 10  X20 hold 5 sec     Glute set HEP X10 hold 5 5 sec x20  X20 hold 5       Standing heel raises x 10 10 x 2 at walker Standing heel raises x10                       TherEx         Knee/Hip PROM  performed Performed  performed     Active w/u   NS lv 1 5 min  Ns lev 1 x 7 min 0.23miles     Ankle pumps HEP 2x10 seated ankle pumps Supine, leg elevated:  2x10 hold 5     SLR  2x10 flexion 10 x AAROM< 10 x AROM 2x10 AROM     Hip abduction HEP x5 5 sec x10x 2  2x10 AROM     LAQ HEP 2x10 hold 5 5 sec x 2 x 10        Heel slides HEP 2x10 hold 5 W/ slideboard: 10x , 10 x AAROM w/ SOS 2x10 with slide board      SAQ HEP          Hip add iso 2x10 hold 5 with glut sets Hip add iso 2x10 hold 5 with glut sets Hip add iso 2x10 hold 5 glut sets       Hip flexion standing, marches x20 Hip flexion standing, marches x20 Hip flexion standing marches x 20         Standing hip abd 2x10 hold 5 with UE assist for balance        Bandage removal performed                TherAct         Patient education  HEP reviewed       Stair  "negotiation         Car transfer                           Gait Training         With AD  RW with L LE \"giving out\" at times  Gait with RW with emphasis on step through gait patternou     No AD                  Modalities         CP                  "

## 2024-03-25 ENCOUNTER — OFFICE VISIT (OUTPATIENT)
Dept: PHYSICAL THERAPY | Facility: CLINIC | Age: 74
End: 2024-03-25
Payer: COMMERCIAL

## 2024-03-25 DIAGNOSIS — M17.12 PRIMARY OSTEOARTHRITIS OF LEFT KNEE: Primary | ICD-10-CM

## 2024-03-25 DIAGNOSIS — M25.562 CHRONIC PAIN OF LEFT KNEE: ICD-10-CM

## 2024-03-25 DIAGNOSIS — G89.29 CHRONIC PAIN OF LEFT KNEE: ICD-10-CM

## 2024-03-25 PROCEDURE — 97110 THERAPEUTIC EXERCISES: CPT | Performed by: PHYSICAL THERAPIST

## 2024-03-25 PROCEDURE — 97112 NEUROMUSCULAR REEDUCATION: CPT | Performed by: PHYSICAL THERAPIST

## 2024-03-25 NOTE — PROGRESS NOTES
"        Daily Note         Today's date: 3/25/2024  Patient name: Ml Barton  : 1950  MRN: [id number]  Referring provider: Myron Branch DO  Dx:   Encounter Diagnosis     ICD-10-CM    1. Primary osteoarthritis of left knee  M17.12       2. Chronic pain of left knee  M25.562     G89.29           Subjective: Ml Barton reports pain level entering today is 6/10. \"I twisted on my knee today in the bathroom\" States she would like to DC  walker over the weekend.         Objective: See treatment diary below    Assessment:     Pt ambulating with RW.  Reviewed with patient to maintain use of walker until pain levels have reduced. No signs of infection noted.  Patient vocalized understanding of all the above.   Pt able to progress with all therex today with increase AROM L knee tolerated. The goal of the current treatment is to address patient's functional limitations as well as objective findings.   Increased time on nustep today as well with reports of decreased L knee \"stiffness\"    Plan:  Progress therex as tolerated. Progress to SC/ no AD as able             Insurance:  AMA/CMS Eval/ Re-eval POC expires FOTO Auth #/ Referral # Total   Visits  Start date  Expiration date Extension  Visit limitation?  PT only or  PT+OT? Co-Insurance   CMS 3/4/24    12 3/4 12/31                                                                     AUTH #:  Date 3/4 3/14 3/18/24 3/20 3/25          Visits  Authed:  Used 1 1 1 1 1           Remaining  11 10 9 8 7               Precautions: s/p left knee arthroplasty * request female therapist only  Past Medical History:   Diagnosis Date    Abdominal pain     recent upper abd.    Acute diverticulitis     last assessed 16    Adenoma of left adrenal gland     Anxiety     Arthritis     Benign paroxysmal positional vertigo     last assessed 03/24/15    Colon polyp     DDD (degenerative disc disease), lumbar     PT and yoga helped    Disease of thyroid gland     " "Diverticulitis     hx of    Diverticulosis     Epigastric pain     wakes her up at night    Gastric ulcer     hx of    Hiatal hernia     Hyperlipidemia     Hypertension     Malignant hypertension     Obesity     Primary hypothyroidism     Primary hypothyroidism     SOB (shortness of breath) on exertion     with exertion when taking a certain medication    Vertigo     last assessed 03/13/13         Date 3/4/24 3/14/24 3/18/24 3/20/24 3/25/24    Visit Number IE 1st post op                ROM         Knee Flexion 130 108 110 115 122    Knee Extension 0 4 4 2 0             TUG 7.76 14.14 sec                Manual         Patellar mobs         STM                  Neuro Re-ed         Quad set  HEP X10 hold 5 5 sec x 10  X20 hold 5 sec X20 hold 5    Glute set HEP X10 hold 5 5 sec x20  X20 hold 5 X20 hold 5      Standing heel raises x 10 10 x 2 at walker Standing heel raises x10 X10                       TherEx         Knee/Hip PROM  performed Performed  performed     Active w/u   NS lv 1 5 min  Ns lev 1 x 7 min 0.23miles NS level 3 x 7 min 0.25 miles     Ankle pumps HEP 2x10 seated ankle pumps Supine, leg elevated:  2x10 hold 5 2x10    SLR  2x10 flexion 10 x AAROM< 10 x AROM 2x10 AROM 2x10    Hip abduction HEP x5 5 sec x10x 2  2x10 AROM 2x10    LAQ HEP 2x10 hold 5 5 sec x 2 x 10        Heel slides HEP 2x10 hold 5 W/ slideboard: 10x , 10 x AAROM w/ SOS 2x10 with slide board  2x10     SAQ HEP          Hip add iso 2x10 hold 5 with glut sets Hip add iso 2x10 hold 5 with glut sets Hip add iso 2x10 hold 5 glut sets 2x10       Hip flexion standing, marches x20 Hip flexion standing, marches x20 Hip flexion standing marches x 20 x20        Standing hip abd 2x10 hold 5 with UE assist for balance Sidestepping 10'x4       Bandage removal performed                TherAct         Patient education  HEP reviewed       Stair negotiation         Car transfer                           Gait Training         With AD  RW with [employer] \"giving " "out\" at times  Gait with RW with emphasis on step through gait patternou Gait without AD with increased antalgic gait pattern noted inside clinic    No AD                  Modalities         CP                  "

## 2024-03-27 ENCOUNTER — TELEPHONE (OUTPATIENT)
Dept: OBGYN CLINIC | Facility: HOSPITAL | Age: 74
End: 2024-03-27

## 2024-03-27 ENCOUNTER — OFFICE VISIT (OUTPATIENT)
Dept: PHYSICAL THERAPY | Facility: CLINIC | Age: 74
End: 2024-03-27
Payer: COMMERCIAL

## 2024-03-27 ENCOUNTER — OFFICE VISIT (OUTPATIENT)
Dept: OBGYN CLINIC | Facility: CLINIC | Age: 74
End: 2024-03-27

## 2024-03-27 ENCOUNTER — APPOINTMENT (OUTPATIENT)
Dept: RADIOLOGY | Facility: CLINIC | Age: 74
End: 2024-03-27
Payer: COMMERCIAL

## 2024-03-27 VITALS
BODY MASS INDEX: 25.91 KG/M2 | HEIGHT: 60 IN | SYSTOLIC BLOOD PRESSURE: 138 MMHG | WEIGHT: 132 LBS | DIASTOLIC BLOOD PRESSURE: 84 MMHG | HEART RATE: 66 BPM

## 2024-03-27 DIAGNOSIS — M17.12 PRIMARY OSTEOARTHRITIS OF LEFT KNEE: Primary | ICD-10-CM

## 2024-03-27 DIAGNOSIS — G89.29 CHRONIC PAIN OF LEFT KNEE: ICD-10-CM

## 2024-03-27 DIAGNOSIS — Z96.652 AFTERCARE FOLLOWING LEFT KNEE JOINT REPLACEMENT SURGERY: ICD-10-CM

## 2024-03-27 DIAGNOSIS — Z96.652 S/P TOTAL KNEE REPLACEMENT USING CEMENT, LEFT: Primary | ICD-10-CM

## 2024-03-27 DIAGNOSIS — M25.562 CHRONIC PAIN OF LEFT KNEE: ICD-10-CM

## 2024-03-27 DIAGNOSIS — Z47.1 AFTERCARE FOLLOWING LEFT KNEE JOINT REPLACEMENT SURGERY: ICD-10-CM

## 2024-03-27 DIAGNOSIS — Z96.652 S/P TOTAL KNEE REPLACEMENT USING CEMENT, LEFT: ICD-10-CM

## 2024-03-27 PROCEDURE — 97110 THERAPEUTIC EXERCISES: CPT | Performed by: PHYSICAL THERAPIST

## 2024-03-27 PROCEDURE — 99024 POSTOP FOLLOW-UP VISIT: CPT | Performed by: ORTHOPAEDIC SURGERY

## 2024-03-27 PROCEDURE — 73562 X-RAY EXAM OF KNEE 3: CPT

## 2024-03-27 PROCEDURE — 97112 NEUROMUSCULAR REEDUCATION: CPT | Performed by: PHYSICAL THERAPIST

## 2024-03-27 RX ORDER — TRAMADOL HYDROCHLORIDE 50 MG/1
50 TABLET ORAL EVERY 6 HOURS PRN
Start: 2024-03-27 | End: 2024-03-27 | Stop reason: SDUPTHER

## 2024-03-27 RX ORDER — TRAMADOL HYDROCHLORIDE 50 MG/1
50 TABLET ORAL EVERY 6 HOURS PRN
Qty: 40 TABLET | Refills: 0 | Status: SHIPPED | OUTPATIENT
Start: 2024-03-27

## 2024-03-27 NOTE — PROGRESS NOTES
Assessment/Plan:  1. S/P total knee replacement using cement, left  XR knee 3 vw left non injury    traMADol (Ultram) 50 mg tablet      2. Aftercare following left knee joint replacement surgery  traMADol (Ultram) 50 mg tablet        Scribe Attestation      I,:  Bonnie Bob am acting as a scribe while in the presence of the attending physician.:       I,:  Myron Branch, DO personally performed the services described in this documentation    as scribed in my presence.:           Ml is a pleasant 74 y.o. female presenting today for follow-up 2 weeks after robotic assisted left total knee arthroplasty.  Her incision is healing well, no signs of infection.  She may get it wet in the shower, pat dry do not scrub, do not soak.  Prosthesis is stable on exam and review of imaging, I am very pleased with her range of motion. I encourage her to continue her efforts in physical therapy, however we also discussed she may be overdoing it in her day to day which is increasing her pain. We dicussed the importance of taking pain medication as prescribed and reducing her activity post operatively to address this. Continue taking aspirin for DVT prophylaxis for 4 more weeks.    Subjective: 2 weeks s/p left total knee arthroplasty     Patient ID: Ml Barton is a 74 y.o. female who presents 2 weeks status post robotic assisted left total knee arthroplasty.  DOS 3/11/2024. Patient notes significant pain diffuse to the knee and along her IT band. She had to discontinue Oxycodone prescription due to GI upset. She feels tramadol was not helping her pain. She takes tylenol 2x daily with minimal relief. She has started physical therapy and admits she has probably been over doing it in her day to day. She has discontinued use of assistive device and is pleased with her post operative outcomes despite pain. She denies any fever, chills, or signs of infection. Denies numbness or paresthesias.     Review of Systems    Constitutional:  Negative for chills and fever.   HENT:  Negative for ear pain and sore throat.    Eyes:  Negative for pain and visual disturbance.   Respiratory:  Negative for cough and shortness of breath.    Cardiovascular:  Negative for chest pain and palpitations.   Gastrointestinal:  Negative for abdominal pain and vomiting.   Genitourinary:  Negative for dysuria and hematuria.   Musculoskeletal:  Negative for arthralgias and back pain.   Skin:  Negative for color change and rash.   Neurological:  Negative for seizures and syncope.   All other systems reviewed and are negative.        Past Medical History:   Diagnosis Date    Abdominal pain     recent upper abd.    Acute diverticulitis     last assessed 16    Adenoma of left adrenal gland     Anxiety     Arthritis     Benign paroxysmal positional vertigo     last assessed 03/24/15    Colon polyp     DDD (degenerative disc disease), lumbar     PT and yoga helped    Disease of thyroid gland     Diverticulitis     hx of    Diverticulosis     Epigastric pain     wakes her up at night    Gastric ulcer     hx of    Hiatal hernia     Hyperlipidemia     Hypertension     Malignant hypertension     Obesity     Primary hypothyroidism     Primary hypothyroidism     SOB (shortness of breath) on exertion     with exertion when taking a certain medication    Vertigo     last assessed 13       Past Surgical History:   Procedure Laterality Date    APPENDECTOMY      BREAST BIOPSY Left     a long time ago     SECTION      CHOLECYSTECTOMY      COLONOSCOPY      KNEE SURGERY      History of Arthrotomy of Knee; Open Meniscus Tear; left    SC ARTHRP KNE CONDYLE&PLATU MEDIAL&LAT COMPARTMENTS Left 3/11/2024    Procedure: ARTHROPLASTY KNEE TOTAL W ROBOT - same day;  Surgeon: Myron Branch DO;  Location: [facility];  Service: Orthopedics    UPPER GASTROINTESTINAL ENDOSCOPY         Family History   Adopted: Yes   Problem Relation Age of Onset    Heart disease Family          Adopted but heard family member    No Known Problems Mother        Social History     Occupational History    Occupation: Retired   Tobacco Use    Smoking status: Former     Current packs/day: 0.00     Types: Cigarettes     Start date:      Quit date: 1990     Years since quittin.9     Passive exposure: Current    Smokeless tobacco: Never    Tobacco comments:     quit 23 years ago- only a social smoker    Vaping Use    Vaping status: Never Used   Substance and Sexual Activity    Alcohol use: Yes     Alcohol/week: 2.0 standard drinks of alcohol     Types: 2 Glasses of wine per week     Comment: occasional- once or twice week     Drug use: Never    Sexual activity: Not on file         Current Outpatient Medications:     traMADol (Ultram) 50 mg tablet, Take 1 tablet (50 mg total) by mouth every 6 (six) hours as needed for moderate pain, Disp: , Rfl:     acetaminophen (TYLENOL) 325 mg tablet, Take 3 tablets (975 mg total) by mouth every 8 (eight) hours, Disp: , Rfl:     Alum & Mag Hydroxide-Simeth (MYLANTA PO), Take by mouth as needed, Disp: , Rfl:     amLODIPine (NORVASC) 2.5 mg tablet, Take 1 tablet (2.5 mg total) by mouth daily, Disp: 90 tablet, Rfl: 2    amLODIPine (NORVASC) 5 mg tablet, Take 1 tablet (5 mg total) by mouth daily, Disp: 90 tablet, Rfl: 1    aspirin 325 mg tablet, Take 1 tablet (325 mg total) by mouth 2 (two) times a day, Disp: 84 tablet, Rfl: 0    baclofen 10 mg tablet, Take 1 tablet (10 mg total) by mouth 3 (three) times a day, Disp: 10 tablet, Rfl: 0    celecoxib (CeleBREX) 100 mg capsule, Take 1 capsule (100 mg total) by mouth 2 (two) times a day, Disp: 28 capsule, Rfl: 0    dicyclomine (BENTYL) 10 mg capsule, TAKE 1 CAPSULE BY MOUTH AT  AT BEDTIME AS NEEDED, Disp: 90 capsule, Rfl: 2    docusate sodium (COLACE) 100 mg capsule, Take 1 capsule (100 mg total) by mouth 2 (two) times a day, Disp: 60 capsule, Rfl: 0    FLUoxetine (PROzac) 10 mg capsule, Take 2 capsules (20 mg total)  by mouth daily, Disp: 180 capsule, Rfl: 1    losartan (COZAAR) 100 MG tablet, TAKE 1 TABLET BY MOUTH DAILY, Disp: 90 tablet, Rfl: 1    ondansetron (Zofran ODT) 4 mg disintegrating tablet, Take 1 tablet (4 mg total) by mouth every 6 (six) hours as needed for nausea or vomiting, Disp: 20 tablet, Rfl: 0    pantoprazole (PROTONIX) 40 mg tablet, TAKE 1 TABLET BY MOUTH TWICE  DAILY WITH MEALS, Disp: 180 tablet, Rfl: 3    Synthroid 75 MCG tablet, Take 1 tablet (75 mcg total) by mouth daily, Disp: 90 tablet, Rfl: 1    traZODone (DESYREL) 100 mg tablet, Take 1 tablet (100 mg total) by mouth daily at bedtime, Disp: 90 tablet, Rfl: 1    Allergies   Allergen Reactions    Hydrochlorothiazide Other (See Comments)     hyponatremia    Crestor [Rosuvastatin] Other (See Comments)     Nightmares        Objective:  Vitals:    03/27/24 1003   BP: 138/84   Pulse: 66       Body mass index is 25.78 kg/m².    Left Knee Exam     Tenderness   The patient is experiencing tenderness in the medial retinaculum.    Range of Motion   Extension:  0   Flexion:  120     Tests   Varus: negative Valgus: negative    Other   Erythema: absent  Scars: present  Sensation: normal  Pulse: present  Swelling: none  Effusion: no effusion present    Comments:  Knee is stable to varus and valgus stress at 0, 30, and 90 degrees  Patella tracks midline and flat   Calf compartments are soft and supple  (-) Christopher's sign  2+ DP and PT pulses with brisk capillary refill to the toes  Sural, saphenous, tibial, superficial, and deep peroneal motor and sensory distributions intact  Sensation light touch intact distally            Observations   Left Knee   Negative for effusion.       Physical Exam  Vitals and nursing note reviewed.   Constitutional:       Appearance: Normal appearance.   HENT:      Head: Normocephalic.      Right Ear: External ear normal.      Left Ear: External ear normal.   Eyes:      Extraocular Movements: Extraocular movements intact.       Conjunctiva/sclera: Conjunctivae normal.   Cardiovascular:      Rate and Rhythm: Normal rate.      Pulses: Normal pulses.   Pulmonary:      Effort: Pulmonary effort is normal.      Breath sounds: Normal breath sounds.   Abdominal:      General: Abdomen is flat.   Musculoskeletal:         General: Normal range of motion.      Cervical back: Normal range of motion and neck supple.      Left knee: No effusion.   Skin:     General: Skin is warm and dry.   Neurological:      General: No focal deficit present.      Mental Status: She is alert.   Psychiatric:         Mood and Affect: Mood normal.         Behavior: Behavior normal.         I have personally reviewed pertinent films in PACS.      x-rays of the left knee demonstrate a well-aligned, well cemented total knee arthroplasty with no evidence of loosening, periprosthetic fracture, or other interval complication.    This document was created using speech voice recognition software.   Grammatical errors, random word insertions, pronoun errors, and incomplete sentences are an occasional consequence of this system due to software limitations, ambient noise, and hardware issues.   Any formal questions or concerns about content, text, or information contained within the body of this dictation should be directly addressed to the provider for clarification.

## 2024-03-27 NOTE — PROGRESS NOTES
Daily Note         Today's date: 3/27/2024  Patient name: Ml Barton  : 1950  MRN: [id number]  Referring provider: Myron Branch DO  Dx:   Encounter Diagnosis     ICD-10-CM    1. Primary osteoarthritis of left knee  M17.12       2. Chronic pain of left knee  M25.562     G89.29           Subjective: Ml Barton reports pain level entering today is 6/10. Pt saw  for her 2 week follow up. Pt to trial Tramadol every 6 hours and take tylenol 650 mg 2 tablets every 8 hours.      Objective: See treatment diary below    Assessment:     Pt ambulating without AD.  Reviewed with patient to maintain use of walker until pain levels have reduced. No signs of infection noted.  Patient vocalized understanding of all the above.   Pt able to progress with all therex today with increase AROM L knee tolerated. The goal of the current treatment is to address patient's functional limitations as well as objective findings.  Pt requested to hold on nu step today due to fatigue.     Plan:  Progress therex as tolerated. Progress to no AD as able             Insurance:  AMA/CMS Eval/ Re-eval POC expires FOTO Auth #/ Referral # Total   Visits  Start date  Expiration date Extension  Visit limitation?  PT only or  PT+OT? Co-Insurance   CMS 3/4/24    12 3/4 12/31                                                                     AUTH #:  Date 3/4 3/14 3/18/24 3/20 3/25 3/27         Visits  Authed:  Used 1 1 1 1 1 1          Remaining  11 10 9 8 7 6              Precautions: s/p left knee arthroplasty * request female therapist only  Past Medical History:   Diagnosis Date    Abdominal pain     recent upper abd.    Acute diverticulitis     last assessed 16    Adenoma of left adrenal gland     Anxiety     Arthritis     Benign paroxysmal positional vertigo     last assessed 03/24/15    Colon polyp     DDD (degenerative disc disease), lumbar     PT and yoga helped    Disease of thyroid gland      Diverticulitis     hx of    Diverticulosis     Epigastric pain     wakes her up at night    Gastric ulcer     hx of    Hiatal hernia     Hyperlipidemia     Hypertension     Malignant hypertension     Obesity     Primary hypothyroidism     Primary hypothyroidism     SOB (shortness of breath) on exertion     with exertion when taking a certain medication    Vertigo     last assessed 03/13/13         Date 3/4/24 3/14/24 3/18/24 3/20/24 3/25/24 3/2724   Visit Number IE 1st post op                ROM         Knee Flexion 130 108 110 115 122 122   Knee Extension 0 4 4 2 0 0            TUG 7.76 14.14 sec                Manual         Patellar mobs         STM                  Neuro Re-ed         Quad set  HEP X10 hold 5 5 sec x 10  X20 hold 5 sec X20 hold 5 X20 hold 5   Glute set HEP X10 hold 5 5 sec x20  X20 hold 5 X20 hold 5 X20 hold 5     Standing heel raises x 10 10 x 2 at walker Standing heel raises x10 X10  x10                     TherEx         Knee/Hip PROM  performed Performed  performed     Active w/u   NS lv 1 5 min  Ns lev 1 x 7 min 0.23miles NS level 3 x 7 min 0.25 miles  Held per pt request   Ankle pumps HEP 2x10 seated ankle pumps Supine, leg elevated:  2x10 hold 5 2x10 2x10 heel raises   SLR  2x10 flexion 10 x AAROM< 10 x AROM 2x10 AROM 2x10 2x10   Hip abduction HEP x5 5 sec x10x 2  2x10 AROM 2x10 2x10   LAQ HEP 2x10 hold 5 5 sec x 2 x 10        Heel slides HEP 2x10 hold 5 W/ slideboard: 10x , 10 x AAROM w/ SOS 2x10 with slide board  2x10  2x10   SAQ HEP          Hip add iso 2x10 hold 5 with glut sets Hip add iso 2x10 hold 5 with glut sets Hip add iso 2x10 hold 5 glut sets 2x10  2x10     Hip flexion standing, marches x20 Hip flexion standing, marches x20 Hip flexion standing marches x 20 x20 2x10       Standing hip abd 2x10 hold 5 with UE assist for balance Sidestepping 10'x4 Sidestepping 10'x4      Bandage removal performed                TherAct         Patient education  HEP reviewed       Stair  "negotiation         Car transfer                           Gait Training         With AD  RW with L LE \"giving out\" at times  Gait with RW with emphasis on step through gait patternou Gait without AD with increased antalgic gait pattern noted inside clinic Gait without AD with decreased antalgic gait pattern noted inside clinic after Pt session   No AD                  Modalities         CP                  "

## 2024-03-27 NOTE — TELEPHONE ENCOUNTER
Caller: Patient     Doctor: Dr. Branch/Lm    Reason for call: Patient was prescribed Tramadol today she called her Pharmacy and there is nothing there for her. Looking at the medication it is prescribed but I did not see it sent to any pharmacy.  Please sent to below pharmacy.    [facility]    Please call once sent.     Call back#: [phone]

## 2024-04-03 ENCOUNTER — OFFICE VISIT (OUTPATIENT)
Dept: PHYSICAL THERAPY | Facility: CLINIC | Age: 74
End: 2024-04-03
Payer: COMMERCIAL

## 2024-04-03 DIAGNOSIS — M17.12 PRIMARY OSTEOARTHRITIS OF LEFT KNEE: Primary | ICD-10-CM

## 2024-04-03 DIAGNOSIS — M25.562 CHRONIC PAIN OF LEFT KNEE: ICD-10-CM

## 2024-04-03 DIAGNOSIS — G89.29 CHRONIC PAIN OF LEFT KNEE: ICD-10-CM

## 2024-04-03 PROCEDURE — 97110 THERAPEUTIC EXERCISES: CPT | Performed by: PHYSICAL THERAPIST

## 2024-04-03 PROCEDURE — 97112 NEUROMUSCULAR REEDUCATION: CPT | Performed by: PHYSICAL THERAPIST

## 2024-04-03 NOTE — PROGRESS NOTES
"        Daily Note         Today's date: 4/3/2024  Patient name: Ml Barton  : 1950  MRN: [id number]  Referring provider: Myron Branch DO  Dx:   Encounter Diagnosis     ICD-10-CM    1. Primary osteoarthritis of left knee  M17.12       2. Chronic pain of left knee  M25.562     G89.29           Subjective: Ml Barton reports pain level entering today is /10. Pt saw  for her 2 week follow up last week. Pt to trial Tramadol every 6 hours and take tylenol 650 mg 2 tablets every 8 hours.  \"Last night was first time I went up to sleep in my bed\"    Objective: See treatment diary below    Assessment:     Pt ambulating without AD.   No signs of infection noted.  Pt able to progress with all therex today with increase AROM L knee tolerated. The goal of the current treatment is to address patient's functional limitations as well as objective findings.   Emphasis of treatment on neuro reeducation of L quads with all therex.    Plan: Progress therex as tolerated. Anticipate DC to HEP in 3-4 weeks.            Insurance:  AMA/CMS Eval/ Re-eval POC expires FOTO Auth #/ Referral # Total   Visits  Start date  Expiration date Extension  Visit limitation?  PT only or  PT+OT? Co-Insurance   CMS 3/4/24    12 3/4 12/31                                                                     AUTH #:  Date 3/4 3/14 3/18/24 3/20 3/25 3/27 4/3        Visits  Authed:  Used 1 1 1 1 1 1 1         Remaining  11 10 9 8 7 6 5             Precautions: s/p left knee arthroplasty * request female therapist only  Past Medical History:   Diagnosis Date    Abdominal pain     recent upper abd.    Acute diverticulitis     last assessed 16    Adenoma of left adrenal gland     Anxiety     Arthritis     Benign paroxysmal positional vertigo     last assessed 03/24/15    Colon polyp     DDD (degenerative disc disease), lumbar     PT and yoga helped    Disease of thyroid gland     Diverticulitis     hx of    Diverticulosis     " Epigastric pain     wakes her up at night    Gastric ulcer     hx of    Hiatal hernia     Hyperlipidemia     Hypertension     Malignant hypertension     Obesity     Primary hypothyroidism     Primary hypothyroidism     SOB (shortness of breath) on exertion     with exertion when taking a certain medication    Vertigo     last assessed 03/13/13         Date 3/4/24 3/14/24 3/18/24 3/20/24 3/25/24 3/2724 4/3   Visit Number IE 1st post op                  ROM          Knee Flexion 130 108 110 115 122 122 125   Knee Extension 0 4 4 2 0 0 0             TUG 7.76 14.14 sec                  Manual          Patellar mobs          STM                    Neuro Re-ed          Quad set  HEP X10 hold 5 5 sec x 10  X20 hold 5 sec X20 hold 5 X20 hold 5 X20 hold 5   Glute set HEP X10 hold 5 5 sec x20  X20 hold 5 X20 hold 5 X20 hold 5 X20 hold 5     Standing heel raises x 10 10 x 2 at walker Standing heel raises x10 X10  x10                        TherEx          Knee/Hip PROM  performed Performed  performed      Active w/u   NS lv 1 5 min  Ns lev 1 x 7 min 0.23miles NS level 3 x 7 min 0.25 miles  Held per pt request Nu step x 7 min lev 2   Ankle pumps HEP 2x10 seated ankle pumps Supine, leg elevated:  2x10 hold 5 2x10 2x10 heel raises 2x10    SLR  2x10 flexion 10 x AAROM< 10 x AROM 2x10 AROM 2x10 2x10 2x10   Hip abduction HEP x5 5 sec x10x 2  2x10 AROM 2x10 2x10 2x10   LAQ HEP 2x10 hold 5 5 sec x 2 x 10         Heel slides HEP 2x10 hold 5 W/ slideboard: 10x , 10 x AAROM w/ SOS 2x10 with slide board  2x10  2x10 2x10   SAQ HEP           Hip add iso 2x10 hold 5 with glut sets Hip add iso 2x10 hold 5 with glut sets Hip add iso 2x10 hold 5 glut sets 2x10  2x10 2x10     Hip flexion standing, marches x20 Hip flexion standing, marches x20 Hip flexion standing marches x 20 x20 2x10 2x10       Standing hip abd 2x10 hold 5 with UE assist for balance Sidestepping 10'x4 Sidestepping 10'x4 Sidestepping 10'x4      Bandage removal performed           "        TherAct          Patient education  HEP reviewed        Stair negotiation          Car transfer                              Gait Training          With AD  RW with L LE \"giving out\" at times  Gait with RW with emphasis on step through gait patternou Gait without AD with increased antalgic gait pattern noted inside clinic Gait without AD with decreased antalgic gait pattern noted inside clinic after Pt session Gait without AD with increased damaso noted today   No AD                    Modalities          CP                   "

## 2024-04-05 ENCOUNTER — OFFICE VISIT (OUTPATIENT)
Dept: PHYSICAL THERAPY | Facility: CLINIC | Age: 74
End: 2024-04-05
Payer: COMMERCIAL

## 2024-04-05 DIAGNOSIS — M17.12 PRIMARY OSTEOARTHRITIS OF LEFT KNEE: Primary | ICD-10-CM

## 2024-04-05 DIAGNOSIS — M25.562 CHRONIC PAIN OF LEFT KNEE: ICD-10-CM

## 2024-04-05 DIAGNOSIS — G89.29 CHRONIC PAIN OF LEFT KNEE: ICD-10-CM

## 2024-04-05 PROCEDURE — 97112 NEUROMUSCULAR REEDUCATION: CPT | Performed by: PHYSICAL THERAPIST

## 2024-04-05 PROCEDURE — 97110 THERAPEUTIC EXERCISES: CPT | Performed by: PHYSICAL THERAPIST

## 2024-04-05 NOTE — PROGRESS NOTES
"        Daily Note         Today's date: 2024  Patient name: Ml Barton  : 1950  MRN: [id number]  Referring provider: Myron Branch DO  Dx:   Encounter Diagnosis     ICD-10-CM    1. Primary osteoarthritis of left knee  M17.12       2. Chronic pain of left knee  M25.562     G89.29           Subjective: Ml Barton reports pain level entering today is 3/10. Pt saw Dr. Branch for her 2 week follow up last week. Pt has been using Tramadol every 6 hours and take tylenol 650 mg 2 tablets every 8 hours.  \"Last night I went up to sleep in my bed and my neck is feeling tight\" Pt requested for BP to be taken, /60 mmHg. Issued nutri-grain bar and glass of water which helped to decrease pt c/o neck/head pain.     Objective: See treatment diary below    Assessment:     Pt ambulating without AD.   No signs of infection noted.  Pt able to progress with all therex today with 125 degrees flexion AROM L knee tolerated. The goal of the current treatment is to address patient's functional limitations as well as objective findings.   Emphasis of treatment on neuro reeducation of L quads with all therex. Stair training 4\" step over step x 3 reps. Emphasis on step over step pattern with eccentric control of quads with lowering.    Plan: Progress therex as tolerated. Anticipate DC to HEP in 3 weeks. Re-eval next week           Insurance:  A/CMS Eval/ Re-eval POC expires FOTO Auth #/ Referral # Total   Visits  Start date  Expiration date Extension  Visit limitation?  PT only or  PT+OT? Co-Insurance   CMS 3/4/24    12 3/4 12/31                                                                     AUTH #:  Date 3/4 3/14 3/18/24 3/20 3/25 3/27 4/3 4/5       Visits  Authed:  Used 1 1 1 1 1 1 1 1        Remaining  11 10 9 8 7 6 5 4            Precautions: s/p left knee arthroplasty * request female therapist only  Past Medical History:   Diagnosis Date    Abdominal pain     recent upper abd.    Acute diverticulitis  "    last assessed 07/18/16    Adenoma of left adrenal gland     Anxiety     Arthritis     Benign paroxysmal positional vertigo     last assessed 03/24/15    Colon polyp     DDD (degenerative disc disease), lumbar     PT and yoga helped    Disease of thyroid gland     Diverticulitis     hx of    Diverticulosis     Epigastric pain     wakes her up at night    Gastric ulcer     hx of    Hiatal hernia     Hyperlipidemia     Hypertension     Malignant hypertension     Obesity     Primary hypothyroidism     Primary hypothyroidism     SOB (shortness of breath) on exertion     with exertion when taking a certain medication    Vertigo     last assessed 03/13/13         Date 3/18/24 3/20/24 3/25/24 3/2724 4/3 4/5 Re-eval   Visit Number                    ROM          Knee Flexion 110 115 122 122 125 125    Knee Extension 4 2 0 0 0 0              TUG                    Manual          Patellar mobs          STM                    Neuro Re-ed          Quad set  5 sec x 10  X20 hold 5 sec X20 hold 5 X20 hold 5 X20 hold 5 X20 hold 5    Glute set 5 sec x20  X20 hold 5 X20 hold 5 X20 hold 5 X20 hold 5 X20 hold 5     10 x 2 at walker Standing heel raises x10 X10  x10                          TherEx          Knee/Hip PROM Performed  performed        Active w/u NS lv 1 5 min  Ns lev 1 x 7 min 0.23miles NS level 3 x 7 min 0.25 miles  Held per pt request Nu step x 7 min lev 2 Nu step x 5 min lev 1 0.2miles    Ankle pumps Supine, leg elevated:  2x10 hold 5 2x10 2x10 heel raises 2x10  2x10 heel raises    SLR 10 x AAROM< 10 x AROM 2x10 AROM 2x10 2x10 2x10 2x10    Hip abduction 5 sec x10x 2  2x10 AROM 2x10 2x10 2x10 2x10    LAQ 5 sec x 2 x 10           Heel slides W/ slideboard: 10x , 10 x AAROM w/ SOS 2x10 with slide board  2x10  2x10 2x10 2x10    SAQ           Hip add iso 2x10 hold 5 with glut sets Hip add iso 2x10 hold 5 glut sets 2x10  2x10 2x10 2x10     Hip flexion standing, marches x20 Hip flexion standing marches x 20 x20 2x10 2x10  2x10      Standing hip abd 2x10 hold 5 with UE assist for balance Sidestepping 10'x4 Sidestepping 10'x4 Sidestepping 10'x4 Sidestepping 10'x4     Bandage removal performed                    TherAct          Patient education          Stair negotiation          Car transfer                              Gait Training          With AD  Gait with RW with emphasis on step through gait patternou Gait without AD with increased antalgic gait pattern noted inside clinic Gait without AD with decreased antalgic gait pattern noted inside clinic after Pt session Gait without AD with increased damaso noted today Gait     No AD                    Modalities          CP

## 2024-04-08 ENCOUNTER — OFFICE VISIT (OUTPATIENT)
Dept: PHYSICAL THERAPY | Facility: CLINIC | Age: 74
End: 2024-04-08
Payer: COMMERCIAL

## 2024-04-08 DIAGNOSIS — M25.562 CHRONIC PAIN OF LEFT KNEE: ICD-10-CM

## 2024-04-08 DIAGNOSIS — Z96.652 AFTERCARE FOLLOWING LEFT KNEE JOINT REPLACEMENT SURGERY: ICD-10-CM

## 2024-04-08 DIAGNOSIS — Z96.652 S/P TOTAL KNEE REPLACEMENT USING CEMENT, LEFT: ICD-10-CM

## 2024-04-08 DIAGNOSIS — M17.12 PRIMARY OSTEOARTHRITIS OF LEFT KNEE: Primary | ICD-10-CM

## 2024-04-08 DIAGNOSIS — Z47.1 AFTERCARE FOLLOWING LEFT KNEE JOINT REPLACEMENT SURGERY: ICD-10-CM

## 2024-04-08 DIAGNOSIS — G89.29 CHRONIC PAIN OF LEFT KNEE: ICD-10-CM

## 2024-04-08 PROCEDURE — 97112 NEUROMUSCULAR REEDUCATION: CPT | Performed by: PHYSICAL THERAPIST

## 2024-04-08 PROCEDURE — 97110 THERAPEUTIC EXERCISES: CPT | Performed by: PHYSICAL THERAPIST

## 2024-04-08 RX ORDER — TRAMADOL HYDROCHLORIDE 50 MG/1
50 TABLET ORAL EVERY 6 HOURS PRN
Qty: 40 TABLET | Refills: 0 | Status: SHIPPED | OUTPATIENT
Start: 2024-04-08

## 2024-04-08 NOTE — PROGRESS NOTES
PT Re-Evaluation   Today's date: 2024  Patient name: Ml Barton  : 1950  MRN: [id number]  Referring provider: Myron Branch DO  Dx:   Encounter Diagnosis     ICD-10-CM    1. Primary osteoarthritis of left knee  M17.12       2. Chronic pain of left knee  M25.562     G89.29             Assessment  Assessment details: Patient is a 74 y.o. Female who presents to skilled outpatient PT for pre-operative evaluation prior to left knee arthroscopy scheduled for 3/11/24 . Referring diagnoses include chronic pain of left knee and primary osteoarthritis of left knee.    Patient presents to clinic with pain and strength deficits  in left knee. The aforementioned impairments have limited the patient's ability to ambulate community distances, perform ADLs, sit and stand without pain. No further referral is necessary at this time based upon examination results.    Patient education performed during today's session included: Signs and symptoms of infection (including redness, warmth, drainage, swelling), Importance of keeping incision dry, per MD order, Home safety checklist, which was signed by both parties and uploaded into patient's chart, and Topics of stair negotiation, ambulation with use of appropriate assistive device, and car transfers also reviewed    AS of 3/14/24: Pt her for 3rd day post op appt. L TKR on 3/11/24. Pt with silver dressing intact. No signs of infection noted. Moderate swelling noted.     As of 24: Pt making gains towards PT goals and is 4 week post op. Pt to continue PT x 2-3 additional weeks and then anticipate DC to HEP.     Impairments: Activity intolerance, Impaired balance, Impaired physical strength, Lacks appropriate HEP, and Poor posture, pain with movement  Understanding of Dx/Px/POC: Excellent  Prognosis: Good    Patient verbalized understanding of POC.    Please contact me if you have any questions or  recommendations. Thank you for the referral and the opportunity to share in Ml Batron's care.        Plan  Plan details: see below    Patient would benefit from: Skilled PT  Planned modality interventions: Biofeedback, Cryotherapy, TENS, and Thermotherapy: Hydrocollator Packs  Planned therapy interventions: Abdominal trunk stabilization, Breathing training, Functional ROM exercises, Gait training, HEP, Joint mobilization, Manual therapy, Walker taping, Neuromuscular re-education, Patient education, Postural training, Strengthening, Stretching, Therapeutic activities, and Therapeutic exercises  Frequency: 3x/wk  Duration in weeks: 12  Plan of Care beginning date: 3/4/24  Plan of Care expiration date: 12 weeks - 7/1/2024  Treatment plan discussed with: Patient       Goals  Short Term Goals (4 weeks):   (STGs met as of 4/8/24)  - Patient will improve time on TUG by 2.9 seconds from 7.76 seconds to 4.86 seconds to facilitate improved safety in all ambulation  - Patient will be independent in basic HEP 2-3 weeks  - Patient will demonstrate >100 degrees of knee ROM for reciprocal stair negotiation  - Patient will have 0/10 pain at rest  - Patient will demonstrate >1/3 improvement in MMT grade as applicable    Long Term Goals (8 weeks):  - Patient will be independent in a comprehensive home exercise program  - Patient FOTO score will improve by 10 points  - Patient will ambulation with AD PRN  - Patient will independently ambulate >1000 feet (community ambulation)  - Patient will self-report >75% improvement in function  - Patient will be able to ambulate stairs with no increase in pain  - Patient will be able to walk 1/2 mile with no increase in pain      Subjective    As of 4/8/24: Pt states feeling 50% improvement since SOC.   Pt has not started driving yet.       History of Present Illness  - Mechanism of injury: hx of knee pa  - Primary AD: rolling walker  - Assist level at home: none  - Prior level of  "function: independent      Pain  - Current pain ratin/10  - At best pain ratin/10  - At worst pain rating: 10/10  - Location: left knee  - Aggravating factors: standing, walking, stairs    Social Support  - Steps to enter house: 1  - Stairs in house: 12   - Bedroom/bathroom set up: 1/2 bath and pull out couch on first floor  - DME available: rolling walker  - Lives in: house  - Lives with:         Objective       LE MMT    L Hip Flexion: 4/5  R Hip Flexion: 4+/5   L Hip Extension: 4/5 R Hip Extension: 4+/5   L Hip Abduction: 4/5 R Hip Abduction: 4+/5   L Hip Adduction: 4/5 R Hip Adduction: 4+/5   L Knee Extension: 4/5 R Knee Extension: 4+/5   L Knee Flexion: 4/5 R Knee Flexion: 4+/5   L Ankle DF: 4/5 R Ankle DF: 4+/5   L Ankle PF: 4+/5  R Ankle PF: 5/5     LE ROM    L knee flexion: 130 degrees R knee flexion: 135 degrees   L knee extension: 0 degrees R knee extension: 0 degrees       L hip flexion: wfl degrees  R hip flexion: wfl degrees    L hip abduction: wfl degrees  R hip abduction: wfl degrees    L hip IR: 55 degrees  R hip IR: 55 degrees    L hip ER: 50 degrees  R hip ER: 45 degrees      AS of 3/14/24: L knee AROM 4-108, PROM 0-110 L knee    As of 24: L knee AROM 0-126 degrees, PROM 0- 130    Strength L knee 4-/5  L hip 4-/5        Sensation  - Light touch: intact    Skin Check  - as of 3/14/24: silver dressing intact. No sign of infection    As of 24: Incision healing well L knee    Wells Criteria  -   - Referral out for possible DVT: (-) 3/14/24    Knee Comments  - Gait Assessment: normal gait  - TU.76 seconds with no assistive device  - Stair Negotiation: performed    As of 3/14/24: Gait with RW step through gait pattern encouraged with L knee \"buckling\" at times.  TUG 14.14 sec    As of 24: Gait without AD with step through gait pattern. TUG : 8.93 sec without AD.               Insurance:  AMA/CMS Eval/ Re-eval POC expires FOTO Auth #/ Referral # Total   Visits  Start date  " Expiration date Extension  Visit limitation?  PT only or  PT+OT? Co-Insurance   CMS 3/4/24    12 3/4 12/31                                                                     AUTH #:  Date 3/4 3/14 3/18/24 3/20 3/25 3/27 4/3 4/5 4/8      Visits  Authed:  Used 1 1 1 1 1 1 1 1 1       Remaining  11 10 9 8 7 6 5 4 3           Precautions: s/p left knee arthroplasty * request female therapist only  Past Medical History:   Diagnosis Date    Abdominal pain     recent upper abd.    Acute diverticulitis     last assessed 07/18/16    Adenoma of left adrenal gland     Anxiety     Arthritis     Benign paroxysmal positional vertigo     last assessed 03/24/15    Colon polyp     DDD (degenerative disc disease), lumbar     PT and yoga helped    Disease of thyroid gland     Diverticulitis     hx of    Diverticulosis     Epigastric pain     wakes her up at night    Gastric ulcer     hx of    Hiatal hernia     Hyperlipidemia     Hypertension     Malignant hypertension     Obesity     Primary hypothyroidism     Primary hypothyroidism     SOB (shortness of breath) on exertion     with exertion when taking a certain medication    Vertigo     last assessed 03/13/13         Date 3/18/24 3/20/24 3/25/24 3/2724 4/3 4/5 Re-eval 4/8   Visit Number                    ROM          Knee Flexion 110 115 122 122 125 125 125   Knee Extension 4 2 0 0 0 0 0             TUG       8.93sec TUG             Manual          Patellar mobs          STM                    Neuro Re-ed          Quad set  5 sec x 10  X20 hold 5 sec X20 hold 5 X20 hold 5 X20 hold 5 X20 hold 5 X20 hold 5   Glute set 5 sec x20  X20 hold 5 X20 hold 5 X20 hold 5 X20 hold 5 X20 hold 5 X20 hold 5    10 x 2 at walker Standing heel raises x10 X10  x10                          TherEx          Knee/Hip PROM Performed  performed        Active w/u NS lv 1 5 min  Ns lev 1 x 7 min 0.23miles NS level 3 x 7 min 0.25 miles  Held per pt request Nu step x 7 min lev 2 Nu step x 5 min lev 1 0.2miles  Nu step x 5 min    Ankle pumps Supine, leg elevated:  2x10 hold 5 2x10 2x10 heel raises 2x10  2x10 heel raises 2x10 heel raises   SLR 10 x AAROM< 10 x AROM 2x10 AROM 2x10 2x10 2x10 2x10 2x10    Hip abduction 5 sec x10x 2  2x10 AROM 2x10 2x10 2x10 2x10 2x10   LAQ 5 sec x 2 x 10           Heel slides W/ slideboard: 10x , 10 x AAROM w/ SOS 2x10 with slide board  2x10  2x10 2x10 2x10 2x10   SAQ           Hip add iso 2x10 hold 5 with glut sets Hip add iso 2x10 hold 5 glut sets 2x10  2x10 2x10 2x10 2x10    Hip flexion standing, marches x20 Hip flexion standing marches x 20 x20 2x10 2x10 2x10 2x10     Standing hip abd 2x10 hold 5 with UE assist for balance Sidestepping 10'x4 Sidestepping 10'x4 Sidestepping 10'x4 Sidestepping 10'x4 Sidestepping 10'x4    Bandage removal performed                    TherAct          Patient education          Stair negotiation          Car transfer                              Gait Training          With AD  Gait with RW with emphasis on step through gait patternou Gait without AD with increased antalgic gait pattern noted inside clinic Gait without AD with decreased antalgic gait pattern noted inside clinic after Pt session Gait without AD with increased damaso noted today Gait  Gait without AD with dtep through gait pattern   No AD                    Modalities          CP

## 2024-04-09 ENCOUNTER — TELEPHONE (OUTPATIENT)
Age: 74
End: 2024-04-09

## 2024-04-09 NOTE — TELEPHONE ENCOUNTER
Caller: patient    Doctor: Sarina    Reason for call: pt called for an update on her prescription, she was told it was sent to the pharmacy    Call back#: [phone]

## 2024-04-11 ENCOUNTER — OFFICE VISIT (OUTPATIENT)
Dept: PHYSICAL THERAPY | Facility: CLINIC | Age: 74
End: 2024-04-11
Payer: COMMERCIAL

## 2024-04-11 DIAGNOSIS — G89.29 CHRONIC PAIN OF LEFT KNEE: ICD-10-CM

## 2024-04-11 DIAGNOSIS — M25.562 CHRONIC PAIN OF LEFT KNEE: ICD-10-CM

## 2024-04-11 DIAGNOSIS — M17.12 PRIMARY OSTEOARTHRITIS OF LEFT KNEE: Primary | ICD-10-CM

## 2024-04-11 PROCEDURE — 97110 THERAPEUTIC EXERCISES: CPT | Performed by: PHYSICAL THERAPIST

## 2024-04-11 PROCEDURE — 97112 NEUROMUSCULAR REEDUCATION: CPT | Performed by: PHYSICAL THERAPIST

## 2024-04-11 NOTE — PROGRESS NOTES
"        Daily Note         Today's date: 2024  Patient name: Ml Barton  : 1950  MRN: [id number]  Referring provider: Myron Branch DO  Dx:   Encounter Diagnosis     ICD-10-CM    1. Primary osteoarthritis of left knee  M17.12       2. Chronic pain of left knee  M25.562     G89.29           Subjective: Ml Barton reports pain level entering today is 3/10. Pt saw Dr. Branch for her 2 week follow up last week. Pt has been using Tramadol every 6 hours and take tylenol 650 mg 2 tablets every 8 hours.  Pt ambulating without AD with increased damaso. Pt has not trialed driving yet.    Objective: See treatment diary below    Assessment:     Pt ambulating without AD.   No signs of infection noted L incision region.  Pt able to progress with all therex today with 127 degrees flexion AROM L knee tolerated. The goal of the current treatment is to address patient's functional limitations as well as objective findings.   Emphasis of treatment on neuro reeducation of L quads with all therex. Stair training 6\" step over step x 5 reps. Emphasis on step over step pattern with eccentric control of quads with step downs.    Plan: Progress therex as tolerated. Anticipate DC to HEP in 3 weeks. Appt with Dr. Branch on 24 for 6 week follow up.            Insurance:  AMA/CMS Eval/ Re-eval POC expires FOTO Auth #/ Referral # Total   Visits  Start date  Expiration date Extension  Visit limitation?  PT only or  PT+OT? Co-Insurance   CMS 3/4/24    12 3/4 12/31                                                                     AUTH #:  Date 3/4 3/14 3/18/24 3/20 3/25 3/27 4/3 4/5 4/8 4/11     Visits  Authed:  Used 1 1 1 1 1 1 1 1 1 1      Remaining  11 10 9 8 7 6 5 4 3 2          Precautions: s/p left knee arthroplasty * request female therapist only  Past Medical History:   Diagnosis Date    Abdominal pain     recent upper abd.    Acute diverticulitis     last assessed 16    Adenoma of left adrenal gland  "    Anxiety     Arthritis     Benign paroxysmal positional vertigo     last assessed 03/24/15    Colon polyp     DDD (degenerative disc disease), lumbar     PT and yoga helped    Disease of thyroid gland     Diverticulitis     hx of    Diverticulosis     Epigastric pain     wakes her up at night    Gastric ulcer     hx of    Hiatal hernia     Hyperlipidemia     Hypertension     Malignant hypertension     Obesity     Primary hypothyroidism     Primary hypothyroidism     SOB (shortness of breath) on exertion     with exertion when taking a certain medication    Vertigo     last assessed 03/13/13         Date 3/2724 4/3 4/5 Re-eval 4/8 4/11   Visit Number                ROM        Knee Flexion 122 125 125 125 127   Knee Extension 0 0 0 0 0           TUG    8.93sec TUG            Manual        Patellar mobs        STM                        Quad set  X20 hold 5 X20 hold 5 X20 hold 5 X20 hold 5 With SLR   Glute set X20 hold 5 X20 hold 5 X20 hold 5 X20 hold 5 X10 hold 5 with hip add iso below    x10                               Knee/Hip PROM        Active w/u Held per pt request Nu step x 7 min lev 2 Nu step x 5 min lev 1 0.2miles Nu step x 5 min  Nu step x 7 min .50 miles    Ankle pumps 2x10 heel raises 2x10  2x10 heel raises 2x10 heel raises 2x10 hold 3   SLR 2x10 2x10 2x10 2x10  2x10   Hip abduction 2x10 2x10 2x10 2x10 2x10   LAQ     2x10   Heel slides 2x10 2x10 2x10 2x10 2x10   SAQ         2x10 2x10 2x10 2x10 Lunge stretch 2x10 hold 10 sec    2x10 2x10 2x10 2x10 Standing hip march 2x10    Sidestepping 10'x4 Sidestepping 10'x4 Sidestepping 10'x4 Sidestepping 10'x4 Sidestepping 10'x4                   TherAct        Patient education        Stair negotiation        Car transfer                        Gait Training        With AD Gait without AD with decreased antalgic gait pattern noted inside clinic after Pt session Gait without AD with increased damaso noted today Gait  Gait without AD with dtep through gait pattern     No AD                Modalities        CP

## 2024-04-15 ENCOUNTER — APPOINTMENT (OUTPATIENT)
Dept: PHYSICAL THERAPY | Facility: CLINIC | Age: 74
End: 2024-04-15
Payer: COMMERCIAL

## 2024-04-17 ENCOUNTER — TELEPHONE (OUTPATIENT)
Dept: FAMILY MEDICINE CLINIC | Facility: CLINIC | Age: 74
End: 2024-04-17

## 2024-04-17 ENCOUNTER — OFFICE VISIT (OUTPATIENT)
Dept: PHYSICAL THERAPY | Facility: CLINIC | Age: 74
End: 2024-04-17
Payer: COMMERCIAL

## 2024-04-17 DIAGNOSIS — M17.12 PRIMARY OSTEOARTHRITIS OF LEFT KNEE: Primary | ICD-10-CM

## 2024-04-17 DIAGNOSIS — G89.29 CHRONIC PAIN OF LEFT KNEE: ICD-10-CM

## 2024-04-17 DIAGNOSIS — M25.562 CHRONIC PAIN OF LEFT KNEE: ICD-10-CM

## 2024-04-17 DIAGNOSIS — E03.9 PRIMARY HYPOTHYROIDISM: ICD-10-CM

## 2024-04-17 PROCEDURE — 97112 NEUROMUSCULAR REEDUCATION: CPT | Performed by: PHYSICAL THERAPIST

## 2024-04-17 PROCEDURE — 97110 THERAPEUTIC EXERCISES: CPT | Performed by: PHYSICAL THERAPIST

## 2024-04-17 NOTE — PROGRESS NOTES
"        Daily Note         Today's date: 2024  Patient name: Ml Barton  : 1950  MRN: [id number]  Referring provider: Myron Branch DO  Dx:   Encounter Diagnosis     ICD-10-CM    1. Primary osteoarthritis of left knee  M17.12       2. Chronic pain of left knee  M25.562     G89.29           Subjective: Ml Barton reports pain level entering today is 3/10. Pt is 5 weeks post op L TKR. Pt states she has not been taking her Tramadol as prescribed due to fear of \"taking too much\". Pt also not taking her tylenol 650 mg 2 tablets every 8 hours as prescribed.  Pt ambulating without AD with increased damaso. Pt has not trialed driving yet.    Objective: See treatment diary below    Assessment:     Pt ambulating without AD.   No signs of infection noted L incision region.  Pt able to progress with all therex today with 130 degrees flexion AROM L knee tolerated. The goal of the current treatment is to address patient's functional limitations as well as objective findings.   Emphasis of treatment on neuro reeducation of L quads with all therex. Stair training 6\" step over step x 5 reps. Emphasis on step over step pattern with eccentric control of quads with step downs.    Plan: Progress therex as tolerated. Anticipate DC to HEP in 2 weeks. Appt with Dr. Branch on 24 for 6 week follow up.            Insurance:  A/CMS Eval/ Re-eval POC expires FOTO Auth #/ Referral # Total   Visits  Start date  Expiration date Extension  Visit limitation?  PT only or  PT+OT? Co-Insurance   CMS 3/4/24    12 3/4 12/31                                                                     AUTH #:  Date 3/4 3/14 3/18/24 3/20 3/25 3/27 4/3 4/5 4/8 4/11 4/17                    Visits  Authed:  Used 1 1 1 1 1 1 1 1 1 1 1     Remaining  11 10 9 8 7 6 5 4 3 2 1         Precautions: s/p left knee arthroplasty * request female therapist only     Past Medical History:   Diagnosis Date    Abdominal pain     recent upper abd.    " Acute diverticulitis     last assessed 07/18/16    Adenoma of left adrenal gland     Anxiety     Arthritis     Benign paroxysmal positional vertigo     last assessed 03/24/15    Colon polyp     DDD (degenerative disc disease), lumbar     PT and yoga helped    Disease of thyroid gland     Diverticulitis     hx of    Diverticulosis     Epigastric pain     wakes her up at night    Gastric ulcer     hx of    Hiatal hernia     Hyperlipidemia     Hypertension     Malignant hypertension     Obesity     Primary hypothyroidism     Primary hypothyroidism     SOB (shortness of breath) on exertion     with exertion when taking a certain medication    Vertigo     last assessed 03/13/13         Date 3/2724 4/3 4/5 Re-eval 4/8 4/11 4/17   Visit Number                  ROM         Knee Flexion 122 125 125 125 127 130   Knee Extension 0 0 0 0 0 0            TUG    8.93sec TUG              Manual         Patellar mobs         STM                           Quad set  X20 hold 5 X20 hold 5 X20 hold 5 X20 hold 5 With SLR -   Glute set X20 hold 5 X20 hold 5 X20 hold 5 X20 hold 5 X10 hold 5 with hip add iso below -    x10                                   Knee/Hip PROM         Active w/u Held per pt request Nu step x 7 min lev 2 Nu step x 5 min lev 1 0.2miles Nu step x 5 min  Nu step x 7 min .50 miles  Nustep lev 4 .50 miles   Ankle pumps 2x10 heel raises 2x10  2x10 heel raises 2x10 heel raises 2x10 hold 3 2x10   SLR 2x10 2x10 2x10 2x10  2x10 2x10    Hip abduction 2x10 2x10 2x10 2x10 2x10 2x10   LAQ     2x10 2x10   Heel slides 2x10 2x10 2x10 2x10 2x10 2x10   SAQ          2x10 2x10 2x10 2x10 Lunge stretch 2x10 hold 10 sec Lunge stretch 2x10 hold 10    2x10 2x10 2x10 2x10 Standing hip march 2x10 Standing hip march 2x10    Sidestepping 10'x4 Sidestepping 10'x4 Sidestepping 10'x4 Sidestepping 10'x4 Sidestepping 10'x4 Sidestepping 10'x4                     TherAct         Patient education         Stair negotiation         Car transfer                            Gait Training         With AD Gait without AD with decreased antalgic gait pattern noted inside clinic after Pt session Gait without AD with increased damaso noted today Gait  Gait without AD with dtep through gait pattern     No AD                  Modalities         CP

## 2024-04-18 RX ORDER — LEVOTHYROXINE SODIUM 75 MCG
75 TABLET ORAL DAILY
Qty: 90 TABLET | Refills: 1 | Status: SHIPPED | OUTPATIENT
Start: 2024-04-18

## 2024-04-22 ENCOUNTER — OFFICE VISIT (OUTPATIENT)
Dept: PHYSICAL THERAPY | Facility: CLINIC | Age: 74
End: 2024-04-22
Payer: COMMERCIAL

## 2024-04-22 DIAGNOSIS — G89.29 CHRONIC PAIN OF LEFT KNEE: ICD-10-CM

## 2024-04-22 DIAGNOSIS — M17.12 PRIMARY OSTEOARTHRITIS OF LEFT KNEE: Primary | ICD-10-CM

## 2024-04-22 DIAGNOSIS — M25.562 CHRONIC PAIN OF LEFT KNEE: ICD-10-CM

## 2024-04-22 PROCEDURE — 97110 THERAPEUTIC EXERCISES: CPT | Performed by: PHYSICAL THERAPIST

## 2024-04-22 PROCEDURE — 97112 NEUROMUSCULAR REEDUCATION: CPT | Performed by: PHYSICAL THERAPIST

## 2024-04-22 NOTE — PROGRESS NOTES
"        Daily Note         Today's date: 2024  Patient name: Ml Barotn  : 1950  MRN: [id number]  Referring provider: Myron Branch DO  Dx:   Encounter Diagnosis     ICD-10-CM    1. Primary osteoarthritis of left knee  M17.12       2. Chronic pain of left knee  M25.562     G89.29           Subjective: Ml Barton reports pain level entering today is 3/10. Pt is 6 weeks post op L TKR. Pt states she is taking tylenol as needed approx 8 hours per pt report.  Pt ambulating without AD with increased damaso. Pt trialed driving over the weekend and pt states she had her  drive along as passenger for her first time. Pt states that overall she felt good driveway.     Objective: See treatment diary below    Assessment:     Pt ambulating without AD.   No signs of infection noted L incision region.  Pt able to progress with all therex today with 130 degrees flexion AROM L knee tolerated. The goal of the current treatment is to address patient's functional limitations as well as objective findings.   Emphasis of treatment on neuro reeducation of L quads with all therex. Stair training 6\" step over step x 5 reps. Pt performing stairs step over step at home \"most of time\". Emphasis on step over step pattern with eccentric control of quads with step downs.    Plan: Progress therex as tolerated. Anticipate DC to HEP in 1-2 weeks. Appt with Dr. Branch on 24 for 6 week follow up.            Insurance:  AMA/CMS Eval/ Re-eval POC expires FOTO Auth #/ Referral # Total   Visits  Start date  Expiration date Extension  Visit limitation?  PT only or  PT+OT? Co-Insurance   CMS 3/4/24    12 3/4 12/31                                                                     AUTH #:  Date 3/4 3/14 3/18/24 3/20 3/25 3/27 4/3 4/5 4/8 4/11 4/17                    Visits  Authed:  Used 1 1 1 1 1 1 1 1 1 1 1     Remaining  11 10 9 8 7 6 5 4 3 2 1         Precautions: s/p left knee arthroplasty * request female " therapist only     Past Medical History:   Diagnosis Date    Abdominal pain     recent upper abd.    Acute diverticulitis     last assessed 07/18/16    Adenoma of left adrenal gland     Anxiety     Arthritis     Benign paroxysmal positional vertigo     last assessed 03/24/15    Colon polyp     DDD (degenerative disc disease), lumbar     PT and yoga helped    Disease of thyroid gland     Diverticulitis     hx of    Diverticulosis     Epigastric pain     wakes her up at night    Gastric ulcer     hx of    Hiatal hernia     Hyperlipidemia     Hypertension     Malignant hypertension     Obesity     Primary hypothyroidism     Primary hypothyroidism     SOB (shortness of breath) on exertion     with exertion when taking a certain medication    Vertigo     last assessed 03/13/13         Date 4/5 Re-eval 4/8 4/11 4/17 4/22   Visit Number                ROM        Knee Flexion 125 125 127 130 130   Knee Extension 0 0 0 0 0           TUG  8.93sec TUG              Manual        Patellar mobs        STM                        Quad set  X20 hold 5 X20 hold 5 With SLR - -   Glute set X20 hold 5 X20 hold 5 X10 hold 5 with hip add iso below - -                                   Knee/Hip PROM        Active w/u Nu step x 5 min lev 1 0.2miles Nu step x 5 min  Nu step x 7 min .50 miles  Nustep lev 4 .50 miles Nu step level 2 .50 miles   Ankle pumps 2x10 heel raises 2x10 heel raises 2x10 hold 3 2x10 2x10   SLR 2x10 2x10  2x10 2x10  2x10   Hip abduction 2x10 2x10 2x10 2x10 2x10   LAQ   2x10 2x10 2x10   Heel slides 2x10 2x10 2x10 2x10 2x10   SAQ         2x10 2x10 Lunge stretch 2x10 hold 10 sec Lunge stretch 2x10 hold 10 Lunge stretch 2x10 hold 10    2x10 2x10 Standing hip march 2x10 Standing hip march 2x10 Standing hip march 3x10    Sidestepping 10'x4 Sidestepping 10'x4 Sidestepping 10'x4 Sidestepping 10'x4 Sidestepping 10'x6                   TherAct        Patient education        Stair negotiation     Stair training step over step  pattern, x 3 trials x 2 steps   Car transfer                        Gait Training        With AD Gait  Gait without AD with dtep through gait pattern   Gait without AD with step throughgait pattern   No AD                Modalities        CP

## 2024-04-25 ENCOUNTER — OFFICE VISIT (OUTPATIENT)
Dept: OBGYN CLINIC | Facility: CLINIC | Age: 74
End: 2024-04-25

## 2024-04-25 VITALS
WEIGHT: 129.8 LBS | HEIGHT: 60 IN | SYSTOLIC BLOOD PRESSURE: 156 MMHG | DIASTOLIC BLOOD PRESSURE: 93 MMHG | BODY MASS INDEX: 25.48 KG/M2 | HEART RATE: 62 BPM

## 2024-04-25 DIAGNOSIS — Z96.652 S/P TOTAL KNEE REPLACEMENT USING CEMENT, LEFT: Primary | ICD-10-CM

## 2024-04-25 DIAGNOSIS — Z96.652 AFTERCARE FOLLOWING LEFT KNEE JOINT REPLACEMENT SURGERY: ICD-10-CM

## 2024-04-25 DIAGNOSIS — Z47.1 AFTERCARE FOLLOWING LEFT KNEE JOINT REPLACEMENT SURGERY: ICD-10-CM

## 2024-04-25 DIAGNOSIS — F33.0 MILD EPISODE OF RECURRENT MAJOR DEPRESSIVE DISORDER (HCC): ICD-10-CM

## 2024-04-25 PROCEDURE — 99024 POSTOP FOLLOW-UP VISIT: CPT | Performed by: ORTHOPAEDIC SURGERY

## 2024-04-25 NOTE — PROGRESS NOTES
Assessment/Plan:  1. S/P total knee replacement using cement, left        2. Aftercare following left knee joint replacement surgery          Scribe Attestation      I,:  Bonnie Bob am acting as a scribe while in the presence of the attending physician.:       I,:  Myron Branch, DO personally performed the services described in this documentation    as scribed in my presence.:           Ml Barton is a 74 y.o. female who returns today for follow-up evaluation 6 weeks status post left total knee arthroplasty.  I am very pleased with her clinical presentation today in the office. She understands to continue with her efforts at physical therapy with discharge to home exercise program as appropriate. She no longer requires DVT prophylaxis. We discussed pain management in the form of tylenol as needed .  All of her questions and concerns were addressed today.  We will see her back in 6 weeks for repeat clinical evaluation with x-ray on arrival.      Subjective: 6 weeks s/p left TKA     Patient ID: Ml Barton is a 74 y.o. female 6 weeks s/p left total knee arthroplasty. Patient states she is doing very well, she continues her efforts in physical therapy. She does note some intermittent aching pain. Over all she is pleased with her outcomes. She has some questions regarding pain management as needed.     Review of Systems   Constitutional:  Negative for chills and fever.   HENT:  Negative for ear pain and sore throat.    Eyes:  Negative for pain and visual disturbance.   Respiratory:  Negative for cough and shortness of breath.    Cardiovascular:  Negative for chest pain and palpitations.   Gastrointestinal:  Negative for abdominal pain and vomiting.   Genitourinary:  Negative for dysuria and hematuria.   Musculoskeletal:  Negative for arthralgias and back pain.   Skin:  Negative for color change and rash.   Neurological:  Negative for seizures and syncope.   All other systems reviewed and are  negative.        Past Medical History:   Diagnosis Date    Abdominal pain     recent upper abd.    Acute diverticulitis     last assessed 16    Adenoma of left adrenal gland     Anxiety     Arthritis     Benign paroxysmal positional vertigo     last assessed 03/24/15    Colon polyp     DDD (degenerative disc disease), lumbar     PT and yoga helped    Disease of thyroid gland     Diverticulitis     hx of    Diverticulosis     Epigastric pain     wakes her up at night    Gastric ulcer     hx of    Hiatal hernia     Hyperlipidemia     Hypertension     Malignant hypertension     Obesity     Primary hypothyroidism     Primary hypothyroidism     SOB (shortness of breath) on exertion     with exertion when taking a certain medication    Vertigo     last assessed 13       Past Surgical History:   Procedure Laterality Date    APPENDECTOMY      BREAST BIOPSY Left     a long time ago     SECTION      CHOLECYSTECTOMY      COLONOSCOPY      KNEE SURGERY      History of Arthrotomy of Knee; Open Meniscus Tear; left    CA ARTHRP KNE CONDYLE&PLATU MEDIAL&LAT COMPARTMENTS Left 3/11/2024    Procedure: ARTHROPLASTY KNEE TOTAL W ROBOT - same day;  Surgeon: Myron Branch DO;  [facility]: [facility];  Service: Orthopedics    UPPER GASTROINTESTINAL ENDOSCOPY         Family History   Adopted: Yes   Problem Relation Age of Onset    Heart disease Family         Adopted but heard family member    No Known Problems Mother        Social History     Occupational History    Occupation: Retired   Tobacco Use    Smoking status: Former     Current packs/day: 0.00     Types: Cigarettes     Start date:      Quit date: 1990     Years since quittin.0     Passive exposure: Current    Smokeless tobacco: Never    Tobacco comments:     quit 23 years ago- only a social smoker    Vaping Use    Vaping status: Never Used   Substance and Sexual Activity    Alcohol use: Yes     Alcohol/week: 2.0 standard drinks of alcohol     Types:  2 Glasses of wine per week     Comment: occasional- once or twice week     Drug use: Never    Sexual activity: Not on file         Current Outpatient Medications:     acetaminophen (TYLENOL) 325 mg tablet, Take 3 tablets (975 mg total) by mouth every 8 (eight) hours, Disp: , Rfl:     Alum & Mag Hydroxide-Simeth (MYLANTA PO), Take by mouth as needed, Disp: , Rfl:     amLODIPine (NORVASC) 2.5 mg tablet, Take 1 tablet (2.5 mg total) by mouth daily, Disp: 90 tablet, Rfl: 2    amLODIPine (NORVASC) 5 mg tablet, Take 1 tablet (5 mg total) by mouth daily, Disp: 90 tablet, Rfl: 1    baclofen 10 mg tablet, Take 1 tablet (10 mg total) by mouth 3 (three) times a day, Disp: 10 tablet, Rfl: 0    celecoxib (CeleBREX) 100 mg capsule, Take 1 capsule (100 mg total) by mouth 2 (two) times a day, Disp: 28 capsule, Rfl: 0    dicyclomine (BENTYL) 10 mg capsule, TAKE 1 CAPSULE BY MOUTH AT  AT BEDTIME AS NEEDED, Disp: 90 capsule, Rfl: 2    docusate sodium (COLACE) 100 mg capsule, Take 1 capsule (100 mg total) by mouth 2 (two) times a day, Disp: 60 capsule, Rfl: 0    FLUoxetine (PROzac) 10 mg capsule, Take 2 capsules (20 mg total) by mouth daily, Disp: 180 capsule, Rfl: 1    losartan (COZAAR) 100 MG tablet, TAKE 1 TABLET BY MOUTH DAILY, Disp: 90 tablet, Rfl: 1    ondansetron (Zofran ODT) 4 mg disintegrating tablet, Take 1 tablet (4 mg total) by mouth every 6 (six) hours as needed for nausea or vomiting, Disp: 20 tablet, Rfl: 0    pantoprazole (PROTONIX) 40 mg tablet, TAKE 1 TABLET BY MOUTH TWICE  DAILY WITH MEALS, Disp: 180 tablet, Rfl: 3    Synthroid 75 MCG tablet, TAKE 1 TABLET BY MOUTH DAILY, Disp: 90 tablet, Rfl: 1    traMADol (Ultram) 50 mg tablet, Take 1 tablet (50 mg total) by mouth every 6 (six) hours as needed for moderate pain, Disp: 40 tablet, Rfl: 0    traZODone (DESYREL) 100 mg tablet, Take 1 tablet (100 mg total) by mouth daily at bedtime, Disp: 90 tablet, Rfl: 1    aspirin 325 mg tablet, Take 1 tablet (325 mg total) by mouth  2 (two) times a day, Disp: 84 tablet, Rfl: 0    Allergies   Allergen Reactions    Hydrochlorothiazide Other (See Comments)     hyponatremia    Crestor [Rosuvastatin] Other (See Comments)     Nightmares        Objective:  Vitals:    04/25/24 1136   BP: 156/93   Pulse: 62       Body mass index is 25.35 kg/m².    Left Knee Exam     Tenderness   The patient is experiencing tenderness in the medial joint line and lateral joint line.    Range of Motion   Extension:  0   Left knee flexion: 125.     Tests   Varus: negative Valgus: negative    Other   Erythema: absent  Sensation: normal  Pulse: present  Swelling: none  Effusion: no effusion present    Comments:  Well healed incision. 2 spitting sutures mid incision removed in clinic today. Incision was sterilized and bandaged  Patella tracks centrally   Calf compartments are soft and supple  2+ DP and PT pulses with brisk capillary refill to the toes  Sural, saphenous, tibial, superficial, and deep peroneal motor and sensory distributions intact  Sensation light touch intact distally            Observations   Left Knee   Negative for effusion.       Physical Exam  Vitals and nursing note reviewed.   Constitutional:       Appearance: Normal appearance.   HENT:      Head: Normocephalic.      Right Ear: External ear normal.      Left Ear: External ear normal.   Eyes:      Extraocular Movements: Extraocular movements intact.      Conjunctiva/sclera: Conjunctivae normal.   Cardiovascular:      Rate and Rhythm: Normal rate.      Pulses: Normal pulses.   Pulmonary:      Effort: Pulmonary effort is normal.      Breath sounds: Normal breath sounds.   Abdominal:      General: Abdomen is flat.   Musculoskeletal:         General: Normal range of motion.      Cervical back: Normal range of motion and neck supple.      Left knee: No effusion.      Comments: See ortho exam   Skin:     General: Skin is warm and dry.   Neurological:      General: No focal deficit present.      Mental Status:  She is alert.   Psychiatric:         Mood and Affect: Mood normal.         Behavior: Behavior normal.         This document was created using speech voice recognition software.   Grammatical errors, random word insertions, pronoun errors, and incomplete sentences are an occasional consequence of this system due to software limitations, ambient noise, and hardware issues.   Any formal questions or concerns about content, text, or information contained within the body of this dictation should be directly addressed to the provider for clarification.

## 2024-04-26 ENCOUNTER — OFFICE VISIT (OUTPATIENT)
Dept: PHYSICAL THERAPY | Facility: CLINIC | Age: 74
End: 2024-04-26
Payer: COMMERCIAL

## 2024-04-26 DIAGNOSIS — M17.12 PRIMARY OSTEOARTHRITIS OF LEFT KNEE: Primary | ICD-10-CM

## 2024-04-26 DIAGNOSIS — M25.562 CHRONIC PAIN OF LEFT KNEE: ICD-10-CM

## 2024-04-26 DIAGNOSIS — G89.29 CHRONIC PAIN OF LEFT KNEE: ICD-10-CM

## 2024-04-26 PROCEDURE — 97110 THERAPEUTIC EXERCISES: CPT | Performed by: PHYSICAL THERAPIST

## 2024-04-26 PROCEDURE — 97112 NEUROMUSCULAR REEDUCATION: CPT | Performed by: PHYSICAL THERAPIST

## 2024-04-26 RX ORDER — FLUOXETINE 10 MG/1
20 CAPSULE ORAL DAILY
Qty: 180 CAPSULE | Refills: 3 | Status: SHIPPED | OUTPATIENT
Start: 2024-04-26

## 2024-04-26 NOTE — PROGRESS NOTES
"        Daily Note         Today's date: 2024  Patient name: Ml Barton  : 1950  MRN: [id number]  Referring provider: Myron Branch DO  Dx:   Encounter Diagnosis     ICD-10-CM    1. Primary osteoarthritis of left knee  M17.12       2. Chronic pain of left knee  M25.562     G89.29           Subjective: Ml Barton reports pain level entering today is 1-2/10, \"achiness and stiffness\". Pt is 6 weeks post op L TKR. Pt states she is taking tylenol as needed approx 8 hours per pt report.  Pt ambulating without AD with increased damaso. Pt reporting she drove to her ortho appt yesterday and felt comfortable with driving. Pt had 6 week follow up with orthopedic surgeon yesterday and pt doing well per surgeon.    Objective: See treatment diary below    Assessment:     Pt ambulating without AD.   No signs of infection noted L incision region.  Pt able to progress with all therex today with 130 degrees flexion AROM L knee tolerated. The goal of the current treatment is to address patient's functional limitations as well as objective findings.   Emphasis of treatment on neuro reeducation of L quads with all therex. Stair training 6\" step over step x 5 reps. Pt performing stairs step over step at home \"most of time\". Emphasis on step over step pattern with eccentric control of quads with step downs.    Plan: Progress therex as tolerated. Anticipate DC to [facility] on 24. Appt with Dr. Branch on 24 for 12 week follow up.            Insurance:  AMA/CMS Eval/ Re-eval POC expires FOTO Auth #/ Referral # Total   Visits  Start date  Expiration date Extension  Visit limitation?  PT only or  PT+OT? Co-Insurance   CMS 3/4/24    12 3/4 12/31                                                                     AUTH #:  Date 3/4 3/14 3/18/24 3/20 3/25 3/27 4/3 4/5 4/8 4/11 4/17 4/26                   Visits  Authed:  Used 1 1 1 1 1 1 1 1 1 1 1 1    Remaining  11 10 9 8 7 6 5 4 3 2 1 0        Precautions: s/p left " knee arthroplasty * request female therapist only     Past Medical History:   Diagnosis Date    Abdominal pain     recent upper abd.    Acute diverticulitis     last assessed 07/18/16    Adenoma of left adrenal gland     Anxiety     Arthritis     Benign paroxysmal positional vertigo     last assessed 03/24/15    Colon polyp     DDD (degenerative disc disease), lumbar     PT and yoga helped    Disease of thyroid gland     Diverticulitis     hx of    Diverticulosis     Epigastric pain     wakes her up at night    Gastric ulcer     hx of    Hiatal hernia     Hyperlipidemia     Hypertension     Malignant hypertension     Obesity     Primary hypothyroidism     Primary hypothyroidism     SOB (shortness of breath) on exertion     with exertion when taking a certain medication    Vertigo     last assessed 03/13/13         Date 4/5 Re-eval 4/8 4/11 4/17 4/22 4/26   Visit Number                  ROM         Knee Flexion 125 125 127 130 130 130   Knee Extension 0 0 0 0 0 0            TUG  8.93sec TUG                Manual         Patellar mobs         STM                           Quad set  X20 hold 5 X20 hold 5 With SLR - -    Glute set X20 hold 5 X20 hold 5 X10 hold 5 with hip add iso below - -                                        Knee/Hip PROM         Active w/u Nu step x 5 min lev 1 0.2miles Nu step x 5 min  Nu step x 7 min .50 miles  Nustep lev 4 .50 miles Nu step level 2 .50 miles Nu step lev 3 x10 min .44miles   Ankle pumps 2x10 heel raises 2x10 heel raises 2x10 hold 3 2x10 2x10 HEP   SLR 2x10 2x10  2x10 2x10  2x10 2x10   Hip abduction 2x10 2x10 2x10 2x10 2x10 2x10   LAQ   2x10 2x10 2x10 2x10   Heel slides 2x10 2x10 2x10 2x10 2x10 2x15   SAQ          2x10 2x10 Lunge stretch 2x10 hold 10 sec Lunge stretch 2x10 hold 10 Lunge stretch 2x10 hold 10 Lunge stretch 2x10 hold 10    2x10 2x10 Standing hip march 2x10 Standing hip march 2x10 Standing hip march 3x10 Standing hip march 3x10    Sidestepping 10'x4 Sidestepping 10'x4  "Sidestepping 10'x4 Sidestepping 10'x4 Sidestepping 10'x6 Sidestepping 10'x6                     TherAct         Patient education         Stair negotiation     Stair training step over step pattern, x 3 trials x 2 steps Stair training step over step pattern x3 trials 6\" step   Car transfer                           Gait Training         With AD Gait  Gait without AD with dtep through gait pattern   Gait without AD with step throughgait pattern    No AD                  Modalities         CP                    "

## 2024-04-29 ENCOUNTER — OFFICE VISIT (OUTPATIENT)
Dept: PHYSICAL THERAPY | Facility: CLINIC | Age: 74
End: 2024-04-29
Payer: COMMERCIAL

## 2024-04-29 ENCOUNTER — APPOINTMENT (OUTPATIENT)
Dept: PHYSICAL THERAPY | Facility: CLINIC | Age: 74
End: 2024-04-29
Payer: COMMERCIAL

## 2024-04-29 DIAGNOSIS — M25.562 CHRONIC PAIN OF LEFT KNEE: ICD-10-CM

## 2024-04-29 DIAGNOSIS — G89.29 CHRONIC PAIN OF LEFT KNEE: ICD-10-CM

## 2024-04-29 DIAGNOSIS — M17.12 PRIMARY OSTEOARTHRITIS OF LEFT KNEE: Primary | ICD-10-CM

## 2024-04-29 PROCEDURE — 97112 NEUROMUSCULAR REEDUCATION: CPT | Performed by: PHYSICAL THERAPIST

## 2024-04-29 PROCEDURE — 97110 THERAPEUTIC EXERCISES: CPT | Performed by: PHYSICAL THERAPIST

## 2024-04-29 NOTE — PROGRESS NOTES
"        Daily Note         Today's date: 2024  Patient name: Ml Barton  : 1950  MRN: [id number]  Referring provider: Myron Branch DO  Dx:   Encounter Diagnosis     ICD-10-CM    1. Primary osteoarthritis of left knee  M17.12       2. Chronic pain of left knee  M25.562     G89.29           Subjective: Ml Barton reports pain level entering today is 1-2/10, \"achiness and stiffness\". Pt is 7 weeks post op L TKR. Pt states she is taking tylenol as needed approx 8 hours per pt report.  Pt ambulating without AD with increased damaso. Pt reporting she is back to driving and feels comfortable with driving. Pt had 6 week follow up with orthopedic surgeon last week and pt doing well per surgeon.    Objective: See treatment diary below    Assessment:     Pt ambulating without AD.   No signs of infection noted L incision region.  Pt able to progress with all therex today with 130 degrees flexion AROM L knee tolerated. The goal of the current treatment is to address patient's functional limitations as well as objective findings.   Emphasis of treatment on neuro reeducation of L quads with all therex. Stair training 6\" step over step x 5 reps. Pt performing stairs step over step at home \"most of time\". Emphasis on step over step pattern with eccentric control of quads with step downs.    Plan: Progress therex as tolerated. Anticipate DC to HEP on 24. Appt with Dr. Branch on 24 for 12 week follow up.            Insurance:  AMA/CMS Eval/ Re-eval POC expires FOTO Auth #/ Referral # Total   Visits  Start date  Expiration date Extension  Visit limitation?  PT only or  PT+OT? Co-Insurance   CMS 3/4/24    12 3/4 12/31                                                                     AUTH #:  Date 3/4 3/14 3/18/24 3/20 3/25 3/27 4/3 4/5 4/8 4/11 4/17 4/26                   Visits  Authed:  Used 1 1 1 1 1 1 1 1 1 1 1 1    Remaining  11 10 9 8 7 6 5 4 3 2 1 0        Precautions: s/p left knee " arthroplasty * request female therapist only     Past Medical History:   Diagnosis Date    Abdominal pain     recent upper abd.    Acute diverticulitis     last assessed 07/18/16    Adenoma of left adrenal gland     Anxiety     Arthritis     Benign paroxysmal positional vertigo     last assessed 03/24/15    Colon polyp     DDD (degenerative disc disease), lumbar     PT and yoga helped    Disease of thyroid gland     Diverticulitis     hx of    Diverticulosis     Epigastric pain     wakes her up at night    Gastric ulcer     hx of    Hiatal hernia     Hyperlipidemia     Hypertension     Malignant hypertension     Obesity     Primary hypothyroidism     Primary hypothyroidism     SOB (shortness of breath) on exertion     with exertion when taking a certain medication    Vertigo     last assessed 03/13/13         Date 4/5 Re-eval 4/8 4/11 4/17 4/22 4/26 4/29   Visit Number                    ROM          Knee Flexion 125 125 127 130 130 130 130   Knee Extension 0 0 0 0 0 0 0             TUG  8.93sec TUG                  Manual          Patellar mobs          STM                              Quad set  X20 hold 5 X20 hold 5 With SLR - -     Glute set X20 hold 5 X20 hold 5 X10 hold 5 with hip add iso below - -                                             Knee/Hip PROM          Active w/u Nu step x 5 min lev 1 0.2miles Nu step x 5 min  Nu step x 7 min .50 miles  Nustep lev 4 .50 miles Nu step level 2 .50 miles Nu step lev 3 x10 min .44miles Nu step level 3x8 min   Ankle pumps 2x10 heel raises 2x10 heel raises 2x10 hold 3 2x10 2x10 HEP    SLR 2x10 2x10  2x10 2x10  2x10 2x10 2x10   Hip abduction 2x10 2x10 2x10 2x10 2x10 2x10 2x10   LAQ   2x10 2x10 2x10 2x10 2x10   Heel slides 2x10 2x10 2x10 2x10 2x10 2x15 2x15   SAQ           2x10 2x10 Lunge stretch 2x10 hold 10 sec Lunge stretch 2x10 hold 10 Lunge stretch 2x10 hold 10 Lunge stretch 2x10 hold 10 Lunge stretch 2x10 hold 10    2x10 2x10 Standing hip march 2x10 Standing hip march  "2x10 Standing hip march 3x10 Standing hip march 3x10 Standing hip march 3x10    Sidestepping 10'x4 Sidestepping 10'x4 Sidestepping 10'x4 Sidestepping 10'x4 Sidestepping 10'x6 Sidestepping 10'x6 Sidestepping 10'x6                       TherAct          Patient education          Stair negotiation     Stair training step over step pattern, x 3 trials x 2 steps Stair training step over step pattern x3 trials 6\" step Stair training step over step pattern x4 trials   Car transfer                              Gait Training          With AD Gait  Gait without AD with dtep through gait pattern   Gait without AD with step throughgait pattern  Gait without AD with step through gait pattern   No AD                    Modalities          CP                     "

## 2024-05-01 ENCOUNTER — OFFICE VISIT (OUTPATIENT)
Dept: PHYSICAL THERAPY | Facility: CLINIC | Age: 74
End: 2024-05-01
Payer: COMMERCIAL

## 2024-05-01 DIAGNOSIS — G89.29 CHRONIC PAIN OF LEFT KNEE: ICD-10-CM

## 2024-05-01 DIAGNOSIS — M25.562 CHRONIC PAIN OF LEFT KNEE: ICD-10-CM

## 2024-05-01 DIAGNOSIS — M17.12 PRIMARY OSTEOARTHRITIS OF LEFT KNEE: Primary | ICD-10-CM

## 2024-05-01 PROCEDURE — 97110 THERAPEUTIC EXERCISES: CPT | Performed by: PHYSICAL THERAPIST

## 2024-05-01 PROCEDURE — 97112 NEUROMUSCULAR REEDUCATION: CPT | Performed by: PHYSICAL THERAPIST

## 2024-05-01 NOTE — PROGRESS NOTES
"        Daily Note         Today's date: 2024  Patient name: Ml Barton  : 1950  MRN: [id number]  Referring provider: Myron Branch DO  Dx:   Encounter Diagnosis     ICD-10-CM    1. Primary osteoarthritis of left knee  M17.12       2. Chronic pain of left knee  M25.562     G89.29           Subjective: Ml Barton reports pain level entering today is 1-2/10, \"achiness and stiffness\". Pt is 7 weeks post op L TKR. Pt states she is taking tylenol as needed per pt report.  Pt ambulating without AD with increased damaso. Pt reporting she is back to driving and feels comfortable with driving. Pt had 6 week follow up with orthopedic surgeon last week and pt doing well per surgeon.    Objective: See treatment diary below    Assessment:     Pt ambulating without AD.   No signs of infection noted L incision region.  Pt able to progress with all therex today with 130 degrees flexion AROM L knee tolerated. The goal of the current treatment is to address patient's functional limitations as well as objective findings.   Emphasis of treatment on neuro reeducation of L quads with all therex. Stair training 6\" step over step x 5 reps. Pt performing stairs step over step at home \"most of time\". Emphasis on step over step pattern with eccentric control of quads with step downs. Reviewed scar tissue mobilization L knee incision with pt and performed for pt to decrease scar tissue. Manual stick rolling L quads to decrease c/o mm tightness.     Plan: Progress therex as tolerated. Anticipate DC to [facility] on 24. Appt with Dr. Branch on 24 for 12 week follow up.            Insurance:  AMA/CMS Eval/ Re-eval POC expires FOTO Auth #/ Referral # Total   Visits  Start date  Expiration date Extension  Visit limitation?  PT only or  PT+OT? Co-Insurance   CMS 3/4/24    12 3/4 12/31                                                                     AUTH #:  Date 3/4 3/14 3/18/24 3/20 3/25 3/27 4/3 4/5 4/8 4/11 4/17 4/26 "                   Visits  Authed:  Used 1 1 1 1 1 1 1 1 1 1 1 1    Remaining  11 10 9 8 7 6 5 4 3 2 1 0        Precautions: s/p left knee arthroplasty * request female therapist only     Past Medical History:   Diagnosis Date    Abdominal pain     recent upper abd.    Acute diverticulitis     last assessed 07/18/16    Adenoma of left adrenal gland     Anxiety     Arthritis     Benign paroxysmal positional vertigo     last assessed 03/24/15    Colon polyp     DDD (degenerative disc disease), lumbar     PT and yoga helped    Disease of thyroid gland     Diverticulitis     hx of    Diverticulosis     Epigastric pain     wakes her up at night    Gastric ulcer     hx of    Hiatal hernia     Hyperlipidemia     Hypertension     Malignant hypertension     Obesity     Primary hypothyroidism     Primary hypothyroidism     SOB (shortness of breath) on exertion     with exertion when taking a certain medication    Vertigo     last assessed 03/13/13         Date Re-eval 4/8 4/11 4/17 4/22 4/26 4/29 5/1   Visit Number                    ROM          Knee Flexion 125 127 130 130 130 130 130   Knee Extension 0 0 0 0 0 0 0          Scar tissue mobilization x5 min   TUG 8.93sec TUG      Stick rolling L quads, pt supine x 6 min             Manual          Patellar mobs          STM                              Quad set  X20 hold 5 With SLR - -      Glute set X20 hold 5 X10 hold 5 with hip add iso below - -                                              Knee/Hip PROM          Active w/u Nu step x 5 min  Nu step x 7 min .50 miles  Nustep lev 4 .50 miles Nu step level 2 .50 miles Nu step lev 3 x10 min .44miles Nu step level 3x8 min Nu step x8 min lev 3   Ankle pumps 2x10 heel raises 2x10 hold 3 2x10 2x10 HEP     SLR 2x10  2x10 2x10  2x10 2x10 2x10 2x15   Hip abduction 2x10 2x10 2x10 2x10 2x10 2x10 2x15   LAQ  2x10 2x10 2x10 2x10 2x10 2x15   Heel slides 2x10 2x10 2x10 2x10 2x15 2x15 2x15   SAQ           2x10 Lunge stretch 2x10 hold 10 sec  "Lunge stretch 2x10 hold 10 Lunge stretch 2x10 hold 10 Lunge stretch 2x10 hold 10 Lunge stretch 2x10 hold 10 Lunge stretch 2x10 hold 10    2x10 Standing hip march 2x10 Standing hip march 2x10 Standing hip march 3x10 Standing hip march 3x10 Standing hip march 3x10 Standing hip march 3x10    Sidestepping 10'x4 Sidestepping 10'x4 Sidestepping 10'x4 Sidestepping 10'x6 Sidestepping 10'x6 Sidestepping 10'x6 Sidestepping 10'x6                       TherAct          Patient education          Stair negotiation    Stair training step over step pattern, x 3 trials x 2 steps Stair training step over step pattern x3 trials 6\" step Stair training step over step pattern x4 trials Stair training step over step pattern x 4 trials   Car transfer                              Gait Training          With AD Gait without AD with dtep through gait pattern   Gait without AD with step throughgait pattern  Gait without AD with step through gait pattern Gait without AD with step through gait pattern   No AD                    Modalities          CP                     "

## 2024-05-02 ENCOUNTER — APPOINTMENT (OUTPATIENT)
Dept: PHYSICAL THERAPY | Facility: CLINIC | Age: 74
End: 2024-05-02
Payer: COMMERCIAL

## 2024-05-08 ENCOUNTER — OFFICE VISIT (OUTPATIENT)
Dept: PHYSICAL THERAPY | Facility: CLINIC | Age: 74
End: 2024-05-08
Payer: COMMERCIAL

## 2024-05-08 DIAGNOSIS — M17.12 PRIMARY OSTEOARTHRITIS OF LEFT KNEE: Primary | ICD-10-CM

## 2024-05-08 DIAGNOSIS — M25.562 CHRONIC PAIN OF LEFT KNEE: ICD-10-CM

## 2024-05-08 DIAGNOSIS — G89.29 CHRONIC PAIN OF LEFT KNEE: ICD-10-CM

## 2024-05-08 PROCEDURE — 97110 THERAPEUTIC EXERCISES: CPT | Performed by: PHYSICAL THERAPIST

## 2024-05-08 PROCEDURE — 97112 NEUROMUSCULAR REEDUCATION: CPT | Performed by: PHYSICAL THERAPIST

## 2024-05-08 NOTE — PROGRESS NOTES
"       Treatment note         Today's date: 2024  Patient name: Ml Barton  : 1950  MRN: [id number]  Referring provider: Myron Branch DO  Dx:   Encounter Diagnosis     ICD-10-CM    1. Primary osteoarthritis of left knee  M17.12       2. Chronic pain of left knee  M25.562     G89.29           Subjective: Ml Barton reports pain level entering today is 1-2/10, \"achiness and stiffness...same pain/achiness/stiffness over past 2 weeks\". Pt is 8 weeks post op L TKR. Pt states she is taking ibuprofen/tylenol as needed per pt report.  Pt ambulating without AD with increased damaso. Pt reporting she is back to driving and feels comfortable with driving. Pt had 6 week follow up with orthopedic surgeon last week and pt doing well per surgeon.     Objective: See treatment diary below    Assessment:     Pt ambulating without AD.   No signs of infection noted L incision region.  Pt able to progress with all therex today with 130 degrees flexion AROM L knee tolerated. The goal of the current treatment is to address patient's functional limitations as well as objective findings.   Emphasis of treatment on neuro reeducation of L quads with all therex. Stair training 6\" step over step x 5 reps. Pt performing stairs step over step at home \"most of time\".  Emphasis on step over step pattern with eccentric control of quads with step downs. Reviewed scar tissue mobilization L knee incision with pt and performed for pt to decrease scar tissue. Manual stick rolling L quads to decrease c/o mm tightness. Patellar joint mobs and k  tape to patellar region,medial to lateral patellar lifting. Will assess pt next week re: pain with squatting utilizing patellar taping. Was anticipating potential DC to HEP this visit however since pt reporting continued stiffness in L knee and trial of taping today will continue x 1 additional week and f/u next week with pt.     Plan: Progress therex as tolerated. Anticipate DC to [facility]P " next week. Appt with Dr. Branch on 6/6/24 for 12 week follow up.            Insurance:  AMA/CMS Eval/ Re-eval POC expires FOTO Auth #/ Referral # Total   Visits  Start date  Expiration date Extension  Visit limitation?  PT only or  PT+OT? Co-Insurance   CMS 3/4/24    12 3/4 12/31                                                                     AUTH #:  Date 3/4 3/14 3/18/24 3/20 3/25 3/27 4/3 4/5 4/8 4/11 4/17 4/26                   Visits  Authed:  Used 1 1 1 1 1 1 1 1 1 1 1 1    Remaining  11 10 9 8 7 6 5 4 3 2 1 0        Precautions: s/p left knee arthroplasty * request female therapist only     Past Medical History:   Diagnosis Date    Abdominal pain     recent upper abd.    Acute diverticulitis     last assessed 07/18/16    Adenoma of left adrenal gland     Anxiety     Arthritis     Benign paroxysmal positional vertigo     last assessed 03/24/15    Colon polyp     DDD (degenerative disc disease), lumbar     PT and yoga helped    Disease of thyroid gland     Diverticulitis     hx of    Diverticulosis     Epigastric pain     wakes her up at night    Gastric ulcer     hx of    Hiatal hernia     Hyperlipidemia     Hypertension     Malignant hypertension     Obesity     Primary hypothyroidism     Primary hypothyroidism     SOB (shortness of breath) on exertion     with exertion when taking a certain medication    Vertigo     last assessed 03/13/13         Date 4/22 4/26 4/29 5/1 5/8   Visit Number                ROM        Knee Flexion 130 130 130 130 130   Knee Extension 0 0 0 0        Scar tissue mobilization x5 min Scar tissue mob   TUG    Stick rolling L quads, pt supine x 6 min Stick rolling L quads, k tape to L patella medial to lateral           Manual        Patellar mobs        STM                        Quad set  -       Glute set -                                       Knee/Hip PROM        Active w/u Nu step level 2 .50 miles Nu step lev 3 x10 min .44miles Nu step level 3x8 min Nu step x8 min lev 3 Nu  "step x 8min lev 3   Ankle pumps 2x10 HEP      SLR 2x10 2x10 2x10 2x15 2x15   Hip abduction 2x10 2x10 2x10 2x15 2x15   LAQ 2x10 2x10 2x10 2x15 2x15   Heel slides 2x10 2x15 2x15 2x15 2x15   SAQ         Lunge stretch 2x10 hold 10 Lunge stretch 2x10 hold 10 Lunge stretch 2x10 hold 10 Lunge stretch 2x10 hold 10 Lunge stretch 2x10 hold 5    Standing hip march 3x10 Standing hip march 3x10 Standing hip march 3x10 Standing hip march 3x10 Standing hip march 3x10    Sidestepping 10'x6 Sidestepping 10'x6 Sidestepping 10'x6 Sidestepping 10'x6 Sidestepping 10'x6                Mini squats x10 with manual assist for patellar tracking   TherAct        Patient education        Stair negotiation Stair training step over step pattern, x 3 trials x 2 steps Stair training step over step pattern x3 trials 6\" step Stair training step over step pattern x4 trials Stair training step over step pattern x 4 trials Stair training step over step pattern  6\" step x 10 reps   Car transfer                        Gait Training        With AD Gait without AD with step throughgait pattern  Gait without AD with step through gait pattern Gait without AD with step through gait pattern    No AD                Modalities        CP                   "

## 2024-05-15 ENCOUNTER — APPOINTMENT (OUTPATIENT)
Dept: PHYSICAL THERAPY | Facility: CLINIC | Age: 74
End: 2024-05-15
Payer: COMMERCIAL

## 2024-06-06 ENCOUNTER — APPOINTMENT (OUTPATIENT)
Dept: RADIOLOGY | Facility: CLINIC | Age: 74
End: 2024-06-06
Payer: COMMERCIAL

## 2024-06-06 ENCOUNTER — OFFICE VISIT (OUTPATIENT)
Dept: OBGYN CLINIC | Facility: CLINIC | Age: 74
End: 2024-06-06

## 2024-06-06 VITALS
HEIGHT: 60 IN | SYSTOLIC BLOOD PRESSURE: 167 MMHG | DIASTOLIC BLOOD PRESSURE: 94 MMHG | HEART RATE: 60 BPM | BODY MASS INDEX: 25.32 KG/M2 | WEIGHT: 129 LBS

## 2024-06-06 DIAGNOSIS — Z96.652 S/P TOTAL KNEE REPLACEMENT USING CEMENT, LEFT: ICD-10-CM

## 2024-06-06 DIAGNOSIS — Z47.1 AFTERCARE FOLLOWING LEFT KNEE JOINT REPLACEMENT SURGERY: ICD-10-CM

## 2024-06-06 DIAGNOSIS — Z96.652 AFTERCARE FOLLOWING LEFT KNEE JOINT REPLACEMENT SURGERY: ICD-10-CM

## 2024-06-06 DIAGNOSIS — Z96.652 S/P TOTAL KNEE REPLACEMENT USING CEMENT, LEFT: Primary | ICD-10-CM

## 2024-06-06 PROCEDURE — 73562 X-RAY EXAM OF KNEE 3: CPT

## 2024-06-06 PROCEDURE — 99024 POSTOP FOLLOW-UP VISIT: CPT | Performed by: ORTHOPAEDIC SURGERY

## 2024-06-06 NOTE — PROGRESS NOTES
Assessment/Plan:  1. S/P total knee replacement using cement, left  XR knee 3 vw left non injury      2. Aftercare following left knee joint replacement surgery          Scribe Attestation      I,:  Kimberley Kelly am acting as a scribe while in the presence of the attending physician.:       I,:  Myron Branch, DO personally performed the services described in this documentation    as scribed in my presence.:           Ml is a pleasant 74-year-old female who presents to the office today for follow-up evaluation 3 months status post left total knee arthroplasty. We discussed that the aching she is experiencing is to be expected at this stage postoperatively. We also discussed that her knee will continue to mature over the course of one year following the procedure. She will need antibiotics prior to any dental procedures moving forward. She will follow-up in 9 months for the anniversary of her total knee arthroplasty with repeat X-rays upon arrival.    Subjective: Follow-up evaluation 3 months status post left total knee arthroplasty    Patient ID: Ml Barton is a 74 y.o. female who presents to the office today for follow-up evaluation 3 months status post left total knee arthroplasty. Today she reports a global pain about the left knee. She notes a tightness in the front and back of the knee when bending it. She is experiencing soreness in the quadriceps and shin. She has been discharged from physical therapy and completing home exercises on her own. She has been taking Tylenol and Ibuprofen for pain control.    Review of Systems   Constitutional:  Positive for activity change. Negative for chills and fever.   HENT:  Negative for ear pain and sore throat.    Eyes:  Negative for pain and visual disturbance.   Respiratory:  Negative for cough and shortness of breath.    Cardiovascular:  Negative for chest pain and palpitations.   Gastrointestinal:  Negative for abdominal pain and vomiting.   Genitourinary:   Negative for dysuria and hematuria.   Musculoskeletal:  Negative for arthralgias and back pain.   Skin:  Negative for color change and rash.   Neurological:  Negative for seizures and syncope.   All other systems reviewed and are negative.        Past Medical History:   Diagnosis Date    Abdominal pain     recent upper abd.    Acute diverticulitis     last assessed 16    Adenoma of left adrenal gland     Anxiety     Arthritis     Benign paroxysmal positional vertigo     last assessed 03/24/15    Colon polyp     DDD (degenerative disc disease), lumbar     PT and yoga helped    Disease of thyroid gland     Diverticulitis     hx of    Diverticulosis     Epigastric pain     wakes her up at night    Gastric ulcer     hx of    Hiatal hernia     Hyperlipidemia     Hypertension     Malignant hypertension     Obesity     Primary hypothyroidism     Primary hypothyroidism     SOB (shortness of breath) on exertion     with exertion when taking a certain medication    Vertigo     last assessed 13       Past Surgical History:   Procedure Laterality Date    APPENDECTOMY      BREAST BIOPSY Left     a long time ago     SECTION      CHOLECYSTECTOMY      COLONOSCOPY      KNEE SURGERY      History of Arthrotomy of Knee; Open Meniscus Tear; left    MO ARTHRP KNE CONDYLE&PLATU MEDIAL&LAT COMPARTMENTS Left 3/11/2024    Procedure: ARTHROPLASTY KNEE TOTAL W ROBOT - same day;  Surgeon: Myron Branch DO;  Location: [facility];  Service: Orthopedics    UPPER GASTROINTESTINAL ENDOSCOPY         Family History   Adopted: Yes   Problem Relation Age of Onset    Heart disease Family         Adopted but heard family member    No Known Problems Mother        Social History     Occupational History    Occupation: Retired   Tobacco Use    Smoking status: Former     Current packs/day: 0.00     Types: Cigarettes     Start date:      Quit date: 1990     Years since quittin.1     Passive exposure: Current    Smokeless  tobacco: Never    Tobacco comments:     quit 23 years ago- only a social smoker    Vaping Use    Vaping status: Never Used   Substance and Sexual Activity    Alcohol use: Yes     Alcohol/week: 2.0 standard drinks of alcohol     Types: 2 Glasses of wine per week     Comment: occasional- once or twice week     Drug use: Never    Sexual activity: Not on file         Current Outpatient Medications:     acetaminophen (TYLENOL) 325 mg tablet, Take 3 tablets (975 mg total) by mouth every 8 (eight) hours, Disp: , Rfl:     Alum & Mag Hydroxide-Simeth (MYLANTA PO), Take by mouth as needed, Disp: , Rfl:     amLODIPine (NORVASC) 2.5 mg tablet, Take 1 tablet (2.5 mg total) by mouth daily, Disp: 90 tablet, Rfl: 2    amLODIPine (NORVASC) 5 mg tablet, Take 1 tablet (5 mg total) by mouth daily, Disp: 90 tablet, Rfl: 1    baclofen 10 mg tablet, Take 1 tablet (10 mg total) by mouth 3 (three) times a day, Disp: 10 tablet, Rfl: 0    celecoxib (CeleBREX) 100 mg capsule, Take 1 capsule (100 mg total) by mouth 2 (two) times a day, Disp: 28 capsule, Rfl: 0    dicyclomine (BENTYL) 10 mg capsule, TAKE 1 CAPSULE BY MOUTH AT  AT BEDTIME AS NEEDED, Disp: 90 capsule, Rfl: 2    docusate sodium (COLACE) 100 mg capsule, Take 1 capsule (100 mg total) by mouth 2 (two) times a day, Disp: 60 capsule, Rfl: 0    FLUoxetine (PROzac) 10 mg capsule, TAKE 2 CAPSULES BY MOUTH DAILY, Disp: 180 capsule, Rfl: 3    losartan (COZAAR) 100 MG tablet, TAKE 1 TABLET BY MOUTH DAILY, Disp: 90 tablet, Rfl: 1    ondansetron (Zofran ODT) 4 mg disintegrating tablet, Take 1 tablet (4 mg total) by mouth every 6 (six) hours as needed for nausea or vomiting, Disp: 20 tablet, Rfl: 0    pantoprazole (PROTONIX) 40 mg tablet, TAKE 1 TABLET BY MOUTH TWICE  DAILY WITH MEALS, Disp: 180 tablet, Rfl: 3    Synthroid 75 MCG tablet, TAKE 1 TABLET BY MOUTH DAILY, Disp: 90 tablet, Rfl: 1    traMADol (Ultram) 50 mg tablet, Take 1 tablet (50 mg total) by mouth every 6 (six) hours as needed for  moderate pain, Disp: 40 tablet, Rfl: 0    traZODone (DESYREL) 100 mg tablet, Take 1 tablet (100 mg total) by mouth daily at bedtime, Disp: 90 tablet, Rfl: 1    aspirin 325 mg tablet, Take 1 tablet (325 mg total) by mouth 2 (two) times a day, Disp: 84 tablet, Rfl: 0    Allergies   Allergen Reactions    Hydrochlorothiazide Other (See Comments)     hyponatremia    Crestor [Rosuvastatin] Other (See Comments)     Nightmares        Objective:  Vitals:    06/06/24 0905   BP: 167/94   Pulse: 60       Body mass index is 25.19 kg/m².    Left Knee Exam     Tenderness   The patient is experiencing tenderness in the medial joint line and lateral joint line.    Range of Motion   Extension:  0   Flexion:  130     Tests   Varus: negative Valgus: negative    Other   Erythema: absent  Scars: present  Sensation: normal  Pulse: present  Swelling: none  Effusion: no effusion present    Comments:  Well healed incision  Patella tracks centrally   Calf compartments are soft and supple  2+ DP and PT pulses with brisk capillary refill to the toes  Sural, saphenous, tibial, superficial, and deep peroneal motor and sensory distributions intact  Sensation light touch intact distally            Observations   Left Knee   Negative for effusion.       Physical Exam  Vitals and nursing note reviewed.   Constitutional:       Appearance: Normal appearance.   HENT:      Head: Normocephalic and atraumatic.      Right Ear: External ear normal.      Left Ear: External ear normal.      Nose: Nose normal.   Eyes:      General: No scleral icterus.     Extraocular Movements: Extraocular movements intact.      Conjunctiva/sclera: Conjunctivae normal.   Cardiovascular:      Rate and Rhythm: Normal rate.   Pulmonary:      Effort: Pulmonary effort is normal. No respiratory distress.   Musculoskeletal:      Cervical back: Normal range of motion and neck supple.      Left knee: No effusion.      Comments: See ortho exam   Skin:     General: Skin is warm and dry.    Neurological:      Mental Status: She is alert and oriented to person, place, and time.   Psychiatric:         Mood and Affect: Mood normal.         Behavior: Behavior normal.         I have personally reviewed pertinent films in PACS.  X-rays of the left knee obtained in the office today demonstrate a well-positioned, well aligned total knee prosthesis.  There is no evidence of fracture or failure present.    This document was created using speech voice recognition software.   Grammatical errors, random word insertions, pronoun errors, and incomplete sentences are an occasional consequence of this system due to software limitations, ambient noise, and hardware issues.   Any formal questions or concerns about content, text, or information contained within the body of this dictation should be directly addressed to the provider for clarification.

## 2024-06-19 DIAGNOSIS — I10 BENIGN ESSENTIAL HYPERTENSION: ICD-10-CM

## 2024-06-20 RX ORDER — AMLODIPINE BESYLATE 5 MG/1
5 TABLET ORAL DAILY
Qty: 90 TABLET | Refills: 1 | Status: SHIPPED | OUTPATIENT
Start: 2024-06-20

## 2024-08-06 ENCOUNTER — RA CDI HCC (OUTPATIENT)
Dept: OTHER | Facility: HOSPITAL | Age: 74
End: 2024-08-06

## 2024-08-20 DIAGNOSIS — I10 BENIGN ESSENTIAL HYPERTENSION: ICD-10-CM

## 2024-08-21 RX ORDER — LOSARTAN POTASSIUM 100 MG/1
100 TABLET ORAL DAILY
Qty: 90 TABLET | Refills: 1 | Status: SHIPPED | OUTPATIENT
Start: 2024-08-21

## 2024-08-27 ENCOUNTER — TELEPHONE (OUTPATIENT)
Dept: OTOLARYNGOLOGY | Facility: CLINIC | Age: 74
End: 2024-08-27

## 2024-09-03 ENCOUNTER — TELEPHONE (OUTPATIENT)
Dept: GASTROENTEROLOGY | Facility: CLINIC | Age: 74
End: 2024-09-03

## 2024-09-03 NOTE — TELEPHONE ENCOUNTER
Pt needs to be scheduled for an Office Visit to discuss a possible repeat colonoscopy for hx of sessile serrated adenoma polyp, hx of colonic polyps (Dr Morse). Called and spoke to pt whom informed she has other issues going on right now and does not wish to schedule right now. She asked me to call her back the end of October.  Will do so per her request.

## 2024-09-04 ENCOUNTER — OFFICE VISIT (OUTPATIENT)
Dept: FAMILY MEDICINE CLINIC | Facility: CLINIC | Age: 74
End: 2024-09-04
Payer: COMMERCIAL

## 2024-09-04 VITALS
DIASTOLIC BLOOD PRESSURE: 80 MMHG | RESPIRATION RATE: 16 BRPM | HEART RATE: 72 BPM | BODY MASS INDEX: 25.19 KG/M2 | SYSTOLIC BLOOD PRESSURE: 136 MMHG | HEIGHT: 60 IN | TEMPERATURE: 96.9 F

## 2024-09-04 DIAGNOSIS — E55.9 VITAMIN D DEFICIENCY: ICD-10-CM

## 2024-09-04 DIAGNOSIS — R19.09 LUMP IN THE GROIN: ICD-10-CM

## 2024-09-04 DIAGNOSIS — E78.5 HYPERLIPIDEMIA, UNSPECIFIED HYPERLIPIDEMIA TYPE: ICD-10-CM

## 2024-09-04 DIAGNOSIS — I10 BENIGN ESSENTIAL HYPERTENSION: ICD-10-CM

## 2024-09-04 DIAGNOSIS — Z00.00 MEDICARE ANNUAL WELLNESS VISIT, SUBSEQUENT: Primary | ICD-10-CM

## 2024-09-04 DIAGNOSIS — F33.0 MILD EPISODE OF RECURRENT MAJOR DEPRESSIVE DISORDER (HCC): ICD-10-CM

## 2024-09-04 DIAGNOSIS — Z12.31 ENCOUNTER FOR SCREENING MAMMOGRAM FOR MALIGNANT NEOPLASM OF BREAST: ICD-10-CM

## 2024-09-04 PROCEDURE — 1159F MED LIST DOCD IN RCRD: CPT | Performed by: FAMILY MEDICINE

## 2024-09-04 PROCEDURE — 1160F RVW MEDS BY RX/DR IN RCRD: CPT | Performed by: FAMILY MEDICINE

## 2024-09-04 PROCEDURE — 1170F FXNL STATUS ASSESSED: CPT | Performed by: FAMILY MEDICINE

## 2024-09-04 PROCEDURE — 3288F FALL RISK ASSESSMENT DOCD: CPT | Performed by: FAMILY MEDICINE

## 2024-09-04 PROCEDURE — G0439 PPPS, SUBSEQ VISIT: HCPCS | Performed by: FAMILY MEDICINE

## 2024-09-04 PROCEDURE — 3725F SCREEN DEPRESSION PERFORMED: CPT | Performed by: FAMILY MEDICINE

## 2024-09-04 PROCEDURE — 1090F PRES/ABSN URINE INCON ASSESS: CPT | Performed by: FAMILY MEDICINE

## 2024-09-04 PROCEDURE — 1125F AMNT PAIN NOTED PAIN PRSNT: CPT | Performed by: FAMILY MEDICINE

## 2024-09-04 PROCEDURE — 3079F DIAST BP 80-89 MM HG: CPT | Performed by: FAMILY MEDICINE

## 2024-09-04 PROCEDURE — 3075F SYST BP GE 130 - 139MM HG: CPT | Performed by: FAMILY MEDICINE

## 2024-09-04 RX ORDER — AMLODIPINE BESYLATE 10 MG/1
10 TABLET ORAL DAILY
Qty: 100 TABLET | Refills: 3 | Status: SHIPPED | OUTPATIENT
Start: 2024-09-04 | End: 2024-09-04 | Stop reason: SDUPTHER

## 2024-09-04 RX ORDER — AMLODIPINE BESYLATE 10 MG/1
10 TABLET ORAL DAILY
Qty: 100 TABLET | Refills: 3 | Status: SHIPPED | OUTPATIENT
Start: 2024-09-04

## 2024-09-04 NOTE — ASSESSMENT & PLAN NOTE
Not fully controlled on prozac, but she doesn't want to increase dose. For now continue current medication regimen.

## 2024-09-04 NOTE — PATIENT INSTRUCTIONS
Medicare Preventive Visit Patient Instructions  Thank you for completing your Welcome to Medicare Visit or Medicare Annual Wellness Visit today. Your next wellness visit will be due in one year (9/5/2025).  The screening/preventive services that you may require over the next 5-10 years are detailed below. Some tests may not apply to you based off risk factors and/or age. Screening tests ordered at today's visit but not completed yet may show as past due. Also, please note that scanned in results may not display below.  Preventive Screenings:  Service Recommendations Previous Testing/Comments   Colorectal Cancer Screening  * Colonoscopy    * Fecal Occult Blood Test (FOBT)/Fecal Immunochemical Test (FIT)  * Fecal DNA/Cologuard Test  * Flexible Sigmoidoscopy Age: 45-75 years old   Colonoscopy: every 10 years (may be performed more frequently if at higher risk)  OR  FOBT/FIT: every 1 year  OR  Cologuard: every 3 years  OR  Sigmoidoscopy: every 5 years  Screening may be recommended earlier than age 45 if at higher risk for colorectal cancer. Also, an individualized decision between you and your healthcare provider will decide whether screening between the ages of 76-85 would be appropriate. Colonoscopy: 11/01/2021  FOBT/FIT: Not on file  Cologuard: Not on file  Sigmoidoscopy: Not on file          Breast Cancer Screening Age: 40+ years old  Frequency: every 1-2 years  Not required if history of left and right mastectomy Mammogram: 10/05/2021        Cervical Cancer Screening Between the ages of 21-29, pap smear recommended once every 3 years.   Between the ages of 30-65, can perform pap smear with HPV co-testing every 5 years.   Recommendations may differ for women with a history of total hysterectomy, cervical cancer, or abnormal pap smears in past. Pap Smear: Not on file        Hepatitis C Screening Once for adults born between 1945 and 1965  More frequently in patients at high risk for Hepatitis C Hep C Antibody:  03/15/2019        Diabetes Screening 1-2 times per year if you're at risk for diabetes or have pre-diabetes Fasting glucose: 101 mg/dL (2/16/2024)  A1C: 5.3 % (2/16/2024)      Cholesterol Screening Once every 5 years if you don't have a lipid disorder. May order more often based on risk factors. Lipid panel: 06/08/2023          Other Preventive Screenings Covered by Medicare:  Abdominal Aortic Aneurysm (AAA) Screening: covered once if your at risk. You're considered to be at risk if you have a family history of AAA.  Lung Cancer Screening: covers low dose CT scan once per year if you meet all of the following conditions: (1) Age 55-77; (2) No signs or symptoms of lung cancer; (3) Current smoker or have quit smoking within the last 15 years; (4) You have a tobacco smoking history of at least 20 pack years (packs per day multiplied by number of years you smoked); (5) You get a written order from a healthcare provider.  Glaucoma Screening: covered annually if you're considered high risk: (1) You have diabetes OR (2) Family history of glaucoma OR (3)  aged 50 and older OR (4)  American aged 65 and older  Osteoporosis Screening: covered every 2 years if you meet one of the following conditions: (1) You're estrogen deficient and at risk for osteoporosis based off medical history and other findings; (2) Have a vertebral abnormality; (3) On glucocorticoid therapy for more than 3 months; (4) Have primary hyperparathyroidism; (5) On osteoporosis medications and need to assess response to drug therapy.   Last bone density test (DXA Scan): Not on file.  HIV Screening: covered annually if you're between the age of 15-65. Also covered annually if you are younger than 15 and older than 65 with risk factors for HIV infection. For pregnant patients, it is covered up to 3 times per pregnancy.    Immunizations:  Immunization Recommendations   Influenza Vaccine Annual influenza vaccination during flu season is  recommended for all persons aged >= 6 months who do not have contraindications   Pneumococcal Vaccine   * Pneumococcal conjugate vaccine = PCV13 (Prevnar 13), PCV15 (Vaxneuvance), PCV20 (Prevnar 20)  * Pneumococcal polysaccharide vaccine = PPSV23 (Pneumovax) Adults 19-63 yo with certain risk factors or if 65+ yo  If never received any pneumonia vaccine: recommend Prevnar 20 (PCV20)  Give PCV20 if previously received 1 dose of PCV13 or PPSV23   Hepatitis B Vaccine 3 dose series if at intermediate or high risk (ex: diabetes, end stage renal disease, liver disease)   Respiratory syncytial virus (RSV) Vaccine - COVERED BY MEDICARE PART D  * RSVPreF3 (Arexvy) CDC recommends that adults 60 years of age and older may receive a single dose of RSV vaccine using shared clinical decision-making (SCDM)   Tetanus (Td) Vaccine - COST NOT COVERED BY MEDICARE PART B Following completion of primary series, a booster dose should be given every 10 years to maintain immunity against tetanus. Td may also be given as tetanus wound prophylaxis.   Tdap Vaccine - COST NOT COVERED BY MEDICARE PART B Recommended at least once for all adults. For pregnant patients, recommended with each pregnancy.   Shingles Vaccine (Shingrix) - COST NOT COVERED BY MEDICARE PART B  2 shot series recommended in those 19 years and older who have or will have weakened immune systems or those 50 years and older     Health Maintenance Due:      Topic Date Due   • DXA SCAN  Never done   • Breast Cancer Screening: Mammogram  10/05/2023   • Hepatitis C Screening  Completed   • Colorectal Cancer Screening  Discontinued     Immunizations Due:      Topic Date Due   • COVID-19 Vaccine (3 - 2023-24 season) 09/01/2024   • Influenza Vaccine (1) 09/01/2024     Advance Directives   What are advance directives?  Advance directives are legal documents that state your wishes and plans for medical care. These plans are made ahead of time in case you lose your ability to make  decisions for yourself. Advance directives can apply to any medical decision, such as the treatments you want, and if you want to donate organs.   What are the types of advance directives?  There are many types of advance directives, and each state has rules about how to use them. You may choose a combination of any of the following:  Living will:  This is a written record of the treatment you want. You can also choose which treatments you do not want, which to limit, and which to stop at a certain time. This includes surgery, medicine, IV fluid, and tube feedings.   Durable power of  for healthcare (DPAHC):  This is a written record that states who you want to make healthcare choices for you when you are unable to make them for yourself. This person, called a proxy, is usually a family member or a friend. You may choose more than 1 proxy.  Do not resuscitate (DNR) order:  A DNR order is used in case your heart stops beating or you stop breathing. It is a request not to have certain forms of treatment, such as CPR. A DNR order may be included in other types of advance directives.  Medical directive:  This covers the care that you want if you are in a coma, near death, or unable to make decisions for yourself. You can list the treatments you want for each condition. Treatment may include pain medicine, surgery, blood transfusions, dialysis, IV or tube feedings, and a ventilator (breathing machine).  Values history:  This document has questions about your views, beliefs, and how you feel and think about life. This information can help others choose the care that you would choose.  Why are advance directives important?  An advance directive helps you control your care. Although spoken wishes may be used, it is better to have your wishes written down. Spoken wishes can be misunderstood, or not followed. Treatments may be given even if you do not want them. An advance directive may make it easier for your family  to make difficult choices about your care.   Weight Management   Why it is important to manage your weight:  Being overweight increases your risk of health conditions such as heart disease, high blood pressure, type 2 diabetes, and certain types of cancer. It can also increase your risk for osteoarthritis, sleep apnea, and other respiratory problems. Aim for a slow, steady weight loss. Even a small amount of weight loss can lower your risk of health problems.  How to lose weight safely:  A safe and healthy way to lose weight is to eat fewer calories and get regular exercise. You can lose up about 1 pound a week by decreasing the number of calories you eat by 500 calories each day.   Healthy meal plan for weight management:  A healthy meal plan includes a variety of foods, contains fewer calories, and helps you stay healthy. A healthy meal plan includes the following:  Eat whole-grain foods more often.  A healthy meal plan should contain fiber. Fiber is the part of grains, fruits, and vegetables that is not broken down by your body. Whole-grain foods are healthy and provide extra fiber in your diet. Some examples of whole-grain foods are whole-wheat breads and pastas, oatmeal, brown rice, and bulgur.  Eat a variety of vegetables every day.  Include dark, leafy greens such as spinach, kale, jodie greens, and mustard greens. Eat yellow and orange vegetables such as carrots, sweet potatoes, and winter squash.   Eat a variety of fruits every day.  Choose fresh or canned fruit (canned in its own juice or light syrup) instead of juice. Fruit juice has very little or no fiber.  Eat low-fat dairy foods.  Drink fat-free (skim) milk or 1% milk. Eat fat-free yogurt and low-fat cottage cheese. Try low-fat cheeses such as mozzarella and other reduced-fat cheeses.  Choose meat and other protein foods that are low in fat.  Choose beans or other legumes such as split peas or lentils. Choose fish, skinless poultry (chicken or  turkey), or lean cuts of red meat (beef or pork). Before you cook meat or poultry, cut off any visible fat.   Use less fat and oil.  Try baking foods instead of frying them. Add less fat, such as margarine, sour cream, regular salad dressing and mayonnaise to foods. Eat fewer high-fat foods. Some examples of high-fat foods include french fries, doughnuts, ice cream, and cakes.  Eat fewer sweets.  Limit foods and drinks that are high in sugar. This includes candy, cookies, regular soda, and sweetened drinks.  Exercise:  Exercise at least 30 minutes per day on most days of the week. Some examples of exercise include walking, biking, dancing, and swimming. You can also fit in more physical activity by taking the stairs instead of the elevator or parking farther away from stores. Ask your healthcare provider about the best exercise plan for you.      © Copyright [employer] 2018 Information is for End User's use only and may not be sold, redistributed or otherwise used for commercial purposes. All illustrations and images included in CareNotes® are the copyrighted property of [employer] Inc[employer] or [employer]

## 2024-09-04 NOTE — ASSESSMENT & PLAN NOTE
BP remains on the higher end of normal despite being on losartan 100mg daily and amlodipine 7.5mg daily. Will increase dose of amlodipine to 10mg. Continue losartan.

## 2024-09-04 NOTE — PROGRESS NOTES
Ambulatory Visit  Name: Ml Barton      : 1950      MRN: [id number]  Encounter Provider: Susie Jason MD  Encounter Date: 2024   Encounter department: [facility]    Assessment & Plan   1. Medicare annual wellness visit, subsequent  2. Hyperlipidemia, unspecified hyperlipidemia type  -     Comprehensive metabolic panel; Future  -     Lipid Panel with Direct LDL reflex; Future  3. Vitamin D deficiency  -     Vitamin D 25 hydroxy; Future  4. Benign essential hypertension  Assessment & Plan:  BP remains on the higher end of normal despite being on losartan 100mg daily and amlodipine 7.5mg daily. Will increase dose of amlodipine to 10mg. Continue losartan.   Orders:  -     amLODIPine (NORVASC) 10 mg tablet; Take 1 tablet (10 mg total) by mouth daily  5. Mild episode of recurrent major depressive disorder (HCC)  Assessment & Plan:  Not fully controlled on prozac, but she doesn't want to increase dose. For now continue current medication regimen.   6. Encounter for screening mammogram for malignant neoplasm of breast  -     Mammo screening bilateral w 3d and cad; Future; Expected date: 2024  7. Lump in the groin  Comments:  Will get US to r/o hernia.  Orders:  -     US groin/inguinal area; Future; Expected date: 2024      Depression Screening and Follow-up Plan: Patient's depression screening was positive with a PHQ-9 score of 13. Patient assessed for underlying major depression. Brief counseling provided and recommend additional follow-up/re-evaluation next office visit.     Falls Plan of Care: balance, strength, and gait training instructions were provided.     Urinary Incontinence Plan of Care: counseling topics discussed: practice Kegel (pelvic floor strengthening) exercises, use restroom every 2 hours and limiting fluid intake 3-4 hours before bed.       Preventive health issues were discussed with patient, and age appropriate screening tests were ordered as noted in  patient's After Visit Summary. Personalized health advice and appropriate referrals for health education or preventive services given if needed, as noted in patient's After Visit Summary.    History of Present Illness     Noticed a lump in her pelvic/groin region about 4 days ago. No significant pain. Noticed after sawing with a power saw and lifting heavy old annamaria.        Patient Care Team:  Meme Dorsey DO as PCP - General  MD Meme Denis DO Vivek Bansal, MD Manoj Mittal, MD    Review of Systems   Constitutional: Negative.    HENT: Negative.     Eyes: Negative.    Respiratory: Negative.     Cardiovascular: Negative.    Gastrointestinal: Negative.    Endocrine: Negative.    Genitourinary: Negative.         Lump in pubic region. Non tender. Slightly mobile.    Musculoskeletal: Negative.    Skin: Negative.    Allergic/Immunologic: Negative.    Neurological: Negative.    Hematological: Negative.    Psychiatric/Behavioral: Negative.       Medical History Reviewed by provider this encounter:  Tobacco  Allergies  Meds  Problems  Med Hx  Surg Hx  Fam Hx       Annual Wellness Visit Questionnaire   Ml is here for her Subsequent Wellness visit.     Health Risk Assessment:   Patient rates overall health as very good. Patient feels that their physical health rating is slightly worse. Patient is satisfied with their life. Eyesight was rated as slightly worse. Hearing was rated as slightly worse. Patient feels that their emotional and mental health rating is same. Patients states they are sometimes angry. Patient states they are always unusually tired/fatigued. Pain experienced in the last 7 days has been some. Patient's pain rating has been 2/10. Patient states that she has experienced weight loss or gain in last 6 months.     Depression Screening:   PHQ-9 Score: 13      Fall Risk Screening:   In the past year, patient has experienced: history of falling in past year    Number of falls: 2 or  more  Injured during fall?: No    Feels unsteady when standing or walking?: No    Worried about falling?: No      Urinary Incontinence Screening:   Patient has leaked urine accidently in the last six months.     Home Safety:  Patient does not have trouble with stairs inside or outside of their home. Patient has working smoke alarms and has working carbon monoxide detector. Home safety hazards include: none.     Nutrition:   Current diet is Low Carb.     Medications:   Patient is currently taking over-the-counter supplements. OTC medications include: see medication list. Patient is able to manage medications.     Activities of Daily Living (ADLs)/Instrumental Activities of Daily Living (IADLs):   Walk and transfer into and out of bed and chair?: Yes  Dress and groom yourself?: Yes    Bathe or shower yourself?: Yes    Feed yourself? Yes  Do your laundry/housekeeping?: Yes  Manage your money, pay your bills and track your expenses?: Yes  Make your own meals?: Yes    Do your own shopping?: Yes    Previous Hospitalizations:   Any hospitalizations or ED visits within the last 12 months?: Yes    How many hospitalizations have you had in the last year?: 1-2    Advance Care Planning:   Living will: Yes    Durable POA for healthcare: No    Advanced directive: Yes      PREVENTIVE SCREENINGS      Cardiovascular Screening:    General: Screening Not Indicated and History Lipid Disorder      Diabetes Screening:     General: Screening Current      Colorectal Cancer Screening:     General: Screening Current      Breast Cancer Screening:     General: Risks and Benefits Discussed    Due for: Mammogram        Cervical Cancer Screening:    General: Screening Not Indicated      Osteoporosis Screening:    General: Risks and Benefits Discussed and Patient Declines      Abdominal Aortic Aneurysm (AAA) Screening:        General: Screening Not Indicated      Lung Cancer Screening:     General: Screening Not Indicated      Hepatitis C  Screening:    General: Screening Current    Screening, Brief Intervention, and Referral to Treatment (SBIRT)    Screening  Typical number of drinks in a day: 0  Typical number of drinks in a week: 2  Interpretation: Low risk drinking behavior.    Single Item Drug Screening:  How often have you used an illegal drug (including marijuana) or a prescription medication for non-medical reasons in the past year? never    Single Item Drug Screen Score: 0  Interpretation: Negative screen for possible drug use disorder    Brief Intervention  Alcohol & drug use screenings were reviewed. No concerns regarding substance use disorder identified.     Social Determinants of Health     Financial Resource Strain: Low Risk  (3/16/2023)    Overall Financial Resource Strain (CARDIA)     Difficulty of Paying Living Expenses: Not hard at all   Food Insecurity: No Food Insecurity (9/4/2024)    Hunger Vital Sign     Worried About Running Out of Food in the Last Year: Never true     Ran Out of Food in the Last Year: Never true   Transportation Needs: No Transportation Needs (9/4/2024)    PRAPARE - Transportation     Lack of Transportation (Medical): No     Lack of Transportation (Non-Medical): No   Housing Stability: Low Risk  (9/4/2024)    Housing Stability Vital Sign     Unable to Pay for Housing in the Last Year: No     Number of Times Moved in the Last Year: 0     Homeless in the Last Year: No   Utilities: Not At Risk (9/4/2024)    [facility] Utilities     Threatened with loss of utilities: No     No results found.    Objective     /80   Pulse 72   Temp (!) 96.9 °F (36.1 °C) (Tympanic)   Resp 16   Ht 5' (1.524 m)   BMI 25.19 kg/m²     Physical Exam  Vitals and nursing note reviewed.   Constitutional:       General: She is not in acute distress.     Appearance: She is well-developed.   HENT:      Head: Normocephalic and atraumatic.      Right Ear: Tympanic membrane, ear canal and external ear normal. There is no impacted cerumen.       Left Ear: Tympanic membrane, ear canal and external ear normal. There is no impacted cerumen.      Nose: Nose normal.      Mouth/Throat:      Mouth: Mucous membranes are moist.   Eyes:      Conjunctiva/sclera: Conjunctivae normal.   Cardiovascular:      Rate and Rhythm: Normal rate and regular rhythm.      Heart sounds: No murmur heard.  Pulmonary:      Effort: Pulmonary effort is normal. No respiratory distress.      Breath sounds: Normal breath sounds.   Abdominal:      General: Abdomen is flat. There is no distension.      Palpations: Abdomen is soft. There is no mass.      Tenderness: There is no abdominal tenderness. There is no guarding or rebound.      Hernia: No hernia is present.   Genitourinary:     Comments: Lump in pubic region- non tender- possible hernia  Musculoskeletal:         General: Normal range of motion.      Cervical back: Neck supple.   Skin:     General: Skin is warm and dry.   Neurological:      General: No focal deficit present.      Mental Status: She is alert and oriented to person, place, and time.

## 2024-09-05 ENCOUNTER — OFFICE VISIT (OUTPATIENT)
Dept: AUDIOLOGY | Facility: CLINIC | Age: 74
End: 2024-09-05

## 2024-09-05 DIAGNOSIS — H90.3 SENSORY HEARING LOSS, BILATERAL: Primary | ICD-10-CM

## 2024-09-06 ENCOUNTER — APPOINTMENT (OUTPATIENT)
Dept: LAB | Facility: CLINIC | Age: 74
End: 2024-09-06
Payer: COMMERCIAL

## 2024-09-06 DIAGNOSIS — E03.9 PRIMARY HYPOTHYROIDISM: ICD-10-CM

## 2024-09-06 DIAGNOSIS — E55.9 VITAMIN D DEFICIENCY: ICD-10-CM

## 2024-09-06 DIAGNOSIS — E78.5 HYPERLIPIDEMIA, UNSPECIFIED HYPERLIPIDEMIA TYPE: ICD-10-CM

## 2024-09-06 LAB
ALBUMIN SERPL BCG-MCNC: 4.3 G/DL (ref 3.5–5)
ALP SERPL-CCNC: 68 U/L (ref 34–104)
ALT SERPL W P-5'-P-CCNC: 8 U/L (ref 7–52)
ANION GAP SERPL CALCULATED.3IONS-SCNC: 6 MMOL/L (ref 4–13)
AST SERPL W P-5'-P-CCNC: 13 U/L (ref 13–39)
BILIRUB SERPL-MCNC: 0.5 MG/DL (ref 0.2–1)
BUN SERPL-MCNC: 21 MG/DL (ref 5–25)
CALCIUM SERPL-MCNC: 9.5 MG/DL (ref 8.4–10.2)
CHLORIDE SERPL-SCNC: 101 MMOL/L (ref 96–108)
CHOLEST SERPL-MCNC: 257 MG/DL
CO2 SERPL-SCNC: 29 MMOL/L (ref 21–32)
CREAT SERPL-MCNC: 0.73 MG/DL (ref 0.6–1.3)
GFR SERPL CREATININE-BSD FRML MDRD: 80 ML/MIN/1.73SQ M
GLUCOSE P FAST SERPL-MCNC: 88 MG/DL (ref 65–99)
HDLC SERPL-MCNC: 69 MG/DL
LDLC SERPL CALC-MCNC: 172 MG/DL (ref 0–100)
POTASSIUM SERPL-SCNC: 4 MMOL/L (ref 3.5–5.3)
PROT SERPL-MCNC: 6.9 G/DL (ref 6.4–8.4)
SODIUM SERPL-SCNC: 136 MMOL/L (ref 135–147)
TRIGL SERPL-MCNC: 81 MG/DL

## 2024-09-06 PROCEDURE — 80061 LIPID PANEL: CPT

## 2024-09-06 PROCEDURE — 36415 COLL VENOUS BLD VENIPUNCTURE: CPT

## 2024-09-06 PROCEDURE — 82306 VITAMIN D 25 HYDROXY: CPT

## 2024-09-06 PROCEDURE — 84443 ASSAY THYROID STIM HORMONE: CPT

## 2024-09-06 PROCEDURE — 80053 COMPREHEN METABOLIC PANEL: CPT

## 2024-09-06 PROCEDURE — 84439 ASSAY OF FREE THYROXINE: CPT

## 2024-09-07 LAB
25(OH)D3 SERPL-MCNC: 29.5 NG/ML (ref 30–100)
T4 FREE SERPL-MCNC: 1.14 NG/DL (ref 0.61–1.12)
TSH SERPL DL<=0.05 MIU/L-ACNC: 1.94 UIU/ML (ref 0.45–4.5)

## 2024-09-09 NOTE — PROGRESS NOTES
Hearing Aid Visit:    Name:  Ml Barton  :  1950  Age:  74 y.o.  MRN:  [id number]  Date of Evaluation: 2024    HISTORY:    Ml Barton was seen today (2024) for a(n) in-warranty hearing aid check of her bilateral hearing aids. Today, Ml has not been seen since 2023 due to medical issues / constraints.   Today, she reports that she continues to push in the devices to hear better.   She has not worn them consistently due to this issue.      DEVICE INFORMATION:  Hearing Aid Fitting Date: 2023          Left Device Right Device   Hearing Aid Make: [employer] OtLinguastat   Hearing Aid Model: Real 3 Real 3   Serial Number: [id number]   Repair Warranty Expiration Date: 2026   Loss/Damage Warranty Expiration Date:  2026    Length: 2 2    Output: 85 85   Wax System: [employer]   Dome Size/Style: 6 mm double vent 6 mm double vent   Battery: Lithium-ion Rechargeable Lithium-ion Rechargeable   Earmold Serial Number: N/A N/A   Earmold Warranty Date:  N/A N/A       Serial Number: [id number]  Accessories: N/A    ACTION/ADJUSTMENTS:  Canals were clear   Cleaned and checked both devices, changed all parts.   Added canal locks for more stability.    Firmware to 1.1.1    Checked and fine tuned settings     RECOMMENDATIONS:   Inquired with  regarding obtaining custom molds at no charge.  Due to the aids being over a year, it was not approved for [place].   Patient will be advised.    Called patient 24:  she would like to proceed with custom molds, will need to call back for impression appointment with me.       Carolyn Myrick, CCC-A  Clinical Audiologist  [id number]  [facility] AUDIOLOGY  [address] [address]

## 2024-09-10 ENCOUNTER — HOSPITAL ENCOUNTER (OUTPATIENT)
Dept: RADIOLOGY | Facility: HOSPITAL | Age: 74
Discharge: HOME | End: 2024-09-10
Attending: FAMILY MEDICINE
Payer: COMMERCIAL

## 2024-09-10 DIAGNOSIS — R19.09 LUMP IN THE GROIN: ICD-10-CM

## 2024-09-10 PROCEDURE — 76705 ECHO EXAM OF ABDOMEN: CPT

## 2024-09-14 DIAGNOSIS — E03.9 PRIMARY HYPOTHYROIDISM: ICD-10-CM

## 2024-09-15 RX ORDER — LEVOTHYROXINE SODIUM 75 MCG
75 TABLET ORAL DAILY
Qty: 90 TABLET | Refills: 1 | Status: SHIPPED | OUTPATIENT
Start: 2024-09-15

## 2024-09-23 ENCOUNTER — TELEPHONE (OUTPATIENT)
Age: 74
End: 2024-09-23

## 2024-09-23 ENCOUNTER — HOSPITAL ENCOUNTER (EMERGENCY)
Facility: HOSPITAL | Age: 74
Discharge: HOME | End: 2024-09-23
Attending: EMERGENCY MEDICINE
Payer: COMMERCIAL

## 2024-09-23 ENCOUNTER — APPOINTMENT (EMERGENCY)
Dept: RADIOLOGY | Facility: HOSPITAL | Age: 74
End: 2024-09-23
Payer: COMMERCIAL

## 2024-09-23 VITALS
DIASTOLIC BLOOD PRESSURE: 86 MMHG | OXYGEN SATURATION: 98 % | HEART RATE: 80 BPM | RESPIRATION RATE: 18 BRPM | TEMPERATURE: 97.3 F | SYSTOLIC BLOOD PRESSURE: 194 MMHG

## 2024-09-23 DIAGNOSIS — S09.93XA FACIAL TRAUMA, INITIAL ENCOUNTER: Primary | ICD-10-CM
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
## 2024-09-23 PROCEDURE — 72125 CT NECK SPINE W/O DYE: CPT

## 2024-09-23 PROCEDURE — 70450 CT HEAD/BRAIN W/O DYE: CPT

## 2024-09-23 PROCEDURE — 99284 EMERGENCY DEPT VISIT MOD MDM: CPT

## 2024-09-23 PROCEDURE — 99284 EMERGENCY DEPT VISIT MOD MDM: CPT | Performed by: EMERGENCY MEDICINE

## 2024-09-23 PROCEDURE — 70486 CT MAXILLOFACIAL W/O DYE: CPT

## 2024-09-23 RX ORDER — IBUPROFEN 400 MG/1
400 TABLET, FILM COATED ORAL ONCE
Status: COMPLETED | OUTPATIENT
Start: 2024-09-23 | End: 2024-09-23

## 2024-09-23 RX ADMIN — IBUPROFEN 400 MG: 400 TABLET, FILM COATED ORAL at 09:53

## 2024-09-23 NOTE — ED PROVIDER NOTES
"1. Facial trauma, initial encounter    2. Fall, initial encounter      ED Disposition       ED Disposition   Discharge    Condition   Stable    Date/Time   Mon Sep 23, 2024 11:14 AM    Comment   Ml Barton discharge to home/self care.                   Assessment & Plan       Medical Decision Making  Pt is a 73yo F who presents with facial trauma.     Differential diagnosis to include but not limited to fracture, ICH, hematoma.  Will plan for imaging and will treat symptomatically.  See ED course for results and details.    Plan to discharge pt with f/u to PCP. Discussed returning the ED with new or worsening of symptoms. Discussed use of over the counter medications as stated on the bottle as needed for pain. Pt expressed understanding of discharge instructions, return precautions, and medication instructions and is stable for discharge at this time. All questions were answered and pt was discharged without incident.       Amount and/or Complexity of Data Reviewed  Radiology: ordered. Decision-making details documented in ED Course.    Risk  Prescription drug management.                ED Course as of 09/23/24 1529   Mon Sep 23, 2024   1017 Pt in CT.    1059 CTs being read.    1111 CT head wo contrast  No acute intracranial abnormality.   1112 CT facial bones without contrast  Left infraorbital and zygomatic soft tissue edema without underlying acute bony fracture   1112 CT spine cervical without contrast  No cervical spine fracture or traumatic malalignment.       Medications   ibuprofen (MOTRIN) tablet 400 mg (400 mg Oral Given 9/23/24 0953)       History of Present Illness       Pt is a 73yo F who presents for facial trauma.  Patient reports that recently she has had increased frequency of \"acting out her dreams\".  Patient states that due to this last night she fell.  Patient states that she had gotten out of bed to \"act out her dream\" and subsequently fell striking the left side of her face on a marble " table.  Patient states that she subsequently woke up.  Patient reports pain surrounding the left eye and therefore came in for further evaluation.  Patient denies any pain with extraocular movement.  Patient does report a mild headache.  Patient also reporting neck pain.  Patient states that she believes she landed on her left knee, however states that she has been able to ambulate on it without difficulty.  Patient is not on any aspirin or blood thinners.          Objective     ED Triage Vitals [09/23/24 0934]   Temperature Pulse Blood Pressure Respirations SpO2 Patient Position - Orthostatic VS   (!) 97.3 °F (36.3 °C) 80 (!) 194/86 18 98 % --      Temp Source Heart Rate Source BP Location FiO2 (%) Pain Score    Tympanic -- -- -- No Pain        Physical Exam  Vitals reviewed.   Constitutional:       General: She is not in acute distress.     Appearance: She is well-developed. She is not toxic-appearing or diaphoretic.   HENT:      Head: Normocephalic.        Right Ear: External ear normal.      Left Ear: External ear normal.      Nose: Nose normal.      Mouth/Throat:      Pharynx: Oropharynx is clear.   Eyes:      Extraocular Movements: Extraocular movements intact.      Pupils: Pupils are equal, round, and reactive to light.   Cardiovascular:      Rate and Rhythm: Normal rate and regular rhythm.      Heart sounds: Normal heart sounds. No murmur heard.  Pulmonary:      Effort: Pulmonary effort is normal. No respiratory distress.      Breath sounds: Normal breath sounds.   Abdominal:      General: There is no distension.      Palpations: Abdomen is soft.      Tenderness: There is no abdominal tenderness.   Musculoskeletal:         General: Normal range of motion.      Cervical back: Normal range of motion and neck supple.      Right lower leg: No edema.      Left lower leg: No edema.   Skin:     General: Skin is warm and dry.      Capillary Refill: Capillary refill takes less than 2 seconds.      Coloration: Skin is  not pale.      Findings: No erythema or rash.   Neurological:      General: No focal deficit present.      Mental Status: She is alert and oriented to person, place, and time.   Psychiatric:         Speech: Speech normal.         Behavior: Behavior is cooperative.         Labs Reviewed - No data to display  CT head wo contrast   Final Interpretation by Kerrie Milner MD (09/23 1107)      No acute intracranial abnormality.                  Workstation performed: [id number]         CT spine cervical without contrast   Final Interpretation by Kerrie Milner MD (09/23 1107)      No cervical spine fracture or traumatic malalignment. Mild degenerative changes of the visualized cervical spine without critical spinal canal stenosis.                  Workstation performed: [id number]         CT facial bones without contrast   Final Interpretation by Kerrie Milner MD (09/23 1107)      Left infraorbital and zygomatic soft tissue edema without underlying acute bony fracture               Workstation performed: [id number]             Procedures    ED Medication and Procedure Management   Prior to Admission Medications   Prescriptions Last Dose Informant Patient Reported? Taking?   Alum & Mag Hydroxide-Simeth (MYLANTA PO)  Self Yes No   Sig: Take by mouth as needed   FLUoxetine (PROzac) 10 mg capsule   No No   Sig: TAKE 2 CAPSULES BY MOUTH DAILY   Synthroid 75 MCG tablet   No No   Sig: TAKE 1 TABLET BY MOUTH DAILY   amLODIPine (NORVASC) 10 mg tablet   No No   Sig: Take 1 tablet (10 mg total) by mouth daily   dicyclomine (BENTYL) 10 mg capsule   No No   Sig: TAKE 1 CAPSULE BY MOUTH AT  AT BEDTIME AS NEEDED   docusate sodium (COLACE) 100 mg capsule   No No   Sig: Take 1 capsule (100 mg total) by mouth 2 (two) times a day   losartan (COZAAR) 100 MG tablet   No No   Sig: TAKE 1 TABLET BY MOUTH DAILY   pantoprazole (PROTONIX) 40 mg tablet   No No   Sig: TAKE 1 TABLET BY MOUTH TWICE  DAILY WITH MEALS   traZODone (DESYREL)  100 mg tablet   No No   Sig: Take 1 tablet (100 mg total) by mouth daily at bedtime      Facility-Administered Medications: None     Discharge Medication List as of 9/23/2024 11:15 AM        CONTINUE these medications which have NOT CHANGED    Details   Alum & Mag Hydroxide-Simeth (MYLANTA PO) Take by mouth as needed, Historical Med      amLODIPine (NORVASC) 10 mg tablet Take 1 tablet (10 mg total) by mouth daily, Starting Wed 9/4/2024, Normal      dicyclomine (BENTYL) 10 mg capsule TAKE 1 CAPSULE BY MOUTH AT  AT BEDTIME AS NEEDED, Normal      docusate sodium (COLACE) 100 mg capsule Take 1 capsule (100 mg total) by mouth 2 (two) times a day, Starting Mon 3/11/2024, Normal      FLUoxetine (PROzac) 10 mg capsule TAKE 2 CAPSULES BY MOUTH DAILY, Starting Fri 4/26/2024, Normal      losartan (COZAAR) 100 MG tablet TAKE 1 TABLET BY MOUTH DAILY, Starting Wed 8/21/2024, Normal      pantoprazole (PROTONIX) 40 mg tablet TAKE 1 TABLET BY MOUTH TWICE  DAILY WITH MEALS, Normal      Synthroid 75 MCG tablet TAKE 1 TABLET BY MOUTH DAILY, Starting Sun 9/15/2024, Normal      traZODone (DESYREL) 100 mg tablet Take 1 tablet (100 mg total) by mouth daily at bedtime, Starting Tue 12/5/2023, Normal           No discharge procedures on file.     Linda Calderon MD  09/23/24 [id number]

## 2024-09-23 NOTE — TELEPHONE ENCOUNTER
Patient called states is having very vivid dreams like she is in them. Fell out of bed and hit left eye on corner of dresser and is swollen and painful, advised to go to ER for evaluation states feels like she has a hang over. States one time before this she was dreaming she was in a car and wanted to get out and jumped out of bed, scared her . Does not know why she is having these types of dreams lately

## 2024-09-24 ENCOUNTER — VBI (OUTPATIENT)
Dept: FAMILY MEDICINE CLINIC | Facility: CLINIC | Age: 74
End: 2024-09-24

## 2024-09-24 NOTE — TELEPHONE ENCOUNTER
09/24/24 9:18 AM    Patient contacted post ED visit, [facility] department spoke with patient/caregiver and outreach was successful.    Thank you.  Savage Berman MA  [facility]

## 2024-09-25 ENCOUNTER — OFFICE VISIT (OUTPATIENT)
Dept: FAMILY MEDICINE CLINIC | Facility: CLINIC | Age: 74
End: 2024-09-25
Payer: COMMERCIAL

## 2024-09-25 VITALS
HEIGHT: 61 IN | HEART RATE: 68 BPM | SYSTOLIC BLOOD PRESSURE: 124 MMHG | BODY MASS INDEX: 24.37 KG/M2 | DIASTOLIC BLOOD PRESSURE: 70 MMHG | RESPIRATION RATE: 16 BRPM | TEMPERATURE: 96.9 F

## 2024-09-25 DIAGNOSIS — F51.4 NIGHT TERRORS: Primary | ICD-10-CM

## 2024-09-25 DIAGNOSIS — G47.00 INSOMNIA, UNSPECIFIED TYPE: ICD-10-CM

## 2024-09-25 DIAGNOSIS — F41.1 GENERALIZED ANXIETY DISORDER: ICD-10-CM

## 2024-09-25 PROCEDURE — G2211 COMPLEX E/M VISIT ADD ON: HCPCS | Performed by: FAMILY MEDICINE

## 2024-09-25 PROCEDURE — 99214 OFFICE O/P EST MOD 30 MIN: CPT | Performed by: FAMILY MEDICINE

## 2024-09-25 RX ORDER — ALPRAZOLAM 0.25 MG
0.25 TABLET ORAL
Qty: 30 TABLET | Refills: 0 | Status: SHIPPED | OUTPATIENT
Start: 2024-09-25

## 2024-09-25 NOTE — PROGRESS NOTES
Ambulatory Visit  Name: Ml Barton      : 1950      MRN: [id number]  Encounter Provider: Susie Jason MD  Encounter Date: 2024   Encounter department: [facility]    Assessment & Plan  Night terrors    Orders:    Ambulatory Referral to Sleep Medicine; Future    Ambulatory referral to Psych Services; Future    Generalized anxiety disorder    Orders:    Ambulatory referral to Psych Services; Future    ALPRAZolam (XANAX) 0.25 mg tablet; Take 1 tablet (0.25 mg total) by mouth daily at bedtime as needed for anxiety or sleep for up to 30 doses    Insomnia, unspecified type    Orders:    ALPRAZolam (XANAX) 0.25 mg tablet; Take 1 tablet (0.25 mg total) by mouth daily at bedtime as needed for anxiety or sleep for up to 30 doses        She has had night terrors and insomnia for many years. Has tried and failed multiple OTC sleep aids and for the past couple of years has been doing well on trazodone, however her night terrors worsened with trazodone use. She does struggle with significant anxiety as well with makes her symptoms worse. For now will d/c trazodone and trial just xanax at bedtime which she states has helped in the past. Recommend starting cognitive behavioral therapy and getting evaluated by sleep medicine.      History of Present Illness     HPI  Ml is here for ER f/u visit after a fall out of bed during a night terror that led to facial trauma on 24.   She had CT head, CT cervical spine and CT facial bones with no acute abnormalities other than left infraorbital and zygomatic soft tissue edema.   Today she reports being afraid of her night terrors. They have been getting worse. She is violent during her sleep. Feels like someone is always chasing her and she has to fight back     Review of Systems   Constitutional: Negative.    HENT: Negative.     Eyes: Negative.    Respiratory: Negative.     Cardiovascular: Negative.    Gastrointestinal: Negative.    Endocrine: Negative.  "   Genitourinary: Negative.    Musculoskeletal: Negative.    Skin: Negative.    Allergic/Immunologic: Negative.    Neurological: Negative.    Hematological: Negative.    Psychiatric/Behavioral:  Positive for sleep disturbance. The patient is nervous/anxious.            Objective     /70   Pulse 68   Temp (!) 96.9 °F (36.1 °C) (Tympanic)   Resp 16   Ht 5' 1\" (1.549 m)   BMI 24.37 kg/m²     Physical Exam  Constitutional:       General: She is not in acute distress.     Appearance: She is well-developed. She is not diaphoretic.   HENT:      Head: Normocephalic and atraumatic.   Cardiovascular:      Rate and Rhythm: Normal rate and regular rhythm.      Heart sounds: Normal heart sounds. No murmur heard.     No friction rub. No gallop.   Pulmonary:      Effort: Pulmonary effort is normal. No respiratory distress.      Breath sounds: Normal breath sounds. No wheezing or rales.   Chest:      Chest wall: No tenderness.   Musculoskeletal:         General: No deformity. Normal range of motion.      Cervical back: Normal range of motion and neck supple.   Skin:     General: Skin is warm and dry.      Findings: Bruising (around left eye post injury) present.   Neurological:      Mental Status: She is alert and oriented to person, place, and time.   Psychiatric:         Behavior: Behavior normal.         Thought Content: Thought content normal.         Judgment: Judgment normal.         "

## 2024-09-26 ENCOUNTER — TELEPHONE (OUTPATIENT)
Age: 74
End: 2024-09-26

## 2024-09-26 NOTE — TELEPHONE ENCOUNTER
Contacted patient in regards to Routine Referral in attempts to verify patient's needs of services and add patient to proper wait list. spoke with patient whom stated she is interested in talk therapy at [facility] only. Patient has been added to proper wait list. Patient stated she does not want to be call right away and she would like to wait few months. Closing referral.

## 2024-10-01 ENCOUNTER — OFFICE VISIT (OUTPATIENT)
Dept: AUDIOLOGY | Facility: CLINIC | Age: 74
End: 2024-10-01

## 2024-10-01 DIAGNOSIS — H90.3 SENSORY HEARING LOSS, BILATERAL: Primary | ICD-10-CM

## 2024-10-03 NOTE — PROGRESS NOTES
"Progress Note    Name:  Ml Barton  :  1950  Age:  74 y.o.  MRN:  [id number]  Date of Evaluation: 10/1/2024    HISTORY:    The patient was seen for earmold impressions for custom CHAIM molds.    She continued to report that \"if she pushes the domes in, She can hear better\".       She is aware that, due to the age of the aids, she has to pay for the earmolds.      Order sent 10/3/24 / patient scheduled for  on 10/21/2024    Carolyn Myrick, CCC-A  Clinical Audiologist  [id number]  [phone]  "

## 2024-10-16 ENCOUNTER — RA CDI HCC (OUTPATIENT)
Dept: OTHER | Facility: HOSPITAL | Age: 74
End: 2024-10-16

## 2024-10-21 ENCOUNTER — OFFICE VISIT (OUTPATIENT)
Dept: AUDIOLOGY | Facility: CLINIC | Age: 74
End: 2024-10-21
Payer: COMMERCIAL

## 2024-10-21 DIAGNOSIS — H90.3 SENSORY HEARING LOSS, BILATERAL: Primary | ICD-10-CM

## 2024-10-21 PROCEDURE — V5264 EAR MOLD/INSERT: HCPCS | Performed by: AUDIOLOGIST

## 2024-10-21 NOTE — PROGRESS NOTES
Hearing Aid Visit:    Name:  Ml Barton  :  1950  Age:  74 y.o.  MRN:  [id number]  Date of Evaluation: 10/21/24     HISTORY:    Ml Barton was seen today (10/21/2024) for a(n) in-warranty hearing aid check of her bilateral hearing aids. Today, Ml is here to have new micromolds fit to her devices.    Most recent Audiogram: 2023    DEVICE INFORMATION:    Hearing Aid Fitting Date: 2023       Left Device Right Device   Hearing Aid Make: OtTempMine Oticon   Hearing Aid Model: Real 3 Real 3   Serial Number: [id number]   Repair Warranty Expiration Date: 2026   Loss/Damage Warranty Expiration Date:  2026    Length: 2 2    Output: 85 85   Wax System: ProWax ProWax   Dome Size/Style: 6 mm double vent 6 mm double vent   Battery: Lithium-ion Rechargeable Lithium-ion Rechargeable   Earmold Serial Number: [id number]   Earmold Warranty Date:  2025       Serial Number: [id number]  Accessories: N/A    ACTION/ADJUSTMENTS:  Placed earmolds and fine tuned devices slightly for her voice   They fit well, but she needs to place entirely into the ear canal / discussed   Provided Pro Wax guards     RECOMMENDATIONS:   Advised patient to call in ASAP if there is any issue with the fit or sound   RTO 2025 for regular DACOSTA maintenance     Carolyn Myrick, CCC-A  Clinical Audiologist  [id number]  [facility] PROFESSION[facility] AUDIOLOG[facility]  [address] [address]

## 2024-10-23 ENCOUNTER — OFFICE VISIT (OUTPATIENT)
Dept: FAMILY MEDICINE CLINIC | Facility: CLINIC | Age: 74
End: 2024-10-23
Payer: COMMERCIAL

## 2024-10-23 VITALS
DIASTOLIC BLOOD PRESSURE: 84 MMHG | RESPIRATION RATE: 16 BRPM | HEIGHT: 61 IN | SYSTOLIC BLOOD PRESSURE: 112 MMHG | BODY MASS INDEX: 24.37 KG/M2 | TEMPERATURE: 96.3 F | HEART RATE: 60 BPM

## 2024-10-23 DIAGNOSIS — I10 BENIGN ESSENTIAL HYPERTENSION: Primary | ICD-10-CM

## 2024-10-23 DIAGNOSIS — F33.0 MILD EPISODE OF RECURRENT MAJOR DEPRESSIVE DISORDER (HCC): ICD-10-CM

## 2024-10-23 DIAGNOSIS — G25.81 RESTLESS LEG SYNDROME: ICD-10-CM

## 2024-10-23 DIAGNOSIS — F41.1 GENERALIZED ANXIETY DISORDER: ICD-10-CM

## 2024-10-23 PROBLEM — R10.13 EPIGASTRIC PAIN: Status: RESOLVED | Noted: 2021-10-27 | Resolved: 2024-10-23

## 2024-10-23 PROBLEM — R51.9 HEADACHE: Status: RESOLVED | Noted: 2017-10-29 | Resolved: 2024-10-23

## 2024-10-23 PROCEDURE — 99214 OFFICE O/P EST MOD 30 MIN: CPT | Performed by: FAMILY MEDICINE

## 2024-10-23 PROCEDURE — G2211 COMPLEX E/M VISIT ADD ON: HCPCS | Performed by: FAMILY MEDICINE

## 2024-10-23 RX ORDER — GABAPENTIN 300 MG/1
300 CAPSULE ORAL
Qty: 90 CAPSULE | Refills: 0 | Status: SHIPPED | OUTPATIENT
Start: 2024-10-23

## 2024-10-23 NOTE — PROGRESS NOTES
"Ambulatory Visit  Name: Ml Barton      : 1950      MRN: [id number]  Encounter Provider: Susie Jason MD  Encounter Date: 10/23/2024   Encounter department: [facility]    Assessment & Plan  Benign essential hypertension  Controlled on amlodipine and losartan.          Generalized anxiety disorder  She tried xanax at bedtime, but it made her depressed the next day so stopped. Continue Prozac.          Restless leg syndrome  She was previously on gabapentin which helped her. Will restart.   Orders:    gabapentin (NEURONTIN) 300 mg capsule; Take 1 capsule (300 mg total) by mouth daily at bedtime    Mild episode of recurrent major depressive disorder (HCC)  Controlled on Prozac.             History of Present Illness     HPI  Ml Barton is a 74 y.o. female who is here today for routine follow up visit.   Reports no acute issues/concerns in office today.   Has been compliant with current medications.      Review of Systems   Constitutional: Negative.    HENT: Negative.     Eyes: Negative.    Respiratory: Negative.     Cardiovascular: Negative.    Gastrointestinal: Negative.    Endocrine: Negative.    Genitourinary: Negative.    Musculoskeletal: Negative.    Skin: Negative.    Allergic/Immunologic: Negative.    Neurological: Negative.    Hematological: Negative.    Psychiatric/Behavioral: Negative.             Objective     /84   Pulse 60   Temp (!) 96.3 °F (35.7 °C)   Resp 16   Ht 5' 1\" (1.549 m)   BMI 24.37 kg/m²     Physical Exam  Constitutional:       General: She is not in acute distress.     Appearance: She is well-developed. She is not diaphoretic.   HENT:      Head: Normocephalic and atraumatic.   Cardiovascular:      Rate and Rhythm: Normal rate and regular rhythm.      Heart sounds: Normal heart sounds. No murmur heard.     No friction rub. No gallop.   Pulmonary:      Effort: Pulmonary effort is normal. No respiratory distress.      Breath sounds: Normal breath " sounds. No wheezing or rales.   Chest:      Chest wall: No tenderness.   Musculoskeletal:         General: No deformity. Normal range of motion.      Cervical back: Normal range of motion and neck supple.   Skin:     General: Skin is warm and dry.   Neurological:      Mental Status: She is alert and oriented to person, place, and time.   Psychiatric:         Behavior: Behavior normal.         Thought Content: Thought content normal.         Judgment: Judgment normal.

## 2024-10-23 NOTE — ASSESSMENT & PLAN NOTE
She was previously on gabapentin which helped her. Will restart.   Orders:    gabapentin (NEURONTIN) 300 mg capsule; Take 1 capsule (300 mg total) by mouth daily at bedtime

## 2024-10-23 NOTE — ASSESSMENT & PLAN NOTE
She tried xanax at bedtime, but it made her depressed the next day so stopped. Continue Prozac.

## 2024-11-13 DIAGNOSIS — R10.13 EPIGASTRIC PAIN: ICD-10-CM

## 2024-11-13 DIAGNOSIS — K21.00 HIATAL HERNIA WITH GERD AND ESOPHAGITIS: ICD-10-CM

## 2024-11-13 DIAGNOSIS — K44.9 HIATAL HERNIA WITH GERD AND ESOPHAGITIS: ICD-10-CM

## 2024-11-14 RX ORDER — PANTOPRAZOLE SODIUM 40 MG/1
40 TABLET, DELAYED RELEASE ORAL 2 TIMES DAILY WITH MEALS
Qty: 180 TABLET | Refills: 1 | Status: SHIPPED | OUTPATIENT
Start: 2024-11-14

## 2025-01-09 NOTE — TELEPHONE ENCOUNTER
8/31/2023 8:46 AM returned call to [facility] to stop the Hydrochlorothiazide due to hyponatremia and hypercalcemia. Will recheck BMP in 2 weeks.      Carline Burnette DO
Apt made, NFA
Clerical -  Please call her and schedule a blood pressure check in 2 weeks  Thank you,  Brigido Peterson, DO
DR MIX    Patient needs to speak to you. She has a mass on her adreanal gland and is concerned about how she feels.
Home

## 2025-01-15 ENCOUNTER — APPOINTMENT (EMERGENCY)
Dept: RADIOLOGY | Facility: HOSPITAL | Age: 75
End: 2025-01-15
Payer: COMMERCIAL

## 2025-01-15 ENCOUNTER — HOSPITAL ENCOUNTER (EMERGENCY)
Facility: HOSPITAL | Age: 75
Discharge: HOME | End: 2025-01-15
Attending: EMERGENCY MEDICINE
Payer: COMMERCIAL

## 2025-01-15 VITALS
DIASTOLIC BLOOD PRESSURE: 84 MMHG | RESPIRATION RATE: 22 BRPM | OXYGEN SATURATION: 98 % | SYSTOLIC BLOOD PRESSURE: 151 MMHG | TEMPERATURE: 97.2 F | HEART RATE: 65 BPM

## 2025-01-15 DIAGNOSIS — R51.9 HEADACHE: Primary | ICD-10-CM

## 2025-01-15 DIAGNOSIS — I10 HYPERTENSION: ICD-10-CM

## 2025-01-15 LAB
ATRIAL RATE: 58 BPM
P AXIS: 72 DEGREES
PR INTERVAL: 178 MS
QRS AXIS: 67 DEGREES
QRSD INTERVAL: 84 MS
QT INTERVAL: 422 MS
QTC INTERVAL: 414 MS
T WAVE AXIS: 74 DEGREES
VENTRICULAR RATE: 58 BPM

## 2025-01-15 PROCEDURE — 93010 ELECTROCARDIOGRAM REPORT: CPT | Performed by: INTERNAL MEDICINE

## 2025-01-15 PROCEDURE — 99284 EMERGENCY DEPT VISIT MOD MDM: CPT | Performed by: EMERGENCY MEDICINE

## 2025-01-15 PROCEDURE — 96372 THER/PROPH/DIAG INJ SC/IM: CPT

## 2025-01-15 PROCEDURE — 70450 CT HEAD/BRAIN W/O DYE: CPT

## 2025-01-15 PROCEDURE — 99284 EMERGENCY DEPT VISIT MOD MDM: CPT

## 2025-01-15 PROCEDURE — 93005 ELECTROCARDIOGRAM TRACING: CPT

## 2025-01-15 RX ORDER — KETOROLAC TROMETHAMINE 30 MG/ML
15 INJECTION, SOLUTION INTRAMUSCULAR; INTRAVENOUS ONCE
Status: COMPLETED | OUTPATIENT
Start: 2025-01-15 | End: 2025-01-15

## 2025-01-15 RX ADMIN — KETOROLAC TROMETHAMINE 15 MG: 30 INJECTION, SOLUTION INTRAMUSCULAR; INTRAVENOUS at 06:35

## 2025-01-15 NOTE — DISCHARGE INSTRUCTIONS
At this time your CAT scan and EKG were unremarkable.  Please follow with your primary care doctor.    Return to ER if you develop any nausea or vomiting.

## 2025-01-15 NOTE — ED PROVIDER NOTES
Time reflects when diagnosis was documented in both MDM as applicable and the Disposition within this note       Time User Action Codes Description Comment    1/15/2025  7:26 AM Johnnie Christine Add [R51.9] Headache     1/15/2025  7:26 AM Johnnie Christine [I10] Hypertension           ED Disposition       ED Disposition   Discharge    Condition   Stable    Date/Time   Wed Tadeo 15, 2025  7:26 AM    Comment   Ml ENA Barton discharge to home/self care.                   Assessment & Plan       Medical Decision Making  74-year-old female presenting to the ED today for headache.  Will do a CT head without contrast to evaluate for intracranial hemorrhage.  Will do an EKG to evaluate for arrhythmia.  Will give IM Toradol for pain.  If negative CT will plan to discharge home.    Amount and/or Complexity of Data Reviewed  Radiology: ordered.    Risk  Prescription drug management.        ED Course as of 01/15/25 0729   Wed Tadeo 15, 2025   0611 Procedure Note: EKG  Date/Time: 01/15/25 6:11 AM   Interpreted by: Johnnie Christine   Indications / Diagnosis: HTN  ECG reviewed by me, the ED Provider: yes   The EKG demonstrates:  Rhythm: normal sinus  Intervals: normal intervals  Axis: normal axis  QRS/Blocks: normal QRS  ST Changes: No acute ST Changes, no STD/MAZIN.           Medications   ketorolac (TORADOL) injection 15 mg (15 mg Intramuscular Given 1/15/25 0635)       ED Risk Strat Scores                          SBIRT 22yo+      Flowsheet Row Most Recent Value   Initial Alcohol Screen: US AUDIT-C     1. How often do you have a drink containing alcohol? 3 Filed at: 01/15/2025 0601   2. How many drinks containing alcohol do you have on a typical day you are drinking?  0 Filed at: 01/15/2025 0601   3b. FEMALE Any Age, or MALE 65+: How often do you have 4 or more drinks on one occassion? 0 Filed at: 01/15/2025 0601   Audit-C Score 3 Filed at: 01/15/2025 0601   JOSIAH: How many times in the past year have you...    Used an illegal drug or  used a prescription medication for non-medical reasons? Never Filed at: 01/15/2025 0601                            History of Present Illness       Chief Complaint   Patient presents with    Headache     Patient reports waking up with a severe headache and bp was 200/120 did take her bp meds, and asa 325mg at 5am       Past Medical History:   Diagnosis Date    Abdominal pain     recent upper abd.    Acute diverticulitis     last assessed 16    Adenoma of left adrenal gland     Anxiety     Arthritis     Benign paroxysmal positional vertigo     last assessed 03/24/15    Colon polyp     DDD (degenerative disc disease), lumbar     PT and yoga helped    Disease of thyroid gland     Diverticulitis     hx of    Diverticulosis     Epigastric pain     wakes her up at night    Gastric ulcer     hx of    Hiatal hernia     Hyperlipidemia     Hypertension     Malignant hypertension     Obesity     Primary hypothyroidism     Primary hypothyroidism     SOB (shortness of breath) on exertion     with exertion when taking a certain medication    Vertigo     last assessed 13      Past Surgical History:   Procedure Laterality Date    APPENDECTOMY      BREAST BIOPSY Left     a long time ago     SECTION      CHOLECYSTECTOMY      COLONOSCOPY      KNEE SURGERY      History of Arthrotomy of Knee; Open Meniscus Tear; left    NC ARTHRP KNE CONDYLE&PLATU MEDIAL&LAT COMPARTMENTS Left 3/11/2024    Procedure: ARTHROPLASTY KNEE TOTAL W ROBOT - same day;  Surgeon: Myron Branch DO;  Location: [facility];  Service: Orthopedics    UPPER GASTROINTESTINAL ENDOSCOPY        Family History   Adopted: Yes   Problem Relation Age of Onset    Heart disease Family         Adopted but heard family member    No Known Problems Mother       Social History     Tobacco Use    Smoking status: Former     Current packs/day: 0.00     Types: Cigarettes     Start date:      Quit date: 1990     Years since quittin.8     Passive exposure:  Current    Smokeless tobacco: Never    Tobacco comments:     quit 23 years ago- only a social smoker    Vaping Use    Vaping status: Never Used   Substance Use Topics    Alcohol use: Yes     Alcohol/week: 2.0 standard drinks of alcohol     Types: 2 Glasses of wine per week     Comment: occasional- once or twice week     Drug use: Never      E-Cigarette/Vaping    E-Cigarette Use Never User       E-Cigarette/Vaping Substances    Nicotine No     THC No     CBD No     Flavoring No     Other No     Unknown No       I have reviewed and agree with the history as documented.     74-year-old female past medical history significant for hypertension and migraines presenting to the ED today for headache.  Patient states she woke up from sleep at around 330 with severe headache.  She says that she then checked her blood pressure and her blood pressure was 160s over 120.  She then continued to check and it got as high as 200 systolic.  Is concerned her as she started to look on the Internet and saw something about brain damage and so she came to the ED for evaluation.  She says that the headache has since gotten better.  She took aspirin 324 mg prior to coming in when the headache started.  No nausea or vomiting.  No fevers or chills.        Review of Systems   Constitutional:  Negative for chills and fever.   HENT:  Negative for hearing loss.    Eyes:  Negative for visual disturbance.   Respiratory:  Negative for shortness of breath.    Cardiovascular:  Negative for chest pain.   Gastrointestinal:  Negative for abdominal pain, constipation, diarrhea, nausea and vomiting.   Genitourinary:  Negative for difficulty urinating.   Musculoskeletal:  Negative for myalgias.   Skin:  Negative for color change.   Neurological:  Positive for headaches. Negative for dizziness.   Psychiatric/Behavioral:  Negative for agitation.    All other systems reviewed and are negative.          Objective       ED Triage Vitals   Temperature Pulse Blood  Pressure Respirations SpO2 Patient Position - Orthostatic VS   01/15/25 0606 01/15/25 0558 01/15/25 0558 01/15/25 0558 01/15/25 0558 01/15/25 0630   (!) 97.2 °F (36.2 °C) 61 (!) 182/88 18 98 % Lying      Temp Source Heart Rate Source BP Location FiO2 (%) Pain Score    01/15/25 0606 01/15/25 0558 01/15/25 0630 -- 01/15/25 0602    Tympanic Monitor Right arm  4      Vitals      Date and Time Temp Pulse SpO2 Resp BP Pain Score FACES Pain Rating User   01/15/25 0635 -- -- -- -- -- 3 -- SS   01/15/25 0630 -- 61 95 % 17 171/81 -- -- SS   01/15/25 0606 97.2 °F (36.2 °C) -- -- -- -- -- -- SS   01/15/25 0602 -- -- -- -- -- 4 -- KD   01/15/25 0558 -- 61 98 % 18 182/88 -- -- KD            Physical Exam  Vitals and nursing note reviewed.   Constitutional:       General: She is not in acute distress.     Appearance: Normal appearance. She is well-developed. She is not ill-appearing.   HENT:      Head: Normocephalic and atraumatic.      Right Ear: External ear normal.      Left Ear: External ear normal.      Nose: Nose normal. No congestion.      Mouth/Throat:      Mouth: Mucous membranes are moist.      Pharynx: Oropharynx is clear. No oropharyngeal exudate.   Eyes:      General:         Right eye: No discharge.         Left eye: No discharge.      Extraocular Movements: Extraocular movements intact.      Conjunctiva/sclera: Conjunctivae normal.      Pupils: Pupils are equal, round, and reactive to light.   Cardiovascular:      Rate and Rhythm: Normal rate and regular rhythm.      Heart sounds: Normal heart sounds. No murmur heard.     No friction rub. No gallop.   Pulmonary:      Effort: Pulmonary effort is normal. No respiratory distress.      Breath sounds: Normal breath sounds. No stridor. No wheezing.   Abdominal:      General: Bowel sounds are normal. There is no distension.      Palpations: Abdomen is soft.      Tenderness: There is no abdominal tenderness.   Musculoskeletal:         General: No swelling. Normal range of  motion.      Cervical back: Normal range of motion and neck supple. No rigidity.   Skin:     General: Skin is warm and dry.      Capillary Refill: Capillary refill takes less than 2 seconds.   Neurological:      General: No focal deficit present.      Mental Status: She is alert and oriented to person, place, and time. Mental status is at baseline.      Cranial Nerves: No cranial nerve deficit.      Sensory: No sensory deficit.      Motor: No weakness.      Gait: Gait normal.   Psychiatric:         Mood and Affect: Mood normal.         Behavior: Behavior normal.         Results Reviewed       None            CT head without contrast   Final Interpretation by Richard Gordillo MD (01/15 0719)      No acute intracranial abnormality.                  Workstation performed: [id number]             Procedures    ED Medication and Procedure Management   Prior to Admission Medications   Prescriptions Last Dose Informant Patient Reported? Taking?   ALPRAZolam (XANAX) 0.25 mg tablet   No No   Sig: Take 1 tablet (0.25 mg total) by mouth daily at bedtime as needed for anxiety or sleep for up to 30 doses   Alum & Mag Hydroxide-Simeth (MYLANTA PO)  Self Yes No   Sig: Take by mouth as needed   FLUoxetine (PROzac) 10 mg capsule   No No   Sig: TAKE 2 CAPSULES BY MOUTH DAILY   Magnesium 250 MG CAPS   Yes No   Sig: Take by mouth if needed   Synthroid 75 MCG tablet   No No   Sig: TAKE 1 TABLET BY MOUTH DAILY   amLODIPine (NORVASC) 10 mg tablet   No No   Sig: Take 1 tablet (10 mg total) by mouth daily   dicyclomine (BENTYL) 10 mg capsule   No No   Sig: TAKE 1 CAPSULE BY MOUTH AT  AT BEDTIME AS NEEDED   gabapentin (NEURONTIN) 300 mg capsule   No No   Sig: Take 1 capsule (300 mg total) by mouth daily at bedtime   losartan (COZAAR) 100 MG tablet   No No   Sig: TAKE 1 TABLET BY MOUTH DAILY   pantoprazole (PROTONIX) 40 mg tablet   No No   Sig: TAKE 1 TABLET BY MOUTH TWICE  DAILY WITH MEALS      Facility-Administered Medications: None      Patient's Medications   Discharge Prescriptions    No medications on file     No discharge procedures on file.  ED SEPSIS DOCUMENTATION   Time reflects when diagnosis was documented in both MDM as applicable and the Disposition within this note       Time User Action Codes Description Comment    1/15/2025  7:26 AM Johnnie Christine [R51.9] Headache     1/15/2025  7:26 AM Johnnie Christine [I10] Hypertension                  Johnnie Christine MD  01/15/25 0729

## 2025-01-16 ENCOUNTER — VBI (OUTPATIENT)
Dept: FAMILY MEDICINE CLINIC | Facility: CLINIC | Age: 75
End: 2025-01-16

## 2025-01-16 NOTE — TELEPHONE ENCOUNTER
01/16/25 9:36 AM    Patient contacted post ED visit, [facility] department spoke with patient/caregiver and outreach was successful.    Thank you.  Savage Berman MA  [facility]

## 2025-01-17 ENCOUNTER — RA CDI HCC (OUTPATIENT)
Dept: OTHER | Facility: HOSPITAL | Age: 75
End: 2025-01-17

## 2025-01-21 DIAGNOSIS — I10 BENIGN ESSENTIAL HYPERTENSION: ICD-10-CM

## 2025-01-22 RX ORDER — LOSARTAN POTASSIUM 100 MG/1
100 TABLET ORAL DAILY
Qty: 90 TABLET | Refills: 1 | Status: SHIPPED | OUTPATIENT
Start: 2025-01-22 | End: 2025-01-24

## 2025-01-24 ENCOUNTER — OFFICE VISIT (OUTPATIENT)
Dept: FAMILY MEDICINE CLINIC | Facility: CLINIC | Age: 75
End: 2025-01-24
Payer: COMMERCIAL

## 2025-01-24 VITALS
TEMPERATURE: 97.8 F | BODY MASS INDEX: 24.92 KG/M2 | HEART RATE: 64 BPM | SYSTOLIC BLOOD PRESSURE: 154 MMHG | WEIGHT: 132 LBS | HEIGHT: 61 IN | RESPIRATION RATE: 18 BRPM | DIASTOLIC BLOOD PRESSURE: 100 MMHG

## 2025-01-24 DIAGNOSIS — D35.02 ADENOMA OF LEFT ADRENAL GLAND: ICD-10-CM

## 2025-01-24 DIAGNOSIS — F41.1 GENERALIZED ANXIETY DISORDER: ICD-10-CM

## 2025-01-24 DIAGNOSIS — G25.81 RESTLESS LEG SYNDROME: ICD-10-CM

## 2025-01-24 DIAGNOSIS — E78.2 MIXED HYPERLIPIDEMIA: ICD-10-CM

## 2025-01-24 DIAGNOSIS — E03.9 PRIMARY HYPOTHYROIDISM: ICD-10-CM

## 2025-01-24 DIAGNOSIS — F33.0 MILD EPISODE OF RECURRENT MAJOR DEPRESSIVE DISORDER (HCC): ICD-10-CM

## 2025-01-24 DIAGNOSIS — J44.9 CHRONIC OBSTRUCTIVE PULMONARY DISEASE, UNSPECIFIED COPD TYPE (HCC): ICD-10-CM

## 2025-01-24 DIAGNOSIS — I10 BENIGN ESSENTIAL HYPERTENSION: Primary | ICD-10-CM

## 2025-01-24 PROCEDURE — G2211 COMPLEX E/M VISIT ADD ON: HCPCS | Performed by: FAMILY MEDICINE

## 2025-01-24 PROCEDURE — 99214 OFFICE O/P EST MOD 30 MIN: CPT | Performed by: FAMILY MEDICINE

## 2025-01-24 RX ORDER — ROPINIROLE 0.25 MG/1
0.25 TABLET, FILM COATED ORAL
Qty: 30 TABLET | Refills: 3 | Status: SHIPPED | OUTPATIENT
Start: 2025-01-24

## 2025-01-24 RX ORDER — OLMESARTAN MEDOXOMIL 40 MG/1
40 TABLET ORAL DAILY
Qty: 100 TABLET | Refills: 3 | Status: SHIPPED | OUTPATIENT
Start: 2025-01-24

## 2025-01-24 NOTE — ASSESSMENT & PLAN NOTE
Blood pressure is well-controlled  Continue amlodipine 10 mg daily and olmesartan 40 mg daily  Orders:  •  Ambulatory Referral to Medical Fitness Exercise Specialist; Future  •  olmesartan (BENICAR) 40 mg tablet; Take 1 tablet (40 mg total) by mouth daily  •  CBC; Future  •  TSH, 3rd generation; Future  •  Urinalysis with microscopic; Future  •  Renin Activity, Plasma; Future  •  DHEA; Future  •  Metanephrine, Fractionated Plasma Free; Future  •  ACTH; Future

## 2025-01-24 NOTE — ASSESSMENT & PLAN NOTE
Stable    Orders:  •  Comprehensive metabolic panel; Future  •  Lipid Panel with Direct LDL reflex; Future

## 2025-01-24 NOTE — PROGRESS NOTES
Name: Ml Barton      : 1950      MRN: [id number]  Encounter Provider: Meme Dorsey DO  Encounter Date: 2025   Encounter department: [facility]  :  Assessment & Plan  Benign essential hypertension  Blood pressure is well-controlled  Continue amlodipine 10 mg daily and olmesartan 40 mg daily  Orders:  •  Ambulatory Referral to Medical Fitness Exercise Specialist; Future  •  olmesartan (BENICAR) 40 mg tablet; Take 1 tablet (40 mg total) by mouth daily  •  CBC; Future  •  TSH, 3rd generation; Future  •  Urinalysis with microscopic; Future  •  Renin Activity, Plasma; Future  •  DHEA; Future  •  Metanephrine, Fractionated Plasma Free; Future  •  ACTH; Future    Chronic obstructive pulmonary disease, unspecified COPD type (HCC)  Stable       Mild episode of recurrent major depressive disorder (HCC)  Depression Screening Follow-up Plan: Patient's depression screening was positive with a PHQ-9 score of 16.   Patient is not at goal  Encouraged her to start exercising again  Will wean off of gabapentin and see if that helps improve her mood       Mixed hyperlipidemia  Stable    Orders:  •  Comprehensive metabolic panel; Future  •  Lipid Panel with Direct LDL reflex; Future    Primary hypothyroidism  Stable  Continue Synthroid 75 mcg daily       Restless leg syndrome  She is going to try taking the gabapentin every other day for a week and then stopping the medication.  If she can't sleep well without it will message for a refill   Will try requip instead   Orders:  •  rOPINIRole (REQUIP) 0.25 mg tablet; Take 1 tablet (0.25 mg total) by mouth daily at bedtime    Generalized anxiety disorder  Not at goal  Exercise and meditation recommended        Adenoma of left adrenal gland    Orders:  •  Renin Activity, Plasma; Future  •  DHEA; Future  •  Metanephrine, Fractionated Plasma Free; Future  •  ACTH; Future          Depression Screening and Follow-up Plan: Patient's depression screening was  "positive with a PHQ-9 score of 16.   Patient assessed for underlying major depression. Brief counseling provided and recommend additional follow-up/re-evaluation next office visit.     History of Present Illness   She is feeling anxious and then she eats.   She would like to get off the gabapentin.  She is worried that is making her more anxious and more depressed.  She has not been exercising.      Review of Systems    Objective   /100   Pulse 64   Temp 97.8 °F (36.6 °C)   Resp 18   Ht 5' 1\" (1.549 m)   Wt 59.9 kg (132 lb)   BMI 24.94 kg/m²      Physical Exam  Vitals and nursing note reviewed.   Constitutional:       General: She is not in acute distress.     Appearance: She is well-developed.   HENT:      Head: Normocephalic and atraumatic.      Right Ear: Tympanic membrane normal.      Left Ear: Tympanic membrane normal.   Cardiovascular:      Rate and Rhythm: Normal rate and regular rhythm.      Heart sounds: No murmur heard.  Pulmonary:      Effort: Pulmonary effort is normal. No respiratory distress.      Breath sounds: Normal breath sounds.   Musculoskeletal:      Cervical back: Neck supple.   Neurological:      Mental Status: She is alert.         "

## 2025-01-24 NOTE — ASSESSMENT & PLAN NOTE
She is going to try taking the gabapentin every other day for a week and then stopping the medication.  If she can't sleep well without it will message for a refill   Will try requip instead   Orders:  •  rOPINIRole (REQUIP) 0.25 mg tablet; Take 1 tablet (0.25 mg total) by mouth daily at bedtime

## 2025-01-24 NOTE — ASSESSMENT & PLAN NOTE
Depression Screening Follow-up Plan: Patient's depression screening was positive with a PHQ-9 score of 16.   Patient is not at goal  Encouraged her to start exercising again  Will wean off of gabapentin and see if that helps improve her mood

## 2025-01-24 NOTE — ASSESSMENT & PLAN NOTE
Orders:  •  Renin Activity, Plasma; Future  •  DHEA; Future  •  Metanephrine, Fractionated Plasma Free; Future  •  ACTH; Future

## 2025-02-10 DIAGNOSIS — E03.9 PRIMARY HYPOTHYROIDISM: ICD-10-CM

## 2025-02-11 RX ORDER — LEVOTHYROXINE SODIUM 75 MCG
75 TABLET ORAL DAILY
Qty: 90 TABLET | Refills: 1 | Status: SHIPPED | OUTPATIENT
Start: 2025-02-11

## 2025-02-21 ENCOUNTER — TELEPHONE (OUTPATIENT)
Age: 75
End: 2025-02-21

## 2025-02-21 NOTE — TELEPHONE ENCOUNTER
Patient on wait list for talk therapy services. Writer reached out to verify if Pt is still interested in service, and if would like to remain on [facility]. Pt did not want to verify last name and . Pt disconnected call.

## 2025-03-06 ENCOUNTER — OFFICE VISIT (OUTPATIENT)
Dept: OBGYN CLINIC | Facility: CLINIC | Age: 75
End: 2025-03-06
Payer: COMMERCIAL

## 2025-03-06 ENCOUNTER — APPOINTMENT (OUTPATIENT)
Dept: RADIOLOGY | Facility: CLINIC | Age: 75
End: 2025-03-06
Payer: COMMERCIAL

## 2025-03-06 DIAGNOSIS — Z96.652 S/P TOTAL KNEE REPLACEMENT USING CEMENT, LEFT: Primary | ICD-10-CM

## 2025-03-06 DIAGNOSIS — Z96.652 AFTERCARE FOLLOWING LEFT KNEE JOINT REPLACEMENT SURGERY: ICD-10-CM

## 2025-03-06 DIAGNOSIS — Z96.652 S/P TOTAL KNEE REPLACEMENT USING CEMENT, LEFT: ICD-10-CM

## 2025-03-06 DIAGNOSIS — Z47.1 AFTERCARE FOLLOWING LEFT KNEE JOINT REPLACEMENT SURGERY: ICD-10-CM

## 2025-03-06 PROCEDURE — 99214 OFFICE O/P EST MOD 30 MIN: CPT | Performed by: ORTHOPAEDIC SURGERY

## 2025-03-06 PROCEDURE — 73562 X-RAY EXAM OF KNEE 3: CPT

## 2025-03-06 NOTE — PROGRESS NOTES
"Assessment/Plan:  1. S/P total knee replacement using cement, left  XR knee 3 vw left non injury    Ambulatory Referral to Physical Therapy      2. Aftercare following left knee joint replacement surgery  XR knee 3 vw left non injury    Ambulatory Referral to Physical Therapy        Scribe Attestation      I,:  Heath Good am acting as a scribe while in the presence of the attending physician.:       I,:  Myron Branch, DO personally performed the services described in this documentation    as scribed in my presence.:           Ml is a pleasant 74-year-old female who returns today for follow-up evaluation 1 year status post left total knee arthroplasty.  I am very pleased with her imaging and her clinical presentation today in the office.  Her knee is functioning well on exam and I believe her discomfort is likely related to deconditioning.  I recommended reinitiating physical therapy and provided her with a referral for this today.  I would like to see her back in 3 months for repeat clinical evaluation.  All of her questions and concerns were addressed today.    Subjective: Follow-up evaluation 1 year status post left total knee arthroplasty    Patient ID: Ml Barton is a 74 y.o. female who returns today for follow-up evaluation 1 year status post left total knee arthroplasty.  At today's visit, she reports that she has been doing well overall.  She complains of \"burning and pulling\" about her knee at times.  This can occur at rest or with activity.  She also complains of occasional throbbing at night after a day of intense activity.  She notes that her knee has been functioning well.  She denies any recent injury or trauma.    Review of Systems   Constitutional:  Positive for activity change. Negative for chills, fever and unexpected weight change.   HENT:  Negative for hearing loss, nosebleeds and sore throat.    Eyes:  Negative for pain, redness and visual disturbance.   Respiratory:  Negative " for cough, shortness of breath and wheezing.    Cardiovascular:  Negative for chest pain, palpitations and leg swelling.   Gastrointestinal:  Negative for abdominal pain, nausea and vomiting.   Endocrine: Negative for polydipsia and polyuria.   Genitourinary:  Negative for dysuria and hematuria.   Musculoskeletal:  Negative for arthralgias, joint swelling and myalgias.        See HPI   Skin:  Negative for rash and wound.   Neurological:  Negative for dizziness, numbness and headaches.   Psychiatric/Behavioral:  Negative for decreased concentration and suicidal ideas. The patient is not nervous/anxious.          Past Medical History:   Diagnosis Date    Abdominal pain     recent upper abd.    Acute diverticulitis     last assessed 16    Adenoma of left adrenal gland     Anxiety     Arthritis     Benign paroxysmal positional vertigo     last assessed 03/24/15    Colon polyp     DDD (degenerative disc disease), lumbar     PT and yoga helped    Disease of thyroid gland     Diverticulitis     hx of    Diverticulosis     Epigastric pain     wakes her up at night    Gastric ulcer     hx of    Hiatal hernia     Hyperlipidemia     Hypertension     Malignant hypertension     Obesity     Primary hypothyroidism     Primary hypothyroidism     SOB (shortness of breath) on exertion     with exertion when taking a certain medication    Vertigo     last assessed 13       Past Surgical History:   Procedure Laterality Date    APPENDECTOMY      BREAST BIOPSY Left     a long time ago     SECTION      CHOLECYSTECTOMY      COLONOSCOPY      KNEE SURGERY      History of Arthrotomy of Knee; Open Meniscus Tear; left    CA ARTHRP KNE CONDYLE&PLATU MEDIAL&LAT COMPARTMENTS Left 3/11/2024    Procedure: ARTHROPLASTY KNEE TOTAL W ROBOT - same day;  Surgeon: Myron Branch DO;  Location: [facility];  Service: Orthopedics    UPPER GASTROINTESTINAL ENDOSCOPY         Family History   Adopted: Yes   Problem Relation Age of Onset     Heart disease Family         Adopted but heard family member    No Known Problems Mother        Social History     Occupational History    Occupation: Retired   Tobacco Use    Smoking status: Former     Current packs/day: 0.00     Types: Cigarettes     Start date:      Quit date: 1990     Years since quittin.9     Passive exposure: Current    Smokeless tobacco: Never    Tobacco comments:     quit 23 years ago- only a social smoker    Vaping Use    Vaping status: Never Used   Substance and Sexual Activity    Alcohol use: Yes     Alcohol/week: 2.0 standard drinks of alcohol     Types: 2 Glasses of wine per week     Comment: occasional- once or twice week     Drug use: Never    Sexual activity: Not on file         Current Outpatient Medications:     ALPRAZolam (XANAX) 0.25 mg tablet, Take 1 tablet (0.25 mg total) by mouth daily at bedtime as needed for anxiety or sleep for up to 30 doses, Disp: 30 tablet, Rfl: 0    Alum & Mag Hydroxide-Simeth (MYLANTA PO), Take by mouth as needed, Disp: , Rfl:     amLODIPine (NORVASC) 10 mg tablet, Take 1 tablet (10 mg total) by mouth daily, Disp: 100 tablet, Rfl: 3    dicyclomine (BENTYL) 10 mg capsule, TAKE 1 CAPSULE BY MOUTH AT  AT BEDTIME AS NEEDED, Disp: 90 capsule, Rfl: 2    FLUoxetine (PROzac) 10 mg capsule, TAKE 2 CAPSULES BY MOUTH DAILY, Disp: 180 capsule, Rfl: 3    gabapentin (NEURONTIN) 300 mg capsule, Take 1 capsule (300 mg total) by mouth daily at bedtime, Disp: 90 capsule, Rfl: 0    Magnesium 250 MG CAPS, Take by mouth if needed, Disp: , Rfl:     olmesartan (BENICAR) 40 mg tablet, Take 1 tablet (40 mg total) by mouth daily, Disp: 100 tablet, Rfl: 3    pantoprazole (PROTONIX) 40 mg tablet, TAKE 1 TABLET BY MOUTH TWICE  DAILY WITH MEALS, Disp: 180 tablet, Rfl: 1    rOPINIRole (REQUIP) 0.25 mg tablet, Take 1 tablet (0.25 mg total) by mouth daily at bedtime, Disp: 30 tablet, Rfl: 3    Synthroid 75 MCG tablet, TAKE 1 TABLET BY MOUTH DAILY, Disp: 90 tablet, Rfl:  1    Allergies   Allergen Reactions    Hydrochlorothiazide Other (See Comments)     hyponatremia    Crestor [Rosuvastatin] Other (See Comments)     Nightmares        Objective:  There were no vitals filed for this visit.    There is no height or weight on file to calculate BMI.    Left Knee Exam     Tenderness   The patient is experiencing tenderness in the medial joint line and lateral joint line.    Range of Motion   Extension:  0   Flexion:  130     Tests   Varus: negative Valgus: negative    Other   Erythema: absent  Scars: present  Sensation: normal  Pulse: present  Swelling: none  Effusion: no effusion present    Comments:  Well healed anterior surgical scar  Stable at 0, 30 and 90 degrees  Neurovascularly in tact distally  No warmth or erythema            Observations   Left Knee   Negative for effusion.       Physical Exam  Vitals and nursing note reviewed.   Constitutional:       Appearance: Normal appearance. She is well-developed.   HENT:      Head: Normocephalic and atraumatic.      Right Ear: External ear normal.      Left Ear: External ear normal.   Eyes:      General: No scleral icterus.     Extraocular Movements: Extraocular movements intact.      Conjunctiva/sclera: Conjunctivae normal.   Cardiovascular:      Rate and Rhythm: Normal rate.   Pulmonary:      Effort: Pulmonary effort is normal. No respiratory distress.   Musculoskeletal:      Cervical back: Normal range of motion and neck supple.      Left knee: No effusion.      Comments: See Ortho exam   Skin:     General: Skin is warm and dry.   Neurological:      General: No focal deficit present.      Mental Status: She is alert and oriented to person, place, and time.   Psychiatric:         Behavior: Behavior normal.         I have personally reviewed pertinent films in PACS.    X-ray of the left knee obtained on 3/6/2025 reviewed demonstrating a well-positioned and aligned cemented total knee arthroplasty without evidence of failure.  There is  no new fracture, dislocation, lytic or blastic lesion.    This document was created using speech voice recognition software.   Grammatical errors, random word insertions, pronoun errors, and incomplete sentences are an occasional consequence of this system due to software limitations, ambient noise, and hardware issues.   Any formal questions or concerns about content, text, or information contained within the body of this dictation should be directly addressed to the provider for clarification.

## 2025-03-26 ENCOUNTER — EVALUATION (OUTPATIENT)
Dept: PHYSICAL THERAPY | Facility: CLINIC | Age: 75
End: 2025-03-26
Payer: COMMERCIAL

## 2025-03-26 DIAGNOSIS — Z96.652 S/P TOTAL KNEE REPLACEMENT USING CEMENT, LEFT: ICD-10-CM

## 2025-03-26 DIAGNOSIS — Z96.652 AFTERCARE FOLLOWING LEFT KNEE JOINT REPLACEMENT SURGERY: ICD-10-CM

## 2025-03-26 DIAGNOSIS — Z47.1 AFTERCARE FOLLOWING LEFT KNEE JOINT REPLACEMENT SURGERY: ICD-10-CM

## 2025-03-26 PROCEDURE — 97162 PT EVAL MOD COMPLEX 30 MIN: CPT | Performed by: PHYSICAL THERAPIST

## 2025-03-26 NOTE — PROGRESS NOTES
PT Evaluation     Today's date: 3/26/2025  Patient name: Ml Barton  : 1950  MRN: [id number]  Referring provider: Myron Branch DO  Dx:   Encounter Diagnosis     ICD-10-CM    1. S/P total knee replacement using cement, left  Z96.652 Ambulatory Referral to Physical Therapy      2. Aftercare following left knee joint replacement surgery  Z47.1 Ambulatory Referral to Physical Therapy    Z96.652           Start Time: 1100  Stop Time: 1145  Total time in clinic (min): 45 minutes    Assessment  Impairments: abnormal gait, abnormal muscle tone, abnormal or restricted ROM, abnormal movement, activity intolerance, impaired balance, impaired physical strength, lacks appropriate home exercise program, pain with function, participation limitations, activity limitations and endurance  Functional limitations: decreased walking tolerance, decreased standing tolerance, decreased ability to perform ADLS    Assessment details: Ml Barton is a 75 y.o. female who presents with: S/P total knee replacement using cement, left  Aftercare following left knee joint replacement surgery. Pt presents with pain, decreased LE range of motion, decreased LE strength, impaired function, decreased activity tolerance, fair posture, fair balance, swelling , and poor body mechanics. Pt presents with these impairments and would benefit from skilled physical therapy to address these impairments in order to maximize their function. Pt s/p L TKR one year ago and states she has been having pain in anterior knee region.Pt appropriate for LE stretching and strengthening as tolerated.    Understanding of Dx/Px/POC: good     Prognosis: good    Goals  Short term goals:  (to be met in 4 weeks)  + Patient will have pain level 2/10 left knee with transfers   + Patient will report a 50% improvement in symptoms  + Patient will be independent in basic HEP    + Patient will demonstrate appropriate hip, knee, and ankle alignment with functional  "activities on uneven terrain    Long term goals:( to be met in 8 weeks)  +  Patient will be independent in a comprehensive home exercise program   +  Patient will have no gait deviations ambulating on level surfaces    +  Patient will report 85 % improvement improvement in symptoms   +  Patient will negotiate stairs up and down pain free with use of one railing.   +  Patient will have pain level 0/10, left knee with all ADLS  + Patient will increase FOTO score by 10 points  + Patient will demonstrate full MMT knee to have painfree gait                         Plan  Patient would benefit from: skilled physical therapy  Planned modality interventions: cryotherapy and thermotherapy: hydrocollator packs    Planned therapy interventions: abdominal trunk stabilization, activity modification, ADL training, balance, manual therapy, nerve gliding, neuromuscular re-education, patient/caregiver education, postural training, therapeutic activities, therapeutic exercise, therapeutic training, transfer training, graded exercise, graded activity, gait training, functional ROM exercises and flexibility    Frequency: 1x week  Duration in weeks: 8  Plan of Care beginning date: 3/26/2025  Plan of Care expiration date: 5/26/2025  Treatment plan discussed with: patient  Plan details: PT 1x/week x 8 weeks        Subjective Evaluation    History of Present Illness  Mechanism of injury: surgery  Mechanism of injury: Pt had a L TKR a yr ago and states she has an \"annoying\" pain 3/10.  Pt states pain is constant \"ache and burn\" with all different positions. No change in pain levels. Pt states she has not been able to exercise as she would like. Pt reporting she did not get to try the fitness center yet after her PT was completed last year but is still interested in trying the fitness center.          Recurrent probem    Quality of life: good    Patient Goals  Patient goals for therapy: decreased pain, improved balance, increased motion, " "increased strength and independence with ADLs/IADLs  Patient goal: \"to get rid of this pain in my knee\"  Pain  Current pain rating: 3  At best pain rating: 3  At worst pain ratin  Location: L knee  Quality: burning and dull ache  Relieving factors: change in position and heat  Aggravating factors: overhead activity, standing, sitting, stair climbing and walking  Progression: worsening    Social Support  Steps to enter house: yes  Stairs in house: yes   Lives in: multiple-level home    Employment status: not working  Hand dominance: right  Exercise history: no exercise routine    Treatments  Previous treatment: physical therapy        Objective     Palpation   Left   Tenderness of the rectus femoris, vastus lateralis and vastus medialis.     Neurological Testing     Sensation     Knee   Left Knee   Intact: Light touch    Right Knee   Intact: light touch     Active Range of Motion   Left Knee   Flexion: 135 degrees   Extension: 0 degrees     Right Knee   Flexion: 140 degrees   Extension: 0 degrees     Strength/Myotome Testing     Left Knee   Flexion: 4-  Extension: 4-  Quadriceps contraction: good    Right Knee   Flexion: 4-  Extension: 4-  Quadriceps contraction: good    General Comments:      Knee Comments  Trigger point release L rectus/left vastus medialis/lateralis to decrease mm tightness noted upon eval.                        Insurance:  AMA/CMS Eval/ Re-eval POC expires FOTO Auth #/ Referral # Total units  Start date  Expiration date Extension  Visit limitation?  PT only or  PT+OT? Co-Insurance   Aetna [facility] 3/26 5/26  BOMN       $10/visit                                                                  Date               Units:  Used               Authed:  Remaining                     Date               Units:  Used               Authed:  Remaining                      Date 3/26        Visit Number IE        Manual         Stick rolling L quads supine stick rolling                                          "    TherEx         Nu step         Hamstring stretch X10 hold 10        Quad stretch                                                      Neuro Re-Ed         Quad sets X5 hold 5        Hip add iso         bridge         LAQ X10 hold 5        Heel raises         Hip march                           TherAct         Patient education HEP issued and reviewed                                                                                Modalities                      Precautions: L TKR 1 yr ago  Past Medical History:   Diagnosis Date    Abdominal pain     recent upper abd.    Acute diverticulitis     last assessed 07/18/16    Adenoma of left adrenal gland     Anxiety     Arthritis     Benign paroxysmal positional vertigo     last assessed 03/24/15    Colon polyp     DDD (degenerative disc disease), lumbar     PT and yoga helped    Disease of thyroid gland     Diverticulitis     hx of    Diverticulosis     Epigastric pain     wakes her up at night    Gastric ulcer     hx of    Hiatal hernia     Hyperlipidemia     Hypertension     Malignant hypertension     Obesity     Primary hypothyroidism     Primary hypothyroidism     SOB (shortness of breath) on exertion     with exertion when taking a certain medication    Vertigo     last assessed 03/13/13

## 2025-04-01 DIAGNOSIS — G25.81 RESTLESS LEG SYNDROME: ICD-10-CM

## 2025-04-02 ENCOUNTER — OFFICE VISIT (OUTPATIENT)
Dept: PHYSICAL THERAPY | Facility: CLINIC | Age: 75
End: 2025-04-02
Payer: COMMERCIAL

## 2025-04-02 DIAGNOSIS — Z96.652 S/P TOTAL KNEE REPLACEMENT USING CEMENT, LEFT: Primary | ICD-10-CM

## 2025-04-02 DIAGNOSIS — Z47.1 AFTERCARE FOLLOWING LEFT KNEE JOINT REPLACEMENT SURGERY: ICD-10-CM

## 2025-04-02 DIAGNOSIS — Z96.652 AFTERCARE FOLLOWING LEFT KNEE JOINT REPLACEMENT SURGERY: ICD-10-CM

## 2025-04-02 PROCEDURE — 97112 NEUROMUSCULAR REEDUCATION: CPT | Performed by: PHYSICAL THERAPIST

## 2025-04-02 PROCEDURE — 97110 THERAPEUTIC EXERCISES: CPT | Performed by: PHYSICAL THERAPIST

## 2025-04-02 RX ORDER — ROPINIROLE 0.25 MG/1
0.25 TABLET, FILM COATED ORAL
Qty: 90 TABLET | Refills: 3 | Status: SHIPPED | OUTPATIENT
Start: 2025-04-02

## 2025-04-02 NOTE — PROGRESS NOTES
"Daily Note     Today's date: 2025  Patient name: Ml Barton  : 1950  MRN: [id number]  Referring provider: Myron Branch DO  Dx:   Encounter Diagnosis     ICD-10-CM    1. S/P total knee replacement using cement, left  Z96.652       2. Aftercare following left knee joint replacement surgery  Z47.1     Z96.652           Start Time: 1520  Stop Time: 1615  Total time in clinic (min): 55 minutes    Subjective: Pt reporting \"burning sensation\" L knee that is intermittent. Pt unable to state what activities cause symptoms.      Objective: See treatment diary below      Assessment: Tolerated treatment well. Emphasis on neuro re-education of L quads with all therex. Stick rolling to L quads and TP release L quads to decrease mm tightness. Pt reporting she has been working on desensitization of L scar region. Patient demonstrated fatigue post treatment, exhibited good technique with therapeutic exercises, and would benefit from continued PT      Plan: Continue per plan of care.  Progress treatment as tolerated.   Add increase reps NV.            Insurance:  AMA/CMS Eval/ Re-eval POC expires FOTO Auth #/ Referral # Total units  Start date  Expiration date Extension  Visit limitation?  PT only or  PT+OT? Co-Insurance   [employer] [facility] 3/26 5/26  BOMN       $10/visit                                                                  Date               Units:  Used               Authed:  Remaining                     Date               Units:  Used               Authed:  Remaining                      Date 3/26 4/2       Visit Number IE 2       Manual         Stick rolling L quads supine stick rolling  L quad supine stick rolling                                           TherEx         Nu step  X10 min .50 miles lev 3       Hamstring stretch X10 hold 10 X10 hold 10        Quad stretch                                                      Neuro Re-Ed         Quad sets X5 hold 5 2x10 hold 5       Hip add iso  3x10 hold 5   "     bridge  2x10 hold 5       LAQ X10 hold 5 2x10 hold 5       Heel raises         Hip march           SAQ 2x10 hold 5                TherAct         Patient education HEP issued and reviewed HEP reviewed                                                                               Modalities                      Precautions: L TKR 1 yr ago  Past Medical History:   Diagnosis Date    Abdominal pain     recent upper abd.    Acute diverticulitis     last assessed 07/18/16    Adenoma of left adrenal gland     Anxiety     Arthritis     Benign paroxysmal positional vertigo     last assessed 03/24/15    Colon polyp     DDD (degenerative disc disease), lumbar     PT and yoga helped    Disease of thyroid gland     Diverticulitis     hx of    Diverticulosis     Epigastric pain     wakes her up at night    Gastric ulcer     hx of    Hiatal hernia     Hyperlipidemia     Hypertension     Malignant hypertension     Obesity     Primary hypothyroidism     Primary hypothyroidism     SOB (shortness of breath) on exertion     with exertion when taking a certain medication    Vertigo     last assessed 03/13/13

## 2025-04-03 ENCOUNTER — APPOINTMENT (OUTPATIENT)
Dept: PHYSICAL THERAPY | Facility: CLINIC | Age: 75
End: 2025-04-03
Payer: COMMERCIAL

## 2025-04-03 DIAGNOSIS — F33.0 MILD EPISODE OF RECURRENT MAJOR DEPRESSIVE DISORDER (HCC): ICD-10-CM

## 2025-04-04 RX ORDER — FLUOXETINE 10 MG/1
20 CAPSULE ORAL DAILY
Qty: 180 CAPSULE | Refills: 1 | Status: SHIPPED | OUTPATIENT
Start: 2025-04-04

## 2025-04-09 ENCOUNTER — TELEPHONE (OUTPATIENT)
Dept: PHYSICAL THERAPY | Facility: CLINIC | Age: 75
End: 2025-04-09

## 2025-04-16 ENCOUNTER — APPOINTMENT (OUTPATIENT)
Dept: LAB | Facility: HOSPITAL | Age: 75
End: 2025-04-16
Attending: FAMILY MEDICINE
Payer: COMMERCIAL

## 2025-04-16 DIAGNOSIS — D35.02 ADENOMA OF LEFT ADRENAL GLAND: ICD-10-CM

## 2025-04-16 DIAGNOSIS — I10 BENIGN ESSENTIAL HYPERTENSION: ICD-10-CM

## 2025-04-16 DIAGNOSIS — E78.2 MIXED HYPERLIPIDEMIA: ICD-10-CM

## 2025-04-16 LAB
ALBUMIN SERPL BCG-MCNC: 3.9 G/DL (ref 3.5–5)
ALP SERPL-CCNC: 73 U/L (ref 34–104)
ALT SERPL W P-5'-P-CCNC: 7 U/L (ref 7–52)
ANION GAP SERPL CALCULATED.3IONS-SCNC: 10 MMOL/L (ref 4–13)
AST SERPL W P-5'-P-CCNC: 12 U/L (ref 13–39)
BACTERIA UR QL AUTO: ABNORMAL /HPF
BILIRUB SERPL-MCNC: 0.49 MG/DL (ref 0.2–1)
BILIRUB UR QL STRIP: NEGATIVE
BUN SERPL-MCNC: 14 MG/DL (ref 5–25)
CALCIUM SERPL-MCNC: 9 MG/DL (ref 8.4–10.2)
CHLORIDE SERPL-SCNC: 106 MMOL/L (ref 96–108)
CHOLEST SERPL-MCNC: 238 MG/DL (ref ?–200)
CLARITY UR: CLEAR
CO2 SERPL-SCNC: 24 MMOL/L (ref 21–32)
COLOR UR: YELLOW
CREAT SERPL-MCNC: 0.78 MG/DL (ref 0.6–1.3)
ERYTHROCYTE [DISTWIDTH] IN BLOOD BY AUTOMATED COUNT: 12.8 % (ref 11.6–15.1)
GFR SERPL CREATININE-BSD FRML MDRD: 73 ML/MIN/1.73SQ M
GLUCOSE P FAST SERPL-MCNC: 99 MG/DL (ref 65–99)
GLUCOSE UR STRIP-MCNC: NEGATIVE MG/DL
HCT VFR BLD AUTO: 41.7 % (ref 34.8–46.1)
HDLC SERPL-MCNC: 83 MG/DL
HGB BLD-MCNC: 13.5 G/DL (ref 11.5–15.4)
HGB UR QL STRIP.AUTO: ABNORMAL
KETONES UR STRIP-MCNC: NEGATIVE MG/DL
LDLC SERPL CALC-MCNC: 142 MG/DL (ref 0–100)
LEUKOCYTE ESTERASE UR QL STRIP: NEGATIVE
MCH RBC QN AUTO: 32.1 PG (ref 26.8–34.3)
MCHC RBC AUTO-ENTMCNC: 32.4 G/DL (ref 31.4–37.4)
MCV RBC AUTO: 99 FL (ref 82–98)
NITRITE UR QL STRIP: NEGATIVE
NON-SQ EPI CELLS URNS QL MICRO: ABNORMAL /HPF
PH UR STRIP.AUTO: 7 [PH]
PLATELET # BLD AUTO: 272 THOUSANDS/UL (ref 149–390)
PMV BLD AUTO: 8.9 FL (ref 8.9–12.7)
POTASSIUM SERPL-SCNC: 4 MMOL/L (ref 3.5–5.3)
PROT SERPL-MCNC: 6.8 G/DL (ref 6.4–8.4)
PROT UR STRIP-MCNC: ABNORMAL MG/DL
RBC # BLD AUTO: 4.21 MILLION/UL (ref 3.81–5.12)
RBC #/AREA URNS AUTO: ABNORMAL /HPF
SODIUM SERPL-SCNC: 140 MMOL/L (ref 135–147)
SP GR UR STRIP.AUTO: 1.02 (ref 1–1.03)
TRIGL SERPL-MCNC: 66 MG/DL (ref ?–150)
TSH SERPL DL<=0.05 MIU/L-ACNC: 2.71 UIU/ML (ref 0.45–4.5)
UROBILINOGEN UR STRIP-ACNC: <2 MG/DL
WBC # BLD AUTO: 6.51 THOUSAND/UL (ref 4.31–10.16)
WBC #/AREA URNS AUTO: ABNORMAL /HPF

## 2025-04-16 PROCEDURE — 81001 URINALYSIS AUTO W/SCOPE: CPT

## 2025-04-16 PROCEDURE — 82626 DEHYDROEPIANDROSTERONE: CPT

## 2025-04-16 PROCEDURE — 84244 ASSAY OF RENIN: CPT

## 2025-04-16 PROCEDURE — 83835 ASSAY OF METANEPHRINES: CPT

## 2025-04-16 PROCEDURE — 80061 LIPID PANEL: CPT

## 2025-04-16 PROCEDURE — 85027 COMPLETE CBC AUTOMATED: CPT

## 2025-04-16 PROCEDURE — 84443 ASSAY THYROID STIM HORMONE: CPT

## 2025-04-16 PROCEDURE — 82024 ASSAY OF ACTH: CPT

## 2025-04-16 PROCEDURE — 80053 COMPREHEN METABOLIC PANEL: CPT

## 2025-04-16 PROCEDURE — 36415 COLL VENOUS BLD VENIPUNCTURE: CPT

## 2025-04-17 ENCOUNTER — RESULTS FOLLOW-UP (OUTPATIENT)
Dept: FAMILY MEDICINE CLINIC | Facility: CLINIC | Age: 75
End: 2025-04-17

## 2025-04-17 ENCOUNTER — OFFICE VISIT (OUTPATIENT)
Dept: PHYSICAL THERAPY | Facility: CLINIC | Age: 75
End: 2025-04-17
Payer: COMMERCIAL

## 2025-04-17 DIAGNOSIS — Z96.652 S/P TOTAL KNEE REPLACEMENT USING CEMENT, LEFT: Primary | ICD-10-CM

## 2025-04-17 DIAGNOSIS — Z96.652 AFTERCARE FOLLOWING LEFT KNEE JOINT REPLACEMENT SURGERY: ICD-10-CM

## 2025-04-17 DIAGNOSIS — Z47.1 AFTERCARE FOLLOWING LEFT KNEE JOINT REPLACEMENT SURGERY: ICD-10-CM

## 2025-04-17 LAB — ACTH PLAS-MCNC: 14.7 PG/ML (ref 7.2–63.3)

## 2025-04-17 PROCEDURE — 97112 NEUROMUSCULAR REEDUCATION: CPT | Performed by: PHYSICAL THERAPIST

## 2025-04-17 PROCEDURE — 97110 THERAPEUTIC EXERCISES: CPT | Performed by: PHYSICAL THERAPIST

## 2025-04-17 NOTE — PROGRESS NOTES
"Daily Note/Discharge Summary  MRN: [id number]  Referring provider: Myron Branch DO  Dx:   Encounter Diagnosis     ICD-10-CM    1. S/P total knee replacement using cement, left  Z96.652       2. Aftercare following left knee joint replacement surgery  Z47.1     Z96.652           Start Time: 1300  Stop Time: 1400  Total time in clinic (min): 60 minutes    Subjective: Pt reporting \"burning sensation\" L knee that is intermittent. \"The burning is not as much\" \"today is going to be my last day\" Pt unable to state what activities cause symptoms when she does get the symptoms. Pt states she plans on joining the fitness center after Easter.     Objective: See treatment diary below      Assessment: Tolerated treatment well. Emphasis on neuro re-education of L quads with all therex. Stick rolling to L quads and TP release L quads to decrease mm tightness. Pt states able to perform all ADLs and mobility. Pt reporting she has been working on desensitization of L scar region. Patient reporting she is ready for discharge to fitness center.    Plan: DC to fitness center.            Insurance:  AMA/CMS Eval/ Re-eval POC expires FOTO Auth #/ Referral # Total units  Start date  Expiration date Extension  Visit limitation?  PT only or  PT+OT? Co-Insurance   Aetna [facility] 3/26 5/26  BOMN       $10/visit                                                                  Date               Units:  Used               Authed:  Remaining                     Date               Units:  Used               Authed:  Remaining                      Date 3/26 4/2 4/17      Visit Number IE 2 3      Manual         Stick rolling L quads supine stick rolling  L quad supine stick rolling L quad supine stick rolling         0-130 AROM L knee                                 TherEx         Nu step  X10 min .50 miles lev 3 X10 min .50 miles lev 3      Hamstring stretch X10 hold 10 X10 hold 10  X20 hold 10      Quad stretch                                         "              Neuro Re-Ed         Quad sets X5 hold 5 2x10 hold 5 2x10 hol d5      Hip add iso  3x10 hold 5 3x10 hold 5      bridge  2x10 hold 5 3x10 hold 5      LAQ X10 hold 5 2x10 hold 5 2x10 hold 5      Heel raises         Hip march           SAQ 2x10 hold 5 SAQ 2x10 hold 5                TherAct         Patient education HEP issued and reviewed HEP reviewed HEP reviewed                                                                              Modalities                      Precautions: L TKR 1 yr ago  Past Medical History:   Diagnosis Date    Abdominal pain     recent upper abd.    Acute diverticulitis     last assessed 07/18/16    Adenoma of left adrenal gland     Anxiety     Arthritis     Benign paroxysmal positional vertigo     last assessed 03/24/15    Colon polyp     DDD (degenerative disc disease), lumbar     PT and yoga helped    Disease of thyroid gland     Diverticulitis     hx of    Diverticulosis     Epigastric pain     wakes her up at night    Gastric ulcer     hx of    Hiatal hernia     Hyperlipidemia     Hypertension     Malignant hypertension     Obesity     Primary hypothyroidism     Primary hypothyroidism     SOB (shortness of breath) on exertion     with exertion when taking a certain medication    Vertigo     last assessed 03/13/13

## 2025-04-19 DIAGNOSIS — K21.00 HIATAL HERNIA WITH GERD AND ESOPHAGITIS: ICD-10-CM

## 2025-04-19 DIAGNOSIS — R10.13 EPIGASTRIC PAIN: ICD-10-CM

## 2025-04-19 DIAGNOSIS — K44.9 HIATAL HERNIA WITH GERD AND ESOPHAGITIS: ICD-10-CM

## 2025-04-20 LAB
METANEPH FREE SERPL-MCNC: <25 PG/ML (ref 0–88)
NORMETANEPHRINE SERPL-MCNC: 91.1 PG/ML (ref 0–285.2)

## 2025-04-21 RX ORDER — PANTOPRAZOLE SODIUM 40 MG/1
40 TABLET, DELAYED RELEASE ORAL 2 TIMES DAILY WITH MEALS
Qty: 180 TABLET | Refills: 3 | OUTPATIENT
Start: 2025-04-21

## 2025-04-22 ENCOUNTER — OFFICE VISIT (OUTPATIENT)
Dept: AUDIOLOGY | Facility: CLINIC | Age: 75
End: 2025-04-22

## 2025-04-22 DIAGNOSIS — H90.3 SENSORY HEARING LOSS, BILATERAL: Primary | ICD-10-CM

## 2025-04-23 ENCOUNTER — APPOINTMENT (OUTPATIENT)
Dept: PHYSICAL THERAPY | Facility: CLINIC | Age: 75
End: 2025-04-23
Payer: COMMERCIAL

## 2025-04-23 LAB — RENIN PLAS-CCNC: 0.42 NG/ML/HR (ref 0.17–5.38)

## 2025-04-23 NOTE — PROGRESS NOTES
Hearing Aid Visit:    Name:  Ml Barton  :  1950  Age:  75 y.o.  MRN:  [id number]  Date of Evaluation: 2025    HISTORY:    Ml Barton was seen today (2025) for a(n) in-warranty hearing aid check of her bilateral hearing aids. Today, Ml is reporting the left earmold does not stay in her canal.   This has been an issue for many months but she did not report it to us.      Most recent Audiogram: 2023    DEVICE INFORMATION:    Hearing Aid Fitting Date: 2023       Left Device Right Device   Hearing Aid Make: Ot[employer]riika [employer]   Hearing Aid Model: Real 3 Real 3   Serial Number: [id number]   Repair Warranty Expiration Date: 2026   Loss/Damage Warranty Expiration Date:  2026    Length: 2 2    Output: 85 85   Wax System: NoiseFree   Dome Size/Style: 6 mm double vent 6 mm double vent   Battery: Lithium-ion Rechargeable Lithium-ion Rechargeable   Earmold Serial Number: [id number]   Earmold Warranty Date:  2025       Serial Number: [id number]  Accessories: N/A    ACTION/ADJUSTMENTS:  The left mold (canal) was not staying in place with jaw movement / the right one is ok   The mold is 3 months out of warranty and [employer] would not allow a remake   Took impression for new mold left ear   Firmware to 1.1.2    Fine tuned both devices per patient reports     RECOMMENDATIONS:   RTO 25 for refit of left earmold     Carolyn Myrick, CCC-A  Clinical Audiologist  [id number]  [facility] AUDIOLOG[facility]  [address] [id number]

## 2025-04-25 ENCOUNTER — OFFICE VISIT (OUTPATIENT)
Age: 75
End: 2025-04-25
Payer: COMMERCIAL

## 2025-04-25 VITALS
OXYGEN SATURATION: 97 % | BODY MASS INDEX: 26.5 KG/M2 | HEIGHT: 60 IN | WEIGHT: 135 LBS | DIASTOLIC BLOOD PRESSURE: 80 MMHG | HEART RATE: 66 BPM | SYSTOLIC BLOOD PRESSURE: 117 MMHG

## 2025-04-25 DIAGNOSIS — Z86.0100 HISTORY OF COLON POLYPS: ICD-10-CM

## 2025-04-25 DIAGNOSIS — K40.90 UNILATERAL INGUINAL HERNIA WITHOUT OBSTRUCTION OR GANGRENE, RECURRENCE NOT SPECIFIED: ICD-10-CM

## 2025-04-25 DIAGNOSIS — K21.00 HIATAL HERNIA WITH GERD AND ESOPHAGITIS: Primary | ICD-10-CM

## 2025-04-25 DIAGNOSIS — K59.00 CONSTIPATION, UNSPECIFIED CONSTIPATION TYPE: ICD-10-CM

## 2025-04-25 DIAGNOSIS — K44.9 HIATAL HERNIA WITH GERD AND ESOPHAGITIS: Primary | ICD-10-CM

## 2025-04-25 PROCEDURE — 99214 OFFICE O/P EST MOD 30 MIN: CPT | Performed by: PHYSICIAN ASSISTANT

## 2025-04-25 NOTE — ASSESSMENT & PLAN NOTE
Patient with history of hiatal hernia was some lower esophageal erythema and some ulceration with erosion in antrum back in 2021, has been taking pantoprazole and denies any excessive NSAID use but denies any significant GERD symptoms, patient can try to taper off PPI so have advised her to take pantoprazole every other day for about 2 weeks and then stop

## 2025-04-25 NOTE — PROGRESS NOTES
Name: Ml Barton      : 1950      MRN: [id number]  Encounter Provider: Gabrielle Rivera PA-C  Encounter Date: 2025   Encounter department: [facility]  :  Assessment & Plan  Hiatal hernia with GERD and esophagitis  Patient with history of hiatal hernia was some lower esophageal erythema and some ulceration with erosion in antrum back in , has been taking pantoprazole and denies any excessive NSAID use but denies any significant GERD symptoms, patient can try to taper off PPI so have advised her to take pantoprazole every other day for about 2 weeks and then stop       History of colon polyps  Patient does have history of large sessile polyp and patient was recommended to have colonoscopy for 3-year recall although patient is refusing to have any other colonoscopies       Constipation, unspecified constipation type  Patient with history of constipation, patient states this is doing better on magnesium supplementation, patient will resume stool softeners       Unilateral inguinal hernia without obstruction or gangrene, recurrence not specified  Patient with left inguinal hernia and I did advise her we could refer her to surgeon but she would like to hold off on this time but will reach out if symptoms worsen     We will see her in 1 year for a follow-up      History of Present Illness   Ml Barton is a 75 y.o. female who presents today for office visit.  Does have history of colon polyps in the past and was getting colonoscopies about every 3 years as patient did have large sessile polyp back in .  Patient reports that she does not want another colonoscopy and last colonoscopy was performed in  which showed a small polyp with benign pathology.  She reports that she still wanted to come in for the office visit to discuss her hernia but patient actually has a small fat-containing left inguinal hernia and had an ultrasound last year.  She  reports that this sometimes hurts but she has been having a lot of other office visits so she does not know if she would want any intervention for this anyway.  Does have a history of GERD according to the chart and she did have a small hernia and EGD back in 2021 which did show occasional goblet cell on biopsy but has no significant reflux symptoms.  Patient has been taking pantoprazole but was wondering if she needed to stay on this.  She also has difficulty moving her bowels but she is taking magnesium at night.  She reports this has helped and she is also taking stool softener.  HPI    Review of Systems A complete review of systems is negative other than that noted above in the HPI.      Current Outpatient Medications   Medication Sig Dispense Refill    ALPRAZolam (XANAX) 0.25 mg tablet Take 1 tablet (0.25 mg total) by mouth daily at bedtime as needed for anxiety or sleep for up to 30 doses 30 tablet 0    Alum & Mag Hydroxide-Simeth (MYLANTA PO) Take by mouth as needed      amLODIPine (NORVASC) 10 mg tablet Take 1 tablet (10 mg total) by mouth daily 100 tablet 3    dicyclomine (BENTYL) 10 mg capsule TAKE 1 CAPSULE BY MOUTH AT  AT BEDTIME AS NEEDED 90 capsule 2    FLUoxetine (PROzac) 10 mg capsule TAKE 2 CAPSULES BY MOUTH DAILY 180 capsule 1    Magnesium 250 MG CAPS Take by mouth if needed      olmesartan (BENICAR) 40 mg tablet Take 1 tablet (40 mg total) by mouth daily 100 tablet 3    pantoprazole (PROTONIX) 40 mg tablet TAKE 1 TABLET BY MOUTH TWICE  DAILY WITH MEALS 180 tablet 1    rOPINIRole (REQUIP) 0.25 mg tablet TAKE 1 TABLET BY MOUTH DAILY AT  BEDTIME (Patient taking differently: Take 0.25 mg by mouth daily at bedtime As needed) 90 tablet 3    Synthroid 75 MCG tablet TAKE 1 TABLET BY MOUTH DAILY 90 tablet 1    gabapentin (NEURONTIN) 300 mg capsule Take 1 capsule (300 mg total) by mouth daily at bedtime (Patient not taking: Reported on 4/25/2025) 90 capsule 0     No current facility-administered medications  for this visit.     Objective   /80 (BP Location: Left arm, Patient Position: Sitting, Cuff Size: Standard)   Pulse 66   Ht 5' (1.524 m)   Wt 61.2 kg (135 lb) Comment: verbally from patient  SpO2 97%   BMI 26.37 kg/m²     Physical Exam General Alert no acute distress  Heart normal S1-S2  Lungs clear to auscultation  Abdomen soft positive bowel sounds nontender nondistended      Lab Results: I personally reviewed relevant lab results.       Results for orders placed during the hospital encounter of 11/01/21    EGD    Impression  No ulceration or significant  hiatal hernia seen.    RECOMMENDATION:  Await pathology results, anti-reflux measures reviewed diet discussed.    Kirby Morse MD    Colonoscopy-11/21-  IMPRESSION:  1. Small polyp removed.  2. Scattered diverticulosis.  Sigmoid colon was somewhat tortuous and fixed, I had to use an ultra slim colonoscope to advance scope through this area      Path- egd/colon-  Patient with squamocolumnar junction glandular epithelium esophageal mucosa with chronic gastritis with rare goblet cells seen with chronic esophagitis suggestive of reflux with no dysplasia identified and then a ascending colon polyp showed marked increase of chronic inflammatory cells of the lamina propria and no active colitis and no features of microscopic colitis crypt distortion or granuloma or parasites

## 2025-04-26 LAB — DHEA SERPL-MCNC: 95 NG/DL (ref 21–402)

## 2025-04-29 NOTE — PROGRESS NOTES
Progress Note    Name:  Ml Barton  :  1950  Age:  75 y.o.  MRN:  [id number]  Date of Evaluation: 25     Remade earmold arrived.  [employer] mold hard  L# [id number]  Already scheduled.    Carolyn Grijalva  Clinical Audiologist

## 2025-04-30 ENCOUNTER — APPOINTMENT (OUTPATIENT)
Dept: PHYSICAL THERAPY | Facility: CLINIC | Age: 75
End: 2025-04-30
Payer: COMMERCIAL

## 2025-05-06 ENCOUNTER — OFFICE VISIT (OUTPATIENT)
Dept: AUDIOLOGY | Facility: CLINIC | Age: 75
End: 2025-05-06
Payer: COMMERCIAL

## 2025-05-06 DIAGNOSIS — H90.3 SENSORY HEARING LOSS, BILATERAL: Primary | ICD-10-CM

## 2025-05-06 PROCEDURE — V5264 EAR MOLD/INSERT: HCPCS | Performed by: AUDIOLOGIST

## 2025-05-06 NOTE — PROGRESS NOTES
"Progress Note    Name:  Ml Barton  :  1950  Age:  75 y.o.  MRN:  [id number]  Date of Evaluation: 25     HISTORY:    The patient is here today to  a new left earmold.   Her 1st earmold was producing a \"clicking\" sound when she moved her mouth.   It was out of warranty for remake.      ACTION/ADJUSTMENTS:  New earmold was placed.   She was able to move her jaw without the clicking.  She could \"feel\" it in her ear, but we discussed that she needs to wear it for a while to see how it is with jaw movement and body heat.       We made an appointment for 2025 prior to the end of the remake period in case she needs it.  I advised her to call as soon as she notes if the mold is a fit issue.      Carolyn Myrick, CCC-A  Clinical Audiologist  [id number]  [phone]  "

## 2025-07-13 DIAGNOSIS — I10 BENIGN ESSENTIAL HYPERTENSION: ICD-10-CM

## 2025-07-13 DIAGNOSIS — E03.9 PRIMARY HYPOTHYROIDISM: ICD-10-CM

## 2025-07-15 RX ORDER — AMLODIPINE BESYLATE 10 MG/1
10 TABLET ORAL DAILY
Qty: 90 TABLET | Refills: 1 | Status: SHIPPED | OUTPATIENT
Start: 2025-07-15

## 2025-07-15 RX ORDER — LEVOTHYROXINE SODIUM 75 MCG
75 TABLET ORAL DAILY
Qty: 90 TABLET | Refills: 1 | Status: SHIPPED | OUTPATIENT
Start: 2025-07-15

## 2025-07-16 ENCOUNTER — OFFICE VISIT (OUTPATIENT)
Dept: AUDIOLOGY | Facility: CLINIC | Age: 75
End: 2025-07-16

## 2025-07-16 DIAGNOSIS — H90.3 SENSORY HEARING LOSS, BILATERAL: Primary | ICD-10-CM

## 2025-07-16 NOTE — PROGRESS NOTES
"Progress Note    Name:  Ml Barton  :  1950  Age:  75 y.o.  MRN:  [id number]  Date of Evaluation: 25     Patient seen today for follow up to fit of LEFT earmold.   It continues to \"move in and out\" and \"click\" while in her ear.  She does not always wear the aid because of it.    I called [facility] ([facility] [employer]) to check the scan.   We chose to remake with a half skeleton.  Will call patient to schedule when it returns from the lab.   They are reporting up to 10 days.      Carolyn Myrick, CCC-A  Clinical Audiologist  [id number]  [phone]  "

## 2025-07-23 DIAGNOSIS — I10 BENIGN ESSENTIAL HYPERTENSION: ICD-10-CM

## 2025-07-23 RX ORDER — OLMESARTAN MEDOXOMIL 40 MG/1
40 TABLET ORAL DAILY
Qty: 100 TABLET | Refills: 0 | Status: SHIPPED | OUTPATIENT
Start: 2025-07-23

## 2025-07-29 NOTE — PROGRESS NOTES
CHECK-IN    Name:  Ml Barton  :  1950  Age:  75 y.o.  MRN:  [id number]  Date of Evaluation: 25     Mold arrived today  SN: [id number]  Warranty:2025    Ten Mcbride, Carolyn, CCC-A  Clinical Audiologist

## 2025-07-31 ENCOUNTER — NURSE TRIAGE (OUTPATIENT)
Age: 75
End: 2025-07-31

## 2025-07-31 DIAGNOSIS — I10 BENIGN ESSENTIAL HYPERTENSION: ICD-10-CM

## 2025-07-31 RX ORDER — OLMESARTAN MEDOXOMIL 40 MG/1
40 TABLET ORAL DAILY
Qty: 100 TABLET | Refills: 0 | Status: SHIPPED | OUTPATIENT
Start: 2025-07-31

## 2025-08-05 ENCOUNTER — OFFICE VISIT (OUTPATIENT)
Dept: AUDIOLOGY | Facility: CLINIC | Age: 75
End: 2025-08-05

## 2025-08-05 DIAGNOSIS — H90.3 SENSORY HEARING LOSS, BILATERAL: Primary | ICD-10-CM

## (undated) DEVICE — 3M™ IOBAN™ 2 ANTIMICROBIAL INCISE DRAPE 6651EZ: Brand: IOBAN™ 2

## (undated) DEVICE — DISPOSABLE EQUIPMENT COVER: Brand: LARGE TOWEL DRAPE

## (undated) DEVICE — ANTIBACTERIAL UNDYED BRAIDED (POLYGLACTIN 910), SYNTHETIC ABSORBABLE SUTURE: Brand: COATED VICRYL

## (undated) DEVICE — DRAPE SHEET X-LG

## (undated) DEVICE — VELYS ROBOT-ASSISTED SOLUTION ARRAY DRILL PIN 100 MM X 4 MM: Brand: VELYS

## (undated) DEVICE — GLOVE INDICATOR PI UNDERGLOVE SZ 8.5 BLUE

## (undated) DEVICE — INSTRUMENT POUCH: Brand: CONVERTORS

## (undated) DEVICE — ANTI-EMBOLISM STOCKINGS,THIGH LENGTH,MEDIUM,REGULAR,SIZE E: Brand: T.E.D.

## (undated) DEVICE — VELYS SATEL DRAPE

## (undated) DEVICE — DRESSING MEPILEX AG BORDER POST-OP 4 X 12 IN

## (undated) DEVICE — CAPIT KNEE ATTUNE FB W/DOM

## (undated) DEVICE — ORTHOPEDIC PACK: Brand: CARDINAL HEALTH

## (undated) DEVICE — VELYS ROBOT DRAPE

## (undated) DEVICE — INTENDED FOR TISSUE SEPARATION, AND OTHER PROCEDURES THAT REQUIRE A SHARP SURGICAL BLADE TO PUNCTURE OR CUT.: Brand: BARD-PARKER ® CARBON RIB-BACK BLADES

## (undated) DEVICE — SKIN MARKER DUAL TIP WITH RULER CAP, FLEXIBLE RULER AND LABELS: Brand: DEVON

## (undated) DEVICE — CHLORAPREP HI-LITE 26ML ORANGE

## (undated) DEVICE — GLOVE SRG BIOGEL 7.5

## (undated) DEVICE — Device

## (undated) DEVICE — VELYS ROBOTIC-ASSISTED SOLUTION ARRAY SET - KNEE: Brand: VELYS

## (undated) DEVICE — VELYS ROBOT-ASSISTED SOLUTION ARRAY DRILL PIN 125 MM X 4 MM: Brand: VELYS

## (undated) DEVICE — SUT VICRYL 0 CP-1 27 IN J267H

## (undated) DEVICE — HOOD: Brand: FLYTE, SURGICOOL

## (undated) DEVICE — NEPTUNE E-SEP SMOKE EVACUATION PENCIL, COATED, 70MM BLADE, PUSH BUTTON SWITCH: Brand: NEPTUNE E-SEP

## (undated) DEVICE — BANDAGE, ESMARK LF STR 6"X9' (20/CS): Brand: CYPRESS

## (undated) DEVICE — 450 ML BOTTLE OF 0.05% CHLORHEXIDINE GLUCONATE IN 99.95% STERILE WATER FOR IRRIGATION, USP AND APPLICATOR.: Brand: IRRISEPT ANTIMICROBIAL WOUND LAVAGE

## (undated) DEVICE — GLOVE INDICATOR PI UNDERGLOVE SZ 8 BLUE

## (undated) DEVICE — GLOVE INDICATOR PI UNDERGLOVE SZ 7.5 BLUE

## (undated) DEVICE — 3M™ STERI-DRAPE™ U-DRAPE 1015: Brand: STERI-DRAPE™

## (undated) DEVICE — SUT STRATFIX SPIRAL PDS PLUS 1 CT-1/CT-1 12IN SXPP2B403

## (undated) DEVICE — VEST SURGEON DISPOSABLE

## (undated) DEVICE — CUFF TOURNIQUET 34 X 4 IN QUICK CONNECT DISP 1BLA

## (undated) DEVICE — GLOVE SRG BIOGEL ORTHOPEDIC 8.5

## (undated) DEVICE — HANDPIECE SET WITH HIGH FLOW TIP AND SUCTION TUBE: Brand: INTERPULSE

## (undated) DEVICE — REPEL CUT REST SURGICAL GLV LNRS LG: Brand: REPEL

## (undated) DEVICE — GLOVE SRG BIOGEL 8

## (undated) DEVICE — SUT STRATAFIX SPIRAL 3-0 PGA/PCL 30 X 30 CM SXMD2B410

## (undated) DEVICE — DRESSING MEPILEX AG BORDER 4 X 12 IN

## (undated) DEVICE — BASIC DOUBLE BASIN 2-LF: Brand: MEDLINE INDUSTRIES, INC.

## (undated) DEVICE — DUAL CUT SAGITTAL BLADE

## (undated) DEVICE — ASTOUND SURGICAL GOWN, XXX LARGE, X-LONG: Brand: CONVERTORS

## (undated) DEVICE — TIBURON EXTREMITY SHEET: Brand: CONVERTORS

## (undated) DEVICE — VELYS BLADE SAW OSC 85 X 19 X 2MM

## (undated) DEVICE — COBAN 4 IN STERILE

## (undated) DEVICE — 3 BONE CEMENT MIXER: Brand: MIXEVAC

## (undated) DEVICE — ADHESIVE SKIN HIGH VISCOSITY EXOFIN 1ML